# Patient Record
Sex: MALE | Race: WHITE | NOT HISPANIC OR LATINO | Employment: OTHER | URBAN - METROPOLITAN AREA
[De-identification: names, ages, dates, MRNs, and addresses within clinical notes are randomized per-mention and may not be internally consistent; named-entity substitution may affect disease eponyms.]

---

## 2017-03-15 DIAGNOSIS — E78.1 HIGH BLOOD TRIGLYCERIDES: ICD-10-CM

## 2017-03-15 DIAGNOSIS — I10 ESSENTIAL HYPERTENSION, BENIGN: ICD-10-CM

## 2017-03-15 DIAGNOSIS — E78.5 HYPERLIPIDEMIA LDL GOAL <130: ICD-10-CM

## 2017-03-15 RX ORDER — SIMVASTATIN 40 MG
40 TABLET ORAL AT BEDTIME
Qty: 30 TABLET | Refills: 5 | Status: SHIPPED | OUTPATIENT
Start: 2017-03-15 | End: 2017-11-07

## 2017-03-15 RX ORDER — OLMESARTAN MEDOXOMIL 40 MG/1
40 TABLET ORAL DAILY
Qty: 30 TABLET | Refills: 5 | Status: SHIPPED | OUTPATIENT
Start: 2017-03-15 | End: 2017-11-03

## 2017-03-15 NOTE — TELEPHONE ENCOUNTER
Prescription approved per [facility] Refill Protocol.  Clarisa Farrell RN

## 2017-03-15 NOTE — TELEPHONE ENCOUNTER
simvastatin (ZOCOR) 40 MG tablet     Last Written Prescription Date: 11/5/816  Last Fill Quantity: 30, # refills: 3  [place] with [facility] or [facility] prescribing provider: 10/24/2016         Lab Results   Component Value Date    CHOL 163 08/02/2016     Lab Results   Component Value Date    HDL 34 08/02/2016     Lab Results   Component Value Date    LDL  08/02/2016     Cannot estimate LDL when triglyceride exceeds 400 mg/dL    LDL 74 08/02/2016     Lab Results   Component Value Date    TRIG 462 08/02/2016     Lab Results   Component Value Date    CHOLHDLRATIO 5.2 05/15/2015   ________________________________________________________________________________    olmesartan (BENICAR) 40 MG tablet      Last Written Prescription Date: 11/5/16  Last Fill Quantity: 30, # refills: 3  Last [place] with [facility] or [facility] prescribing provider: 10/24/2016       Potassium   Date Value Ref Range Status   08/02/2016 4.3 3.4 - 5.3 mmol/L Final     Creatinine   Date Value Ref Range Status   08/02/2016 0.93 0.66 - 1.25 mg/dL Final     BP Readings from Last 3 Encounters:   10/24/16 142/80   08/23/16 136/80   08/02/16 132/80       OLYA Wilkinson

## 2017-05-05 ENCOUNTER — HOSPITAL ENCOUNTER (INPATIENT)
Facility: CLINIC | Age: 62
LOS: 2 days | Discharge: HOME | DRG: 305 | End: 2017-05-07
Attending: EMERGENCY MEDICINE | Admitting: HOSPITALIST
Payer: COMMERCIAL

## 2017-05-05 ENCOUNTER — APPOINTMENT (OUTPATIENT)
Dept: GENERAL RADIOLOGY | Facility: CLINIC | Age: 62
DRG: 305 | End: 2017-05-05
Attending: EMERGENCY MEDICINE
Payer: COMMERCIAL

## 2017-05-05 DIAGNOSIS — E78.5 HYPERLIPIDEMIA LDL GOAL <130: ICD-10-CM

## 2017-05-05 DIAGNOSIS — I16.1 HYPERTENSIVE EMERGENCY: ICD-10-CM

## 2017-05-05 DIAGNOSIS — I10 ESSENTIAL HYPERTENSION, BENIGN: Primary | ICD-10-CM

## 2017-05-05 DIAGNOSIS — I20.0 UNSTABLE ANGINA (H): ICD-10-CM

## 2017-05-05 LAB
ALBUMIN SERPL-MCNC: 4.2 G/DL (ref 3.4–5)
ALP SERPL-CCNC: 116 U/L (ref 40–150)
ALT SERPL W P-5'-P-CCNC: 48 U/L (ref 0–70)
ANION GAP SERPL CALCULATED.3IONS-SCNC: 9 MMOL/L (ref 3–14)
AST SERPL W P-5'-P-CCNC: 37 U/L (ref 0–45)
BASOPHILS # BLD AUTO: 0 10E9/L (ref 0–0.2)
BASOPHILS NFR BLD AUTO: 0.4 %
BILIRUB SERPL-MCNC: 0.6 MG/DL (ref 0.2–1.3)
BUN SERPL-MCNC: 14 MG/DL (ref 7–30)
CALCIUM SERPL-MCNC: 9.5 MG/DL (ref 8.5–10.1)
CHLORIDE SERPL-SCNC: 105 MMOL/L (ref 94–109)
CO2 SERPL-SCNC: 24 MMOL/L (ref 20–32)
CREAT SERPL-MCNC: 0.95 MG/DL (ref 0.66–1.25)
D DIMER PPP FEU-MCNC: 0.3 UG/ML FEU (ref 0–0.5)
DIFFERENTIAL METHOD BLD: ABNORMAL
EOSINOPHIL # BLD AUTO: 0 10E9/L (ref 0–0.7)
EOSINOPHIL NFR BLD AUTO: 0.3 %
ERYTHROCYTE [DISTWIDTH] IN BLOOD BY AUTOMATED COUNT: 12.3 % (ref 10–15)
GFR SERPL CREATININE-BSD FRML MDRD: 80 ML/MIN/1.7M2
GLUCOSE SERPL-MCNC: 129 MG/DL (ref 70–99)
HBA1C MFR BLD: 5.5 % (ref 4.3–6)
HCT VFR BLD AUTO: 47.4 % (ref 40–53)
HGB BLD-MCNC: 17.1 G/DL (ref 13.3–17.7)
IMM GRANULOCYTES # BLD: 0 10E9/L (ref 0–0.4)
IMM GRANULOCYTES NFR BLD: 0.3 %
INR PPP: 0.99 (ref 0.86–1.14)
INTERPRETATION ECG - MUSE: NORMAL
LIPASE SERPL-CCNC: 139 U/L (ref 73–393)
LMWH PPP CHRO-ACNC: 0.1 IU/ML
LYMPHOCYTES # BLD AUTO: 1.4 10E9/L (ref 0.8–5.3)
LYMPHOCYTES NFR BLD AUTO: 19.3 %
MCH RBC QN AUTO: 33.2 PG (ref 26.5–33)
MCHC RBC AUTO-ENTMCNC: 36.1 G/DL (ref 31.5–36.5)
MCV RBC AUTO: 92 FL (ref 78–100)
MONOCYTES # BLD AUTO: 0.6 10E9/L (ref 0–1.3)
MONOCYTES NFR BLD AUTO: 8.3 %
MRSA DNA SPEC QL NAA+PROBE: NORMAL
NEUTROPHILS # BLD AUTO: 5 10E9/L (ref 1.6–8.3)
NEUTROPHILS NFR BLD AUTO: 71.4 %
NRBC # BLD AUTO: 0 10*3/UL
NRBC BLD AUTO-RTO: 0 /100
PLATELET # BLD AUTO: 218 10E9/L (ref 150–450)
POTASSIUM SERPL-SCNC: 3.9 MMOL/L (ref 3.4–5.3)
PROT SERPL-MCNC: 8.3 G/DL (ref 6.8–8.8)
RBC # BLD AUTO: 5.15 10E12/L (ref 4.4–5.9)
SODIUM SERPL-SCNC: 138 MMOL/L (ref 133–144)
SPECIMEN SOURCE: NORMAL
TROPONIN I BLD-MCNC: 0 UG/L (ref 0–0.1)
TROPONIN I SERPL-MCNC: NORMAL UG/L (ref 0–0.04)
WBC # BLD AUTO: 7 10E9/L (ref 4–11)

## 2017-05-05 PROCEDURE — 20000003 ZZH R&B ICU

## 2017-05-05 PROCEDURE — 96376 TX/PRO/DX INJ SAME DRUG ADON: CPT

## 2017-05-05 PROCEDURE — 36415 COLL VENOUS BLD VENIPUNCTURE: CPT | Performed by: HOSPITALIST

## 2017-05-05 PROCEDURE — 96365 THER/PROPH/DIAG IV INF INIT: CPT

## 2017-05-05 PROCEDURE — 84484 ASSAY OF TROPONIN QUANT: CPT | Performed by: HOSPITALIST

## 2017-05-05 PROCEDURE — 25000132 ZZH RX MED GY IP 250 OP 250 PS 637: Performed by: HOSPITALIST

## 2017-05-05 PROCEDURE — 96366 THER/PROPH/DIAG IV INF ADDON: CPT

## 2017-05-05 PROCEDURE — 87640 STAPH A DNA AMP PROBE: CPT | Performed by: INTERNAL MEDICINE

## 2017-05-05 PROCEDURE — 71020 XR CHEST 2 VW: CPT

## 2017-05-05 PROCEDURE — 84484 ASSAY OF TROPONIN QUANT: CPT

## 2017-05-05 PROCEDURE — 99223 1ST HOSP IP/OBS HIGH 75: CPT | Mod: AI | Performed by: HOSPITALIST

## 2017-05-05 PROCEDURE — 83690 ASSAY OF LIPASE: CPT | Performed by: EMERGENCY MEDICINE

## 2017-05-05 PROCEDURE — 25000132 ZZH RX MED GY IP 250 OP 250 PS 637: Performed by: EMERGENCY MEDICINE

## 2017-05-05 PROCEDURE — 93005 ELECTROCARDIOGRAM TRACING: CPT | Mod: 76

## 2017-05-05 PROCEDURE — 87641 MR-STAPH DNA AMP PROBE: CPT | Performed by: INTERNAL MEDICINE

## 2017-05-05 PROCEDURE — 25000128 H RX IP 250 OP 636: Performed by: EMERGENCY MEDICINE

## 2017-05-05 PROCEDURE — 83036 HEMOGLOBIN GLYCOSYLATED A1C: CPT | Performed by: EMERGENCY MEDICINE

## 2017-05-05 PROCEDURE — 80053 COMPREHEN METABOLIC PANEL: CPT | Performed by: EMERGENCY MEDICINE

## 2017-05-05 PROCEDURE — 93005 ELECTROCARDIOGRAM TRACING: CPT

## 2017-05-05 PROCEDURE — 85610 PROTHROMBIN TIME: CPT | Performed by: EMERGENCY MEDICINE

## 2017-05-05 PROCEDURE — 85520 HEPARIN ASSAY: CPT | Performed by: EMERGENCY MEDICINE

## 2017-05-05 PROCEDURE — 85025 COMPLETE CBC W/AUTO DIFF WBC: CPT | Performed by: EMERGENCY MEDICINE

## 2017-05-05 PROCEDURE — 99285 EMERGENCY DEPT VISIT HI MDM: CPT | Mod: 25

## 2017-05-05 PROCEDURE — 85379 FIBRIN DEGRADATION QUANT: CPT | Performed by: EMERGENCY MEDICINE

## 2017-05-05 RX ORDER — LABETALOL HYDROCHLORIDE 5 MG/ML
15 INJECTION, SOLUTION INTRAVENOUS ONCE
Status: DISCONTINUED | OUTPATIENT
Start: 2017-05-05 | End: 2017-05-05

## 2017-05-05 RX ORDER — CARVEDILOL 12.5 MG/1
12.5 TABLET ORAL 2 TIMES DAILY WITH MEALS
Status: DISCONTINUED | OUTPATIENT
Start: 2017-05-05 | End: 2017-05-07 | Stop reason: HOSPADM

## 2017-05-05 RX ORDER — HYDRALAZINE HYDROCHLORIDE 20 MG/ML
10 INJECTION INTRAMUSCULAR; INTRAVENOUS EVERY 4 HOURS PRN
Status: DISCONTINUED | OUTPATIENT
Start: 2017-05-05 | End: 2017-05-06

## 2017-05-05 RX ORDER — ONDANSETRON 2 MG/ML
4 INJECTION INTRAMUSCULAR; INTRAVENOUS EVERY 6 HOURS PRN
Status: DISCONTINUED | OUTPATIENT
Start: 2017-05-05 | End: 2017-05-07 | Stop reason: HOSPADM

## 2017-05-05 RX ORDER — ONDANSETRON 4 MG/1
4 TABLET, ORALLY DISINTEGRATING ORAL EVERY 6 HOURS PRN
Status: DISCONTINUED | OUTPATIENT
Start: 2017-05-05 | End: 2017-05-07 | Stop reason: HOSPADM

## 2017-05-05 RX ORDER — BIOTIN 10 MG
1 TABLET ORAL DAILY
COMMUNITY

## 2017-05-05 RX ORDER — LIDOCAINE 40 MG/G
CREAM TOPICAL
Status: DISCONTINUED | OUTPATIENT
Start: 2017-05-05 | End: 2017-05-07 | Stop reason: HOSPADM

## 2017-05-05 RX ORDER — NITROGLYCERIN 0.4 MG/1
0.4 TABLET SUBLINGUAL EVERY 5 MIN PRN
Status: DISCONTINUED | OUTPATIENT
Start: 2017-05-05 | End: 2017-05-07 | Stop reason: HOSPADM

## 2017-05-05 RX ORDER — ASPIRIN 81 MG/1
81 TABLET ORAL DAILY
Status: DISCONTINUED | OUTPATIENT
Start: 2017-05-06 | End: 2017-05-07 | Stop reason: HOSPADM

## 2017-05-05 RX ORDER — NITROGLYCERIN 20 MG/100ML
.07-2 INJECTION INTRAVENOUS CONTINUOUS
Status: DISCONTINUED | OUTPATIENT
Start: 2017-05-05 | End: 2017-05-06

## 2017-05-05 RX ORDER — NALOXONE HYDROCHLORIDE 0.4 MG/ML
.1-.4 INJECTION, SOLUTION INTRAMUSCULAR; INTRAVENOUS; SUBCUTANEOUS
Status: DISCONTINUED | OUTPATIENT
Start: 2017-05-05 | End: 2017-05-06

## 2017-05-05 RX ORDER — AMOXICILLIN 250 MG
1-2 CAPSULE ORAL 2 TIMES DAILY PRN
Status: DISCONTINUED | OUTPATIENT
Start: 2017-05-05 | End: 2017-05-07 | Stop reason: HOSPADM

## 2017-05-05 RX ORDER — LOSARTAN POTASSIUM 100 MG/1
100 TABLET ORAL DAILY
Status: DISCONTINUED | OUTPATIENT
Start: 2017-05-05 | End: 2017-05-07 | Stop reason: HOSPADM

## 2017-05-05 RX ORDER — SIMVASTATIN 40 MG
40 TABLET ORAL AT BEDTIME
Status: DISCONTINUED | OUTPATIENT
Start: 2017-05-05 | End: 2017-05-07 | Stop reason: HOSPADM

## 2017-05-05 RX ADMIN — NITROGLYCERIN 0.4 MG: 0.4 TABLET SUBLINGUAL at 12:19

## 2017-05-05 RX ADMIN — NITROGLYCERIN 0.4 MG: 0.4 TABLET SUBLINGUAL at 12:41

## 2017-05-05 RX ADMIN — Medication 2200 UNITS: at 22:03

## 2017-05-05 RX ADMIN — Medication 4400 UNITS: at 14:23

## 2017-05-05 RX ADMIN — SIMVASTATIN 40 MG: 40 TABLET, FILM COATED ORAL at 21:58

## 2017-05-05 RX ADMIN — NITROGLYCERIN 0.4 MG: 0.4 TABLET SUBLINGUAL at 12:56

## 2017-05-05 RX ADMIN — NITROGLYCERIN 0.07 MCG/KG/MIN: 20 INJECTION INTRAVENOUS at 14:14

## 2017-05-05 RX ADMIN — LOSARTAN POTASSIUM 100 MG: 100 TABLET, FILM COATED ORAL at 22:04

## 2017-05-05 RX ADMIN — CARVEDILOL 12.5 MG: 12.5 TABLET, FILM COATED ORAL at 21:58

## 2017-05-05 RX ADMIN — HEPARIN SODIUM 900 UNITS/HR: 10000 INJECTION, SOLUTION INTRAVENOUS at 14:12

## 2017-05-05 RX ADMIN — HEPARIN SODIUM 900 UNITS/HR: 10000 INJECTION, SOLUTION INTRAVENOUS at 14:11

## 2017-05-05 ASSESSMENT — ENCOUNTER SYMPTOMS
NAUSEA: 1
BACK PAIN: 1
SHORTNESS OF BREATH: 1

## 2017-05-05 NOTE — PHARMACY-ADMISSION MEDICATION HISTORY
Admission medication history interview status for this patient is complete. See [facility] admission navigator for allergy information, prior to admission medications and immunization status.     Medication history interview source(s):Patient  Medication history resources (including written lists, pill bottles, clinic record):None    Changes made to PTA medication list:  Added: none  Deleted: robitussin  Changed: none    Actions taken by pharmacist (provider contacted, etc):None     Additional medication history information:    **Patient stopped taking simvastatin & olmesartan a few weeks ago (was not per PCP instruction). Left on PTA med list for MD to address.      Medication reconciliation/reorder completed by provider prior to medication history? No        For patients on insulin therapy: No  Lantus/levemir/NPH/Mix 70/30 dose:  _____   in AM/PM  or twice daily   Sliding scale Novolog Y/N  If Yes, do you have a baseline novolog pre-meal dose:  ______units with meals   Patients eat three meals a day:   Y/N     Any Barriers to therapy:  cost of medications/comfortable with giving injections (if applicable)/ comfortable and confident with current diabetes regimen       Prior to Admission medications    Medication Sig Last Dose Taking? Auth Provider   Multiple Vitamins-Minerals (MULTIVITAMIN ADULT) CHEW Take 1 chew tab by mouth daily 5/5/2017 at am Yes Unknown, Entered By History   simvastatin (ZOCOR) 40 MG tablet Take 1 tablet (40 mg) by mouth At Bedtime Past Month at stopped few weeks ago Yes Trish Edwards MD   olmesartan (BENICAR) 40 MG tablet Take 1 tablet (40 mg) by mouth daily Past Month at Stopped few weeks ago Yes Trish Edwards MD   aspirin 81 MG tablet Take 1 tablet by mouth daily. 5/5/2017 at 0800 Yes Reported, Patient   colchicine (COLCRYS) 0.6 MG tablet Take 2 tablets at the first sign of flare, take 1 additional tablet one hour later. More than a month at Unknown time  Trish Edwards  MD Radha

## 2017-05-05 NOTE — IP AVS SNAPSHOT
[facility]    [address]    [facility] [address]    Phone:  [phone]    Fax:  [phone]                                       After Visit Summary   5/5/2017    Emery Zayas    MRN: [id number]           After Visit Summary Signature Page     I have received my discharge instructions, and my questions have been answered. I have discussed any challenges I see with this plan with the nurse or doctor.    ..........................................................................................................................................  Patient/Patient Representative Signature      ..........................................................................................................................................  Patient Representative Print Name and Relationship to Patient    ..................................................               ................................................  Date                                            Time    ..........................................................................................................................................  Reviewed by Signature/Title    ...................................................              ..............................................  Date                                                            Time

## 2017-05-05 NOTE — IP AVS SNAPSHOT
MRN:[id number]                      After Visit Summary   5/5/2017    Emery Zayas    MRN: [id number]           Thank you!     Thank you for choosing [facility] for your care. Our goal is always to provide you with excellent care. Hearing back from our patients is one way we can continue to improve our services. Please take a few minutes to complete the written survey that you may receive in the mail after you visit. If you would like to speak to someone directly about your visit please contact Patient Relations at [phone]. Thank you!          Patient Information     Date Of Birth          1955        Designated Caregiver       Most Recent Value    Caregiver    Will someone help with your care after discharge? yes    Name of designated caregiver John self    Phone number of caregiver [phone]    Caregiver address xxxx [xxxx]      About your hospital stay     You were admitted on:  May 5, 2017 You last received care in the:  [facility]ensive [facility] Unit    You were discharged on:  May 7, 2017        Reason for your hospital stay       Hypertensive emergency and chest pain.                  Who to Call     For medical emergencies, please call 911.  For non-urgent questions about your medical care, please call your primary care provider or clinic, [phone]          Attending Provider     Provider Specialty    Chuck Jesus MD Emergency Medicine    ChaparroJustino dinero MD Family Practice       Primary Care Provider Office Phone # Fax #    Trish Radha Edwards [phone]       [facility] [address]  [address]   [facility] [address]        After Care Instructions     Activity       Your activity upon discharge: activity as tolerated            Diet       Follow this diet upon discharge: Orders Placed This Encounter      Low Saturated Fat Na <2400 mg                  Follow-up Appointments     Follow-up and recommended  "labs and tests        Follow up with primary care provider, Trish Edwards, within 7 days for hospital follow- up.  No follow up labs or test are needed.                  Additional Services     Follow-Up with Cardiologist                 Pending Results     No orders found from 5/3/2017 to 2017.            Statement of Approval     Ordered          17 1105  I have reviewed and agree with all the recommendations and orders detailed in this document.  EFFECTIVE NOW     Approved and electronically signed by:  Justino Chaparro MD             Admission Information     Date & Time Provider Department Dept. Phone    2017 Justino Chaparro MD [facility] Intensive Care Unit [phone]      Your Vitals Were     Blood Pressure Pulse Temperature Respirations Height Weight    121/84 100 97.6  F (36.4  C) (Oral) 12 1.676 m (5' 6\") 86.2 kg (190 lb 0.6 oz)    Pulse Oximetry BMI (Body Mass Index)                96% 30.67 kg/m2          MyChart Information     [employer] lets you send messages to your doctor, view your test results, renew your prescriptions, schedule appointments and more. To sign up, go to [email or web address] . Click on \"Log in\" on the left side of the screen, which will take you to the Welcome page. Then click on \"Sign up Now\" on the right side of the page.     You will be asked to enter the access code listed below, as well as some personal information. Please follow the directions to create your username and password.     Your access code is: [email or web address]  Expires: 2017 11:08 AM     Your access code will  in 90 days. If you need help or a new code, please call your [facility] clinic or [phone].        Care EveryWhere ID     This is your Care EveryWhere ID. This could be used by other organizations to access your [facility] medical records  [id number]           Review of your medicines      START taking        Dose / Directions    carvedilol 12.5 MG tablet   Commonly " known as:  COREG   Used for:  Essential hypertension, benign        Dose:  12.5 mg   Take 1 tablet (12.5 mg) by mouth 2 times daily (with meals)   Quantity:  60 tablet   Refills:  0       fenofibrate 54 MG tablet   Used for:  Hyperlipidemia LDL goal <130        Dose:  54 mg   Take 1 tablet (54 mg) by mouth daily   Quantity:  30 tablet   Refills:  0         CONTINUE these medicines which have NOT CHANGED        Dose / Directions    aspirin 81 MG tablet        Dose:  1 tablet   Take 1 tablet by mouth daily.   Refills:  0       colchicine 0.6 MG tablet   Commonly known as:  COLCRYS   Used for:  Idiopathic gout, unspecified chronicity, unspecified site        Take 2 tablets at the first sign of flare, take 1 additional tablet one hour later.   Quantity:  30 tablet   Refills:  3       MULTIVITAMIN ADULT Chew        Dose:  1 chew tab   Take 1 chew tab by mouth daily   Refills:  0       olmesartan 40 MG tablet   Commonly known as:  BENICAR   Used for:  Essential hypertension, benign        Dose:  40 mg   Take 1 tablet (40 mg) by mouth daily   Quantity:  30 tablet   Refills:  5       simvastatin 40 MG tablet   Commonly known as:  ZOCOR   Used for:  High blood triglycerides, Hyperlipidemia LDL goal <130        Dose:  40 mg   Take 1 tablet (40 mg) by mouth At Bedtime   Quantity:  30 tablet   Refills:  5            Where to get your medicines      These medications were sent to [facility] [address] [place] [id number]    Hours:  24-hours Phone:  [phone]     carvedilol 12.5 MG tablet    fenofibrate 54 MG tablet                Protect others around you: Learn how to safely use, store and throw away your medicines at www.disposemymeds.org.             Medication List: This is a list of all your medications and when to take them. Check marks below indicate your daily home schedule. Keep this list as a reference.       Medications           Morning Afternoon Evening Bedtime As Needed    aspirin 81 MG tablet   Take 1 tablet by mouth daily.                                carvedilol 12.5 MG tablet   Commonly known as:  COREG   Take 1 tablet (12.5 mg) by mouth 2 times daily (with meals)   Last time this was given:  12.5 mg on 5/7/2017  8:31 AM                                colchicine 0.6 MG tablet   Commonly known as:  COLCRYS   Take 2 tablets at the first sign of flare, take 1 additional tablet one hour later.                                fenofibrate 54 MG tablet   Take 1 tablet (54 mg) by mouth daily   Last time this was given:  54 mg on 5/7/2017  8:31 AM                                MULTIVITAMIN ADULT Chew   Take 1 chew tab by mouth daily                                olmesartan 40 MG tablet   Commonly known as:  BENICAR   Take 1 tablet (40 mg) by mouth daily                                simvastatin 40 MG tablet   Commonly known as:  ZOCOR   Take 1 tablet (40 mg) by mouth At Bedtime   Last time this was given:  40 mg on 5/6/2017  9:11 PM                                          More Information        High Cholesterol: Assessing Your Risk    Have you been told that your cholesterol is too high? If so, you could be heading for a heart attack, also known as acute myocardial infarction, or AMI, or stroke without knowing it. This is especially true if you have other risk factors for heart disease. Get smart about cholesterol and your heart disease risk. This sheet can help you understand your heart disease risk and how your cholesterol level affects it. Talk to your health care provider about how to get started controlling your cholesterol.  Why is high cholesterol a problem?  Blood cholesterol is a fatty substance. It travels through the bloodstream. When blood cholesterol is high, it forms plaque. The plaque builds up in the walls of arteries (blood vessels that carry blood from the heart to the body). This narrows the  opening for blood flow. Over time, this can lead to coronary artery disease, heart attack, or stroke.  3 steps to assessing your risk  Step 1. Find your risk factors for heart disease  How your cholesterol numbers affect your heart health depends on other risk factors for heart attack and stroke. Check off each risk factor below that applies to you:    Are you a man 45 years old or older or a woman 55 years old or older?    Does your family have a history of heart problems? This includes heart attack, coronary heart disease, or atherosclerosis.    Do you have high blood pressure? Are you on blood pressure medication?    Do you smoke?    Do you have diabetes?  Step 2. Test your cholesterol  Cholesterol testing most often needs no preparation. Sometimes you may be asked to fast (not eat) before your test. A blood sample is taken and sent to a lab. There, the amount of cholesterol and triglyceride in your blood is measured. There are 2 types of cholesterol in the sample. The first is HDL ( good cholesterol ). The second is LDL ( bad cholesterol ). Cholesterol test results are most often shown as the total of HDL and LDL cholesterol numbers. You may also be told the separate HDL and LDL cholesterol results.  Fill in your numbers below.  HDL cholesterol:                           LDL cholesterol:                         Total cholesterol:                         Triglyceride:                           Step 3. Discuss the results with your health care provider   If your cholesterol levels are higher than normal, your health care provider will help you with steps to take to lower your levels. Steps may include lifestyle changes like diet, physical activity, and quitting smoking, and medication.      8946-7308 [employer]. [address] [place] [address], [place] [address]. All rights reserved. This information is not intended as a substitute for professional medical care. Always follow your healthcare professional's  instructions.                Medication for Cholesterol Control  Cholesterol is a waxy substance in your bloodstream. If there is too much of it in your blood, it can build up in the walls of your arteries. Medication can give you the extra help you need to control your cholesterol.  How medication helps  There are different kinds of medications to help with cholesterol levels. Some help lower your LDL (bad cholesterol). Some help raise your HDL (good cholesterol). And some do both. It may take some time to find the right medication for you. Taking medication will be only one part of your cholesterol control plan. You will still need to eat right and get regular exercise.  Taking your medication  It is important to:    Tell your doctor about any other medications you take. This includes over-the-counter medications. It also includes vitamins and herbs.    Take your medication exactly as directed. This helps ensure that it works as it should.    Do not skip a dose.    Do not stop taking it if you feel better.    Do not stop taking it when your cholesterol numbers improve.    Order your refill before your medication runs out.  Side effects  Medications can cause side effects. These often occur at the start of taking a new medication. Side effects can include headache and upset stomach. These should go away in a few weeks. Tell your health care provider about any side effects you have.  When to call your health care provider  When taking your medication, let your health care provider know if you have:    Yellowing of the whites of eyes    Blurred vision    Muscle aches    Trouble breathing     7624-3282 [employer]. [address] [place] [address], [place] [address]. All rights reserved. This information is not intended as a substitute for professional medical care. Always follow your healthcare professional's instructions.                Understanding Your Cholesterol Numbers  The higher your blood cholesterol,  the greater your risk for heart attack, also known as acute myocardial infarction, or AMI, or stroke. That s why you need to know your cholesterol level. If it s high, you can take steps to bring it down. Eating the right foods and getting enough exercise can help. Some people also need medication to control their cholesterol. Your health care provider can help you get started on a plan to control your cholesterol.        Blood flows easily when arteries are clear.      Less blood flows when cholesterol builds up in artery walls.   Checking your cholesterol  Your cholesterol is checked with a simple blood test. The results tell you how much cholesterol you have in your blood. Get checked as often as your health care provider suggests. As you work to lower your cholesterol, your numbers will change slowly. But they will change. Be patient and stay on track. Discuss your numbers with your health care provider.   Your total cholesterol number  A blood test will give you a number for the total amount of cholesterol in your blood. The higher this number, the more likely it is that cholesterol will build up in your blood vessels. Even if your cholesterol is just slightly high, you are at increased risk for health problems.  My total cholesterol is: ________________  Your lipid numbers  Total cholesterol is just 1 part of the story. Cholesterol is made up of different kinds of fats, or  lipids.  If your total cholesterol is high, knowing your lipid profile is important. The 2 most important lipids are HDL and LDL. Lipids are checked during a  fasting  blood test (you don t eat for a certain amount of time before the test is done). And along with cholesterol, triglyceride (another type of fat) can also lead to blocked arteries. So, knowing your HDL, LDL, and triglyceride numbers as well as your total cholesterol gives you a more complete picture of your cholesterol level:    HDL is called the  good  cholesterol. It moves  "out of the bloodstream and does not block your blood vessels. HDL levels are affected by how much you exercise and what you eat.My HDL cholesterol is: ________________    LDL is called the  bad  cholesterol. This is because it can stick to your artery walls and block blood flow. LDL levels are most affected by what you eat.My LDL cholesterol is: ________________    Triglyceride is a type of fat the body uses to store energy. Too much triglyceride can increase your risk for heart disease. Triglyceride levels should be under 150.My triglyceride is:  ________________    2251-9235 [employer]. [address] [place] [address], [place] [address]. All rights reserved. This information is not intended as a substitute for professional medical care. Always follow your healthcare professional's instructions.                Controlling Your Cholesterol  Cholesterol is a waxy substance. It travels in your blood through the blood vessels. When you have high cholesterol, it builds up in the walls of the blood vessels. This makes the vessels narrower. Blood flow decreases. You are then at greater risk for having a heart attack or a stroke.  Good and bad cholesterol  Lipids are fats. Blood is mostly water. Fat and water don't mix. So our bodies need lipoproteins (lipids inside a protein shell) to carry the lipids. The protein shell carries its lipids through the bloodstream. There are two main kinds of lipoproteins:    LDL (low-density lipoprotein) is known as \"bad cholesterol.\" It mainly carries cholesterol. It delivers this cholesterol to body cells. Excess LDL cholesterol will build up in artery walls. This increases your risk for heart disease and stroke.    HDL (high-density lipoprotein) is known as \"good cholesterol.\" This protein shell collects excess cholesterol that LDLs have left behind on blood vessel walls. That's why high levels of HDL cholesterol can decrease your risk of heart disease and stroke.  Controlling " cholesterol levels  Total cholesterol includes LDL and HDL cholesterol, as well as other fats in the bloodstream. If your total cholesterol is high, follow the steps below to help lower your total cholesterol level:    Eat less unhealthy fat:    Cut back on saturated fats and trans (also called hydrogenated) fats by selecting lean cuts of meat, low-fat dairy, and using oils instead of solid fats. Limit baked goods, processed meats, and fried foods. A diet that s high in these fats increases your bad cholesterol. It's not enough to just cut back on foods containing cholesterol.    Eat about 2 servings of fish per week. Most fish contain omega-3 fatty acids. These help lower blood cholesterol.    Eat more whole grains and soluble fiber (such as oat bran). These lower overall cholesterol.    Be active:    Choose an activity you enjoy. Walking, swimming, and riding a bike are some good ways to be active.    Start at a level where you feel comfortable. Increase your time and pace a little each week.    Work up to 40 minutes of moderate to high intensity physical activity at least 3 to 4 days per week.    Remember, some activity is better than none.    If you haven't been exercising regularly, start slowly. Check with your doctor to make sure the exercise plan is right for you.    Quit smoking. Quitting smoking can improve your lipid levels. It also lowers your risk for heart disease and stroke.    Weight management. If you are overweight or obese, your health care provider will work with you to lose weight and lower your BMI (body mass index) to a normal or near-normal level. Making diet changes and increasing physical activity can help.    Take medication as directed. Many people need medication to get their LDL levels to a safe level. Medication to lower cholesterol levels is effective and safe. (But taking medication is not a substitute for exercise or watching your diet!) Your doctor can tell you whether you might  benefit from a cholesterol-lowering medication.    1637-1514 [employer]. [address] [place] [address], [place] [address]. All rights reserved. This information is not intended as a substitute for professional medical care. Always follow your healthcare professional's instructions.                Lifestyle Changes to Control Cholesterol  Diet, exercise, weight management, quitting smoking, stress management, and taking your medications right can help you control your cholesterol.    Diet  Your health care provider will give you information on changes to your diet you may need to make, based on your situation. Your provider may recommend that you see a registered dietitian for help with diet changes. Changes may include:    Reducing the amount of fat and cholesterol in your meals    Reducing the amount of sodium (salt) in your food, especially if you have high blood pressure    Eating more fresh vegetables and fruits    Eating lean proteins, such as fish, poultry, and legumes (beans and peas), and eating less red meat and processed meats    Using low-fat dairy products    Using vegetable and nut oils in limited amounts    Limiting how many sweets and processed foods like chips, cookies, and baked goods that you eat   Exercise  Regular exercise is a good way to help your body control cholesterol. Regular exercise has many benefits. It can:    Raise your good cholesterol.    Help lower your bad cholesterol.    Let blood flow better through your body.    Give more oxygen to your muscles and tissues.    Help you manage your weight.  Your health care provider may recommend that you get more physical activity if you haven't been active. Depending on your situation, your provider may recommend that you get moderate to vigorous physical activity for at least 40 minutes each day and for at least 3 to 4 days each week. A few examples of moderate to vigorous activity include:    Walking at a brisk pace, about 3 to 4  miles per hour    Jogging or running    Swimming or water aerobics    Hiking    Dancing    Martial arts    Tennis    Riding a bicycle or stationary bike    Dancing  Weight management  If you are overweight or obese, your health care provider will work with you to help you lose weight and lower your BMI (body mass index) to a normal or near-normal level. Making diet changes and getting more physical activity can help.    Quitting smoking  Smoking and other tobacco use can raise cholesterol and make it harder to control. Quitting is tough. But millions of people have given up tobacco for good. You can quit, too! Think about some of the reasons below to quit smoking. Do any of them make you think twice about your smoking habit?  Stop smoking because it:    Keeps your cholesterol high, even if you make all the other changes you re supposed to.    Damages your body, especially your heart, lungs, and blood vessels.    Makes you more likely to have a heart attack (also known as acute myocardial infarction, or AMI), stroke, or cancer.    Stains your teeth and makes your skin, clothes, and breath smell bad.    Costs a lot of money.  Stress   Learn stress-management techniques to help you deal with stress in your home and work life.   Making the most of medications  Healthy eating and exercise are a good start to keeping your cholesterol down. But you may need some extra help from medication. If your doctor prescribes medication, be sure to take it exactly as directed. Remember:    Tell your doctor about all other medications you take, including vitamins and herbs.    Tell your doctor if you have any side effects after starting to take a medication. Examples of side effects to watch for include: muscle aches, weakness, blurred vision, rust-colored urine, yellowing of eyes or skin (jaundice), or headache.    Don t skip a dose or stop taking your medication because you feel better or because your cholesterol numbers go down.  Never stop taking your medication unless your doctor has told you it s OK.    3651-4066 [employer]. [address] [place] [address], [place] [address]. All rights reserved. This information is not intended as a substitute for professional medical care. Always follow your healthcare professional's instructions.                Discharge Instructions: Taking Your Blood Pressure  Blood pressure is the force of blood as it moves from the heart through the blood vessels. You can take your own blood pressure reading using a digital monitor. Take readings as often as your health care provider instructs. Take your readings each time in the same way, using the same arm. Here are guidelines for taking your blood pressure.  1. Relax      Wait at least a half hour after smoking, eating, or exercising. Do not drink coffee, tea, soda, or other caffeinated beverages before checking your blood pressure.     Sit comfortably at a table. Place the monitor near you.    Rest for a few minutes before you begin.  2. Wrap the cuff      Place your arm on the table, palm up. Put your arm in a position that is level with your heart. Wrap the cuff around your upper arm, just above your elbow. It s best to wrap the cuff on bare skin, not over clothing.    Make sure your cuff fits. If it doesn t wrap around your upper arm, order a larger cuff. A cuff that is too large or too small can result in an inaccurate blood pressure reading.  3. Inflate the cuff      Pump the cuff until the scale reads 200. If you have a self-inflating cuff, push the button that starts the pump.    The cuff will tighten, then loosen.    The numbers will change. When they stop changing, your blood pressure reading will appear.    If you get a reading that is too high or too low for you, relax for a few minutes. Then do the test again.    4. Write down the results    Write down your blood pressure numbers. Jasbir the date and time. Keep your results in one place, such as  a notebook.    Remove the cuff from your arm. Turn off the machine.       4302-1444 [employer]. [address] [place] [address]. All rights reserved. This information is not intended as a substitute for professional medical care. Always follow your healthcare professional's instructions.                Taking Your Blood Pressure  Blood pressure is the force of blood as it moves from the heart through the blood vessels. You can take your own blood pressure reading using a digital monitor. Take readings as often as your doctor instructs. Take each reading at the same time of day.  Step 1. Relax      Wait at least a half-hour after smoking, eating, or exercising.    Sit comfortably at a table. Place the monitor near you.    Rest for a few minutes before you begin.  Step 2. Wrap the Cuff      Place your arm on the table, palm up. Your arm should be at the level of your heart. Wrap the cuff around your upper arm, just above your elbow. It s best done on bare skin, not over clothing.    Make sure your cuff fits. If it doesn t wrap around your upper arm, order a larger cuff.  Step 3. Inflate the Cuff      Pump the cuff until the scale reads 160. If you have a self-inflating cuff, push the button that starts the pump.    The cuff will tighten, then loosen.    The numbers will change. When they stop changing, your blood pressure reading will appear.    If you get a reading that is too high or too low for you, relax for a few minutes. Then do the test again.  Step 4. Write Down the Results      Write down your blood pressure numbers. Note the date and time. Keep your results in one place, such as a notebook.    Remove the cuff from your arm. Turn off the machine.    7403-9636 [employer]. [address] [place] [address], [place] [address]. All rights reserved. This information is not intended as a substitute for professional medical care. Always follow your healthcare professional's  instructions.                Established High Blood Pressure    High blood pressure (hypertension) is a chronic disease. Often health care providers don t know what causes it. But it can be caused by certain health conditions and medicines.  If you have high blood pressure, you may not have any symptoms. If you do have symptoms, they may include headache, dizziness, changes in your vision, chest pain, and shortness of breath. But even without symptoms, high blood pressure that s not treated raises your risk for heart attack and stroke. High blood pressure is a serious health risk and shouldn t be ignored.  A blood pressure reading is made up of two numbers: a higher number over a lower number. The top number is the systolic pressure. The bottom number is the diastolic pressure. A normal blood pressure is less than 120 over less than 80.  High blood pressure is when either the top number is 140 or higher, or the bottom number is 90 or higher. This must be the result when taking your blood pressure a number of times. The blood pressures between normal and high are called prehypertension.  Home care  If you have high blood pressure, you should do what is listed below to lower your blood pressure. If you are taking medicines for high blood pressure, these methods may reduce or end your need for medicines in the future.    Begin a weight-loss program if you are overweight.    Cut back on how much salt you get in your diet. Here s how to do this:    Don t eat foods that have a lot of salt. These include olives, pickles, smoked meats, and salted potato chips.    Don t add salt to your food at the table.    Use only small amounts of salt when cooking.    Begin an exercise program. Talk with your health care provider about the type of exercise program that would be best for you. It doesn't have to be hard. Even brisk walking for 20 minutes 3 times a week is a good form of exercise.    Don t take medicines that have heart  stimulants. This includes many cold and sinus decongestant pills and sprays, as well as diet pills. Check the warnings about hypertension on the label. Stimulants such as amphetamine or cocaine could be lethal for someone with high blood pressure. Never take these.    Limit how much caffeine you get in your diet. Switch to caffeine-free products.    Stop smoking. If you are a long-time smoker, this can be hard. Enroll in a stop-smoking program to make it more likely that you will quit for good.    Learn how to handle stress. This is an important part of any program to lower blood pressure. Learn about relaxation methods like meditation, yoga, or biofeedback.    If your provider prescribed medicines, take them exactly as directed. Missing doses may cause your blood pressure get out of control.    Consider buying an automatic blood pressure machine. You can get one of these at most pharmacies. Use this to watch your blood pressure at home. Give the results to your provider.  Follow-up care  You will need to make regular visits to your health care provider. This is to check your blood pressure and to make changes to your medicines. Make a follow-up appointment as directed.  When to seek medical advice  Call your health care provider right away if any of these occur:    Chest pain or shortness of breath    Severe headache    Throbbing or rushing sound in the ears    Nosebleed    Sudden severe pain in your belly (abdomen)    Extreme drowsiness, confusion, or fainting    Dizziness or dizziness with a spinning sensation (vertigo)    Weakness of an arm or leg or one side of the face    You have problems speaking or seeing     [id number] [employer]. [address] [place] [address], [place] [address]. All rights reserved. This information is not intended as a substitute for professional medical care. Always follow your healthcare professional's instructions.

## 2017-05-05 NOTE — H&P
[facility]    History and Physical  Hospitalist     Date of Admission:  5/5/2017  Date of Service (when I saw the patient): 05/05/17  Provider: Justino Chaparro MD    Assessment & Plan   Emery Zayas is a 61 year old male who presents with uncontrolled hypertension and new onset of chest pain radiating to the left arm.     1. Hypertensive emergency secondary to medication noncompliance.  2. Chest pain suspicious for angina in the setting of #1.  3. History of essential hypertension.  4. History of hyperlipidemia.  5. History of chronic low back pain.  6. History of gout.  7.Obesity    Plan.  - Admit to ICU. Telemetry.  - Continue nitroglycerin drip.    - Continue  heparin drip.  - start carvedilol 12.5 mg q.12 hours.  - resume Benicar.  - baby aspirin.  - complete serial troponin  - check lipid panel in the morning  - obtain echocardiogram.    Code Status   Full Code    Primary Care Physician   Trish Edwards    Chief Complaint   Chest pain     History is obtained from the patient, electronic health record and emergency department physician    History of Present Illness   Emery Zayas is a 61 year old male past medical history of hypertension, hyperlipidemia, gout,chronic low back pain who presents with chest pain of new onset. Early in the morning today he had pain in his left arm, and he blamed his position in bed during the nighttime.  Around  10:30 AM, he and his wife went to visit the library and suddenly he felt pain in the center of his chest, tightness type, 5-6/10 that radiated to the left arm. It is associated to mild shortness of breath.  No headache,nausea,sweatiness,near fainting sensations or palpitations.  He has a history of hypertension and he did not take his treatment for the last few weeks. Historically his blood pressure has not been at goal. According to the patient, his systolic blood pressure runs around 145 or more. And his diastolic blood pressure  fluctuates between 90 and 100.  He is not restricting the salt in his diet. After the preliminary treatment here in the emergency department, he feels pain-free. At the moment of my visit he is in fact asymptomatic.  Lab workup has been ordered and it is not very revealing.  Except for blood sugar of 126 the rest of the values are within normal limits. First troponin is negative.  EKG did not show any ST abnormality. He has RBBB pattern in his baseline.      Past Medical History    I have reviewed this patient's medical history and updated it with pertinent information if needed.   Past Medical History:   Diagnosis Date     Alcoholism (H)     Treated 1980's, denies any recent problems with withdrawal when skips days drinking.     Arthritis      Chronic low back pain      Gout     possible     Herniated intervertebral disk     thoracic     Hypertension      RBBB (right bundle branch block)     Present for several years         Past Surgical History   I have reviewed this patient's surgical history and updated it with pertinent information if needed.  Past Surgical History:   Procedure Laterality Date     BACK SURGERY       Back surgery x 4       ESOPHAGOSCOPY, GASTROSCOPY, DUODENOSCOPY (EGD), COMBINED  2000    normal     TONSILLECTOMY         Prior to Admission Medications   Prior to Admission Medications   Prescriptions Last Dose Informant Patient Reported? Taking?   Multiple Vitamins-Minerals (MULTIVITAMIN ADULT) CHEW 5/5/2017 at am  Yes Yes   Sig: Take 1 chew tab by mouth daily   aspirin 81 MG tablet 5/5/2017 at 0800 Self Yes Yes   Sig: Take 1 tablet by mouth daily.   colchicine (COLCRYS) 0.6 MG tablet More than a month at Unknown time  No No   Sig: Take 2 tablets at the first sign of flare, take 1 additional tablet one hour later.   olmesartan (BENICAR) 40 MG tablet Past Month at Stopped few weeks ago  No Yes   Sig: Take 1 tablet (40 mg) by mouth daily   simvastatin (ZOCOR) 40 MG tablet Past Month at stopped few  weeks ago  No Yes   Sig: Take 1 tablet (40 mg) by mouth At Bedtime      Facility-Administered Medications: None     Allergies   Allergies   Allergen Reactions     No Known Drug Allergies        Social History   I have personally reviewed the social history with the patient showing he quit smoking long time ago, he likes to drink in social occasions . He is  and his wife is present in the room.    Family History   I have reviewed this patient's family history and updated it with pertinent information if needed.   Family History   Problem Relation Age of Onset     Hypertension Father      HEART DISEASE Father      Angioplasty in late 60's or early 70's.     Unknown/Adopted Father      Hypertension Sister      KIDNEY DISEASE Sister      Unknown/Adopted Sister      CANCER Mother      lung     Unknown/Adopted Mother      Thyroid Disease Sister      Unknown/Adopted Sister      Unknown/Adopted Maternal Grandmother      Substance Abuse Maternal Grandfather      Depression Paternal Grandmother      Unknown/Adopted Paternal Grandfather      DIABETES Mother      DIABETES Sister        Review of Systems   Ten points ROS done and pertinent as per HPI, otherwise negative.    Physical Exam   Vital Signs with Ranges  Temp:  [98  F (36.7  C)] 98  F (36.7  C)  Pulse:  [100] 100  Heart Rate:  [] 82  Resp:  [18] 18  BP: (122-200)/() 156/93  SpO2:  [94 %-100 %] 94 %  195 lbs 0 oz    GEN: Obese. Alert, oriented x 3, appears comfortable, NAD.  HEENT:  Normocephalic/atraumatic, no scleral icterus, no nasal discharge, mouth moist.  CV:  Regular rate and rhythm, no murmur or JVD.  S1 + S2 noted, no S3 or S4.  LUNGS:  Clear to auscultation bilaterally without rales/rhonchi/wheezing/retractions.  Symmetric chest rise on inhalation noted.  ABD:  Active bowel sounds, soft, non-tender/non-distended.  No rebound/guarding/rigidity.  EXT:  No edema or cyanosis.  No joint synovitis noted.  SKIN:  Dry to touch, no exanthems noted in  the visualized areas.       Data   I personally reviewed the EKG tracing showing sinus rhythm with right bundle branch block pattern..  Results for orders placed or performed during the hospital encounter of 05/05/17 (from the past 24 hour(s))   CBC with platelets differential   Result Value Ref Range    WBC 7.0 4.0 - 11.0 10e9/L    RBC Count 5.15 4.4 - 5.9 10e12/L    Hemoglobin 17.1 13.3 - 17.7 g/dL    Hematocrit 47.4 40.0 - 53.0 %    MCV 92 78 - 100 fl    MCH 33.2 (H) 26.5 - 33.0 pg    MCHC 36.1 31.5 - 36.5 g/dL    RDW 12.3 10.0 - 15.0 %    Platelet Count 218 150 - 450 10e9/L    Diff Method Automated Method     % Neutrophils 71.4 %    % Lymphocytes 19.3 %    % Monocytes 8.3 %    % Eosinophils 0.3 %    % Basophils 0.4 %    % Immature Granulocytes 0.3 %    Nucleated RBCs 0 0 /100    Absolute Neutrophil 5.0 1.6 - 8.3 10e9/L    Absolute Lymphocytes 1.4 0.8 - 5.3 10e9/L    Absolute Monocytes 0.6 0.0 - 1.3 10e9/L    Absolute Eosinophils 0.0 0.0 - 0.7 10e9/L    Absolute Basophils 0.0 0.0 - 0.2 10e9/L    Abs Immature Granulocytes 0.0 0 - 0.4 10e9/L    Absolute Nucleated RBC 0.0    INR   Result Value Ref Range    INR 0.99 0.86 - 1.14   Comprehensive metabolic panel   Result Value Ref Range    Sodium 138 133 - 144 mmol/L    Potassium 3.9 3.4 - 5.3 mmol/L    Chloride 105 94 - 109 mmol/L    Carbon Dioxide 24 20 - 32 mmol/L    Anion Gap 9 3 - 14 mmol/L    Glucose 129 (H) 70 - 99 mg/dL    Urea Nitrogen 14 7 - 30 mg/dL    Creatinine 0.95 0.66 - 1.25 mg/dL    GFR Estimate 80 >60 mL/min/1.7m2    GFR Estimate If Black >90   GFR Calc   >60 mL/min/1.7m2    Calcium 9.5 8.5 - 10.1 mg/dL    Bilirubin Total 0.6 0.2 - 1.3 mg/dL    Albumin 4.2 3.4 - 5.0 g/dL    Protein Total 8.3 6.8 - 8.8 g/dL    Alkaline Phosphatase 116 40 - 150 U/L    ALT 48 0 - 70 U/L    AST 37 0 - 45 U/L   Lipase   Result Value Ref Range    Lipase 139 73 - 393 U/L   D dimer quantitative   Result Value Ref Range    D Dimer 0.3 0.0 - 0.50 ug/ml FEU   EKG  12 lead   Result Value Ref Range    Interpretation ECG Click View Image link to view waveform and result    Troponin POCT   Result Value Ref Range    Troponin I 0.00 0.00 - 0.10 ug/L   EKG 12-lead, tracing only   Result Value Ref Range    Interpretation ECG Click View Image link to view waveform and result    EKG 12-lead, tracing only   Result Value Ref Range    Interpretation ECG Click View Image link to view waveform and result        Disclaimer: This note consists of symbols derived from keyboarding, dictation and/or voice recognition software. As a result, there may be errors in the script that have gone undetected. Please consider this when interpreting information found in this chart.

## 2017-05-05 NOTE — ED NOTES
Pt presents to ED for evaluation of left shoulder and chest pain.  Took one 81mg ASA at 0800 today.  Pain improved slightly but then returned causing pt concern.  Pt also reports some minor SOB.  Hx of HTN and stopped taking his hypertensive meds about one week ago.

## 2017-05-05 NOTE — ED PROVIDER NOTES
History     Chief Complaint:  Chest Pain      HPI   Emery Zayas is a 61 year old male with a history of HTN and HLD who presents for evaluation of left arm and chest pain. The patient woke up this morning with some back and left arm pain. He took a baby aspirin and the pain almost entirely subsided 2-4 hours after onset. Later today, about 20 minutes ago, the patient was sitting at the library when he had a return of left arm pain and new chest pain with shortness of breath and mild nausea, prompting his visit to the ED today. Here, he describes his arm pain as a soreness, not worse with movement, and characterizes his chest pain as a 3-4/10 tightness, not exertion or movement dependent. Patient denies any recent trauma, travel, sickness. The patient stopped taking all of his medication including this meds for HTN and HLD 2-3 weeks ago, however, he continued to take a baby aspirin daily. Of note, the patient's last stress test was in 2012.      Cardiac Risk Factors:  CAD:    Neg  Hypertension:   Pos  Hyperlipidemia:  Pos  Diabetes:   Neg  Tobacco use:   Former smoker, quit 7/15/93  Gender:   M  Age:    61  Familial Hx of CAD:  Pos     Allergies:  The patient has no known drug allergies.      Medications:    Simvastatin (zocor) 40 mg tablet  Olmesartan (benicar) 40 mg tablet  Aspirin 81 mg tablet  Guaifenesin-codeine (robitussin ac) 100-10 mg/5ml soln  Colchicine (colcrys) 0.6 mg tablet  Multiple vitamins-minerals (centrum silver po)     Past Medical History:    Alcoholism (H)   Arthritis   Anxiety  Chronic low back pain   Dyspepsia and other specified disorders of function of stomach  Elevated blood sugar  Gout   Herniated intervertebral disk   Hypertension   RBBB (right bundle branch block)  Metabolic syndrome   Obesity    Past Surgical History:    Back surgery  EGD, combined  Tonsillectomy     Family History:    HTN  Heart disease  Lung cancer  Thyroid disease  Substance abuse  "  Depression  Diabetes    Social History:  Relationship status:   Tobacco use: Former smoker, quit 7/15/93  Alcohol use: Pos  The patient presents with his wife.     Review of Systems   Respiratory: Positive for shortness of breath.    Cardiovascular: Positive for chest pain.   Gastrointestinal: Positive for nausea.   Musculoskeletal: Positive for back pain.        Positive for left arm pain.    All other systems reviewed and are negative.    Physical Exam   First Vitals:  BP: (!) 200/101  Pulse: 100  Temp: 98  F (36.7  C)  Resp: 18  Height: 167.6 cm (5' 6\")  Weight: 88.5 kg (195 lb)  SpO2: 100 %    Physical Exam  General: The patient is alert, in no respiratory distress.    HENT: Mucous membranes moist.    Cardiovascular: Regular rate and rhythm. Good pulses in all four extremities. Normal capillary refill and skin turgor.     Respiratory: Lungs are clear. No nasal flaring. No retractions. No wheezing, no crackles.    Gastrointestinal: Abdomen soft. No guarding, no rebound. No palpable hernias. No abdominal tenderness.    Musculoskeletal: No gross deformity. No left arm tenderness to palpation     Skin: No rashes or petechiae.     Neurologic: The patient is alert and oriented x3. GCS 15. No testable cranial nerve deficit. Follows commands with clear and appropriate speech. Gives appropriate answers. Good strength in all extremities. No gross neurologic deficit. Gross sensation intact. Pupils are round and reactive. No meningismus.     Lymphology: No cervical adenopathy. No lower extremity swelling.    Psychiatric: The patient is non-tearful.     Emergency Department Course   ECG (11:52:05):  Indication: chest pain.   Rate 80 bpm. WV interval 140. QRS duration 132. QT/QTc 414/477. P-R-T axes -24.   Interpretation: Normal sinus rhythm, right bundle branch block, abnormal ECG.  Agree with computer interpretation.   No significant change compared to EKG dated 7/16/12.   Interpreted at 1156 by Dr. Jesus.  "     ECG (13:05:05):  Indication: chest pain.   Rate 97 bpm. AK interval 138. QRS duration 128. QT/QTc 398/505. P-R-T axes -20.   Interpretation: Normal sinus rhythm, right bundle branch block, abnormal ECG.  Agree with computer interpretation.   No significant change compared to EKG dated 7/16/12  Interpreted at 1311 by Dr. Jesus.      ECG (14:11:10):  Indication: chest pain.  Rate 80 bpm. AK interval 140. QRS duration 130. QT/QTc 416/479. P-R-T axes -28.   Interpretation: Normal sinus rhythm, Possible left atrial enlargement, Right bundle branch block, Abnormal ECG.  Agree with computer interpretation.   No significant change compared to EKG dated 7/16/12  Interpreted at 1412 by Dr. Jesus.      Laboratory:  CBC: WBC 7.0, HGB 17.1,   CMP: Glucose 129 (H), o/w WNL (Creatinine 0.95)   1152: INR: 0.09   1209: Troponin POCT: 0.00    Lipase: 139  D dimer: 0.3      Interventions:   1256: Nitrostat, 0.4 mg, Sublingual  1412: Heparin, 900 units/hr, IV  1414: Nitroglycerin, 0.07 mcg/kg/min*88.5 kg, IV  1423: Heparin Loading Dose Bolus, 4400 units, IV    Emergency Department Course:  Nursing notes and vitals reviewed.  I performed an exam of the patient as documented above.  The above workup was undertaken.  1233: After the first Nitro, the patient's pain remains at a 3-4/10.   1334: After 3 Nitro, the patient is now chest pain free, but still experiencing 2/10 arm pain.   1536: I discussed the patient with Dr. Chaparro of the hospitalist service who agrees to admit the patient to the hospital.   1559: I rechecked the patient and discussed results.    Findings and plan explained to the Patient who consents to admission. Discussed the patient with Dr. Chaparro, who will admit the patient to a ICU bed for further monitoring, evaluation, and treatment.      Impression & Plan    Medical Decision Making:  Emery Zayas is a 61 year old male who has not been compliant with his medications as he presents with very high  blood pressure. He is currently in hypertensive emergency. I believe this is what led to his arm pain this morning which resolved after 2-3 hours,. ECG was not diagnostic showing a right bundle branch block, but no signs of pathologic ST elevation. The patient was treated here aggressively with Nitro and a Nitro drip and his symptoms did resolve. Of note, 25 minutes prior to arrival, he presented with chest pain which was new this morning as well, but the first troponin was negative. The patient was anticoagulated and admitted to the ICU with hypertensive emergency, with improved blood pressure and is currently asymptomatic.     Critical Care time 40 minutes in exclusion of any procedures.    Diagnosis:    ICD-10-CM   1. Unstable angina (H) I20.0   2. Hypertensive emergency I16.1       Disposition:  Admitted to a ICU bed under the care of Dr. Chaparro of the hospitalist service.     I, Negrita Guerra, am serving as a scribe on 5/5/2017 at 12:06 PM to personally document services performed by Chuck Jesus MD, based on my observations and the provider's statements to me.    [facility] EMERGENCY DEPARTMENT           hCuck Jesus MD  05/06/17 0711       Chuck Jesus MD  05/14/17 [id number]

## 2017-05-06 ENCOUNTER — APPOINTMENT (OUTPATIENT)
Dept: CARDIOLOGY | Facility: CLINIC | Age: 62
DRG: 305 | End: 2017-05-06
Attending: HOSPITALIST
Payer: COMMERCIAL

## 2017-05-06 LAB
ALBUMIN SERPL-MCNC: 3.4 G/DL (ref 3.4–5)
ALP SERPL-CCNC: 94 U/L (ref 40–150)
ALT SERPL W P-5'-P-CCNC: 43 U/L (ref 0–70)
ANION GAP SERPL CALCULATED.3IONS-SCNC: 6 MMOL/L (ref 3–14)
AST SERPL W P-5'-P-CCNC: 27 U/L (ref 0–45)
BASOPHILS # BLD AUTO: 0 10E9/L (ref 0–0.2)
BASOPHILS NFR BLD AUTO: 0.2 %
BILIRUB SERPL-MCNC: 0.5 MG/DL (ref 0.2–1.3)
BUN SERPL-MCNC: 14 MG/DL (ref 7–30)
CALCIUM SERPL-MCNC: 8.8 MG/DL (ref 8.5–10.1)
CHLORIDE SERPL-SCNC: 106 MMOL/L (ref 94–109)
CHOLEST SERPL-MCNC: 225 MG/DL
CO2 SERPL-SCNC: 26 MMOL/L (ref 20–32)
CREAT SERPL-MCNC: 0.92 MG/DL (ref 0.66–1.25)
DIFFERENTIAL METHOD BLD: ABNORMAL
EOSINOPHIL # BLD AUTO: 0.1 10E9/L (ref 0–0.7)
EOSINOPHIL NFR BLD AUTO: 2 %
ERYTHROCYTE [DISTWIDTH] IN BLOOD BY AUTOMATED COUNT: 12.8 % (ref 10–15)
GFR SERPL CREATININE-BSD FRML MDRD: 83 ML/MIN/1.7M2
GLUCOSE BLDC GLUCOMTR-MCNC: 116 MG/DL (ref 70–99)
GLUCOSE BLDC GLUCOMTR-MCNC: 126 MG/DL (ref 70–99)
GLUCOSE BLDC GLUCOMTR-MCNC: 128 MG/DL (ref 70–99)
GLUCOSE BLDC GLUCOMTR-MCNC: 131 MG/DL (ref 70–99)
GLUCOSE BLDC GLUCOMTR-MCNC: 141 MG/DL (ref 70–99)
GLUCOSE SERPL-MCNC: 127 MG/DL (ref 70–99)
HCT VFR BLD AUTO: 41.7 % (ref 40–53)
HDLC SERPL-MCNC: 42 MG/DL
HGB BLD-MCNC: 14.8 G/DL (ref 13.3–17.7)
IMM GRANULOCYTES # BLD: 0 10E9/L (ref 0–0.4)
IMM GRANULOCYTES NFR BLD: 0.2 %
LDLC SERPL CALC-MCNC: ABNORMAL MG/DL
LMWH PPP CHRO-ACNC: 0.23 IU/ML
LYMPHOCYTES # BLD AUTO: 1.2 10E9/L (ref 0.8–5.3)
LYMPHOCYTES NFR BLD AUTO: 26.7 %
MCH RBC QN AUTO: 33.3 PG (ref 26.5–33)
MCHC RBC AUTO-ENTMCNC: 35.5 G/DL (ref 31.5–36.5)
MCV RBC AUTO: 94 FL (ref 78–100)
MONOCYTES # BLD AUTO: 0.5 10E9/L (ref 0–1.3)
MONOCYTES NFR BLD AUTO: 10.8 %
NEUTROPHILS # BLD AUTO: 2.8 10E9/L (ref 1.6–8.3)
NEUTROPHILS NFR BLD AUTO: 60.1 %
NONHDLC SERPL-MCNC: 183 MG/DL
NRBC # BLD AUTO: 0 10*3/UL
NRBC BLD AUTO-RTO: 0 /100
PLATELET # BLD AUTO: 168 10E9/L (ref 150–450)
POTASSIUM SERPL-SCNC: 3.4 MMOL/L (ref 3.4–5.3)
PROT SERPL-MCNC: 6.8 G/DL (ref 6.8–8.8)
RBC # BLD AUTO: 4.45 10E12/L (ref 4.4–5.9)
SODIUM SERPL-SCNC: 138 MMOL/L (ref 133–144)
TRIGL SERPL-MCNC: 574 MG/DL
TROPONIN I SERPL-MCNC: NORMAL UG/L (ref 0–0.04)
TROPONIN I SERPL-MCNC: NORMAL UG/L (ref 0–0.04)
WBC # BLD AUTO: 4.6 10E9/L (ref 4–11)

## 2017-05-06 PROCEDURE — 85025 COMPLETE CBC W/AUTO DIFF WBC: CPT | Performed by: HOSPITALIST

## 2017-05-06 PROCEDURE — 99221 1ST HOSP IP/OBS SF/LOW 40: CPT | Mod: 25 | Performed by: INTERNAL MEDICINE

## 2017-05-06 PROCEDURE — 80061 LIPID PANEL: CPT | Performed by: HOSPITALIST

## 2017-05-06 PROCEDURE — 40000264 ECHO COMPLETE WITH OPTISON

## 2017-05-06 PROCEDURE — 20000003 ZZH R&B ICU

## 2017-05-06 PROCEDURE — 00000146 ZZHCL STATISTIC GLUCOSE BY METER IP

## 2017-05-06 PROCEDURE — 25500064 ZZH RX 255 OP 636: Performed by: HOSPITALIST

## 2017-05-06 PROCEDURE — 93306 TTE W/DOPPLER COMPLETE: CPT | Mod: 26 | Performed by: INTERNAL MEDICINE

## 2017-05-06 PROCEDURE — 25000132 ZZH RX MED GY IP 250 OP 250 PS 637: Performed by: HOSPITALIST

## 2017-05-06 PROCEDURE — 84484 ASSAY OF TROPONIN QUANT: CPT | Performed by: HOSPITALIST

## 2017-05-06 PROCEDURE — 85520 HEPARIN ASSAY: CPT | Performed by: HOSPITALIST

## 2017-05-06 PROCEDURE — 36415 COLL VENOUS BLD VENIPUNCTURE: CPT | Performed by: HOSPITALIST

## 2017-05-06 PROCEDURE — 99233 SBSQ HOSP IP/OBS HIGH 50: CPT | Performed by: HOSPITALIST

## 2017-05-06 PROCEDURE — 80053 COMPREHEN METABOLIC PANEL: CPT | Performed by: HOSPITALIST

## 2017-05-06 RX ORDER — HYDRALAZINE HYDROCHLORIDE 10 MG/1
10 TABLET, FILM COATED ORAL 4 TIMES DAILY PRN
Status: DISCONTINUED | OUTPATIENT
Start: 2017-05-06 | End: 2017-05-07 | Stop reason: HOSPADM

## 2017-05-06 RX ORDER — HYDRALAZINE HYDROCHLORIDE 10 MG/1
10 TABLET, FILM COATED ORAL 4 TIMES DAILY
Status: DISCONTINUED | OUTPATIENT
Start: 2017-05-06 | End: 2017-05-06

## 2017-05-06 RX ORDER — FENOFIBRATE 54 MG/1
54 TABLET ORAL DAILY
Status: DISCONTINUED | OUTPATIENT
Start: 2017-05-06 | End: 2017-05-07 | Stop reason: HOSPADM

## 2017-05-06 RX ADMIN — SIMVASTATIN 40 MG: 40 TABLET, FILM COATED ORAL at 21:11

## 2017-05-06 RX ADMIN — ASPIRIN 81 MG: 81 TABLET, COATED ORAL at 09:06

## 2017-05-06 RX ADMIN — HUMAN ALBUMIN MICROSPHERES AND PERFLUTREN 3 ML: 10; .22 INJECTION, SOLUTION INTRAVENOUS at 08:26

## 2017-05-06 RX ADMIN — FENOFIBRATE 54 MG: 54 TABLET ORAL at 11:53

## 2017-05-06 RX ADMIN — LOSARTAN POTASSIUM 100 MG: 100 TABLET, FILM COATED ORAL at 09:07

## 2017-05-06 RX ADMIN — CARVEDILOL 12.5 MG: 12.5 TABLET, FILM COATED ORAL at 09:07

## 2017-05-06 NOTE — PLAN OF CARE
Problem: Goal Outcome Summary  Goal: Goal Outcome Summary  Outcome: Improving  ICU End of Shift Summary.  For vital signs and complete assessments, please see documentation flowsheets.   No chest pain. BP stable. HR dips to 50's at times.  Dr Shankar post. BBlocker now on hold due to stress echo in am.  S/B cardiology.      Pertinent assessments: As above  Major Shift Events: none  Plan (Upcoming Events): stress echo  Discharge/Transfer Needs: probable discharge after stress echo     Bedside Shift Report Completed :   Bedside Safety Check Completed:

## 2017-05-06 NOTE — CONSULTS
Patient seen; discussed with Mr. Zayas and his wife.  Recommended stress echo in AM.  We agreed that overall, he needs better BP control as well.    Full consult to follow.

## 2017-05-06 NOTE — PLAN OF CARE
Problem: Goal Outcome Summary  Goal: Goal Outcome Summary  Outcome: Improving  ICU End of Shift Summary.  For vital signs and complete assessments, please see documentation flowsheets.      Pertinent assessments: VSS, afebrile. Alert and oriented. Denies chest pain or pain of any kind entire shift. Lungs clear, RA. Denies shortness of breath. Voiding. Good appetite, tolerated a sandwich last night before bed. Heparin gtt infusing, adjusting appropriately per pharmacy and lab checks.   Major Shift Events: Nitro gtt stopped at 0330. Bradycardic at times.  Plan (Upcoming Events): Monitor patient for pain, monitor labs and heparin gtt. Echo today.   Discharge/Transfer Needs: Pending.      Bedside Shift Report Completed :   Bedside Safety Check Completed:

## 2017-05-06 NOTE — PROGRESS NOTES
Pt transferred up to floor from ED. A&O. VSS, see flow sheet. Heparin and nitro gtt infusing upon arrival. Currently denies any pain. Will continue to monitor.

## 2017-05-06 NOTE — PROGRESS NOTES
[facility]    Hospitalist Progress Note    Date of Service (when I saw the patient): 05/06/2017  Provider:  Justino Chaparro MD     Initial presenting complaint/issue to hospital (Diagnosis): Chest pain.  Assessment & Plan   Emery Zayas is a 61 year old male who presents with uncontrolled hypertension and new onset of chest pain radiating to the left arm.      1. Hypertensive emergency secondary to medication noncompliance.  2. Chest pain suspicious for angina in the setting of #1.  3. History of essential hypertension.   - Mild LVH per echo.  - Borderline dilation of the ascending aorta.  - Normal LVEF.  4. Hyperlipidemia with triglyceridemia > 500.  5. History of chronic low back pain.  6. History of gout.  7. Obesity     Plan.  - ICU. Telemetry.  - Discontinue nitroglycerin drip and heparin drip due to negative serial troponin.  - Carvedilol 12.5 mg q.12 hours.  - Benicar.  - baby aspirin.  - Resume simvastatin, start fenofibrate  - echocardiogram results available and commented above.    DVT Prophylaxis: Enoxaparin (Lovenox) SQ  Code Status: Full Code    Disposition: Expected discharge in 1- 2 days. .     Interval History   No chest pain, no shortness of breath.  Overall asymptomatic this morning.    -Data reviewed today: I reviewed all new labs and imaging results over the last 24 hours. I personally reviewed the EKG tracing showing NSR with RBBB.    Physical Exam   Temp: 98.8  F (37.1  C) Temp src: Oral BP: 135/83 Pulse: 100 Heart Rate: 56 Resp: 16 SpO2: 99 % O2 Device: None (Room air)    Vitals:    05/05/17 1154 05/06/17 0615   Weight: 88.5 kg (195 lb) 86.9 kg (191 lb 9.3 oz)     Vital Signs with Ranges  Temp:  [97.9  F (36.6  C)-98.8  F (37.1  C)] 98.8  F (37.1  C)  Pulse:  [100] 100  Heart Rate:  [] 56  Resp:  [10-28] 16  BP: ()/() 135/83  SpO2:  [91 %-100 %] 99 %  I/O last 3 completed shifts:  In: 182.09 [I.V.:182.09]  Out: 350 [Urine:350]    GEN: Obese. Alert,  oriented x 3, appears comfortable, NAD.  HEENT: Normocephalic/atraumatic, no scleral icterus, no nasal discharge, mouth moist.  CV: Regular rate and rhythm, no murmur or JVD. S1 + S2 noted, no S3 or S4.  LUNGS: Clear to auscultation bilaterally without rales/rhonchi/wheezing/retractions. Symmetric chest rise on inhalation noted.  ABD: Active bowel sounds, soft, non-tender/non-distended. No rebound/guarding/rigidity.  EXT: No edema or cyanosis. No joint synovitis noted.  SKIN: Dry to touch, no exanthems noted in the visualized areas.       Medications     HEParin       nitroglycerin Stopped (05/06/17 0330)     - MEDICATION INSTRUCTIONS -         sodium chloride (PF)  3 mL Intracatheter Q8H     simvastatin  40 mg Oral At Bedtime     losartan  100 mg Oral Daily     aspirin EC  81 mg Oral Daily     carvedilol  12.5 mg Oral BID w/meals       Data     Recent Labs  Lab 05/06/17  0505 05/06/17  0215 05/05/17  2043 05/05/17  1209 05/05/17  1152   WBC 4.6  --   --   --  7.0   HGB 14.8  --   --   --  17.1   MCV 94  --   --   --  92     --   --   --  218   INR  --   --   --   --  0.99     --   --   --  138   POTASSIUM 3.4  --   --   --  3.9   CHLORIDE 106  --   --   --  105   CO2 26  --   --   --  24   BUN 14  --   --   --  14   CR 0.92  --   --   --  0.95   ANIONGAP 6  --   --   --  9   RONN 8.8  --   --   --  9.5   *  --   --   --  129*   ALBUMIN 3.4  --   --   --  4.2   PROTTOTAL 6.8  --   --   --  8.3   BILITOTAL 0.5  --   --   --  0.6   ALKPHOS 94  --   --   --  116   ALT 43  --   --   --  48   AST 27  --   --   --  37   LIPASE  --   --   --   --  139   TROPI <0.015The 99th percentile for upper reference range is 0.045 ug/L.  Troponin values in the range of 0.045 - 0.120 ug/L may be associated with risks of adverse clinical events. <0.015The 99th percentile for upper reference range is 0.045 ug/L.  Troponin values in the range of 0.045 - 0.120 ug/L may be associated with risks of adverse clinical events.  <0.015The 99th percentile for upper reference range is 0.045 ug/L.  Troponin values in the range of 0.045 - 0.120 ug/L may be associated with risks of adverse clinical events.  --   --    TROPONIN  --   --   --  0.00  --        Recent Results (from the past 24 hour(s))   XR Chest 2 Views    Narrative    CHEST TWO VIEWS 5/5/2017 4:35 PM     COMPARISON: Two view chest x-ray 7/15/2012.    HISTORY: Chest pain.    FINDINGS: The cardiac silhouette, pulmonary vasculature, lungs and  pleural spaces are within normal limits.      Impression    IMPRESSION: Clear lungs.    BAM VELASQUEZ MD             Disclaimer: This note consists of symbols derived from keyboarding, dictation and/or voice recognition software. As a result, there may be errors in the script that have gone undetected. Please consider this when interpreting information found in this chart.

## 2017-05-07 ENCOUNTER — APPOINTMENT (OUTPATIENT)
Dept: CARDIOLOGY | Facility: CLINIC | Age: 62
DRG: 305 | End: 2017-05-07
Attending: INTERNAL MEDICINE
Payer: COMMERCIAL

## 2017-05-07 VITALS
SYSTOLIC BLOOD PRESSURE: 121 MMHG | HEART RATE: 100 BPM | OXYGEN SATURATION: 96 % | TEMPERATURE: 97.6 F | BODY MASS INDEX: 30.54 KG/M2 | DIASTOLIC BLOOD PRESSURE: 79 MMHG | WEIGHT: 190.04 LBS | RESPIRATION RATE: 12 BRPM | HEIGHT: 66 IN

## 2017-05-07 LAB
ALBUMIN SERPL-MCNC: 3.5 G/DL (ref 3.4–5)
ALP SERPL-CCNC: 92 U/L (ref 40–150)
ALT SERPL W P-5'-P-CCNC: 39 U/L (ref 0–70)
ANION GAP SERPL CALCULATED.3IONS-SCNC: 4 MMOL/L (ref 3–14)
AST SERPL W P-5'-P-CCNC: 19 U/L (ref 0–45)
BASOPHILS # BLD AUTO: 0 10E9/L (ref 0–0.2)
BASOPHILS NFR BLD AUTO: 0.6 %
BILIRUB SERPL-MCNC: 0.9 MG/DL (ref 0.2–1.3)
BUN SERPL-MCNC: 17 MG/DL (ref 7–30)
CALCIUM SERPL-MCNC: 8.8 MG/DL (ref 8.5–10.1)
CHLORIDE SERPL-SCNC: 107 MMOL/L (ref 94–109)
CO2 SERPL-SCNC: 30 MMOL/L (ref 20–32)
CREAT SERPL-MCNC: 1.2 MG/DL (ref 0.66–1.25)
DIFFERENTIAL METHOD BLD: NORMAL
EOSINOPHIL # BLD AUTO: 0.1 10E9/L (ref 0–0.7)
EOSINOPHIL NFR BLD AUTO: 1.8 %
ERYTHROCYTE [DISTWIDTH] IN BLOOD BY AUTOMATED COUNT: 13 % (ref 10–15)
GFR SERPL CREATININE-BSD FRML MDRD: 61 ML/MIN/1.7M2
GLUCOSE SERPL-MCNC: 125 MG/DL (ref 70–99)
HCT VFR BLD AUTO: 44 % (ref 40–53)
HGB BLD-MCNC: 15.1 G/DL (ref 13.3–17.7)
IMM GRANULOCYTES # BLD: 0 10E9/L (ref 0–0.4)
IMM GRANULOCYTES NFR BLD: 0.4 %
INTERPRETATION ECG - MUSE: NORMAL
INTERPRETATION ECG - MUSE: NORMAL
LYMPHOCYTES # BLD AUTO: 1.1 10E9/L (ref 0.8–5.3)
LYMPHOCYTES NFR BLD AUTO: 19.3 %
MCH RBC QN AUTO: 32.6 PG (ref 26.5–33)
MCHC RBC AUTO-ENTMCNC: 34.3 G/DL (ref 31.5–36.5)
MCV RBC AUTO: 95 FL (ref 78–100)
MONOCYTES # BLD AUTO: 0.6 10E9/L (ref 0–1.3)
MONOCYTES NFR BLD AUTO: 11.6 %
NEUTROPHILS # BLD AUTO: 3.6 10E9/L (ref 1.6–8.3)
NEUTROPHILS NFR BLD AUTO: 66.3 %
NRBC # BLD AUTO: 0 10*3/UL
NRBC BLD AUTO-RTO: 0 /100
PLATELET # BLD AUTO: 169 10E9/L (ref 150–450)
POTASSIUM SERPL-SCNC: 4.3 MMOL/L (ref 3.4–5.3)
PROT SERPL-MCNC: 7 G/DL (ref 6.8–8.8)
RBC # BLD AUTO: 4.63 10E12/L (ref 4.4–5.9)
SODIUM SERPL-SCNC: 141 MMOL/L (ref 133–144)
WBC # BLD AUTO: 5.5 10E9/L (ref 4–11)

## 2017-05-07 PROCEDURE — 36415 COLL VENOUS BLD VENIPUNCTURE: CPT | Performed by: HOSPITALIST

## 2017-05-07 PROCEDURE — 93350 STRESS TTE ONLY: CPT | Mod: 26 | Performed by: INTERNAL MEDICINE

## 2017-05-07 PROCEDURE — 25000132 ZZH RX MED GY IP 250 OP 250 PS 637: Performed by: HOSPITALIST

## 2017-05-07 PROCEDURE — 93325 DOPPLER ECHO COLOR FLOW MAPG: CPT | Mod: 26 | Performed by: INTERNAL MEDICINE

## 2017-05-07 PROCEDURE — 99231 SBSQ HOSP IP/OBS SF/LOW 25: CPT | Mod: 25 | Performed by: INTERNAL MEDICINE

## 2017-05-07 PROCEDURE — 93018 CV STRESS TEST I&R ONLY: CPT | Performed by: INTERNAL MEDICINE

## 2017-05-07 PROCEDURE — 25500064 ZZH RX 255 OP 636: Performed by: HOSPITALIST

## 2017-05-07 PROCEDURE — 80053 COMPREHEN METABOLIC PANEL: CPT | Performed by: HOSPITALIST

## 2017-05-07 PROCEDURE — 93321 DOPPLER ECHO F-UP/LMTD STD: CPT | Mod: 26 | Performed by: INTERNAL MEDICINE

## 2017-05-07 PROCEDURE — 85025 COMPLETE CBC W/AUTO DIFF WBC: CPT | Performed by: HOSPITALIST

## 2017-05-07 PROCEDURE — 40000264 ECHO STRESS TEST WITH LUMASON

## 2017-05-07 PROCEDURE — 93016 CV STRESS TEST SUPVJ ONLY: CPT | Performed by: INTERNAL MEDICINE

## 2017-05-07 PROCEDURE — 99239 HOSP IP/OBS DSCHRG MGMT >30: CPT | Performed by: HOSPITALIST

## 2017-05-07 RX ORDER — FENOFIBRATE 54 MG/1
54 TABLET ORAL DAILY
Qty: 30 TABLET | Refills: 0 | Status: SHIPPED | OUTPATIENT
Start: 2017-05-07 | End: 2017-05-11

## 2017-05-07 RX ORDER — CARVEDILOL 12.5 MG/1
12.5 TABLET ORAL 2 TIMES DAILY WITH MEALS
Qty: 60 TABLET | Refills: 0 | Status: SHIPPED | OUTPATIENT
Start: 2017-05-07 | End: 2017-05-11

## 2017-05-07 RX ADMIN — CARVEDILOL 12.5 MG: 12.5 TABLET, FILM COATED ORAL at 08:31

## 2017-05-07 RX ADMIN — SULFUR HEXAFLUORIDE 5 ML: KIT at 08:00

## 2017-05-07 RX ADMIN — ASPIRIN 81 MG: 81 TABLET, COATED ORAL at 08:31

## 2017-05-07 RX ADMIN — LOSARTAN POTASSIUM 100 MG: 100 TABLET, FILM COATED ORAL at 08:31

## 2017-05-07 RX ADMIN — FENOFIBRATE 54 MG: 54 TABLET ORAL at 08:31

## 2017-05-07 NOTE — PLAN OF CARE
Problem: Goal Outcome Summary  Goal: Goal Outcome Summary  ICU End of Shift Summary.  For vital signs and complete assessments, please see documentation flowsheets.      Pertinent assessments: Afebrile, VSS. Alert, oriented. Sinus sana. Lungs clear, denies shortness of breath. Ambulates to restroom, voiding. Denies pain of any kind.  Major Shift Events: NA  Plan (Upcoming Events): Echo Stress test today.  Discharge/Transfer Needs: D/C after stress test.      Bedside Shift Report Completed :   Bedside Safety Check Completed:

## 2017-05-07 NOTE — PROGRESS NOTES
"Cardiology Progress Note:    Assess/Recommendations:  1. Chest pain - he has \"ruled out\" for MI.   Symptoms likely related to chronic back issues and/or very high blood pressure.  Beta blockade is excellent for his situation.  He will benefit from lipid management as well.  Stress echo this morning was normal with good workload achieved and no symptoms.    2. Hypertension - as above.  Blood pressures are now normal.    Discussed results and recommendations with patient and his wife.  Routine F/U ordered with Cardiology in 9 months for risk factors.  Thank you.  Will sign off.    Subjective: no recurrent chest or arm discomfort.    Exam:  Blood pressure 121/84, pulse 100, temperature 97.6  F (36.4  C), temperature source Oral, resp. rate 12, height 1.676 m (5' 6\"), weight 86.2 kg (190 lb 0.6 oz), SpO2 96 %.      Labs:  Lab Results   Component Value Date    TROPI  05/06/2017     <0.015  The 99th percentile for upper reference range is 0.045 ug/L.  Troponin values in   the range of 0.045 - 0.120 ug/L may be associated with risks of adverse   clinical events.      TROPI  05/06/2017     <0.015  The 99th percentile for upper reference range is 0.045 ug/L.  Troponin values in   the range of 0.045 - 0.120 ug/L may be associated with risks of adverse   clinical events.      TROPI  05/05/2017     <0.015  The 99th percentile for upper reference range is 0.045 ug/L.  Troponin values in   the range of 0.045 - 0.120 ug/L may be associated with risks of adverse   clinical events.      TROPI <0.012 07/15/2012    TROPI <0.012 07/15/2012    TROPONIN 0.00 05/05/2017       "

## 2017-05-07 NOTE — CONSULTS
CARDIOLOGY CONSULTATION       REQUESTING PHYSICIAN:  Dr. Justino Chaparro.       PATIENT HISTORY:  Mr. Emery Zayas was seen in consultation at [facility] at the request of Dr. Chaparro of the Hospitalist Service.  Mr. Zayas was admitted with chest and arm pain.      He notes that he was feeling well, other than his chronic back pain, which does limit him to some degree.  He awakened from sleep yesterday morning and noted some mid-back and left arm discomfort.  He took an aspirin and he and his wife went to the [employer].  The discomfort persisted or increased and he noted he was mildly short of breath.  He did not feel well and so he went to the Emergency Department at his wife urging.  She was with him.      He has previously been evaluated for chest pain with normal stress echocardiograms.  He cannot remember exactly which symptoms he had, but he did not remember having left arm discomfort.  He does have a history of hypertension and dyslipidemia.  He stopped smoking about 20-25 years ago and he does have a family history of heart disease.  He does not have diabetes mellitus.      He notes that he has a longstanding history of hypertension.  His blood pressures are typically 150 mmHg, systolic when he is seen in the doctor's office.  It has previously been measured at 180 mmHg and he notes that despite some therapy in the past, his blood pressure has not come down.  When he was seen in the Emergency Department, his blood pressure was 200/101 mmHg.      His discomfort resolved later that day.  He has not had any return of the discomfort and his troponins have been normal.  I read his echocardiogram this morning which demonstrated normal left ventricular systolic performance.      His EKG similarly does not demonstrate any ischemic ST segment changes and he has been in normal sinus rhythm.      MEDICATIONS:   1.  Simvastatin 40 mg p.o. nightly.   2.  Benicar 40 mg p.o. daily, not  taken in the last weeks.   3.  Colchicine 0.6 mg p.r.n. gout.   4.  Aspirin 81 mg p.o. daily.      PAST MEDICAL HISTORY:   1.  Hypertension.   2.  Right bundle branch block.   3.  Herniated thoracic spinal disk.   4.  History of hyperuricemia.   5.  Chronic low back pain.   6.  Degenerative joint disease.   7.  Alcohol use.   8.  Prior back surgeries including a fusion x4.   9.  Tonsillectomy in the past.      ALLERGIES:  None known.      SOCIAL HISTORY:  The patient is .  He is retired.  He mentioned his grandchildren.      FAMILY HISTORY:  Significant for coronary artery disease in his father and diabetes mellitus in his mother.      REVIEW OF SYSTEMS:  Negative except as described above.      PHYSICAL EXAMINATION:   GENERAL:  This is a man in no apparent distress, lying in bed.   VITAL SIGNS:  Blood pressure was 142/80 mmHg, heart rate 60 beats per minute and regular, and respiratory rate 14-18 per minute.   SKIN:  Warm and dry without cyanosis or jaundice.   HEAD AND NECK:  The head was normocephalic.  The neck was free of thyromegaly.   CHEST:  Clear to auscultation.  Breathing was unlabored.   CARDIAC:  On cardiac auscultation, there was an S4, S1 and an S2 without additional extra sounds or murmur.  The rhythm was regular.   EXTREMITIES:  There is no peripheral pedal edema.   NEUROLOGIC:  Facies were symmetric and he moved all extremities without focal limitation.      LABORATORY:  The EKG demonstrated normal sinus rhythm with a right bundle branch block, but an absence of ischemic ST segment changes.  His triglycerides were 574 mg/dL, LDL was 183 mg/dL.  The troponins were normal.  The sodium was 138, potassium 3.4, BUN 14 and creatinine 0.92.  The white blood cell count was 4.6, hemoglobin 14.8 and platelet count 168,000.       ASSESSMENT:  Mr. Zayas has presented with somewhat atypical back and left arm discomfort in that it was not necessarily exertional and it was not provoked by any obvious  etiology.  It has resolved.  His blood pressure has also been treated and he has been initiated on beta blockade.  His troponins have been normal despite the high blood pressures.      As such, I have recommended a symptom-limited stress echocardiogram.  I would utilize a modified Maninder protocol given his chronic back pain.  If the stress echocardiogram does not demonstrate ischemia or provoke recurrent symptoms, I would recommend discharge with careful outpatient followup.  If he has a submaximal test, I would recommend a Lexiscan-exercise stress nuclear study at a later date.      Additionally, his blood pressure requires better control and it is very possible that whether he has significant epicardial coronary disease or not, his symptoms were provoked by the significant hypertension.         JUD DEL TORO MD, [facility]             D: 2017 18:06   T: 2017 19:37   MT: HERMINIA#[id number]      Name:     SHERRY PRESLEY   MRN:      [id number]        Account:       [id number]   :      1955           Consult Date:  2017      Document: [id number]       cc: Justino Chaparro MD

## 2017-05-07 NOTE — PLAN OF CARE
Problem: Goal Outcome Summary  Goal: Goal Outcome Summary  Outcome: Adequate for Discharge Date Met:  05/07/17  ICU End of Shift Summary.  For vital signs and complete assessments, please see documentation flowsheets.   Pt had stress echo this am and has been cleared by Dr Hunt for discharge.   BP stable. No complaints. No pain.  All discharge instructions and education on cholesterol and hypertension given to pt     Pertinent assessments: none  Major Shift Events: none  Plan (Upcoming Events):   Discharge/Transfer Needs: Pt discharged to home with wife     Bedside Shift Report Completed :   Bedside Safety Check Completed:

## 2017-05-07 NOTE — DISCHARGE SUMMARY
[facility]    Discharge Summary  Hospitalist    Date of Admission:  5/5/2017  Date of Discharge:  5/7/2017  Provider:  Justino Chaparro MD  Date of Service (when I last saw the patient): 05/07/17    Discharge Diagnoses    1. Hypertensive emergency secondary to medication noncompliance.  2. Chest pain secondary to #1, normal serial troponin and negative stress test.    3. History of essential hypertension.   - Mild LVH per echo.  - Borderline dilation of the ascending aorta.  - Normal LVEF.  4. Hyperlipidemia with triglyceridemia > 500.  5. History of chronic low back pain.  6. History of gout.  7. Obesity    Other medical issues:  Past Medical History:   Diagnosis Date     Alcoholism (H)     Treated 1980's, denies any recent problems with withdrawal when skips days drinking.     Arthritis      Chronic low back pain      Gout     possible     Herniated intervertebral disk     thoracic     Hypertension      RBBB (right bundle branch block)     Present for several years       History of Present Illness   Emery Zayas is an 61 year old male who presented with chest pain and elevated BP.  Please see the admission history and physical for full details.    Hospital Course   Emery Zayas was admitted on 5/5/2017.  The following problems were addressed during his hospitalization:    Hospital course.  He admitted to ICU on a nitroglycerin drip and heparin drip.  Serial troponins completed and negative.  Blood pressure treated with carvedilol and losartan successfully corrected.  Echocardiogram performed revealed normal LV ejection fraction, no abnormal wall motion.  Cardiology consulted.  Echo stress test done and reported within normal limits.  Overall he had a very stable and favorable hospital course with symptom resolution immediately after admission.  He has been asymptomatic for the rest of the stay.  He has been instructed about medication compliance, diet and physical activity as well as  regular follow-up with his primary care physician.        Significant Results and Procedures   See below     Pending Results   Unresulted Labs Ordered in the Past 30 Days of this Admission     No orders found from 3/6/2017 to 5/6/2017.          Code Status   Full Code       Primary Care Physician   Trish Edwards    GEN:  Alert, oriented x 3, appears comfortable, NAD.  HEENT:  Normocephalic/atraumatic, no scleral icterus, no nasal discharge, mouth moist.  CV:  Regular rate and rhythm, no murmur or JVD.  S1 + S2 noted, no S3 or S4.  LUNGS:  Clear to auscultation bilaterally without rales/rhonchi/wheezing/retractions.  Symmetric chest rise on inhalation noted.  ABD:  Active bowel sounds, soft, non-tender/non-distended.  No rebound/guarding/rigidity.  EXT:  No edema or cyanosis.  No joint synovitis noted.  SKIN:  Dry to touch, no exanthems noted in the visualized areas.     Discharge Disposition   Discharged to home    Consultations This Hospital Stay   PHARMACY TO DOSE HEPARIN  CARDIOLOGY IP CONSULT    Time Spent on this Encounter   I, Justino Chaparro, personally saw the patient today and spent greater than 30 minutes discharging this patient.     Discharge Orders     Follow-Up with Cardiologist     Reason for your hospital stay   Hypertensive emergency and chest pain.     Follow-up and recommended labs and tests    Follow up with primary care provider, Trish Edwarsd, within 7 days for hospital follow- up.  No follow up labs or test are needed.     Activity   Your activity upon discharge: activity as tolerated     Full Code     Diet   Follow this diet upon discharge: Orders Placed This Encounter     Low Saturated Fat Na <2400 mg       Discharge Medications   Current Discharge Medication List      START taking these medications    Details   fenofibrate 54 MG tablet Take 1 tablet (54 mg) by mouth daily  Qty: 30 tablet, Refills: 0    Associated Diagnoses: Hyperlipidemia LDL goal <130      carvedilol  (COREG) 12.5 MG tablet Take 1 tablet (12.5 mg) by mouth 2 times daily (with meals)  Qty: 60 tablet, Refills: 0    Associated Diagnoses: Essential hypertension, benign         CONTINUE these medications which have NOT CHANGED    Details   Multiple Vitamins-Minerals (MULTIVITAMIN ADULT) CHEW Take 1 chew tab by mouth daily      simvastatin (ZOCOR) 40 MG tablet Take 1 tablet (40 mg) by mouth At Bedtime  Qty: 30 tablet, Refills: 5    Associated Diagnoses: High blood triglycerides; Hyperlipidemia LDL goal <130      olmesartan (BENICAR) 40 MG tablet Take 1 tablet (40 mg) by mouth daily  Qty: 30 tablet, Refills: 5    Associated Diagnoses: Essential hypertension, benign      aspirin 81 MG tablet Take 1 tablet by mouth daily.      colchicine (COLCRYS) 0.6 MG tablet Take 2 tablets at the first sign of flare, take 1 additional tablet one hour later.  Qty: 30 tablet, Refills: 3    Associated Diagnoses: Idiopathic gout, unspecified chronicity, unspecified site           Allergies   Allergies   Allergen Reactions     No Known Drug Allergies      Data   Most Recent 3 CBC's:  Recent Labs   Lab Test  05/07/17   0505  05/06/17   0505  05/05/17   1152   WBC  5.5  4.6  7.0   HGB  15.1  14.8  17.1   MCV  95  94  92   PLT  169  168  218      Most Recent 3 BMP's:  Recent Labs   Lab Test  05/07/17   0505  05/06/17   0505  05/05/17   1152   NA  141  138  138   POTASSIUM  4.3  3.4  3.9   CHLORIDE  107  106  105   CO2  30  26  24   BUN  17  14  14   CR  1.20  0.92  0.95   ANIONGAP  4  6  9   RONN  8.8  8.8  9.5   GLC  125*  127*  129*     Most Recent 2 LFT's:  Recent Labs   Lab Test  05/07/17   0505  05/06/17   0505   AST  19  27   ALT  39  43   ALKPHOS  92  94   BILITOTAL  0.9  0.5     Most Recent INR's and Anticoagulation Dosing History:  Anticoagulation Dose History     Recent Dosing and Labs Latest Ref Rng & Units 2/5/2008 5/5/2017    INR 0.86 - 1.14 1.02 0.99        Most Recent 3 Troponin's:  Recent Labs   Lab Test  05/06/17   0505   05/06/17   0215  05/05/17   2043  05/05/17   1209   TROPI  <0.015  The 99th percentile for upper reference range is 0.045 ug/L.  Troponin values in   the range of 0.045 - 0.120 ug/L may be associated with risks of adverse   clinical events.    <0.015  The 99th percentile for upper reference range is 0.045 ug/L.  Troponin values in   the range of 0.045 - 0.120 ug/L may be associated with risks of adverse   clinical events.    <0.015  The 99th percentile for upper reference range is 0.045 ug/L.  Troponin values in   the range of 0.045 - 0.120 ug/L may be associated with risks of adverse   clinical events.     --    TROPONIN   --    --    --   0.00     Most Recent Cholesterol Panel:  Recent Labs   Lab Test  05/06/17   0505   CHOL  225*   LDL  Cannot estimate LDL when triglyceride exceeds 400 mg/dL   HDL  42   TRIG  574*     Most Recent 6 Bacteria Isolates From Any Culture (See EPIC Reports for Culture Details):No lab results found.  Most Recent TSH, T4 and A1c Labs:  Recent Labs   Lab Test  05/05/17   1152  08/02/16   0948   TSH   --   1.47   A1C  5.5  5.7     Results for orders placed or performed during the hospital encounter of 05/05/17   XR Chest 2 Views    Narrative    CHEST TWO VIEWS 5/5/2017 4:35 PM     COMPARISON: Two view chest x-ray 7/15/2012.    HISTORY: Chest pain.    FINDINGS: The cardiac silhouette, pulmonary vasculature, lungs and  pleural spaces are within normal limits.      Impression    IMPRESSION: Clear lungs.    BAM VELASQUEZ MD         Disclaimer: This note consists of symbols derived from keyboarding, dictation and/or voice recognition software. As a result, there may be errors in the script that have gone undetected. Please consider this when interpreting information found in this chart.

## 2017-05-07 NOTE — PROGRESS NOTES
Pt. Denies chest pain. BP stable. Shower this afternoon. 100% of dinner consumed. Wife at bedside.

## 2017-05-08 ENCOUNTER — TELEPHONE (OUTPATIENT)
Dept: FAMILY MEDICINE | Facility: CLINIC | Age: 62
End: 2017-05-08

## 2017-05-09 NOTE — PROGRESS NOTES
HPI   SUBJECTIVE:                                                    Emery Zayas is a 61 year old male who presents to clinic today for the following health issues:      Hospital Follow-up Visit:    Hospital/Nursing Home/IP Rehab Facility: [facility]  Date of Admission: 05/05/17  Date of Discharge: 05/07/17  Reason(s) for Admission: Hypertensive emergency secondary to medication noncompliance. He was drinking at that time as well, however not nearly as much as he used to drink.             Problems taking medications regularly: None       Medication changes since discharge: New meds: Fenofibrate 54 MG and Coreg 12.5 MG       Problems adhering to non-medication therapy: None    Summary of hospitalization:  [facility] discharge summary reviewed  Diagnostic Tests/Treatments reviewed.  Follow up needed: Cardiology in one year.  Other Healthcare Providers Involved in Patient s Care:         Specialist appointment - Next year with Cardiology for repeat Echocardiogram.  Update since discharge: Improved. He is back on medications and he has cut down on his drinking.     Post Discharge Medication Reconciliation: discharge medications reconciled, continue medications without change.  Plan of care communicated with patient     Coding guidelines for this visit:  Type of Medical   Decision Making Face-to-Face Visit       within 7 Days of discharge Face-to-Face Visit        within 14 days of discharge   Moderate Complexity [id number]   High Complexity [id number]        Cardiac Tests:  Reviewed the tests that were performed at [facility]. His Echocardiogram resulted with mild concentric left ventricular hypertrophy with mild mitral regurgitation. Also, there is borderline ascending aortic dilation, which will require routine monitoring with yearly Echocardiogram screening. His EKG resulted with a right bundle branch block.     Colon Cancer Screening:  According to health maintenance, the patient is due  for additional colon cancer screening. In the past, he has used the FIT test as his primary method of screening and elects to continue using the FIT test.     Medications:  The patient is due for refills of several of his medications.    Decreased Energy:  Since starting his new medications, the patient reports he is feeling less energetic, especially in the evenings. He states he could sleep until 9 AM every morning if he could. When asked, the patient states his wife complains of his snoring at night. He has not had a sleep study performed. Several times throughout the night he will wake up and cannot fall back to sleep, which he contributes to lower back pain that radiates into his hip.     Gout:  Upon returning home from the Hospital, the patient reports he woke the following morning with severe gout in the back of his left foot and on top of his foot. It is not as bad currently, but it is still painful if he bends his foot the wrong way. He was afraid to take colchicine the first day with his other medications, but eventually was unable to tolerate the pain and was prompted to take colchicine for relief. It seems to be improving with the use of colchicine.       Problem list and histories reviewed & adjusted, as indicated.  Additional history: as documented    BP Readings from Last 3 Encounters:   05/11/17 138/80   05/07/17 121/79   10/24/16 142/80    Wt Readings from Last 3 Encounters:   05/11/17 191 lb 11.2 oz (87 kg)   05/07/17 190 lb 0.6 oz (86.2 kg)   10/24/16 197 lb 9.6 oz (89.6 kg)             Labs reviewed in EPIC    Reviewed and updated as needed this visit by clinical staff  Tobacco  Allergies  Meds  Problems  Med Hx  Surg Hx  Fam Hx  Soc Hx        Reviewed and updated as needed this visit by Provider         ROS:  Constitutional, HEENT, cardiovascular, pulmonary, gi and gu systems are negative, except as otherwise noted.    This document serves as a record of the services and decisions  "personally performed and made by Trish Edwards MD. It was created on her behalf by Iva Valentin, a trained medical scribe. The creation of this document is based on the provider's statements to the medical scribe.  Iva Valentin, May 11, 2017, 3:08 PM     OBJECTIVE:                                                    /80 (BP Location: Right arm, Patient Position: Chair, Cuff Size: Adult Regular)  Pulse 64  Temp 98.5  F (36.9  C) (Oral)  Resp 16  Ht 5' 5.75\" (1.67 m)  Wt 191 lb 11.2 oz (87 kg)  SpO2 97%  BMI 31.18 kg/m2  Body mass index is 31.18 kg/(m^2).    EXAM:  GENERAL APPEARANCE: Patient appears healthy, alert and in no apparent distress  EYES: Eyes grossly normal to inspection, PERRL and conjunctivae and sclerae normal  HENT: Ear canals and TM's normal, mouth is ulcers or lesions, oropharynx clear and oral mucous membranes moist  NECK: No adenopathy, no asymmetry, masses, or scars and thyroid normal to palpation  RESP: Lungs clear to auscultation - no rales, rhonchi or wheezes  CV: Regular rate and rhythm, normal S1 S2, no S3 or S4, no murmur, click or rub, no peripheral edema and peripheral pulses strong  MS: No musculoskeletal defects are noted and gait is age appropriate without ataxia  PSYCH: Mentation appears normal and affect normal/bright    Diagnostic Test Results:  No results found for this or any previous visit (from the past 24 hour(s)).     ASSESSMENT/PLAN:                                                    (E78.5) Hyperlipidemia LDL goal <130  (primary encounter diagnosis)  Comment: Labs reviewed. Unable to determine his LDL Cholesterol based on elevated triglycerides. Started on Fenofibrate at the hospital. Adding Lovaza today to provide additional benefit to his cholesterol and triglycerides.   LDL Cholesterol Calculated   Date Value Ref Range Status   05/06/2017  <100 mg/dL Final    Cannot estimate LDL when triglyceride exceeds 400 mg/dL   Plan: fenofibrate 54 MG " tablet  Follow-up in one month.           (Z12.11) Screen for colon cancer  Comment: Due for additional screening. Elects to continue using the FIT test as his primary method of screening.   Plan: Fecal colorectal cancer screen (FIT)  Follow-up in one year for routine screening.          (E78.1) High blood triglycerides  Comment: Labs reviewed. History of elevated triglycerides, but improving as he cuts down on alcohol use. Now on Fenofibrate per [facility]. Starting on Lovaza to provide additional benefit to his cholesterol and triglycerides.   Recent Labs   Lab Test  05/06/17   0505  08/02/16   0948  05/15/15   0833  06/03/14   0948   CHOL  225*  163  183  127   HDL  42  34*  35*  26*   LDL  Cannot estimate LDL when triglyceride exceeds 400 mg/dL  Cannot estimate LDL when triglyceride exceeds 400 mg/dL  74  Cannot estimate LDL when triglyceride exceeds 400 mg/dL  76  41   TRIG  574*  462*  523*  300*   CHOLHDLRATIO   --    --   5.2*  5.0   Plan: omega-3 acid ethyl esters (LOVAZA) 1 G capsule  Follow-up in one month.           (I11.9) LVH (left ventricular hypertrophy) due to hypertensive disease, without heart failure  Comment: Noted on Echocardiogram completed at the hospital. Recommended to follow-up with cardiology annually for repeat Echocardiograms.  Plan: Follow-up with cardiology in one year.     (I77.810) Ascending aorta dilatation (H)  Comment: Noted on Echocardiogram completed at the hospital. Recommended to follow-up with cardiology annually for repeat Echocardiograms.  Plan: Follow-up with cardiology in one year.     (I10) Essential hypertension, benign  Comment: Blood pressure still borderline. However, will continue with current medication regimen he started at the Hospital. Refilled his prescription of Coreg. Will continue to monitor his blood pressure with every clinical visit.  Plan: carvedilol (COREG) 12.5 MG tablet  Follow-up in one month.           (R53.83) Fatigue, unspecified  type  Comment: Trouble sleeping at night. Wakes frequently. Wife complains of his snoring. Referred to Sleep Clinic for evaluation, he will consider evaluation.   Plan: SLEEP EVALUATION & MANAGEMENT REFERRAL - ADULT          (M10.00) Acute idiopathic gout, unspecified site  Comment: Flare-up upon returning home from hospital. Improving with use of Colchicine. Labs today to check his uric acid.   Plan: Uric acid              The information in this document, created by a medical scribe for me, accurately reflects the services I personally performed and the decisions made by me. I have reviewed and approved this document for accuracy.  Dr. Trish Edwards, May 11, 2017, 3:30 PM     Trish Edwards MD  [facility]      Physical Exam

## 2017-05-09 NOTE — TELEPHONE ENCOUNTER
"Hospital/TCU/ED for chronic condition Discharge Protocol    \"Hi, my name is Clarisa Farrell, a registered nurse, and I am calling from [facility].  I am calling to follow up and see how things are going for you after your recent emergency visit/hospital/TCU stay.\"    Tell me how you are doing now that you are home?\" Doing good.  No symptoms currently.  Feeling just fine.  Taking medications as prescribed.      Discharge Instructions    \"Let's review your discharge instructions.  What is/are the follow-up recommendations?  Pt. Response:  f/u with PCP if no improvement or worsening.      \"Has an appointment with your primary care provider been scheduled?\"   Yes. (confirm)    \"When you see the provider, I would recommend that you bring your medications with you.\"    Medications    \"Tell me what changed about your medicines when you discharged?\"    Changes to chronic meds?    0-1    \"What questions do you have about your medications?\"    None     New diagnoses of heart failure, COPD, diabetes, or MI?    No              Medication reconciliation completed? Yes  Was MTM referral placed (*Make sure to put transitions as reason for referral)?   No    Call Summary    \"What questions or concerns do you have about your recent visit and your follow-up care?\"     none    \"If you have questions or things don't continue to improve, we encourage you contact us through the main clinic number (give number).  Even if the clinic is not open, triage nurses are available 24/7 to help you.     We would like you to know that our clinic has extended hours (provide information).  We also have urgent care (provide details on closest location and hours/contact info)\"      \"Thank you for your time and take care!\"    Clarisa Farrell RN           "

## 2017-05-11 ENCOUNTER — OFFICE VISIT (OUTPATIENT)
Dept: FAMILY MEDICINE | Facility: CLINIC | Age: 62
End: 2017-05-11
Payer: COMMERCIAL

## 2017-05-11 VITALS
RESPIRATION RATE: 16 BRPM | HEIGHT: 66 IN | TEMPERATURE: 98.5 F | WEIGHT: 191.7 LBS | HEART RATE: 64 BPM | OXYGEN SATURATION: 97 % | SYSTOLIC BLOOD PRESSURE: 138 MMHG | BODY MASS INDEX: 30.81 KG/M2 | DIASTOLIC BLOOD PRESSURE: 80 MMHG

## 2017-05-11 DIAGNOSIS — E78.5 HYPERLIPIDEMIA LDL GOAL <130: Primary | ICD-10-CM

## 2017-05-11 DIAGNOSIS — Z12.11 SCREEN FOR COLON CANCER: ICD-10-CM

## 2017-05-11 DIAGNOSIS — I11.9 LVH (LEFT VENTRICULAR HYPERTROPHY) DUE TO HYPERTENSIVE DISEASE, WITHOUT HEART FAILURE: ICD-10-CM

## 2017-05-11 DIAGNOSIS — I10 ESSENTIAL HYPERTENSION, BENIGN: ICD-10-CM

## 2017-05-11 DIAGNOSIS — R53.83 FATIGUE, UNSPECIFIED TYPE: ICD-10-CM

## 2017-05-11 DIAGNOSIS — M10.00 ACUTE IDIOPATHIC GOUT, UNSPECIFIED SITE: ICD-10-CM

## 2017-05-11 DIAGNOSIS — E78.1 HIGH BLOOD TRIGLYCERIDES: ICD-10-CM

## 2017-05-11 DIAGNOSIS — I77.810 ASCENDING AORTA DILATATION (H): ICD-10-CM

## 2017-05-11 PROCEDURE — 36415 COLL VENOUS BLD VENIPUNCTURE: CPT | Performed by: FAMILY MEDICINE

## 2017-05-11 PROCEDURE — 99496 TRANSJ CARE MGMT HIGH F2F 7D: CPT | Performed by: FAMILY MEDICINE

## 2017-05-11 PROCEDURE — 84550 ASSAY OF BLOOD/URIC ACID: CPT | Performed by: FAMILY MEDICINE

## 2017-05-11 RX ORDER — FENOFIBRATE 54 MG/1
54 TABLET ORAL DAILY
Qty: 30 TABLET | Refills: 0 | Status: SHIPPED | OUTPATIENT
Start: 2017-05-11 | End: 2017-07-05

## 2017-05-11 RX ORDER — CARVEDILOL 12.5 MG/1
12.5 TABLET ORAL 2 TIMES DAILY WITH MEALS
Qty: 60 TABLET | Refills: 0 | Status: SHIPPED | OUTPATIENT
Start: 2017-05-11 | End: 2017-07-05

## 2017-05-11 RX ORDER — OMEGA-3-ACID ETHYL ESTERS 1 G/1
1 CAPSULE, LIQUID FILLED ORAL DAILY
Qty: 90 CAPSULE | Refills: 3 | Status: SHIPPED | OUTPATIENT
Start: 2017-05-11 | End: 2018-03-30

## 2017-05-11 NOTE — PATIENT INSTRUCTIONS
Consider the sleep study. Keep active, watch your diet closely. Continue to cut down on alcohol consumption.      The Mediterranean Diet can reduce your risk of Heart Disease and Stroke    Recommended:     Olive oil         >4 Tbs/day  Tree nuts       >3 handfuls/wk, prefer once daily 1/4 cup, Almonds, Walnuts, Hazelnuts preferred   Fresh Fruits   >3/day  Vegetables     >2/day  Fish, seafood >3/week  Legumes        >3/week  White meat     Instead of red meat  Wine with meals, optional, only for those who wish to drink, up to 7 drinks per week    Discouraged; Limit the following    Soda drinks                 <1/day  Commercial baked goods, sweets, pastries  <3/week  Spread fats                 <1/day  Red meat                    <1/day  Or processed meats  <1/day     Taken from [place] Journal of Medicine Feb 2013

## 2017-05-11 NOTE — MR AVS SNAPSHOT
After Visit Summary   5/11/2017    Emery Zayas    MRN: [id number]           Patient Information     Date Of Birth          1955        Visit Information        Provider Department      5/11/2017 2:40 PM Trish Edwards MD [facility]        Today's Diagnoses     Hyperlipidemia LDL goal <130    -  1    Screen for colon cancer        High blood triglycerides        LVH (left ventricular hypertrophy) due to hypertensive disease, without heart failure        Ascending aorta dilatation (H)        Essential hypertension, benign        Fatigue, unspecified type        Acute idiopathic gout, unspecified site          Care Instructions    Consider the sleep study. Keep active, watch your diet closely. Continue to cut down on alcohol consumption.      The Mediterranean Diet can reduce your risk of Heart Disease and Stroke    Recommended:     Olive oil         >4 Tbs/day  Tree nuts       >3 handfuls/wk, prefer once daily 1/4 cup, Almonds, Walnuts, Hazelnuts preferred   Fresh Fruits   >3/day  Vegetables     >2/day  Fish, seafood >3/week  Legumes        >3/week  White meat     Instead of red meat  Wine with meals, optional, only for those who wish to drink, up to 7 drinks per week    Discouraged; Limit the following    Soda drinks                 <1/day  Commercial baked goods, sweets, pastries  <3/week  Spread fats                 <1/day  Red meat                    <1/day  Or processed meats  <1/day     Taken from [place] Journal of Medicine Feb 2013         Follow-ups after your visit        Additional Services     SLEEP EVALUATION & MANAGEMENT REFERRAL - ADULT       Please be aware that coverage of these services is subject to the terms and limitations of your health insurance plan.  Call member services at your health plan with any benefit or coverage questions.      Please bring the following to your appointment:    >>   List of current medications   >>   This referral  "request   >>   Any documents/labs given to you for this referral    [facility] [phone] (Age 18 and up)                  Follow-up notes from your care team     Return in about 4 weeks (around 2017).      Future tests that were ordered for you today     Open Future Orders        Priority Expected Expires Ordered    Fecal colorectal cancer screen (FIT) Routine 2017    SLEEP EVALUATION & MANAGEMENT REFERRAL - ADULT Routine  2018            Who to contact     If you have questions or need follow up information about today's clinic visit or your schedule please contact [facility] directly at [phone].  Normal or non-critical lab and imaging results will be communicated to you by MyChart, letter or phone within 4 business days after the clinic has received the results. If you do not hear from us within 7 days, please contact the clinic through [email or web address] or phone. If you have a critical or abnormal lab result, we will notify you by phone as soon as possible.  Submit refill requests through [facility] or call your pharmacy and they will forward the refill request to us. Please allow 3 business days for your refill to be completed.          Additional Information About Your Visit        [email or web address] Information     [facility] lets you send messages to your doctor, view your test results, renew your prescriptions, schedule appointments and more. To sign up, go to [email or web address] . Click on \"Log in\" on the left side of the screen, which will take you to the Welcome page. Then click on \"Sign up Now\" on the right side of the page.     You will be asked to enter the access code listed below, as well as some personal information. Please follow the directions to create your username and password.     Your access code is: [email or web address]  Expires: 2017 11:08 AM     Your access code will  in 90 days. If you need help or a new code, " "please call your [facility] clinic or [phone].        Care EveryWhere ID     This is your Care EveryWhere ID. This could be used by other organizations to access your [facility] medical records  [id number]        Your Vitals Were     Pulse Temperature Respirations Height Pulse Oximetry BMI (Body Mass Index)    64 98.5  F (36.9  C) (Oral) 16 5' 5.75\" (1.67 m) 97% 31.18 kg/m2       Blood Pressure from Last 3 Encounters:   05/11/17 138/80   05/07/17 121/79   10/24/16 142/80    Weight from Last 3 Encounters:   05/11/17 191 lb 11.2 oz (87 kg)   05/07/17 190 lb 0.6 oz (86.2 kg)   10/24/16 197 lb 9.6 oz (89.6 kg)              We Performed the Following     Uric acid          Today's Medication Changes          These changes are accurate as of: 5/11/17  3:31 PM.  If you have any questions, ask your nurse or doctor.               Start taking these medicines.        Dose/Directions    omega-3 acid ethyl esters 1 G capsule   Commonly known as:  Lovaza   Used for:  High blood triglycerides   Started by:  Trish Edwards MD        Dose:  1 g   Take 1 capsule (1 g) by mouth daily   Quantity:  90 capsule   Refills:  3            Where to get your medicines      [facility] [address] [place] [address] AT [address] [address]    Hours:  24-hours Phone:  [phone]     carvedilol 12.5 MG tablet    fenofibrate 54 MG tablet    omega-3 acid ethyl esters 1 G capsule                Primary Care Provider Office Phone # Fax #    Trish Edwards [phone]       [facility] [address]  [address]   [facility] [address]        Thank you!     Thank you for choosing [facility]  for your care. Our goal is always to provide you with excellent care. Hearing back from our patients is one way we can continue to improve our services. Please take a few minutes to complete the " written survey that you may receive in the mail after your visit with us. Thank you!             Your Updated Medication List - Protect others around you: Learn how to safely use, store and throw away your medicines at www.disposemymeds.org.          This list is accurate as of: 5/11/17  3:31 PM.  Always use your most recent med list.                   Brand Name Dispense Instructions for use    aspirin 81 MG tablet      Take 1 tablet by mouth daily.       carvedilol 12.5 MG tablet    COREG    60 tablet    Take 1 tablet (12.5 mg) by mouth 2 times daily (with meals)       colchicine 0.6 MG tablet    COLCRYS    30 tablet    Take 2 tablets at the first sign of flare, take 1 additional tablet one hour later.       fenofibrate 54 MG tablet     30 tablet    Take 1 tablet (54 mg) by mouth daily       MULTIVITAMIN ADULT Chew      Take 1 chew tab by mouth daily       olmesartan 40 MG tablet    BENICAR    30 tablet    Take 1 tablet (40 mg) by mouth daily       omega-3 acid ethyl esters 1 G capsule    Lovaza    90 capsule    Take 1 capsule (1 g) by mouth daily       simvastatin 40 MG tablet    ZOCOR    30 tablet    Take 1 tablet (40 mg) by mouth At Bedtime

## 2017-05-11 NOTE — NURSING NOTE
"Chief Complaint   Patient presents with     Hospital F/U     Initial /80 (BP Location: Right arm, Patient Position: Chair, Cuff Size: Adult Regular)  Pulse 64  Temp 98.5  F (36.9  C) (Oral)  Resp 16  Ht 5' 5.75\" (1.67 m)  Wt 191 lb 11.2 oz (87 kg)  SpO2 97%  BMI 31.18 kg/m2 Estimated body mass index is 31.18 kg/(m^2) as calculated from the following:    Height as of this encounter: 5' 5.75\" (1.67 m).    Weight as of this encounter: 191 lb 11.2 oz (87 kg).  BP completed using cuff size regular right arm.    Lisa Magill, CMA    "

## 2017-05-12 ENCOUNTER — TELEPHONE (OUTPATIENT)
Dept: FAMILY MEDICINE | Facility: CLINIC | Age: 62
End: 2017-05-12

## 2017-05-12 DIAGNOSIS — M10.00 IDIOPATHIC GOUT, UNSPECIFIED CHRONICITY, UNSPECIFIED SITE: ICD-10-CM

## 2017-05-12 RX ORDER — COLCHICINE 0.6 MG/1
TABLET ORAL
Qty: 30 TABLET | Refills: 3 | Status: SHIPPED | OUTPATIENT
Start: 2017-05-12 | End: 2020-06-26

## 2017-05-12 NOTE — TELEPHONE ENCOUNTER
Disp Refills Start End REBEKA   colchicine (COLCRYS) 0.6 MG tablet 30 tablet 3 8/2/2016  No   Sig: Take 2 tablets at the first sign of flare, take 1 additional tablet one hour later.            Last Written Prescription Date: 8.2.16  Last Fill Quantity: 30, # refills: 3  Last Office Visit with [facility], [facility] or [facility] prescribing provider:  5.11.17   Next 5 appointments (look out 90 days)     Jun 13, 2017  7:40 AM CDT   SHORT with Trish Edwards MD   [facility]    [address] [place] [address], [address]  [facility] [address] [phone]                   Uric Acid   Date Value Ref Range Status   08/02/2016 9.0 (H) 3.5 - 7.2 mg/dL Final   ]  Creatinine   Date Value Ref Range Status   05/07/2017 1.20 0.66 - 1.25 mg/dL Final   ]  Lab Results   Component Value Date    WBC 5.5 05/07/2017     Lab Results   Component Value Date    RBC 4.63 05/07/2017     Lab Results   Component Value Date    HGB 15.1 05/07/2017     Lab Results   Component Value Date    HCT 44.0 05/07/2017     No components found for: MCT  Lab Results   Component Value Date    MCV 95 05/07/2017     Lab Results   Component Value Date    MCH 32.6 05/07/2017     Lab Results   Component Value Date    MCHC 34.3 05/07/2017     Lab Results   Component Value Date    RDW 13.0 05/07/2017     Lab Results   Component Value Date     05/07/2017     Lab Results   Component Value Date    AST 19 05/07/2017     Lab Results   Component Value Date    ALT 39 05/07/2017     Prescription approved per [facility] Refill Protocol  Yvette Rossi RN BS

## 2017-05-13 LAB — URATE SERPL-MCNC: 9.9 MG/DL (ref 3.5–7.2)

## 2017-05-15 ENCOUNTER — TELEPHONE (OUTPATIENT)
Dept: FAMILY MEDICINE | Facility: CLINIC | Age: 62
End: 2017-05-15

## 2017-05-15 DIAGNOSIS — E79.0 HIGH LEVEL OF URIC ACID IN BLOOD: Primary | ICD-10-CM

## 2017-05-16 RX ORDER — ALLOPURINOL 300 MG/1
300 TABLET ORAL DAILY
Qty: 90 TABLET | Refills: 1 | Status: SHIPPED | OUTPATIENT
Start: 2017-05-16 | End: 2017-06-13

## 2017-05-16 NOTE — TELEPHONE ENCOUNTER
Pt calls, accepts allopurinol, saw SD 8/23/16 for elevated uric acid but allopurinol never prescribed, pt will f/u later with [facility], routed to SD, please send new rx, close when final  Emi Valenzuela RN, BSN  Message handled by Nurse Triage.

## 2017-05-23 PROCEDURE — 82274 ASSAY TEST FOR BLOOD FECAL: CPT | Performed by: FAMILY MEDICINE

## 2017-05-26 DIAGNOSIS — Z12.11 SCREEN FOR COLON CANCER: ICD-10-CM

## 2017-05-26 LAB — HEMOCCULT STL QL IA: NEGATIVE

## 2017-06-12 NOTE — PROGRESS NOTES
HPI   SUBJECTIVE:                                                    Emery Zayas is a 61 year old male who presents to clinic today for the following health issues:    Hypertension Follow-up      Outpatient blood pressures are being checked at home.  Results are 138-144/80    Low Salt Diet: not monitoring salt      BP Readings from Last 6 Encounters:   06/13/17 120/74   05/11/17 138/80   05/07/17 121/79   10/24/16 142/80   08/23/16 136/80   08/02/16 132/80     Oziel is here today for hypertension follow up. Is taking all of his medications as directed. Benicar 40 mg daily and Coreg 12.5 mg BID.        Amount of exercise or physical activity: 6-7 days/week for an average of 15-30 minutes    Problems taking medications regularly: No    Medication side effects: diarrhea     Diet: regular (no restrictions)      Acute Illness   Acute illness concerns: chest congestion  Onset: Since last Thursday     Fever: no    Chills/Sweats: no    Headache (location?): no    Sinus Pressure:no    Conjunctivitis:  no    Ear Pain: no    Rhinorrhea: no    Backaches:  YES    Congestion: YES- chest     Sore Throat: YES     Cough: YES-non-productive    Wheeze: no    Decreased Appetite: YES    Nausea: no    Vomiting: no    Diarrhea:  YES    Dizziness:  YES    Dysuria/Freq.: no    Fatigue/Achiness: YES- fatigue    Sick/Strep Exposure: YES- wife      Therapies Tried and outcome: [id number] a little bit    Will would also like to address ongoing illness that started last Thursday. Notes that his wife has also been sick for a week and a half. Complains of congestion, backaches, sore throat, cough, and fatigue. Has felt some SOB due to chest congestion, no known wheezing.      Additional problems:   Alcohol dependence: still drinking 5-7 drinks a week. Down from what he is drinking in the past.   Hyperlipidemia: taking lovaza and Zocor 40 mg.   Gout: did have a bad flare up recently. Unsure if allopurinol is helping. Did have to take  colchicine- four times with latest flare up. Finally able to get left foot into a shoe. Does note that he has had bad diarrhea following colchicine use. Notes that he is has had this diarrhea for a couple weeks now.   Back pain: taking rin back and body if his back is flaring up.     Problem list and histories reviewed & adjusted, as indicated.  Additional history: as documented    BP Readings from Last 3 Encounters:   06/13/17 120/74   05/11/17 138/80   05/07/17 121/79    Wt Readings from Last 3 Encounters:   06/13/17 87.4 kg (192 lb 9.6 oz)   05/11/17 87 kg (191 lb 11.2 oz)   05/07/17 86.2 kg (190 lb 0.6 oz)                  Labs reviewed in EPIC    Reviewed and updated as needed this visit by clinical staff  Tobacco  Allergies  Meds  Problems  Med Hx  Surg Hx  Fam Hx  Soc Hx        Reviewed and updated as needed this visit by Provider  Allergies  Meds  Problems         ROS:  CONSTITUTIONAL:NEGATIVE for fever, chills, change in weight, POSITIVE  for fatigue and malaise  INTEGUMENTARY/SKIN: NEGATIVE for worrisome rashes, moles or lesions  EYES: NEGATIVE for vision changes or irritation  ENT/MOUTH: POSITIVE for nasal congestion, postnasal drainage and sore throat  RESP:POSITIVE for  cough-non productive and dyspnea on exertion  CV: NEGATIVE for chest pain, palpitations or peripheral edema, POSITIVE for Hx HTN  MUSCULOSKELETAL: NEGATIVE for significant arthralgias or myalgia  PSYCHIATRIC: NEGATIVE for changes in mood or affect    This document serves as a record of the services and decisions personally performed and made by Trish Edwards MD. It was created on her behalf by Sherice Sarmiento, a trained medical scribe. The creation of this document is based the provider's statements to the medical scribe.  Sherice Sarmiento June 13, 2017 8:12 AM    OBJECTIVE:                                                    /74 (BP Location: Right arm, Patient Position: Chair, Cuff Size: Adult Regular)  Pulse 55  Temp  98  F (36.7  C) (Oral)  Wt 87.4 kg (192 lb 9.6 oz)  SpO2 98%  BMI 31.32 kg/m2  Body mass index is 31.32 kg/(m^2).  GENERAL: healthy, alert and no distress  EYES: Eyes grossly normal to inspection, PERRL and conjunctivae and sclerae normal  HENT: normal cephalic/atraumatic, ear canals and TM's normal, nasal mucosa edematous , post nasal drainage.  oropharynx clear and oral mucous membranes moist  NECK: no adenopathy, no asymmetry, masses, or scars and thyroid normal to palpation  RESP: lungs clear to auscultation - no rales, rhonchi or wheezes  CV: regular rate and rhythm, normal S1 S2, no S3 or S4, no murmur, click or rub, no peripheral edema and peripheral pulses strong  ABDOMEN: soft, nontender, no hepatosplenomegaly, no masses and bowel sounds normal  MS: no gross musculoskeletal defects noted, no edema  SKIN: no suspicious lesions or rashes  NEURO: Normal strength and tone, mentation intact and speech normal  PSYCH: mentation appears normal, affect normal/bright    Diagnostic Test Results:  No results found for this or any previous visit (from the past 24 hour(s)).     ASSESSMENT/PLAN:                                                    (E78.5) Hyperlipidemia LDL goal <130  (primary encounter diagnosis)  Comment: Fasting for labs. Continue to work on a healthy diet. No refills needed today.    Plan: Lipid panel reflex to direct LDL, Comprehensive        metabolic panel (BMP + Alb, Alk Phos, ALT, AST,        Total. Bili, TP)        Follow up in 3 months.     (I10) Essential hypertension, benign  Comment: Well controlled. Continue with Coreg and Benicar.   BP Readings from Last 3 Encounters:   06/13/17 120/74   05/11/17 138/80   05/07/17 121/79     Plan: Comprehensive metabolic panel (BMP + Alb, Alk         Phos, ALT, AST, Total. Bili, TP)        Follow up in three months.     (F10.20) Uncomplicated alcohol dependence (H)  Comment: Continues to drink, 5-7 per week per patient. Discussed limiting to under 14 a  week.   Plan: allopurinol (ZYLOPRIM) 100 MG tablet        Follow up in three months.     (J06.9,  B97.89) Viral upper respiratory tract infection  Comment: Likely viral at this point. Advised supportive cares and rest. Flonase or saline nasal spray as needed. Continue to monitor symptoms and watch for possible sinus infection.   Plan: Follow up as needed.     (M10.00) Acute idiopathic gout, unspecified site  Comment: Increase allopurinol to 400 mg daily from 300 mg. Continue to monitor gout flare ups, use colchicine as needed. Discussed this could be causing diarrhea. Will check uric acid today.   Plan: Uric acid        Follow up in three months.         The information in this document, created by the medical scribe for me, accurately reflects the services I personally performed and the decisions made by me. I have reviewed and approved this document for accuracy prior to leaving the patient care area.  Trish Edwards MD 8:12 AM 6/13/2017          Trish Edwards MD  [facility]      Physical Exam

## 2017-06-13 ENCOUNTER — OFFICE VISIT (OUTPATIENT)
Dept: FAMILY MEDICINE | Facility: CLINIC | Age: 62
End: 2017-06-13
Payer: COMMERCIAL

## 2017-06-13 VITALS
OXYGEN SATURATION: 98 % | DIASTOLIC BLOOD PRESSURE: 74 MMHG | BODY MASS INDEX: 31.32 KG/M2 | TEMPERATURE: 98 F | WEIGHT: 192.6 LBS | HEART RATE: 55 BPM | SYSTOLIC BLOOD PRESSURE: 120 MMHG

## 2017-06-13 DIAGNOSIS — E78.5 HYPERLIPIDEMIA LDL GOAL <130: Primary | ICD-10-CM

## 2017-06-13 DIAGNOSIS — M10.00 ACUTE IDIOPATHIC GOUT, UNSPECIFIED SITE: ICD-10-CM

## 2017-06-13 DIAGNOSIS — F10.20 UNCOMPLICATED ALCOHOL DEPENDENCE (H): ICD-10-CM

## 2017-06-13 DIAGNOSIS — I10 ESSENTIAL HYPERTENSION, BENIGN: ICD-10-CM

## 2017-06-13 DIAGNOSIS — J06.9 VIRAL UPPER RESPIRATORY TRACT INFECTION: ICD-10-CM

## 2017-06-13 PROCEDURE — 36415 COLL VENOUS BLD VENIPUNCTURE: CPT | Performed by: FAMILY MEDICINE

## 2017-06-13 PROCEDURE — 80061 LIPID PANEL: CPT | Performed by: FAMILY MEDICINE

## 2017-06-13 PROCEDURE — 80053 COMPREHEN METABOLIC PANEL: CPT | Performed by: FAMILY MEDICINE

## 2017-06-13 PROCEDURE — 84550 ASSAY OF BLOOD/URIC ACID: CPT | Performed by: FAMILY MEDICINE

## 2017-06-13 PROCEDURE — 99214 OFFICE O/P EST MOD 30 MIN: CPT | Performed by: FAMILY MEDICINE

## 2017-06-13 RX ORDER — ALLOPURINOL 100 MG/1
200 TABLET ORAL 2 TIMES DAILY
Qty: 120 TABLET | Refills: 3 | Status: SHIPPED | OUTPATIENT
Start: 2017-06-13 | End: 2017-09-22

## 2017-06-13 NOTE — NURSING NOTE
"Chief Complaint   Patient presents with     Hypertension     URI     Initial /74 (BP Location: Right arm, Patient Position: Chair, Cuff Size: Adult Regular)  Pulse 55  Temp 98  F (36.7  C) (Oral)  Wt 192 lb 9.6 oz (87.4 kg)  SpO2 98%  BMI 31.32 kg/m2 Estimated body mass index is 31.32 kg/(m^2) as calculated from the following:    Height as of 5/11/17: 5' 5.75\" (1.67 m).    Weight as of this encounter: 192 lb 9.6 oz (87.4 kg).  BP completed using cuff size regular right arm.    Lisa Magill, CMA    "

## 2017-06-13 NOTE — LETTER
[address]  [address], 2017       [place] [address]           Phone :  [phone]          Fax:  [phone]  Emery Zayas  [address] [facility] [address]          Dear Emery,    The uric acid is very good, at 4, much better than last time , 9.9  Normal electrolyte panel. The sodium, potassium, sugar and kidneys are all normal  Your cholesterol tests are normal, but the triglycerides are still high, but way better then last time! The medications are really working. Keep on a low carb diet.    Sincerely,      Trish Edwards MD/jonatan  Results for orders placed or performed in visit on 06/13/17   Lipid panel reflex to direct LDL   Result Value Ref Range    Cholesterol 137 <200 mg/dL    Triglycerides 280 (H) <150 mg/dL    HDL Cholesterol 36 (L) >39 mg/dL    LDL Cholesterol Calculated 45 <100 mg/dL    Non HDL Cholesterol 101 <130 mg/dL   Comprehensive metabolic panel (BMP + Alb, Alk Phos, ALT, AST, Total. Bili, TP)   Result Value Ref Range    Sodium 142 133 - 144 mmol/L    Potassium 4.0 3.4 - 5.3 mmol/L    Chloride 108 94 - 109 mmol/L    Carbon Dioxide 24 20 - 32 mmol/L    Anion Gap 10 3 - 14 mmol/L    Glucose 124 (H) 70 - 99 mg/dL    Urea Nitrogen 14 7 - 30 mg/dL    Creatinine 1.03 0.66 - 1.25 mg/dL    GFR Estimate 73 >60 mL/min/1.7m2    GFR Estimate If Black 88 >60 mL/min/1.7m2    Calcium 8.8 8.5 - 10.1 mg/dL    Bilirubin Total 0.5 0.2 - 1.3 mg/dL    Albumin 3.8 3.4 - 5.0 g/dL    Protein Total 7.0 6.8 - 8.8 g/dL    Alkaline Phosphatase 85 40 - 150 U/L    ALT 37 0 - 70 U/L    AST 25 0 - 45 U/L   Uric acid   Result Value Ref Range    Uric Acid 4.5 3.5 - 7.2 mg/dL

## 2017-06-13 NOTE — MR AVS SNAPSHOT
"              After Visit Summary   6/13/2017    Emery Zayas    MRN: [id number]           Patient Information     Date Of Birth          1955        Visit Information        Provider Department      6/13/2017 7:40 AM Trish Edwards MD [facility]        Today's Diagnoses     Hyperlipidemia LDL goal <130    -  1    Essential hypertension, benign        Uncomplicated alcohol dependence (H)        Viral upper respiratory tract infection        Acute idiopathic gout, unspecified site           Follow-ups after your visit        Follow-up notes from your care team     Return in about 3 months (around 9/13/2017).      Who to contact     If you have questions or need follow up information about today's clinic visit or your schedule please contact [facility] directly at [phone].  Normal or non-critical lab and imaging results will be communicated to you by Edmodohart, letter or phone within 4 business days after the clinic has received the results. If you do not hear from us within 7 days, please contact the clinic through MyChart or phone. If you have a critical or abnormal lab result, we will notify you by phone as soon as possible.  Submit refill requests through Streamfile or call your pharmacy and they will forward the refill request to us. Please allow 3 business days for your refill to be completed.          Additional Information About Your Visit        MyChart Information     Streamfile lets you send messages to your doctor, view your test results, renew your prescriptions, schedule appointments and more. To sign up, go to [email or web address] . Click on \"Log in\" on the left side of the screen, which will take you to the Welcome page. Then click on \"Sign up Now\" on the right side of the page.     You will be asked to enter the access code listed below, as well as some personal information. Please follow the directions to create your username and password.   "   Your access code is: [email or web address]  Expires: 2017 11:08 AM     Your access code will  in 90 days. If you need help or a new code, please call your [place] clinic or [phone].        Care EveryWhere ID     This is your Care EveryWhere ID. This could be used by other organizations to access your [place] medical records  [id number]        Your Vitals Were     Pulse Temperature Pulse Oximetry BMI (Body Mass Index)          55 98  F (36.7  C) (Oral) 98% 31.32 kg/m2         Blood Pressure from Last 3 Encounters:   17 120/74   17 138/80   17 121/79    Weight from Last 3 Encounters:   17 192 lb 9.6 oz (87.4 kg)   17 191 lb 11.2 oz (87 kg)   17 190 lb 0.6 oz (86.2 kg)              We Performed the Following     Comprehensive metabolic panel (BMP + Alb, Alk Phos, ALT, AST, Total. Bili, TP)     Lipid panel reflex to direct LDL     Uric acid          Today's Medication Changes          These changes are accurate as of: 17  8:11 AM.  If you have any questions, ask your nurse or doctor.               These medicines have changed or have updated prescriptions.        Dose/Directions    allopurinol 100 MG tablet   Commonly known as:  ZYLOPRIM   This may have changed:    - medication strength  - how much to take  - when to take this   Used for:  Uncomplicated alcohol dependence (H)   Changed by:  Trish Edwards MD        Dose:  200 mg   Take 2 tablets (200 mg) by mouth 2 times daily   Quantity:  120 tablet   Refills:  3            Where to get your medicines      These medications were sent to [facility] [address] [place] [address] [id number] AT [place] [address]  [id number] [place] [address]    Hours:  24-hours Phone:  [phone]     allopurinol 100 MG tablet                Primary Care Provider Office Phone # Fax #    Trish Edwards [phone]       [facility] [address]  [facility] [address]        Thank you!     Thank you for choosing [facility]  for your care. Our goal is always to provide you with excellent care. Hearing back from our patients is one way we can continue to improve our services. Please take a few minutes to complete the written survey that you may receive in the mail after your visit with us. Thank you!             Your Updated Medication List - Protect others around you: Learn how to safely use, store and throw away your medicines at www.disposemymeds.org.          This list is accurate as of: 6/13/17  8:11 AM.  Always use your most recent med list.                   Brand Name Dispense Instructions for use    allopurinol 100 MG tablet    ZYLOPRIM    120 tablet    Take 2 tablets (200 mg) by mouth 2 times daily       aspirin 81 MG tablet      Take 1 tablet by mouth daily.       carvedilol 12.5 MG tablet    COREG    60 tablet    Take 1 tablet (12.5 mg) by mouth 2 times daily (with meals)       colchicine 0.6 MG tablet    COLCRYS    30 tablet    Take 2 tablets at the first sign of flare, take 1 additional tablet one hour later.       fenofibrate 54 MG tablet     30 tablet    Take 1 tablet (54 mg) by mouth daily       MULTIVITAMIN ADULT Chew      Take 1 chew tab by mouth daily       olmesartan 40 MG tablet    BENICAR    30 tablet    Take 1 tablet (40 mg) by mouth daily       omega-3 acid ethyl esters 1 G capsule    Lovaza    90 capsule    Take 1 capsule (1 g) by mouth daily       order for DME     1 Device    BP cuff, severe HTN, needs to monitor daily       simvastatin 40 MG tablet    ZOCOR    30 tablet    Take 1 tablet (40 mg) by mouth At Bedtime

## 2017-06-14 LAB
ALBUMIN SERPL-MCNC: 3.8 G/DL (ref 3.4–5)
ALP SERPL-CCNC: 85 U/L (ref 40–150)
ALT SERPL W P-5'-P-CCNC: 37 U/L (ref 0–70)
ANION GAP SERPL CALCULATED.3IONS-SCNC: 10 MMOL/L (ref 3–14)
AST SERPL W P-5'-P-CCNC: 25 U/L (ref 0–45)
BILIRUB SERPL-MCNC: 0.5 MG/DL (ref 0.2–1.3)
BUN SERPL-MCNC: 14 MG/DL (ref 7–30)
CALCIUM SERPL-MCNC: 8.8 MG/DL (ref 8.5–10.1)
CHLORIDE SERPL-SCNC: 108 MMOL/L (ref 94–109)
CHOLEST SERPL-MCNC: 137 MG/DL
CO2 SERPL-SCNC: 24 MMOL/L (ref 20–32)
CREAT SERPL-MCNC: 1.03 MG/DL (ref 0.66–1.25)
GFR SERPL CREATININE-BSD FRML MDRD: 73 ML/MIN/1.7M2
GLUCOSE SERPL-MCNC: 124 MG/DL (ref 70–99)
HDLC SERPL-MCNC: 36 MG/DL
LDLC SERPL CALC-MCNC: 45 MG/DL
NONHDLC SERPL-MCNC: 101 MG/DL
POTASSIUM SERPL-SCNC: 4 MMOL/L (ref 3.4–5.3)
PROT SERPL-MCNC: 7 G/DL (ref 6.8–8.8)
SODIUM SERPL-SCNC: 142 MMOL/L (ref 133–144)
TRIGL SERPL-MCNC: 280 MG/DL
URATE SERPL-MCNC: 4.5 MG/DL (ref 3.5–7.2)

## 2017-07-05 DIAGNOSIS — I10 ESSENTIAL HYPERTENSION, BENIGN: ICD-10-CM

## 2017-07-05 DIAGNOSIS — E78.5 HYPERLIPIDEMIA LDL GOAL <130: ICD-10-CM

## 2017-07-05 NOTE — TELEPHONE ENCOUNTER
carvedilol (COREG) 12.5 MG tablet      Sig: Take 1 tablet (12.5 mg) by mouth 2 times daily (with meals)    Last Written Prescription Date: 5/11/17  Last Fill Quantity: 60, # refills: 0  Last Office Visit with [facility], [facility] or [facility] prescribing provider: 6/13/2017         Potassium   Date Value Ref Range Status   06/13/2017 4.0 3.4 - 5.3 mmol/L Final     Creatinine   Date Value Ref Range Status   06/13/2017 1.03 0.66 - 1.25 mg/dL Final     BP Readings from Last 3 Encounters:   06/13/17 120/74   05/11/17 138/80   05/07/17 121/79     ________________________________________________________________________________    fenofibrate 54 MG tablet       Last Written Prescription Date: 5/11/17  Last Fill Quantity: 30, # refills: 0    Last Office Visit with [facility], [facility] or [facility] prescribing provider:  6/13/2017     Future Office Visit:      Cholesterol   Date Value Ref Range Status   06/13/2017 137 <200 mg/dL Final     HDL Cholesterol   Date Value Ref Range Status   06/13/2017 36 (L) >39 mg/dL Final     LDL Cholesterol Calculated   Date Value Ref Range Status   06/13/2017 45 <100 mg/dL Final     Comment:     Desirable:       <100 mg/dl     Triglycerides   Date Value Ref Range Status   06/13/2017 280 (H) <150 mg/dL Final     Comment:     Borderline high:  150-199 mg/dl   High:             200-499 mg/dl   Very high:       >499 mg/dl   Fasting specimen       Cholesterol/HDL Ratio   Date Value Ref Range Status   05/15/2015 5.2 (H) 0.0 - 5.0 Final     ALT   Date Value Ref Range Status   06/13/2017 37 0 - 70 U/L Final          OLYA Wilkinson  July 5, 2017  3:28 PM

## 2017-07-06 RX ORDER — FENOFIBRATE 54 MG/1
54 TABLET ORAL DAILY
Qty: 30 TABLET | Refills: 5 | Status: SHIPPED | OUTPATIENT
Start: 2017-07-06 | End: 2018-01-09

## 2017-07-06 RX ORDER — CARVEDILOL 12.5 MG/1
12.5 TABLET ORAL 2 TIMES DAILY WITH MEALS
Qty: 60 TABLET | Refills: 5 | Status: SHIPPED | OUTPATIENT
Start: 2017-07-06 | End: 2018-01-02

## 2017-07-06 NOTE — TELEPHONE ENCOUNTER
Prescription approved per [facility] Refill Protocol.  Clarisa Farrell RN

## 2017-07-28 ENCOUNTER — RADIANT APPOINTMENT (OUTPATIENT)
Dept: GENERAL RADIOLOGY | Facility: CLINIC | Age: 62
End: 2017-07-28
Attending: FAMILY MEDICINE
Payer: COMMERCIAL

## 2017-07-28 ENCOUNTER — OFFICE VISIT (OUTPATIENT)
Dept: FAMILY MEDICINE | Facility: CLINIC | Age: 62
End: 2017-07-28
Payer: COMMERCIAL

## 2017-07-28 VITALS
TEMPERATURE: 98.2 F | WEIGHT: 196 LBS | SYSTOLIC BLOOD PRESSURE: 139 MMHG | RESPIRATION RATE: 20 BRPM | HEART RATE: 52 BPM | BODY MASS INDEX: 31.88 KG/M2 | DIASTOLIC BLOOD PRESSURE: 78 MMHG

## 2017-07-28 DIAGNOSIS — M10.062 ACUTE IDIOPATHIC GOUT OF LEFT KNEE: ICD-10-CM

## 2017-07-28 DIAGNOSIS — M25.562 ACUTE PAIN OF LEFT KNEE: ICD-10-CM

## 2017-07-28 DIAGNOSIS — M25.562 ACUTE PAIN OF LEFT KNEE: Primary | ICD-10-CM

## 2017-07-28 PROCEDURE — 73562 X-RAY EXAM OF KNEE 3: CPT | Mod: LT

## 2017-07-28 PROCEDURE — 84550 ASSAY OF BLOOD/URIC ACID: CPT | Performed by: FAMILY MEDICINE

## 2017-07-28 PROCEDURE — 36415 COLL VENOUS BLD VENIPUNCTURE: CPT | Performed by: FAMILY MEDICINE

## 2017-07-28 PROCEDURE — 99214 OFFICE O/P EST MOD 30 MIN: CPT | Performed by: FAMILY MEDICINE

## 2017-07-28 RX ORDER — INDOMETHACIN 50 MG/1
50 CAPSULE ORAL
Qty: 42 CAPSULE | Refills: 1 | Status: SHIPPED | OUTPATIENT
Start: 2017-07-28 | End: 2020-06-26

## 2017-07-28 ASSESSMENT — ENCOUNTER SYMPTOMS
FOCAL WEAKNESS: 0
CONSTITUTIONAL NEGATIVE: 1
FEVER: 0
FALLS: 0
NEUROLOGICAL NEGATIVE: 1

## 2017-07-28 NOTE — PROGRESS NOTES
"HPI  Emery Zayas is a 61 year old male who presents to clinic today for the following health issues:        Joint Pain    Onset: injured yesterday 07/27/2017    Description:   Location: left knee  Character: Sharp    Intensity: moderate    Progression of Symptoms: worse    Accompanying Signs & Symptoms:  Other symptoms: radiation of pain to left leg and swelling    History:   Previous similar pain: no       Precipitating factors:   Trauma or overuse: YES    Alleviating factors:  Improved by: nothing     Therapies Tried and outcome: heat        Bowls often with grandson, bowling yesterday and felt a twinge.  Seemed tender but basically OK the rest of the day.  Woke during the night with knee pain.  Any bend in knee is very painful, need to be completely straight.  Pain seem to be particularly notable across to of kneecap.  When walking will often feel a \"snap.\"  No previous injuries of surgeries to knees.  Has had issues with gout in big toe, ankle.  Has tried both ASA and ibuprofen, without much improvement.  Notes he did have a beer the day before the pain started.     Allopurinol dose increased about one month ago.      Review of Systems   Constitutional: Negative.  Negative for fever.   Musculoskeletal: Positive for joint pain. Negative for falls.   Neurological: Negative.  Negative for focal weakness.         Physical Exam   Musculoskeletal:        Left knee: He exhibits decreased range of motion and effusion. Tenderness found. No medial joint line, no lateral joint line, no MCL, no LCL and no patellar tendon tenderness noted.        Legs:  Diffusely tender, but extremely tender at superior pole of patella.  Subtle effusion, redness at this point.     (M25.562) Acute pain of left knee  (primary encounter diagnosis)  Comment: normal, latoh lateral may show smaall effusion superior to patella  Plan: XR Knee Left 3 Views            (M10.062) Acute idiopathic gout of left knee  Comment: no rel mechanism of " injury, extremely tender, suspect gout.  May also sart colchicine  Plan: Uric acid, indomethacin (INDOCIN) 50 MG capsule              RTC in 2w    Sky Harrington MD

## 2017-07-28 NOTE — MR AVS SNAPSHOT
"              After Visit Summary   2017    Emery Zayas    MRN: [id number]           Patient Information     Date Of Birth          1955        Visit Information        Provider Department      2017 10:40 AM Sky Harrington MD [facility]        Today's Diagnoses     Acute pain of left knee    -  1    Acute idiopathic gout of left knee           Follow-ups after your visit        Who to contact     If you have questions or need follow up information about today's clinic visit or your schedule please contact [facility] directly at [phone].  Normal or non-critical lab and imaging results will be communicated to you by [employer], letter or phone within 4 business days after the clinic has received the results. If you do not hear from us within 7 days, please contact the clinic through [employer] or phone. If you have a critical or abnormal lab result, we will notify you by phone as soon as possible.  Submit refill requests through [employer] or call your pharmacy and they will forward the refill request to us. Please allow 3 business days for your refill to be completed.          Additional Information About Your Visit        MyChart Information     [employer] lets you send messages to your doctor, view your test results, renew your prescriptions, schedule appointments and more. To sign up, go to [email or web address] . Click on \"Log in\" on the left side of the screen, which will take you to the Welcome page. Then click on \"Sign up Now\" on the right side of the page.     You will be asked to enter the access code listed below, as well as some personal information. Please follow the directions to create your username and password.     Your access code is: [email or web address]  Expires: 2017 11:08 AM     Your access code will  in 90 days. If you need help or a new code, please call your [facility] or [phone].        Care EveryWhere ID     This is your " Care EveryWhere ID. This could be used by other organizations to access your [place] medical records  [id number]        Your Vitals Were     Pulse Temperature Respirations BMI (Body Mass Index)          52 98.2  F (36.8  C) (Oral) 20 31.88 kg/m2         Blood Pressure from Last 3 Encounters:   07/28/17 139/78   06/13/17 120/74   05/11/17 138/80    Weight from Last 3 Encounters:   07/28/17 196 lb (88.9 kg)   06/13/17 192 lb 9.6 oz (87.4 kg)   05/11/17 191 lb 11.2 oz (87 kg)              We Performed the Following     Uric acid          Today's Medication Changes          These changes are accurate as of: 7/28/17 11:57 AM.  If you have any questions, ask your nurse or doctor.               Start taking these medicines.        Dose/Directions    indomethacin 50 MG capsule   Commonly known as:  INDOCIN   Used for:  Acute idiopathic gout of left knee   Started by:  Sky Harrington MD        Dose:  50 mg   Take 1 capsule (50 mg) by mouth 3 times daily (with meals)   Quantity:  42 capsule   Refills:  1            Where to get your medicines      These medications were sent to [facility] [address] [place] [id number]    Hours:  24-hours Phone:  [phone]     indomethacin 50 MG capsule                Primary Care Provider Office Phone # Fax #    Trish Radha Edwards [phone]       [facility] [place] [address]  [place]   [facility] [address]        Equal Access to Services     [facility] : Hadii aad ku hadasho Soomaali, waaxda luqadaha, qaybta kaalmada adeegyada, mary idiin haypravin velázquez . So [facility] [phone].    ATENCIÓN: Si habla español, tiene a smith disposición servicios gratuitos de asistencia lingüística. Llame al [phone].    We comply with applicable federal civil rights laws and [place] laws. We do not discriminate on the basis of race, color, national  origin, age, disability sex, sexual orientation or gender identity.            Thank you!     Thank you for choosing [facility]  for your care. Our goal is always to provide you with excellent care. Hearing back from our patients is one way we can continue to improve our services. Please take a few minutes to complete the written survey that you may receive in the mail after your visit with us. Thank you!             Your Updated Medication List - Protect others around you: Learn how to safely use, store and throw away your medicines at www.disposemymeds.org.          This list is accurate as of: 7/28/17 11:57 AM.  Always use your most recent med list.                   Brand Name Dispense Instructions for use Diagnosis    allopurinol 100 MG tablet    ZYLOPRIM    120 tablet    Take 2 tablets (200 mg) by mouth 2 times daily    Uncomplicated alcohol dependence (H)       aspirin 81 MG tablet      Take 1 tablet by mouth daily.        carvedilol 12.5 MG tablet    COREG    60 tablet    Take 1 tablet (12.5 mg) by mouth 2 times daily (with meals)    Essential hypertension, benign       colchicine 0.6 MG tablet    COLCRYS    30 tablet    Take 2 tablets at the first sign of flare, take 1 additional tablet one hour later.    Idiopathic gout, unspecified chronicity, unspecified site       fenofibrate 54 MG tablet     30 tablet    Take 1 tablet (54 mg) by mouth daily    Hyperlipidemia LDL goal <130       indomethacin 50 MG capsule    INDOCIN    42 capsule    Take 1 capsule (50 mg) by mouth 3 times daily (with meals)    Acute idiopathic gout of left knee       MULTIVITAMIN ADULT Chew      Take 1 chew tab by mouth daily        olmesartan 40 MG tablet    BENICAR    30 tablet    Take 1 tablet (40 mg) by mouth daily    Essential hypertension, benign       omega-3 acid ethyl esters 1 G capsule    Lovaza    90 capsule    Take 1 capsule (1 g) by mouth daily    High blood triglycerides       order for DME     1 Device     BP cuff, severe HTN, needs to monitor daily    Essential hypertension, benign       simvastatin 40 MG tablet    ZOCOR    30 tablet    Take 1 tablet (40 mg) by mouth At Bedtime    High blood triglycerides, Hyperlipidemia LDL goal <130

## 2017-07-28 NOTE — NURSING NOTE
"Chief Complaint   Patient presents with     Knee Pain     left knee injury, injured when bowling yesterday, c/o pain, swelling, and decreased ROM       Initial /79 (BP Location: Right arm, Patient Position: Chair, Cuff Size: Adult Large)  Pulse 54  Temp 98.2  F (36.8  C) (Oral)  Resp 20  Wt 196 lb (88.9 kg)  BMI 31.88 kg/m2 Estimated body mass index is 31.88 kg/(m^2) as calculated from the following:    Height as of 5/11/17: 5' 5.75\" (1.67 m).    Weight as of this encounter: 196 lb (88.9 kg).  Medication Reconciliation: complete     Vilma Madrigal/[facility]---[facility]      "

## 2017-07-28 NOTE — PROGRESS NOTES
"  SUBJECTIVE:                                                    Emery Zayas is a 61 year old male who presents to clinic today for the following health issues:      Joint Pain    Onset: injured yesterday 07/27/2017    Description:   Location: left knee  Character: Sharp    Intensity: moderate    Progression of Symptoms: worse    Accompanying Signs & Symptoms:  Other symptoms: radiation of pain to left leg and swelling    History:   Previous similar pain: no       Precipitating factors:   Trauma or overuse: YES    Alleviating factors:  Improved by: nothing    Therapies Tried and outcome: heat      Bowls often with grandson, bowling yesterday and felt a twinge.  Seemed tender but basically OK the rest of the day.  Woke during the night with knee pain.  Any bend in knee is very painful, need to be completely straight.  Pain seem to be particularly notable across to of kneecap.  When walking will often feel a \"snap.\"  No previous injuries of surgeries to knees.  Has had issues with gout in big toe, ankle.  Has tried both ASA and ibuprofen, without much improvement.    "

## 2017-07-29 LAB — URATE SERPL-MCNC: 3 MG/DL (ref 3.5–7.2)

## 2017-07-31 ENCOUNTER — TELEPHONE (OUTPATIENT)
Dept: FAMILY MEDICINE | Facility: CLINIC | Age: 62
End: 2017-07-31

## 2017-07-31 NOTE — TELEPHONE ENCOUNTER
Follow up - reviewed Uric Acid results.  Knee muchbetter, was able to attaend father's birthday party over the weekend.    Sky Harrington MD

## 2017-09-22 DIAGNOSIS — F10.20 UNCOMPLICATED ALCOHOL DEPENDENCE (H): ICD-10-CM

## 2017-09-22 RX ORDER — ALLOPURINOL 100 MG/1
200 TABLET ORAL 2 TIMES DAILY
Qty: 120 TABLET | Refills: 3 | Status: SHIPPED | OUTPATIENT
Start: 2017-09-22 | End: 2018-01-17

## 2017-10-06 ENCOUNTER — OFFICE VISIT (OUTPATIENT)
Dept: FAMILY MEDICINE | Facility: CLINIC | Age: 62
End: 2017-10-06
Payer: COMMERCIAL

## 2017-10-06 ENCOUNTER — RADIANT APPOINTMENT (OUTPATIENT)
Dept: GENERAL RADIOLOGY | Facility: CLINIC | Age: 62
End: 2017-10-06
Attending: FAMILY MEDICINE
Payer: COMMERCIAL

## 2017-10-06 VITALS
BODY MASS INDEX: 32.46 KG/M2 | RESPIRATION RATE: 20 BRPM | WEIGHT: 199.6 LBS | HEART RATE: 54 BPM | OXYGEN SATURATION: 99 % | TEMPERATURE: 98.4 F | SYSTOLIC BLOOD PRESSURE: 126 MMHG | DIASTOLIC BLOOD PRESSURE: 64 MMHG

## 2017-10-06 DIAGNOSIS — M54.50 CHRONIC LOW BACK PAIN WITHOUT SCIATICA, UNSPECIFIED BACK PAIN LATERALITY: ICD-10-CM

## 2017-10-06 DIAGNOSIS — M25.551 HIP PAIN, RIGHT: Primary | ICD-10-CM

## 2017-10-06 DIAGNOSIS — G89.29 CHRONIC LOW BACK PAIN WITHOUT SCIATICA, UNSPECIFIED BACK PAIN LATERALITY: ICD-10-CM

## 2017-10-06 DIAGNOSIS — M25.551 HIP PAIN, RIGHT: ICD-10-CM

## 2017-10-06 PROCEDURE — 99214 OFFICE O/P EST MOD 30 MIN: CPT | Performed by: FAMILY MEDICINE

## 2017-10-06 PROCEDURE — 73522 X-RAY EXAM HIPS BI 3-4 VIEWS: CPT

## 2017-10-06 RX ORDER — TRAMADOL HYDROCHLORIDE 50 MG/1
50-100 TABLET ORAL EVERY 6 HOURS PRN
Qty: 30 TABLET | Refills: 0 | Status: SHIPPED | OUTPATIENT
Start: 2017-10-06 | End: 2018-07-30

## 2017-10-06 NOTE — PROGRESS NOTES
SUBJECTIVE:   Emery Zayas is a 62 year old male who presents to clinic today for the following health issues:      Back Pain       Duration: Hx of back problems; currently 1.5 weeks ago        Specific cause: right hip pain caused him to walk differently; he thinks that this may have contributed to the onset of his back pain    Description:   Location of pain: low back right  Character of pain: stabbing  Pain radiation:none and radiates into mid-back  New numbness or weakness in legs, not attributed to pain:    Intensity: Currently 3/10, At its worst 10/10    History:   Pain interferes with job: Not applicable, retired  History of back problems: previous degenerative joint disease of the lumbar spine  Any previous MRI or X-rays: Yes- at [facility].  Date 2-3 years ago  Sees a specialist for back pain:  No  Therapies tried without relief: heat, NSAIDs and rest    Alleviating factors:   Improved by: None    Precipitating factors:  Worsened by: Bending and Standing    Functional and Psychosocial Screen (Shubham STarT Back):      Not performed today          Accompanying Signs & Symptoms:  Risk of Fracture:  none  Risk of Cauda Equina:  None  Risk of Infection:  None  Risk of Cancer:  None  Risk of Ankylosing Spondylitis:  Onset at age <35, male, AND morning back stiffness. no                 Joint Pain    Onset: 1.5 weeks    Description:   Location: right hip  Character: Stabbing    Intensity: 2-10/10    Progression of Symptoms: intermittent    Accompanying Signs & Symptoms:  Other symptoms: radiation of pain to right leg, leg feels tired with too much activity, numbness and cramping in calf.    History:   Previous similar pain: no       Precipitating factors:   Trauma or overuse: no     Alleviating factors:  Improved by: nothing    Therapies Tried and outcome: Took an old oxycontin to be able to fall asleep.it dulled the pain    Gout  Patient was told he had gout when he previously saw a physician for his pain.  "Medication cleared it up. He is still on Allopurinol, two tablets 2X a day.     Hip pain  The patient states that when walking, he has recently begun to feel a sudden sharp, stabbing, knife-like pain in his inner (groin-side) right hip. It gets to the point where his back begins to hurt because he starts walking abnormally. He feels as if he will \"buckle over\" during these episodes. These symptoms began about 1.5 weeks ago. He first noticed in when playing kickiball with his grandkids, kicking the ball while sitting in a chair. The pain affects his sleep, and it takes him up to 5 hours to fall asleep with the pain. He has tried heat pads, bare back and body. Old oxycontin that he had on hand worked to improve the pain somewhat.     Exercise  The patient gets activity (eg kickball, basketball) when his kids get home from school, and from walking.     Alcohol  The patient staets that he drinks about 1-2 alcoholic beverages a day, definitely less than 14 drinks total in a week. He typically drinks rum and coke, more than a shot at a time if he mixes them himself at home.     Influenza vaccination  The patient refuses an influenza vaccination today, as he \"is not a believer in them.\"    Problem list and histories reviewed & adjusted, as indicated.  Additional history: as documented    BP Readings from Last 3 Encounters:   10/06/17 126/64   07/28/17 139/78   06/13/17 120/74    Wt Readings from Last 3 Encounters:   10/06/17 90.5 kg (199 lb 9.6 oz)   07/28/17 88.9 kg (196 lb)   06/13/17 87.4 kg (192 lb 9.6 oz)            Labs reviewed in EPIC      Reviewed and updated as needed this visit by clinical staff  Tobacco  Allergies  Meds  Problems       Reviewed and updated as needed this visit by Provider  Problems         ROS:  Constitutional, HEENT, cardiovascular, pulmonary, gi and gu systems are negative, except as otherwise noted.    This document serves as a record of the services and decisions personally performed " and made by Trish Edwards MD. It was created on his/her behalf by Carlos Daley, a trained medical scribe. The creation of this document is based the provider's statements to the medical scribe.  Marshaibmaria del carmen Daley 10:50 AM, October 6, 2017    OBJECTIVE:     /64  Pulse 54  Temp 98.4  F (36.9  C) (Oral)  Resp 20  Wt 90.5 kg (199 lb 9.6 oz)  SpO2 99%  BMI 32.46 kg/m2  Body mass index is 32.46 kg/(m^2).  GENERAL: healthy, alert and no distress  MS: no gross musculoskeletal defects noted, no edema. POS limited ROM and especially adduction of right hip.   SKIN: no suspicious lesions or rashes  NEURO: Normal strength and tone, mentation intact and speech normal  PSYCH: mentation appears normal, affect normal/bright    Diagnostic Test Results:  none     ASSESSMENT/PLAN:     (M25.551) Hip pain, right  (primary encounter diagnosis)  Comment: Discussed patient's right hip pain, suspect OA type pain, pt is requeting something for sleep, will try Ultram  patient agreed. Will decide plan based on x-ray findings  Plan: XR Hip Bilateral 2 Views Each, traMADol         (ULTRAM) 50 MG tablet    (M54.5,  G89.29) Chronic low back pain without sciatica, unspecified back pain laterality  Comment: Lower back pain appears to be chronic and stable and associated with the right hip pain; advised patient on his right hip pain (see above), and will see if back pain is alleviated as well.   Plan: Follow plan for right hip pain (as above), follow-up at a later date if back pain does not improve as well.         The information in this document, created by the medical scribe for me, accurately reflects the services I personally performed and the decisions made by me. I have reviewed and approved this document for accuracy prior to leaving the patient care area.  Trish Edwards MD  10:50 AM, 10/06/17    Trish Edwards MD  [facility]

## 2017-10-06 NOTE — NURSING NOTE
"Chief Complaint   Patient presents with     Back Pain     Musculoskeletal Problem     right hip pain       Initial /64  Pulse 54  Temp 98.4  F (36.9  C) (Oral)  Resp 20  Wt 199 lb 9.6 oz (90.5 kg)  SpO2 99%  BMI 32.46 kg/m2 Estimated body mass index is 32.46 kg/(m^2) as calculated from the following:    Height as of 5/11/17: 5' 5.75\" (1.67 m).    Weight as of this encounter: 199 lb 9.6 oz (90.5 kg).  Medication Reconciliation: complete     Patient declined flu shot.    Tanya Berger CMA (AAMA)    "

## 2017-10-06 NOTE — MR AVS SNAPSHOT
After Visit Summary   10/6/2017    Emery Zayas    MRN: [id number]           Patient Information     Date Of Birth          1955        Visit Information        Provider Department      10/6/2017 10:20 AM Trish Edwards MD [facility]        Today's Diagnoses     Hip pain, right    -  1    Chronic low back pain without sciatica, unspecified back pain laterality           Follow-ups after your visit        Your next 10 appointments already scheduled     Oct 06, 2017 11:05 AM CDT   XR HIP BILATERAL 2 VIEWS EACH with FMXR1   [facility]    [id number] [place] [address], [address]  [facility] [id number] [phone]           Please bring a list of your current medicines to your exam. (Include vitamins, minerals and over-thecounter medicines.) Leave your valuables at home.  Tell your doctor if there is a chance you may be pregnant.  You do not need to do anything special for this exam.              Future tests that were ordered for you today     Open Future Orders        Priority Expected Expires Ordered    XR Hip Bilateral 2 Views Each Routine 10/6/2017 10/6/2018 10/6/2017            Who to contact     If you have questions or need follow up information about today's clinic visit or your schedule please contact [facility] directly at [phone].  Normal or non-critical lab and imaging results will be communicated to you by MyChart, letter or phone within 4 business days after the clinic has received the results. If you do not hear from us within 7 days, please contact the clinic through MyChart or phone. If you have a critical or abnormal lab result, we will notify you by phone as soon as possible.  Submit refill requests through [employer] or call your pharmacy and they will forward the refill request to us. Please allow 3 business days for your refill to be completed.          Additional Information About  "Your Visit        [employer] Information     [employer] lets you send messages to your doctor, view your test results, renew your prescriptions, schedule appointments and more. To sign up, go to [email or web address] . Click on \"Log in\" on the left side of the screen, which will take you to the Welcome page. Then click on \"Sign up Now\" on the right side of the page.     You will be asked to enter the access code listed below, as well as some personal information. Please follow the directions to create your username and password.     Your access code is: [email or web address]  Expires: 2018 11:02 AM     Your access code will  in 90 days. If you need help or a new code, please call your [place] clinic or [phone].        Care EveryWhere ID     This is your Care EveryWhere ID. This could be used by other organizations to access your [place] medical records  [id number]        Your Vitals Were     Pulse Temperature Respirations Pulse Oximetry BMI (Body Mass Index)       54 98.4  F (36.9  C) (Oral) 20 99% 32.46 kg/m2        Blood Pressure from Last 3 Encounters:   10/06/17 126/64   17 139/78   17 120/74    Weight from Last 3 Encounters:   10/06/17 199 lb 9.6 oz (90.5 kg)   17 196 lb (88.9 kg)   17 192 lb 9.6 oz (87.4 kg)                 Today's Medication Changes          These changes are accurate as of: 10/6/17 11:02 AM.  If you have any questions, ask your nurse or doctor.               Start taking these medicines.        Dose/Directions    traMADol 50 MG tablet   Commonly known as:  ULTRAM   Used for:  Hip pain, right   Started by:  Trish Edwards MD        Dose:   mg   Take 1-2 tablets ( mg) by mouth every 6 hours as needed for pain maximum 4 tablet(s) per day   Quantity:  30 tablet   Refills:  0            Where to get your medicines      Some of these will need a paper prescription and others can be bought over the counter.  Ask your nurse if you have questions.  "    Bring a paper prescription for each of these medications     traMADol 50 MG tablet                Primary Care Provider Office Phone # Fax #    Trish Radha Edwards [phone]       [address]  [place]   [facility] [address]        Equal Access to Services     [place] : Hadii russ ku hadasho Soomaali, waaxda luqadaha, qaybta kaalmada adeegyada, waxmauricio barry haydavidn justine diggsshaguftafelecia guan. So [facility] [phone].    ATENCIÓN: Si habla español, tiene a smith disposición servicios gratuitos de asistencia lingüística. Llame al [phone].    We comply with applicable federal civil rights laws and [place] laws. We do not discriminate on the basis of race, color, national origin, age, disability, sex, sexual orientation, or gender identity.            Thank you!     Thank you for choosing [facility]  for your care. Our goal is always to provide you with excellent care. Hearing back from our patients is one way we can continue to improve our services. Please take a few minutes to complete the written survey that you may receive in the mail after your visit with us. Thank you!             Your Updated Medication List - Protect others around you: Learn how to safely use, store and throw away your medicines at www.disposemymeds.org.          This list is accurate as of: 10/6/17 11:02 AM.  Always use your most recent med list.                   Brand Name Dispense Instructions for use Diagnosis    allopurinol 100 MG tablet    ZYLOPRIM    120 tablet    Take 2 tablets (200 mg) by mouth 2 times daily    Uncomplicated alcohol dependence (H)       aspirin 81 MG tablet      Take 1 tablet by mouth daily.        carvedilol 12.5 MG tablet    COREG    60 tablet    Take 1 tablet (12.5 mg) by mouth 2 times daily (with meals)    Essential hypertension, benign       colchicine 0.6 MG tablet    COLCRYS    30 tablet    Take 2 tablets at the first sign of flare, take 1 additional tablet one hour  later.    Idiopathic gout, unspecified chronicity, unspecified site       fenofibrate 54 MG tablet     30 tablet    Take 1 tablet (54 mg) by mouth daily    Hyperlipidemia LDL goal <130       indomethacin 50 MG capsule    INDOCIN    42 capsule    Take 1 capsule (50 mg) by mouth 3 times daily (with meals)    Acute idiopathic gout of left knee       MULTIVITAMIN ADULT Chew      Take 1 chew tab by mouth daily        olmesartan 40 MG tablet    BENICAR    30 tablet    Take 1 tablet (40 mg) by mouth daily    Essential hypertension, benign       omega-3 acid ethyl esters 1 G capsule    Lovaza    90 capsule    Take 1 capsule (1 g) by mouth daily    High blood triglycerides       order for DME     1 Device    BP cuff, severe HTN, needs to monitor daily    Essential hypertension, benign       simvastatin 40 MG tablet    ZOCOR    30 tablet    Take 1 tablet (40 mg) by mouth At Bedtime    High blood triglycerides, Hyperlipidemia LDL goal <130       traMADol 50 MG tablet    ULTRAM    30 tablet    Take 1-2 tablets ( mg) by mouth every 6 hours as needed for pain maximum 4 tablet(s) per day    Hip pain, right

## 2017-10-09 ENCOUNTER — TELEPHONE (OUTPATIENT)
Dept: FAMILY MEDICINE | Facility: CLINIC | Age: 62
End: 2017-10-09

## 2017-10-09 NOTE — TELEPHONE ENCOUNTER
Pt calling why he was called by  [facility] when he hasn't heard anything about his x-rays.  Explained that x-ray was normal and he was referred to a specialist to find out why he is having so much pain and what recommendations they have.  Writer apologized to pt and offered to give him the number to call but he had it.    Live Peña RN, BSN

## 2017-10-11 NOTE — TELEPHONE ENCOUNTER
Notes Recorded by Trish Edwards MD on 10/6/2017 at 4:57 PM  The x-ray was normal. I would like him to see sports medicine for his hips.

## 2017-11-03 DIAGNOSIS — I10 ESSENTIAL HYPERTENSION, BENIGN: ICD-10-CM

## 2017-11-03 DIAGNOSIS — E78.5 HYPERLIPIDEMIA LDL GOAL <130: ICD-10-CM

## 2017-11-03 DIAGNOSIS — E78.1 HIGH BLOOD TRIGLYCERIDES: ICD-10-CM

## 2017-11-07 RX ORDER — OLMESARTAN MEDOXOMIL 40 MG/1
40 TABLET ORAL DAILY
Qty: 30 TABLET | Refills: 5 | Status: SHIPPED | OUTPATIENT
Start: 2017-11-07 | End: 2018-04-26

## 2017-11-07 RX ORDER — SIMVASTATIN 40 MG
40 TABLET ORAL AT BEDTIME
Qty: 30 TABLET | Refills: 5 | Status: SHIPPED | OUTPATIENT
Start: 2017-11-07 | End: 2018-04-26

## 2017-11-07 NOTE — TELEPHONE ENCOUNTER
Prescription approved per [facility] Refill Protocol.  Clarisa Farrell RN    LDL Cholesterol Calculated   Date Value Ref Range Status   06/13/2017 45 <100 mg/dL Final     Comment:     Desirable:       <100 mg/dl

## 2018-01-02 DIAGNOSIS — I10 ESSENTIAL HYPERTENSION, BENIGN: ICD-10-CM

## 2018-01-02 NOTE — TELEPHONE ENCOUNTER
Carvedilol 12.5 mg      Last Written Prescription Date: 07/06/17  Last Fill Quantity: 60, # refills: 5    Last Office Visit with G, P or [facility] prescribing provider:  10/06/17 Jerry   Future Office Visit:        BP Readings from Last 3 Encounters:   10/06/17 126/64   07/28/17 139/78   06/13/17 120/74

## 2018-01-04 RX ORDER — CARVEDILOL 12.5 MG/1
12.5 TABLET ORAL 2 TIMES DAILY WITH MEALS
Qty: 60 TABLET | Refills: 5 | Status: SHIPPED | OUTPATIENT
Start: 2018-01-04 | End: 2018-06-27

## 2018-01-04 NOTE — TELEPHONE ENCOUNTER
Requested Prescriptions   Pending Prescriptions Disp Refills     carvedilol (COREG) 12.5 MG tablet 60 tablet 5     Sig: Take 1 tablet (12.5 mg) by mouth 2 times daily (with meals)    Beta-Blockers Protocol Passed    1/2/2018  4:01 PM       Passed - Blood pressure under 140/90    BP Readings from Last 3 Encounters:   10/06/17 126/64   07/28/17 139/78   06/13/17 120/74          Passed - Patient is age 6 or older       Passed - Recent or future visit with authorizing provider's specialty    Patient had office visit in the last year or has a visit in the next 30 days with authorizing provider.  See chart review.

## 2018-01-09 DIAGNOSIS — E78.5 HYPERLIPIDEMIA LDL GOAL <130: ICD-10-CM

## 2018-01-09 RX ORDER — FENOFIBRATE 54 MG/1
54 TABLET ORAL DAILY
Qty: 30 TABLET | Refills: 5 | Status: SHIPPED | OUTPATIENT
Start: 2018-01-09 | End: 2018-06-26

## 2018-01-09 NOTE — TELEPHONE ENCOUNTER
Per patient Brandon requested 3 times    Requested Prescriptions   Pending Prescriptions Disp Refills     fenofibrate 54 MG tablet 30 tablet 5     Sig: Take 1 tablet (54 mg) by mouth daily    There is no refill protocol information for this order   .Last Written Prescription Date:  07/06/2017  Last Fill Quantity: 30,  # refills: 5   Last Office Visit with G, P or [facility] prescribing provider:  10/06/2017   Future Office Visit:          Rochelle Dias

## 2018-01-09 NOTE — TELEPHONE ENCOUNTER
"Requested Prescriptions   Pending Prescriptions Disp Refills     fenofibrate 54 MG tablet 30 tablet 5     Sig: Take 1 tablet (54 mg) by mouth daily    Fibrates Failed    1/9/2018 12:00 PM       Failed - Lipid panel on file in past 12 months    Recent Labs   Lab Test  06/13/17   0809   05/15/15   0833   CHOL  137   < >  183   TRIG  280*   < >  523*   HDL  36*   < >  35*   LDL  45   < >  Cannot estimate LDL when triglyceride exceeds 400 mg/dL  76   VLDL   --    --   Cannot estimate VLDL when triglyceride exceeds 400 mg/dL.   CHOLHDLRATIO   --    --   5.2*    < > = values in this interval not displayed.              Passed - No abnormal creatine kinase in past 12 months    No lab results found.         Passed - Recent or future visit with authorizing provider    Patient had office visit in the last year or has a visit in the next 30 days with authorizing provider.  See \"Patient Info\" tab in inbasket, or \"Choose Columns\" in Meds & Orders section of the refill encounter.              Passed - Patient is age 18 or older        Last fill: 7.6.17 #30, 5 ref  Last OV: 10.6.17    Prescription approved per [facility] Refill Protocol  Yvette Rossi RN BS    "

## 2018-01-17 DIAGNOSIS — F10.20 UNCOMPLICATED ALCOHOL DEPENDENCE (H): ICD-10-CM

## 2018-01-17 NOTE — TELEPHONE ENCOUNTER
.  Requested Prescriptions   Pending Prescriptions Disp Refills     allopurinol (ZYLOPRIM) 100 MG tablet 120 tablet 3     Sig: Take 2 tablets (200 mg) by mouth 2 times daily    There is no refill protocol information for this order        Last Written Prescription Date:  9/22/2017  Last Fill Quantity: 120,  # refills: 3   Last Office Visit with G, [facility] or [facility] prescribing provider:  10/06/2017   Future Office Visit:

## 2018-01-17 NOTE — TELEPHONE ENCOUNTER
"Requested Prescriptions   Pending Prescriptions Disp Refills     allopurinol (ZYLOPRIM) 100 MG tablet 120 tablet 3     Sig: Take 2 tablets (200 mg) by mouth 2 times daily    Gout Agents Protocol Failed    1/17/2018  2:13 PM       Failed - Uric acid greater than or equal to 6 on file in past 12 months    Recent Labs   Lab Test  07/28/17   1155   URIC  3.0*     If level is 6mg/dL or greater, ok to refill one time and refer to provider.          Passed - CBC on file in past 12 months    Recent Labs   Lab Test  05/07/17   0505   WBC  5.5   RBC  4.63   HGB  15.1   HCT  44.0   PLT  169            Passed - ALT on file in past 12 months    Recent Labs   Lab Test  06/13/17   0809   ALT  37            Passed - Recent or future visit with authorizing provider's specialty    Patient had office visit in the last year or has a visit in the next 30 days with authorizing provider.  See \"Patient Info\" tab in inbasket, or \"Choose Columns\" in Meds & Orders section of the refill encounter.            Passed - Patient is age 18 or older       Passed - Normal serum creatinine on file in the past 12 months    Recent Labs   Lab Test  06/13/17   0809   CR  1.03               "

## 2018-01-19 RX ORDER — ALLOPURINOL 100 MG/1
200 TABLET ORAL 2 TIMES DAILY
Qty: 120 TABLET | Refills: 3 | Status: SHIPPED | OUTPATIENT
Start: 2018-01-19 | End: 2018-05-29

## 2018-03-16 ENCOUNTER — TELEPHONE (OUTPATIENT)
Dept: FAMILY MEDICINE | Facility: CLINIC | Age: 63
End: 2018-03-16

## 2018-03-16 DIAGNOSIS — Z20.828 EXPOSURE TO INFLUENZA: Primary | ICD-10-CM

## 2018-03-16 RX ORDER — OSELTAMIVIR PHOSPHATE 75 MG/1
75 CAPSULE ORAL DAILY
Qty: 10 CAPSULE | Refills: 0 | Status: SHIPPED | OUTPATIENT
Start: 2018-03-16 | End: 2018-07-30

## 2018-03-30 DIAGNOSIS — E78.1 HIGH BLOOD TRIGLYCERIDES: ICD-10-CM

## 2018-04-02 RX ORDER — OMEGA-3-ACID ETHYL ESTERS 1 G/1
1 CAPSULE, LIQUID FILLED ORAL DAILY
Qty: 90 CAPSULE | Refills: 0 | Status: SHIPPED | OUTPATIENT
Start: 2018-04-02 | End: 2018-07-02

## 2018-04-26 DIAGNOSIS — E78.1 HIGH BLOOD TRIGLYCERIDES: ICD-10-CM

## 2018-04-26 DIAGNOSIS — I10 ESSENTIAL HYPERTENSION, BENIGN: ICD-10-CM

## 2018-04-26 DIAGNOSIS — E78.5 HYPERLIPIDEMIA LDL GOAL <130: ICD-10-CM

## 2018-04-27 RX ORDER — SIMVASTATIN 40 MG
40 TABLET ORAL AT BEDTIME
Qty: 90 TABLET | Refills: 0 | Status: SHIPPED | OUTPATIENT
Start: 2018-04-27 | End: 2018-07-30

## 2018-04-27 RX ORDER — OLMESARTAN MEDOXOMIL 40 MG/1
40 TABLET ORAL DAILY
Qty: 30 TABLET | Refills: 1 | Status: SHIPPED | OUTPATIENT
Start: 2018-04-27 | End: 2018-06-27

## 2018-04-27 NOTE — TELEPHONE ENCOUNTER
"Routing refill request to provider for review/approval because:  Drug interaction warning  Live Peña RN, BSN              Last Written Prescription Date:  11/7/17  Last Fill Quantity: 30,  # refills: 5   Last office visit: 10/6/2017 with prescribing provider:  Jerry   Future Office Visit:    Requested Prescriptions   Pending Prescriptions Disp Refills     simvastatin (ZOCOR) 40 MG tablet 30 tablet 5     Sig: Take 1 tablet (40 mg) by mouth At Bedtime    Statins Protocol Passed    4/26/2018  8:28 AM       Passed - LDL on file in past 12 months    Recent Labs   Lab Test  06/13/17   0809   LDL  45            Passed - No abnormal creatine kinase in past 12 months    No lab results found.            Passed - Recent (12 mo) or future (30 days) visit within the authorizing provider's specialty    Patient had office visit in the last 12 months or has a visit in the next 30 days with authorizing provider or within the authorizing provider's specialty.  See \"Patient Info\" tab in inbasket, or \"Choose Columns\" in Meds & Orders section of the refill encounter.           Passed - Patient is age 18 or older          "

## 2018-04-27 NOTE — TELEPHONE ENCOUNTER
"Prescription approved per [facility] [place].  Live Peña RN, BSN      Last Written Prescription Date:  11/7/17  Last Fill Quantity: 30,  # refills: 5   Last office visit: 10/6/2017 with prescribing provider:  Jerry   Future Office Visit:    Requested Prescriptions   Pending Prescriptions Disp Refills     olmesartan (BENICAR) 40 MG tablet 30 tablet 5     Sig: Take 1 tablet (40 mg) by mouth daily    Angiotensin-II Receptors Passed    4/26/2018  8:30 AM       Passed - Blood pressure under 140/90 in past 12 months    BP Readings from Last 3 Encounters:   10/06/17 126/64   07/28/17 139/78   06/13/17 120/74                Passed - Recent (12 mo) or future (30 days) visit within the authorizing provider's specialty    Patient had office visit in the last 12 months or has a visit in the next 30 days with authorizing provider or within the authorizing provider's specialty.  See \"Patient Info\" tab in inbasket, or \"Choose Columns\" in Meds & Orders section of the refill encounter.           Passed - Patient is age 18 or older       Passed - Normal serum creatinine on file in past 12 months    Recent Labs   Lab Test  06/13/17   0809   CR  1.03            Passed - Normal serum potassium on file in past 12 months    Recent Labs   Lab Test  06/13/17   0809   POTASSIUM  4.0                      "

## 2018-05-29 DIAGNOSIS — F10.20 UNCOMPLICATED ALCOHOL DEPENDENCE (H): ICD-10-CM

## 2018-05-29 RX ORDER — ALLOPURINOL 100 MG/1
200 TABLET ORAL 2 TIMES DAILY
Qty: 120 TABLET | Refills: 3 | Status: SHIPPED | OUTPATIENT
Start: 2018-05-29 | End: 2018-07-30

## 2018-05-29 NOTE — TELEPHONE ENCOUNTER
Routing refill request to provider for review/approval because:  Due for several labs soon, when do you want to see him again?    Yvette Rossi RN, BS  Clinical Nurse Triage.

## 2018-05-29 NOTE — TELEPHONE ENCOUNTER
Requested Prescriptions   Pending Prescriptions Disp Refills     allopurinol (ZYLOPRIM) 100 MG tablet  Last Written Prescription Date:  1/19/2018  Last Fill Quantity: 120,  # refills: 3   Last office visit: 10/6/2017 with prescribing provider:  10/6/17   Future Office Visit:   120 tablet 3     Sig: Take 2 tablets (200 mg) by mouth 2 times daily    There is no refill protocol information for this order

## 2018-05-29 NOTE — TELEPHONE ENCOUNTER
"Requested Prescriptions   Pending Prescriptions Disp Refills     allopurinol (ZYLOPRIM) 100 MG tablet 120 tablet 3     Sig: Take 2 tablets (200 mg) by mouth 2 times daily    Gout Agents Protocol Failed    5/29/2018  4:01 PM       Failed - CBC on file in past 12 months    Recent Labs   Lab Test  05/07/17   0505   WBC  5.5   RBC  4.63   HGB  15.1   HCT  44.0   PLT  169       For [facility] ONLY: [id number] = WBC, JHVD152 = RBC         Failed - Uric acid greater than or equal to 6 on file in past 12 months    Recent Labs   Lab Test  07/28/17   1155   URIC  3.0*     If level is 6mg/dL or greater, ok to refill one time and refer to provider.          Passed - ALT on file in past 12 months    Recent Labs   Lab Test  06/13/17   0809   ALT  37            Passed - Recent (12 mo) or future (30 days) visit within the authorizing provider's specialty    Patient had office visit in the last 12 months or has a visit in the next 30 days with authorizing provider or within the authorizing provider's specialty.  See \"Patient Info\" tab in inbasket, or \"Choose Columns\" in Meds & Orders section of the refill encounter.           Passed - Patient is age 18 or older       Passed - Normal serum creatinine on file in the past 12 months    Recent Labs   Lab Test  06/13/17   0809   CR  1.03               "

## 2018-06-26 DIAGNOSIS — E78.5 HYPERLIPIDEMIA LDL GOAL <130: ICD-10-CM

## 2018-06-26 RX ORDER — FENOFIBRATE 54 MG/1
54 TABLET ORAL DAILY
Qty: 30 TABLET | Refills: 0 | Status: SHIPPED | OUTPATIENT
Start: 2018-06-26 | End: 2018-07-23

## 2018-06-26 NOTE — TELEPHONE ENCOUNTER
Medication is being filled for 1 time refill only due to:  due for labs, reminder sent     Yvette Rossi RN, BS  Clinical Nurse Triage.

## 2018-06-26 NOTE — TELEPHONE ENCOUNTER
"fenofibrate 54 MG tablet  Last Written Prescription Date:  01/09/18  Last Fill Quantity: 30,  # refills: 5   Last office visit: 10/6/2017 with prescribing provider:  Jerry     Future Office Visit:      Requested Prescriptions   Pending Prescriptions Disp Refills     fenofibrate 54 MG tablet 30 tablet 5     Sig: Take 1 tablet (54 mg) by mouth daily    Fibrates Failed    6/26/2018  4:23 PM       Failed - Lipid panel on file in past 12 months    Recent Labs   Lab Test  06/13/17   0809   05/15/15   0833   CHOL  137   < >  183   TRIG  280*   < >  523*   HDL  36*   < >  35*   LDL  45   < >  Cannot estimate LDL when triglyceride exceeds 400 mg/dL  76   NHDL  101   < >   --    VLDL   --    --   Cannot estimate VLDL when triglyceride exceeds 400 mg/dL.   CHOLHDLRATIO   --    --   5.2*    < > = values in this interval not displayed.              Passed - No abnormal creatine kinase in past 12 months    No lab results found.            Passed - Recent (12 mo) or future (30 days) visit within the authorizing provider's specialty    Patient had office visit in the last 12 months or has a visit in the next 30 days with authorizing provider or within the authorizing provider's specialty.  See \"Patient Info\" tab in inbasket, or \"Choose Columns\" in Meds & Orders section of the refill encounter.           Passed - Patient is age 18 or older          "

## 2018-06-27 DIAGNOSIS — I10 ESSENTIAL HYPERTENSION, BENIGN: ICD-10-CM

## 2018-06-27 RX ORDER — OLMESARTAN MEDOXOMIL 40 MG/1
40 TABLET ORAL DAILY
Qty: 30 TABLET | Refills: 0 | Status: SHIPPED | OUTPATIENT
Start: 2018-06-27 | End: 2018-07-24

## 2018-06-27 RX ORDER — CARVEDILOL 12.5 MG/1
12.5 TABLET ORAL 2 TIMES DAILY WITH MEALS
Qty: 60 TABLET | Refills: 3 | Status: SHIPPED | OUTPATIENT
Start: 2018-06-27 | End: 2018-07-30

## 2018-06-27 NOTE — TELEPHONE ENCOUNTER
"Last Written Prescription Date:  4.27.18  Last Fill Quantity: 30,  # refills: 1   Last office visit: 10/6/2017 with prescribing provider:  Jerry   Future Office Visit:      Requested Prescriptions   Pending Prescriptions Disp Refills     olmesartan (BENICAR) 40 MG tablet 30 tablet 1     Sig: Take 1 tablet (40 mg) by mouth daily    Angiotensin-II Receptors Failed    6/27/2018 10:47 AM       Failed - Normal serum creatinine on file in past 12 months    Recent Labs   Lab Test  06/13/17   0809   CR  1.03            Failed - Normal serum potassium on file in past 12 months    Recent Labs   Lab Test  06/13/17   0809   POTASSIUM  4.0                   Passed - Blood pressure under 140/90 in past 12 months    BP Readings from Last 3 Encounters:   10/06/17 126/64   07/28/17 139/78   06/13/17 120/74                Passed - Recent (12 mo) or future (30 days) visit within the authorizing provider's specialty    Patient had office visit in the last 12 months or has a visit in the next 30 days with authorizing provider or within the authorizing provider's specialty.  See \"Patient Info\" tab in inbasket, or \"Choose Columns\" in Meds & Orders section of the refill encounter.           Passed - Patient is age 18 or older        Medication is being filled for 1 time refill only due to:  due for physical and labs, reminder sent     Yvette Rossi RN, BS  Clinical Nurse Triage.          "

## 2018-06-27 NOTE — TELEPHONE ENCOUNTER
"Requested Prescriptions   Pending Prescriptions Disp Refills     carvedilol (COREG) 12.5 MG tablet 60 tablet 5        Last Written Prescription Date: 1/4/18  Last Fill Quantity: 60 tablet,  # refills: 5   Last office visit: 10/6/2017 with prescribing provider:  Jerry   Future Office Visit:     Sig: Take 1 tablet (12.5 mg) by mouth 2 times daily (with meals)    Beta-Blockers Protocol Passed    6/27/2018 10:40 AM       Passed - Blood pressure under 140/90 in past 12 months    BP Readings from Last 3 Encounters:   10/06/17 126/64   07/28/17 139/78   06/13/17 120/74                Passed - Patient is age 6 or older       Passed - Recent (12 mo) or future (30 days) visit within the authorizing provider's specialty    Patient had office visit in the last 12 months or has a visit in the next 30 days with authorizing provider or within the authorizing provider's specialty.  See \"Patient Info\" tab in inbasket, or \"Choose Columns\" in Meds & Orders section of the refill encounter.              "

## 2018-07-23 DIAGNOSIS — E78.5 HYPERLIPIDEMIA LDL GOAL <130: ICD-10-CM

## 2018-07-23 NOTE — TELEPHONE ENCOUNTER
"fenofibrate 54 MG tablet  Last Written Prescription Date:  06/26/18  Last Fill Quantity: 30,  # refills: 0   Last office visit: 10/6/2017 with prescribing provider:  Jerry     Future Office Visit:   Next 5 appointments (look out 90 days)     Jul 30, 2018  9:40 AM CDT   PHYSICAL with Trish Edwards MD   [facility]    [address] [place] [address], [address] [address] [phone]                 Requested Prescriptions   Pending Prescriptions Disp Refills     fenofibrate 54 MG tablet 30 tablet 0     Sig: Take 1 tablet (54 mg) by mouth daily See MD for refills, due for office visit and labs    Fibrates Failed    7/23/2018 11:16 AM       Failed - Lipid panel on file in past 12 months    Recent Labs   Lab Test  06/13/17   0809   05/15/15   0833   CHOL  137   < >  183   TRIG  280*   < >  523*   HDL  36*   < >  35*   LDL  45   < >  Cannot estimate LDL when triglyceride exceeds 400 mg/dL  76   NHDL  101   < >   --    VLDL   --    --   Cannot estimate VLDL when triglyceride exceeds 400 mg/dL.   CHOLHDLRATIO   --    --   5.2*    < > = values in this interval not displayed.              Passed - No abnormal creatine kinase in past 12 months    No lab results found.            Passed - Recent (12 mo) or future (30 days) visit within the authorizing provider's specialty    Patient had office visit in the last 12 months or has a visit in the next 30 days with authorizing provider or within the authorizing provider's specialty.  See \"Patient Info\" tab in inbasket, or \"Choose Columns\" in Meds & Orders section of the refill encounter.           Passed - Patient is age 18 or older          "

## 2018-07-24 DIAGNOSIS — I10 ESSENTIAL HYPERTENSION, BENIGN: ICD-10-CM

## 2018-07-24 RX ORDER — FENOFIBRATE 54 MG/1
54 TABLET ORAL DAILY
Qty: 30 TABLET | Refills: 0 | Status: SHIPPED | OUTPATIENT
Start: 2018-07-24 | End: 2018-07-30

## 2018-07-24 RX ORDER — OLMESARTAN MEDOXOMIL 40 MG/1
40 TABLET ORAL DAILY
Qty: 30 TABLET | Refills: 0 | Status: SHIPPED | OUTPATIENT
Start: 2018-07-24 | End: 2018-07-30

## 2018-07-24 NOTE — TELEPHONE ENCOUNTER
"Last Written Prescription Date:  6/27/18  Last Fill Quantity: 30 tablet,  # refills: 0   Last office visit: 10/6/2017 with prescribing provider:  Jerry   Future Office Visit:   Next 5 appointments (look out 90 days)     Jul 30, 2018  9:40 AM CDT   PHYSICAL with Trish Edwards MD   [facility]    [address] [place] [address], [address] [address] [phone]                 Notes to Pharmacy: Pt due for physical and labs, please schedule asap  Requested Prescriptions   Pending Prescriptions Disp Refills     olmesartan (BENICAR) 40 MG tablet 30 tablet 0     Sig: Take 1 tablet (40 mg) by mouth daily    Angiotensin-II Receptors Failed    7/24/2018  9:06 AM       Failed - Normal serum creatinine on file in past 12 months    Recent Labs   Lab Test  06/13/17   0809   CR  1.03            Failed - Normal serum potassium on file in past 12 months    Recent Labs   Lab Test  06/13/17   0809   POTASSIUM  4.0                   Passed - Blood pressure under 140/90 in past 12 months    BP Readings from Last 3 Encounters:   10/06/17 126/64   07/28/17 139/78   06/13/17 120/74                Passed - Recent (12 mo) or future (30 days) visit within the authorizing provider's specialty    Patient had office visit in the last 12 months or has a visit in the next 30 days with authorizing provider or within the authorizing provider's specialty.  See \"Patient Info\" tab in inbasket, or \"Choose Columns\" in Meds & Orders section of the refill encounter.           Passed - Patient is age 18 or older          "

## 2018-07-30 ENCOUNTER — OFFICE VISIT (OUTPATIENT)
Dept: FAMILY MEDICINE | Facility: CLINIC | Age: 63
End: 2018-07-30
Payer: COMMERCIAL

## 2018-07-30 VITALS
DIASTOLIC BLOOD PRESSURE: 80 MMHG | HEART RATE: 56 BPM | WEIGHT: 197 LBS | BODY MASS INDEX: 30.92 KG/M2 | TEMPERATURE: 98.8 F | SYSTOLIC BLOOD PRESSURE: 134 MMHG | RESPIRATION RATE: 16 BRPM | HEIGHT: 67 IN | OXYGEN SATURATION: 97 %

## 2018-07-30 DIAGNOSIS — Z00.00 ROUTINE GENERAL MEDICAL EXAMINATION AT A HEALTH CARE FACILITY: Primary | ICD-10-CM

## 2018-07-30 DIAGNOSIS — M1A.9XX0 CHRONIC GOUT WITHOUT TOPHUS, UNSPECIFIED CAUSE, UNSPECIFIED SITE: ICD-10-CM

## 2018-07-30 DIAGNOSIS — E78.5 HYPERLIPIDEMIA LDL GOAL <130: ICD-10-CM

## 2018-07-30 DIAGNOSIS — I77.810 ASCENDING AORTA DILATATION (H): ICD-10-CM

## 2018-07-30 DIAGNOSIS — I10 ESSENTIAL HYPERTENSION, BENIGN: ICD-10-CM

## 2018-07-30 DIAGNOSIS — E78.1 HIGH BLOOD TRIGLYCERIDES: ICD-10-CM

## 2018-07-30 DIAGNOSIS — Z12.11 SCREEN FOR COLON CANCER: ICD-10-CM

## 2018-07-30 DIAGNOSIS — M51.369 DDD (DEGENERATIVE DISC DISEASE), LUMBAR: ICD-10-CM

## 2018-07-30 LAB
ERYTHROCYTE [DISTWIDTH] IN BLOOD BY AUTOMATED COUNT: 12.6 % (ref 10–15)
HCT VFR BLD AUTO: 41.8 % (ref 40–53)
HGB BLD-MCNC: 14.5 G/DL (ref 13.3–17.7)
MCH RBC QN AUTO: 33.9 PG (ref 26.5–33)
MCHC RBC AUTO-ENTMCNC: 34.7 G/DL (ref 31.5–36.5)
MCV RBC AUTO: 98 FL (ref 78–100)
PLATELET # BLD AUTO: 182 10E9/L (ref 150–450)
RBC # BLD AUTO: 4.28 10E12/L (ref 4.4–5.9)
WBC # BLD AUTO: 5.6 10E9/L (ref 4–11)

## 2018-07-30 PROCEDURE — 99213 OFFICE O/P EST LOW 20 MIN: CPT | Mod: 25 | Performed by: FAMILY MEDICINE

## 2018-07-30 PROCEDURE — 84443 ASSAY THYROID STIM HORMONE: CPT | Performed by: FAMILY MEDICINE

## 2018-07-30 PROCEDURE — 99396 PREV VISIT EST AGE 40-64: CPT | Performed by: FAMILY MEDICINE

## 2018-07-30 PROCEDURE — 85027 COMPLETE CBC AUTOMATED: CPT | Performed by: FAMILY MEDICINE

## 2018-07-30 PROCEDURE — 36415 COLL VENOUS BLD VENIPUNCTURE: CPT | Performed by: FAMILY MEDICINE

## 2018-07-30 PROCEDURE — 80053 COMPREHEN METABOLIC PANEL: CPT | Performed by: FAMILY MEDICINE

## 2018-07-30 PROCEDURE — 80061 LIPID PANEL: CPT | Performed by: FAMILY MEDICINE

## 2018-07-30 PROCEDURE — 84550 ASSAY OF BLOOD/URIC ACID: CPT | Performed by: FAMILY MEDICINE

## 2018-07-30 RX ORDER — SIMVASTATIN 40 MG
40 TABLET ORAL AT BEDTIME
Qty: 90 TABLET | Refills: 3 | Status: SHIPPED | OUTPATIENT
Start: 2018-07-30 | End: 2018-11-20

## 2018-07-30 RX ORDER — GABAPENTIN 300 MG/1
CAPSULE ORAL
Qty: 90 CAPSULE | Refills: 0 | Status: SHIPPED | OUTPATIENT
Start: 2018-07-30 | End: 2018-09-04

## 2018-07-30 RX ORDER — OLMESARTAN MEDOXOMIL 40 MG/1
40 TABLET ORAL DAILY
Qty: 90 TABLET | Refills: 1 | Status: SHIPPED | OUTPATIENT
Start: 2018-07-30 | End: 2018-11-20

## 2018-07-30 RX ORDER — CARVEDILOL 12.5 MG/1
12.5 TABLET ORAL 2 TIMES DAILY WITH MEALS
Qty: 60 TABLET | Refills: 3 | Status: SHIPPED | OUTPATIENT
Start: 2018-07-30 | End: 2018-12-11

## 2018-07-30 RX ORDER — FENOFIBRATE 54 MG/1
54 TABLET ORAL DAILY
Qty: 90 TABLET | Refills: 3 | Status: SHIPPED | OUTPATIENT
Start: 2018-07-30 | End: 2019-01-03

## 2018-07-30 RX ORDER — ALLOPURINOL 100 MG/1
200 TABLET ORAL 2 TIMES DAILY
Qty: 120 TABLET | Refills: 3 | Status: SHIPPED | OUTPATIENT
Start: 2018-07-30 | End: 2018-12-11

## 2018-07-30 NOTE — MR AVS SNAPSHOT
After Visit Summary   7/30/2018    Emery Zayas    MRN: [id number]           Patient Information     Date Of Birth          1955        Visit Information        Provider Department      7/30/2018 9:40 AM Trish Edwards MD [facility]        Today's Diagnoses     Routine general medical examination at a health care facility    -  1    Hyperlipidemia LDL goal <130        Essential hypertension, benign        High blood triglycerides        Screen for colon cancer        Chronic gout without tophus, unspecified cause, unspecified site        DDD (degenerative disc disease), lumbar          Care Instructions      Preventive Health Recommendations  Male Ages 50 - 64    Yearly exam:             See your health care provider every year in order to  o   Review health changes.   o   Discuss preventive care.    o   Review your medicines if your doctor has prescribed any.     Have a cholesterol test every 5 years, or more frequently if you are at risk for high cholesterol/heart disease.     Have a diabetes test (fasting glucose) every three years. If you are at risk for diabetes, you should have this test more often.     Have a colonoscopy at age 50, or have a yearly FIT test (stool test). These exams will check for colon cancer.      Talk with your health care provider about whether or not a prostate cancer screening test (PSA) is right for you.    You should be tested each year for STDs (sexually transmitted diseases), if you re at risk.     Shots: Get a flu shot each year. Get a tetanus shot every 10 years.     Nutrition:    Eat at least 5 servings of fruits and vegetables daily.     Eat whole-grain bread, whole-wheat pasta and brown rice instead of white grains and rice.     Get adequate Calcium and Vitamin D.     Lifestyle    Exercise for at least 150 minutes a week (30 minutes a day, 5 days a week). This will help you control your weight and prevent disease.     Limit  "alcohol to one drink per day.     No smoking.     Wear sunscreen to prevent skin cancer.     See your dentist every six months for an exam and cleaning.     See your eye doctor every 1 to 2 years.            Follow-ups after your visit        Follow-up notes from your care team     Return in about 1 month (around 8/30/2018) for BACK PAIN RECHECK.      Future tests that were ordered for you today     Open Future Orders        Priority Expected Expires Ordered    Fecal colorectal cancer screen FIT - Future (S+30) Routine 8/20/2018 8/29/2018 7/30/2018            Who to contact     If you have questions or need follow up information about today's clinic visit or your schedule please contact [facility] directly at [phone].  Normal or non-critical lab and imaging results will be communicated to you by K[employer], letter or phone within 4 business days after the clinic has received the results. If you do not hear from us within 7 days, please contact the clinic through Kikhart or phone. If you have a critical or abnormal lab result, we will notify you by phone as soon as possible.  Submit refill requests through [facility] or call your pharmacy and they will forward the refill request to us. Please allow 3 business days for your refill to be completed.          Additional Information About Your Visit        [facility] [employer] Information     [facility] lets you send messages to your doctor, view your test results, renew your prescriptions, schedule appointments and more. To sign up, go to [email or web address] . Click on \"Log in\" on the left side of the screen, which will take you to the Welcome page. Then click on \"Sign up Now\" on the right side of the page.     You will be asked to enter the access code listed below, as well as some personal information. Please follow the directions to create your username and password.     Your access code is: [email or web address]  Expires: 10/28/2018 10:24 AM     Your access code will " " in 90 days. If you need help or a new code, please call your [place] clinic or [phone].        Care EveryWhere ID     This is your Care EveryWhere ID. This could be used by other organizations to access your [place] medical records  [id number]        Your Vitals Were     Pulse Temperature Respirations Height Pulse Oximetry BMI (Body Mass Index)    56 98.8  F (37.1  C) (Oral) 16 5' 6.75\" (1.695 m) 97% 31.09 kg/m2       Blood Pressure from Last 3 Encounters:   18 134/80   10/06/17 126/64   17 139/78    Weight from Last 3 Encounters:   18 197 lb (89.4 kg)   10/06/17 199 lb 9.6 oz (90.5 kg)   17 196 lb (88.9 kg)              We Performed the Following     CBC with platelets     Comprehensive metabolic panel     Lipid panel reflex to direct LDL Fasting     OFFICE/OUTPT VISIT,EST,LEVL III     TSH with free T4 reflex     Uric acid          Today's Medication Changes          These changes are accurate as of 18 10:24 AM.  If you have any questions, ask your nurse or doctor.               Start taking these medicines.        Dose/Directions    gabapentin 300 MG capsule   Commonly known as:  NEURONTIN   Used for:  DDD (degenerative disc disease), lumbar   Started by:  Trish Edwards MD        Take 1 tablet (300 mg) every night for 1-3 days, then 1 tablet twice daily for 1-3 days, then 1 tablet three times daily   Quantity:  90 capsule   Refills:  0         These medicines have changed or have updated prescriptions.        Dose/Directions    fenofibrate 54 MG tablet   This may have changed:  additional instructions   Used for:  Hyperlipidemia LDL goal <130   Changed by:  Trish Edwards MD        Dose:  54 mg   Take 1 tablet (54 mg) by mouth daily   Quantity:  90 tablet   Refills:  3            Where to get your medicines      These medications were sent to [facility] [address] [place] [address] - [address] AT [place] [address]  [address] " [facility] [address]     Phone:  [phone]     allopurinol 100 MG tablet    carvedilol 12.5 MG tablet    fenofibrate 54 MG tablet    gabapentin 300 MG capsule    olmesartan 40 MG tablet    simvastatin 40 MG tablet                Primary Care Provider Office Phone # Fax #    Trish Radha Edwards [phone]       [id number]   [facility] [address]        Equal Access to Services     CHARIS [place] : Hadii aad ku hadasho Soomaali, waaxda luqadaha, qaybta kaalmada adeegyada, waxay idiin hayaan adeeg khshaguftash lalesia . So [facility] [phone].    ATENCIÓN: Si melvina valdovinos, tiene a smith disposición servicios gratuitos de asistencia lingüística. [facility] [phone].    We comply with applicable federal civil rights laws and [place] laws. We do not discriminate on the basis of race, color, national origin, age, disability, sex, sexual orientation, or gender identity.            Thank you!     Thank you for choosing [facility]  for your care. Our goal is always to provide you with excellent care. Hearing back from our patients is one way we can continue to improve our services. Please take a few minutes to complete the written survey that you may receive in the mail after your visit with us. Thank you!             Your Updated Medication List - Protect others around you: Learn how to safely use, store and throw away your medicines at www.disposemymeds.org.          This list is accurate as of 7/30/18 10:24 AM.  Always use your most recent med list.                   Brand Name Dispense Instructions for use Diagnosis    allopurinol 100 MG tablet    ZYLOPRIM    120 tablet    Take 2 tablets (200 mg) by mouth 2 times daily    Chronic gout without tophus, unspecified cause, unspecified site       aspirin 81 MG tablet      Take 1 tablet by mouth daily.        carvedilol 12.5 MG tablet    COREG    60 tablet    Take 1 tablet (12.5 mg) by mouth 2 times daily (with meals)     Essential hypertension, benign       colchicine 0.6 MG tablet    COLCRYS    30 tablet    Take 2 tablets at the first sign of flare, take 1 additional tablet one hour later.    Idiopathic gout, unspecified chronicity, unspecified site       fenofibrate 54 MG tablet     90 tablet    Take 1 tablet (54 mg) by mouth daily    Hyperlipidemia LDL goal <130       gabapentin 300 MG capsule    NEURONTIN    90 capsule    Take 1 tablet (300 mg) every night for 1-3 days, then 1 tablet twice daily for 1-3 days, then 1 tablet three times daily    DDD (degenerative disc disease), lumbar       indomethacin 50 MG capsule    INDOCIN    42 capsule    Take 1 capsule (50 mg) by mouth 3 times daily (with meals)    Acute idiopathic gout of left knee       MULTIVITAMIN ADULT Chew      Take 1 chew tab by mouth daily        olmesartan 40 MG tablet    BENICAR    90 tablet    Take 1 tablet (40 mg) by mouth daily    Essential hypertension, benign       omega-3 acid ethyl esters 1 g capsule    Lovaza    90 capsule    Take 1 capsule (1 g) by mouth daily    High blood triglycerides       order for DME     1 Device    BP cuff, severe HTN, needs to monitor daily    Essential hypertension, benign       simvastatin 40 MG tablet    ZOCOR    90 tablet    Take 1 tablet (40 mg) by mouth At Bedtime    High blood triglycerides, Hyperlipidemia LDL goal <130

## 2018-07-30 NOTE — PROGRESS NOTES
"  SUBJECTIVE:   CC: Emery Zayas is an 62 year old male who presents for preventative health visit.     Healthy Habits:    Do you get at least three servings of calcium containing foods daily (dairy, green leafy vegetables, etc.)? {YES/NO, DAIRY INTAKE:941874::\"yes\"}    Amount of exercise or daily activities, outside of work: {AMOUNT EXERCISE:362479}    Problems taking medications regularly {Yes /No default:387777::\"No\"}    Medication side effects: {Yes /No default.:273951::\"No\"}    Have you had an eye exam in the past two years? {YESNOBLANK:022934}    Do you see a dentist twice per year? {YESNOBLANK:210136}    Do you have sleep apnea, excessive snoring or daytime drowsiness?{YESNOBLANK:338999}  {Outside tests to abstract? :472084}     {additional problems to add (Optional):584298}    Today's PHQ-2 Score:   PHQ-2 ( 1999 [employer]) 7/30/2018 10/6/2017   Q1: Little interest or pleasure in doing things 0 0   Q2: Feeling down, depressed or hopeless 0 0   PHQ-2 Score 0 0   Q1: Little interest or pleasure in doing things Not at all -   Q2: Feeling down, depressed or hopeless Not at all -   PHQ-2 Score 0 -     {PHQ-2 LOOK IN ASSESSMENTS :998894}  Abuse: Current or Past(Physical, Sexual or Emotional)- {YES/NO/NA:516050}  Do you feel safe in your environment - {YES/NO/NA:308047}    Social History   Substance Use Topics     Smoking status: Former Smoker     Quit date: 7/15/1993     Smokeless tobacco: Never Used      Comment: Quit 20 years ago.     Alcohol use 0.0 oz/week     0 Standard drinks or equivalent per week      Comment: maybe 5-7 rum and cokes or beer a week      If you drink alcohol do you typically have >3 drinks per day or >7 drinks per week? {ETOH :495630}                      Last PSA:   PSA   Date Value Ref Range Status   01/04/2011 3.19 0 - 4 ug/L Final       Reviewed orders with patient. Reviewed health maintenance and updated orders accordingly - {Yes/No:192609::\"Yes\"}  {Chronicprobdata " "(Optional):682909}    Reviewed and updated as needed this visit by clinical staff         Reviewed and updated as needed this visit by Provider        {HISTORY OPTIONS (Optional):601303}    ROS:  { :528864::\"CONSTITUTIONAL: NEGATIVE for fever, chills, change in weight\",\"INTEGUMENTARY/SKIN: NEGATIVE for worrisome rashes, moles or lesions\",\"EYES: NEGATIVE for vision changes or irritation\",\"ENT: NEGATIVE for ear, mouth and throat problems\",\"RESP: NEGATIVE for significant cough or SOB\",\"CV: NEGATIVE for chest pain, palpitations or peripheral edema\",\"GI: NEGATIVE for nausea, abdominal pain, heartburn, or change in bowel habits\",\" male: negative for dysuria, hematuria, decreased urinary stream, erectile dysfunction, urethral discharge\",\"MUSCULOSKELETAL: NEGATIVE for significant arthralgias or myalgia\",\"NEURO: NEGATIVE for weakness, dizziness or paresthesias\",\"PSYCHIATRIC: NEGATIVE for changes in mood or affect\"}    OBJECTIVE:   There were no vitals taken for this visit.  EXAM:  {Exam Choices:115056}    {Diagnostic Test Results (Optional):904001::\"Diagnostic Test Results:\",\"none \"}    ASSESSMENT/PLAN:   {Diag Picklist:689838}    COUNSELING:  {MALE COUNSELING MESSAGES:080545::\"Reviewed preventive health counseling, as reflected in patient instructions\"}    BP Readings from Last 1 Encounters:   10/06/17 126/64     Estimated body mass index is 32.46 kg/(m^2) as calculated from the following:    Height as of 5/11/17: 5' 5.75\" (1.67 m).    Weight as of 10/6/17: 199 lb 9.6 oz (90.5 kg).    {BP Counseling- Complete if BP >= 120/80  (Optional):238702}  {Weight Management Plan (ACO) Complete if BMI is abnormal-  Ages 18-64  BMI >24.9.  Age 65+ with BMI <23 or >30 (Optional):041609}     reports that he quit smoking about 25 years ago. He has never used smokeless tobacco.  {Tobacco Cessation -- Complete if patient is a smoker (Optional):012238}    Counseling Resources:  ATP IV Guidelines  Pooled Cohorts Equation Calculator  FRAX " Risk Assessment  ICSI Preventive Guidelines  Dietary Guidelines for Americans, 2010  USDA's MyPlate  ASA Prophylaxis  Lung CA Screening    Trish Edwards MD  [facility]

## 2018-07-30 NOTE — PROGRESS NOTES
SUBJECTIVE:   CC: Emery Zayas is an 62 year old male who presents for preventative health visit.     Physical   Annual:     Getting at least 3 servings of Calcium per day:  NO    Bi-annual eye exam:  Yes    Dental care twice a year:  Yes    Sleep apnea or symptoms of sleep apnea:  Excessive snoring    Diet:  Regular (no restrictions)    Frequency of exercise:  6-7 days/week    Duration of exercise:  15-30 minutes    Taking medications regularly:  Yes    Medication side effects:  None    Additional concerns today:  No    Back Pain       Duration: ongoing         Specific cause: work-related    Description:   Location of pain: low back both  Character of pain: dull ache and constant  Pain radiation:radiates into the left buttocks, radiates into the left leg and radiates into the left foot  New numbness or weakness in legs, not attributed to pain:  no     Intensity: Currently 3/10, At its worst 8-9/10    History:   Pain interferes with job: Not applicable  History of back problems: previous herniated disc and previous spinal surgery --fusion   Any previous MRI or X-rays: Yes--CT scans, MRI's and X-rays   Sees a specialist for back pain:  [facility]   Therapies tried without relief: Physical Therapy and stretch    Alleviating factors:   Improved by: LAYING DOWN and heat      Precipitating factors:  Worsened by: Bending and Walking    Functional and Psychosocial Screen (Shubham STarT Back):      Not performed today    Accompanying Signs & Symptoms:  Risk of Fracture:  None  Risk of Cauda Equina:  None  Risk of Infection:  None  Risk of Cancer:  None  Risk of Ankylosing Spondylitis:  Onset at age <35, male, AND morning back stiffness. no     Patient reports that over the past couple of months he has been taking some of his old oxycodone once or twice a week. He inquires if there is a way he can get a refill on this prescription. Notes that the back pain has been flaring up, he now  goes to bed with a heating pad because the pain makes it difficult to fall asleep and it keeps him up at night. He has been on lyrica years ago, he does not recall it being effective. He has been seen by Pain Clinic before, he does not wish to return as he did not think it was very helpful. He takes a lot of OTC aspirin, which helped for a while but is no longer as affective. Aleve has provided no improvement in pain.     Alcohol use  Will have a couple drinks every now and then. Sometimes he goes days without drinking it, sometimes he has a couple of drinks in one day. He has saige trying to drink in moderation and trying not to drink daily.      Gout  Has not had any recent gout flare ups. Currently on allopurinol 200 mg twice daily.     Hyperlipidemia  Taking simvastatin 40 mg and fenofibrate 54 mg daily.     Hypertension  Taking carvedilol 25 mg daily and olmesartan 40 mg daily.     Derm  Notes a skin lesion on the right side of his temple that it is painful to the touch. He notices it more when he shaves because it gets very irritated afterwards. It does not bleed after shaving. The size and color of it has not changed.     He went through a patch of poison ivy while at the cabin last weekend. Notes that the skin on his lower legs is still clearing up.      Ventral hernia  He has gone to see a specialist, was told it is not a ventral hernia. He was recommended some weight loss.     Nocturia  No changes to nocturia. It does not occur often and is not too bothersome for him.     Borderline dilated ascending aorta  Has not been seen by cardiology recently. Last echo cardiogram was 5/5/2017.       Today's PHQ-2 Score:   PHQ-2 ( 1999 [employer]) 7/30/2018   Q1: Little interest or pleasure in doing things 0   Q2: Feeling down, depressed or hopeless 0   PHQ-2 Score 0   Q1: Little interest or pleasure in doing things Not at all   Q2: Feeling down, depressed or hopeless Not at all   PHQ-2 Score 0     Abuse: Current or  Past(Physical, Sexual or Emotional)- NO  Do you feel safe in your environment - YES    Social History   Substance Use Topics     Smoking status: Former Smoker     Quit date: 7/15/1993     Smokeless tobacco: Never Used      Comment: Quit 20 years ago.     Alcohol use 0.0 oz/week     0 Standard drinks or equivalent per week      Comment: maybe 5-7 rum and cokes or beer a week     Alcohol Use 7/30/2018   If you drink alcohol do you typically have greater than 3 drinks per day OR greater than 7 drinks per week? No   No flowsheet data found.    Last PSA:   PSA   Date Value Ref Range Status   01/04/2011 3.19 0 - 4 ug/L Final     Reviewed orders with patient. Reviewed health maintenance and updated orders accordingly - Yes  Labs reviewed in EPIC  BP Readings from Last 3 Encounters:   07/30/18 134/80   10/06/17 126/64   07/28/17 139/78    Wt Readings from Last 3 Encounters:   07/30/18 89.4 kg (197 lb)   10/06/17 90.5 kg (199 lb 9.6 oz)   07/28/17 88.9 kg (196 lb)            Recent Labs   Lab Test  06/13/17   0809  05/07/17   0505  05/06/17   0505  05/05/17   1152  08/02/16   0948   12/30/13   0752  12/30/13   0751   A1C   --    --    --   5.5  5.7   --   5.5   --    LDL  45   --   Cannot estimate LDL when triglyceride exceeds 400 mg/dL   --   Cannot estimate LDL when triglyceride exceeds 400 mg/dL  74   < >   --   Cannot estimate LDL when triglyceride exceeds 400 mg/dL  65   HDL  36*   --   42   --   34*   < >   --   33*   TRIG  280*   --   574*   --   462*   < >   --   1102*   ALT  37  39  43  48  45   < >   --   90*   CR  1.03  1.20  0.92  0.95  0.93   < >   --   1.08   GFRESTIMATED  73  61  83  80  82   < >   --   70   GFRESTBLACK  88  74  >90   GFR Calc    >90   GFR Calc    >90   GFR Calc     < >   --   84   POTASSIUM  4.0  4.3  3.4  3.9  4.3   < >   --   4.2   TSH   --    --    --    --   1.47   --    --   1.84    < > = values in this interval not displayed.     "  Reviewed and updated as needed this visit by clinical staff  Tobacco  Allergies  Meds  Med Hx  Surg Hx  Fam Hx  Soc Hx        Reviewed and updated as needed this visit by Provider          Review of Systems  CONSTITUTIONAL: NEGATIVE for fever, chills, change in weight  INTEGUMENTARY/SKIN: POSITIVE for skin lesion on right temple NEGATIVE for worrisome rashes, moles   EYES: NEGATIVE for vision changes or irritation  ENT: NEGATIVE for ear, mouth and throat problems  RESP: NEGATIVE for significant cough or SOB  CV: NEGATIVE for chest pain, palpitations or peripheral edema  GI: NEGATIVE for nausea, abdominal pain, heartburn, or change in bowel habits   male: negative for dysuria, hematuria, decreased urinary stream, erectile dysfunction, urethral discharge  MUSCULOSKELETAL: POSITIVE for back pain   NEURO: NEGATIVE for weakness, dizziness or paresthesias  PSYCHIATRIC: NEGATIVE for changes in mood or affect    This document serves as a record of the services and decisions personally performed and made by Trish Edwards MD. It was created on her behalf by Violeta Pina, a trained medical scribe. The creation of this document is based on the provider's statements to the medical scribe.  Violeta Pina July 30, 2018 10:07 AM     OBJECTIVE:   /80 (BP Location: Right arm, Patient Position: Chair, Cuff Size: Adult Regular)  Pulse 56  Temp 98.8  F (37.1  C) (Oral)  Resp 16  Ht 5' 6.75\" (1.695 m)  Wt 197 lb (89.4 kg)  SpO2 97%  BMI 31.09 kg/m2    Physical Exam  GENERAL: healthy, alert and no distress  EYES: Eyes grossly normal to inspection, PERRL and conjunctivae and sclerae normal  HENT: ear canals and TM's normal, nose and mouth without ulcers or lesions  NECK: no adenopathy, no asymmetry, masses, or scars and thyroid normal to palpation  RESP: lungs clear to auscultation - no rales, rhonchi or wheezes  CV: regular rate and rhythm, normal S1 S2, no S3 or S4, no murmur, click or rub, no peripheral edema " and peripheral pulses strong  ABDOMEN: soft, nontender, no hepatosplenomegaly, no masses and bowel sounds normal  MS: no gross musculoskeletal defects noted, no edema  SKIN: SK appearing lesion on right temple  no suspicious rashes  NEURO: Normal strength and tone, mentation intact and speech normal  PSYCH: mentation appears normal, affect normal/bright    Diagnostic Test Results:  No results found for this or any previous visit (from the past 24 hour(s)).    ASSESSMENT/PLAN:   (Z00.00) Routine general medical examination at a health care facility  (primary encounter diagnosis)  Comment: Normal physical exam. Fasting lab work up today, recommend stop drinking and lose weight  Plan: TSH with free T4 reflex, CBC with platelets,         Comprehensive metabolic panel          (E78.5) Hyperlipidemia LDL goal <130  Comment: Fasting labs today. If LDL is within target range, continue fenofibrate and simvastatin  Plan: fenofibrate 54 MG tablet, simvastatin (ZOCOR)         40 MG tablet, Lipid panel reflex to direct LDL         Fasting, OFFICE/OUTPT VISIT,EST,LEVL III          (I10) Essential hypertension, benign  Comment: BP at target. Refilled rx, continue benicar and carvedilol.  Plan: olmesartan (BENICAR) 40 MG tablet, carvedilol         (COREG) 12.5 MG tablet, OFFICE/OUTPT         VISIT,EST,LEVL III          (E78.1) High blood triglycerides  Plan: simvastatin (ZOCOR) 40 MG tablet, OFFICE/OUTPT         VISIT,EST,LEVL III          (Z12.11) Screen for colon cancer  Comment: Ordered today  Plan: Fecal colorectal cancer screen FIT - Future         (S+30)          (M1A.9XX0) Chronic gout without tophus, unspecified cause, unspecified site  Comment: Checking uric acid levels. If within normal limits, continue allopurinol   Plan: allopurinol (ZYLOPRIM) 100 MG tablet, Uric         acid, OFFICE/OUTPT VISIT,EST,LEVL III          (M51.36) DDD (degenerative disc disease), lumbar  Comment: Discussed that oxycodone is not recommended  "for chronic pain management. Recommended gabapentin, starting out at 300 mg daily and increasing dosage as needed. Discussed and reviewed potential side effects of medication, will let me know if experience any.   Plan: gabapentin (NEURONTIN) 300 MG capsule,         OFFICE/OUTPT VISIT,ABI BARTLETT III          (I77.810) Ascending aorta dilatation (H)  Comment: Patient has not been seen by cardiology recently. Recommend a follow up  Plan: CARDIOLOGY EVAL ADULT REFERRAL          Skin: assured patient that SK appears benign. Will let me know if he notices any changes    Follow up in 1 month     COUNSELING:   Reviewed preventive health counseling, as reflected in patient instructions       Regular exercise       Healthy diet/nutrition       HIV screeninx in teen years, 1x in adult years, and at intervals if high risk       Colon cancer screening    BP Readings from Last 1 Encounters:   18 134/80     Estimated body mass index is 31.09 kg/(m^2) as calculated from the following:    Height as of this encounter: 5' 6.75\" (1.695 m).    Weight as of this encounter: 197 lb (89.4 kg).  Weight management plan: Discussed healthy diet and exercise guidelines and patient will follow up in 12 months in clinic to re-evaluate.   reports that he quit smoking about 25 years ago. He has never used smokeless tobacco.    Counseling Resources:  ATP IV Guidelines  Pooled Cohorts Equation Calculator  FRAX Risk Assessment  ICSI Preventive Guidelines  Dietary Guidelines for Americans,   USDA's MyPlate  ASA Prophylaxis  Lung CA Screening    The information in this document, created by the medical scribe for me, accurately reflects the services I personally performed and the decisions made by me. I have reviewed and approved this document for accuracy prior to leaving the patient care area.  2018 10:07 AM    Trish Edwards MD  [facility]  "

## 2018-07-31 LAB
ALBUMIN SERPL-MCNC: 3.8 G/DL (ref 3.4–5)
ALP SERPL-CCNC: 80 U/L (ref 40–150)
ALT SERPL W P-5'-P-CCNC: 28 U/L (ref 0–70)
ANION GAP SERPL CALCULATED.3IONS-SCNC: 9 MMOL/L (ref 3–14)
AST SERPL W P-5'-P-CCNC: 20 U/L (ref 0–45)
BILIRUB SERPL-MCNC: 0.6 MG/DL (ref 0.2–1.3)
BUN SERPL-MCNC: 14 MG/DL (ref 7–30)
CALCIUM SERPL-MCNC: 9 MG/DL (ref 8.5–10.1)
CHLORIDE SERPL-SCNC: 106 MMOL/L (ref 94–109)
CHOLEST SERPL-MCNC: 135 MG/DL
CO2 SERPL-SCNC: 24 MMOL/L (ref 20–32)
CREAT SERPL-MCNC: 0.97 MG/DL (ref 0.66–1.25)
GFR SERPL CREATININE-BSD FRML MDRD: 78 ML/MIN/1.7M2
GLUCOSE SERPL-MCNC: 107 MG/DL (ref 70–99)
HDLC SERPL-MCNC: 36 MG/DL
LDLC SERPL CALC-MCNC: 60 MG/DL
NONHDLC SERPL-MCNC: 99 MG/DL
POTASSIUM SERPL-SCNC: 4.4 MMOL/L (ref 3.4–5.3)
PROT SERPL-MCNC: 7.1 G/DL (ref 6.8–8.8)
SODIUM SERPL-SCNC: 139 MMOL/L (ref 133–144)
TRIGL SERPL-MCNC: 193 MG/DL
TSH SERPL DL<=0.005 MIU/L-ACNC: 1.88 MU/L (ref 0.4–4)
URATE SERPL-MCNC: 3.1 MG/DL (ref 3.5–7.2)

## 2018-09-04 DIAGNOSIS — M51.369 DDD (DEGENERATIVE DISC DISEASE), LUMBAR: ICD-10-CM

## 2018-09-04 NOTE — TELEPHONE ENCOUNTER
Routing refill request to provider for review/approval because:  Drug not on the [facility] refill protocol   Requesting 90 day supply, rx adjusted    Yvette Rossi RN, BS  Clinical Nurse Triage.

## 2018-09-04 NOTE — TELEPHONE ENCOUNTER
gabapentin (NEURONTIN) 300 MG capsule - 90 Day Supply  Last Written Prescription Date:  07/30/18  Last Fill Quantity: 90,  # refills: 0   Last office visit: 7/30/2018 with prescribing provider:  Jerry     Future Office Visit:        Requested Prescriptions   Pending Prescriptions Disp Refills     gabapentin (NEURONTIN) 300 MG capsule 90 capsule 0     Sig: Take 1 tablet (300 mg) every night for 1-3 days, then 1 tablet twice daily for 1-3 days, then 1 tablet three times daily    There is no refill protocol information for this order

## 2018-09-05 RX ORDER — GABAPENTIN 300 MG/1
300 CAPSULE ORAL 3 TIMES DAILY
Qty: 270 CAPSULE | Refills: 1 | Status: SHIPPED | OUTPATIENT
Start: 2018-09-05 | End: 2019-01-03

## 2018-11-20 DIAGNOSIS — E78.1 HIGH BLOOD TRIGLYCERIDES: ICD-10-CM

## 2018-11-20 DIAGNOSIS — Z12.11 SPECIAL SCREENING FOR MALIGNANT NEOPLASMS, COLON: Primary | ICD-10-CM

## 2018-11-20 DIAGNOSIS — E88.810 METABOLIC SYNDROME: ICD-10-CM

## 2018-11-20 DIAGNOSIS — I10 ESSENTIAL HYPERTENSION, BENIGN: ICD-10-CM

## 2018-11-20 DIAGNOSIS — E78.5 HYPERLIPIDEMIA LDL GOAL <130: ICD-10-CM

## 2018-11-20 NOTE — TELEPHONE ENCOUNTER
"Requesting 90 Day Supply  olmesartan (BENICAR) 40 MG tablet  Last Written Prescription Date:  07/30/18  Last Fill Quantity: 90,  # refills: 1   Last office visit: 7/30/2018 with prescribing provider:  Jerry     Requested Prescriptions   Pending Prescriptions Disp Refills     olmesartan (BENICAR) 40 MG tablet 90 tablet 1     Sig: Take 1 tablet (40 mg) by mouth daily    Angiotensin-II Receptors Passed    11/20/2018  2:02 PM       Passed - Blood pressure under 140/90 in past 12 months    BP Readings from Last 3 Encounters:   07/30/18 134/80   10/06/17 126/64   07/28/17 139/78                Passed - Recent (12 mo) or future (30 days) visit within the authorizing provider's specialty    Patient had office visit in the last 12 months or has a visit in the next 30 days with authorizing provider or within the authorizing provider's specialty.  See \"Patient Info\" tab in inbasket, or \"Choose Columns\" in Meds & Orders section of the refill encounter.             Passed - Patient is age 18 or older       Passed - Normal serum creatinine on file in past 12 months    Recent Labs   Lab Test  07/30/18   1026   CR  0.97            Passed - Normal serum potassium on file in past 12 months    Recent Labs   Lab Test  07/30/18   1026   POTASSIUM  4.4                    simvastatin (ZOCOR) 40 MG tablet 90 tablet 3     Sig: Take 1 tablet (40 mg) by mouth At Bedtime    Statins Protocol Passed    11/20/2018  2:02 PM       Passed - LDL on file in past 12 months    Recent Labs   Lab Test  07/30/18   1026   LDL  60            Passed - No abnormal creatine kinase in past 12 months    No lab results found.            Passed - Recent (12 mo) or future (30 days) visit within the authorizing provider's specialty    Patient had office visit in the last 12 months or has a visit in the next 30 days with authorizing provider or within the authorizing provider's specialty.  See \"Patient Info\" tab in inbasket, or \"Choose Columns\" in Meds & Orders " "section of the refill encounter.             Passed - Patient is age 18 or older        Requesting 90 Day Supply  simvastatin (ZOCOR) 40 MG tablet  Last Written Prescription Date:  07/30/18  Last Fill Quantity: 90,  # refills: 3   Last office visit: 7/30/2018 with prescribing provider:  Jerry     Requested Prescriptions   Pending Prescriptions Disp Refills     olmesartan (BENICAR) 40 MG tablet 90 tablet 1     Sig: Take 1 tablet (40 mg) by mouth daily    Angiotensin-II Receptors Passed    11/20/2018  2:02 PM       Passed - Blood pressure under 140/90 in past 12 months    BP Readings from Last 3 Encounters:   07/30/18 134/80   10/06/17 126/64   07/28/17 139/78                Passed - Recent (12 mo) or future (30 days) visit within the authorizing provider's specialty    Patient had office visit in the last 12 months or has a visit in the next 30 days with authorizing provider or within the authorizing provider's specialty.  See \"Patient Info\" tab in inbasket, or \"Choose Columns\" in Meds & Orders section of the refill encounter.             Passed - Patient is age 18 or older       Passed - Normal serum creatinine on file in past 12 months    Recent Labs   Lab Test  07/30/18   1026   CR  0.97            Passed - Normal serum potassium on file in past 12 months    Recent Labs   Lab Test  07/30/18   1026   POTASSIUM  4.4                    simvastatin (ZOCOR) 40 MG tablet 90 tablet 3     Sig: Take 1 tablet (40 mg) by mouth At Bedtime    Statins Protocol Passed    11/20/2018  2:02 PM       Passed - LDL on file in past 12 months    Recent Labs   Lab Test  07/30/18   1026   LDL  60            Passed - No abnormal creatine kinase in past 12 months    No lab results found.            Passed - Recent (12 mo) or future (30 days) visit within the authorizing provider's specialty    Patient had office visit in the last 12 months or has a visit in the next 30 days with authorizing provider or within the authorizing provider's " "specialty.  See \"Patient Info\" tab in inbasket, or \"Choose Columns\" in Meds & Orders section of the refill encounter.             Passed - Patient is age 18 or older          "

## 2018-11-26 RX ORDER — OLMESARTAN MEDOXOMIL 40 MG/1
40 TABLET ORAL DAILY
Qty: 90 TABLET | Refills: 2 | Status: SHIPPED | OUTPATIENT
Start: 2018-11-26 | End: 2019-01-03

## 2018-11-26 NOTE — TELEPHONE ENCOUNTER
Routing refill request to provider for review/approval because:  Drug interaction warning  Live Peña RN, BSN

## 2018-11-27 RX ORDER — SIMVASTATIN 40 MG
40 TABLET ORAL AT BEDTIME
Qty: 90 TABLET | Refills: 2 | Status: SHIPPED | OUTPATIENT
Start: 2018-11-27 | End: 2019-01-03

## 2018-11-27 NOTE — TELEPHONE ENCOUNTER
Pt needs to be seen every 6 months, so is due for appt.  Please call pt and offer appt AND I recommend he get the FIT done again, can he pick this up? I can also order the labs for his up coming visit. Please offer a lab only appt as well.  Thank you

## 2018-12-03 NOTE — TELEPHONE ENCOUNTER
Patient called back.  Assisted patient in scheduling a welcome to medicare physical for 01/03/18.  Patient will come in fasting.  Lisa Magill, CMA

## 2018-12-11 DIAGNOSIS — M1A.9XX0 CHRONIC GOUT WITHOUT TOPHUS, UNSPECIFIED CAUSE, UNSPECIFIED SITE: ICD-10-CM

## 2018-12-11 DIAGNOSIS — I10 ESSENTIAL HYPERTENSION, BENIGN: ICD-10-CM

## 2018-12-11 RX ORDER — ALLOPURINOL 100 MG/1
TABLET ORAL
Qty: 120 TABLET | Refills: 0 | Status: SHIPPED | OUTPATIENT
Start: 2018-12-11 | End: 2019-01-03

## 2018-12-11 RX ORDER — CARVEDILOL 12.5 MG/1
TABLET ORAL
Qty: 60 TABLET | Refills: 0 | Status: SHIPPED | OUTPATIENT
Start: 2018-12-11 | End: 2019-01-03

## 2018-12-11 NOTE — TELEPHONE ENCOUNTER
Medication is being filled for 1 time refill only due to:  Patient needs to be seen because short term fill until appt.     Yvette Rossi RN, BS  Clinical Nurse Triage.

## 2018-12-11 NOTE — TELEPHONE ENCOUNTER
"Requested Prescriptions   Pending Prescriptions Disp Refills     carvedilol (COREG) 12.5 MG tablet [Pharmacy Med Name: CARVEDILOL 12.5 MG TABLET] 60 tablet 3    Last Written Prescription Date:  7/30/18  Last Fill Quantity: 60,  # refills: 3   Last Office Visit: 7/30/2018 Jerry      Return in about 1 month (around 8/30/2018) for BACK PAIN RECHECK.     Future Office Visit:    Next 5 appointments (look out 90 days)    Jan 03, 2019 10:00 AM CST  PHYSICAL with Trish Edwards MD  [facility] [address] [place] [address], [facility] [address]  [facility] [address] [phone]          Sig: TAKE 1 TABLET BY MOUTH TWICE A DAY WITH MEAL    Beta-Blockers Protocol Passed - 12/11/2018  1:49 AM       Passed - Blood pressure under 140/90 in past 12 months    BP Readings from Last 3 Encounters:   07/30/18 134/80   10/06/17 126/64   07/28/17 139/78                Passed - Patient is age 6 or older       Passed - Recent (12 mo) or future (30 days) visit within the authorizing provider's specialty    Patient had office visit in the last 12 months or has a visit in the next 30 days with authorizing provider or within the authorizing provider's specialty.  See \"Patient Info\" tab in inbasket, or \"Choose Columns\" in Meds & Orders section of the refill encounter.         _________________________________________________________________________________________________________________________       allopurinol (ZYLOPRIM) 100 MG tablet [Pharmacy Med Name: ALLOPURINOL 100 MG TABLET] 120 tablet 3    Last Written Prescription Date:  7/30/18  Last Fill Quantity: 120,  # refills: 3   Last Office Visit: 7/30/2018   Future Office Visit:    Next 5 appointments (look out 90 days)    Jan 03, 2019 10:00 AM CST  PHYSICAL with Trish Edwards MD  [facility] ([facility]) [id number] [place] [address], [address] [address] [phone]          Sig: TAKE 2 TAB BY " "MOUTH TWICE DAILY    Gout Agents Protocol Passed - 12/11/2018  1:49 AM       Passed - CBC on file in past 12 months    Recent Labs   Lab Test 07/30/18  1026   WBC 5.6   RBC 4.28*   HGB 14.5   HCT 41.8                   Passed - ALT on file in past 12 months    Recent Labs   Lab Test 07/30/18  1026   ALT 28            Passed - Has Uric Acid on file in past 12 months and value is less than 6    Recent Labs   Lab Test 07/30/18  1026   URIC 3.1*     If level is 6mg/dL or greater, ok to refill one time and refer to provider.          Passed - Recent (12 mo) or future (30 days) visit within the authorizing provider's specialty    Patient had office visit in the last 12 months or has a visit in the next 30 days with authorizing provider or within the authorizing provider's specialty.  See \"Patient Info\" tab in inbasket, or \"Choose Columns\" in Meds & Orders section of the refill encounter.             Passed - Patient is age 18 or older       Passed - Normal serum creatinine on file in the past 12 months    Recent Labs   Lab Test 07/30/18  1026   CR 0.97             "

## 2019-01-03 ENCOUNTER — OFFICE VISIT (OUTPATIENT)
Dept: FAMILY MEDICINE | Facility: CLINIC | Age: 64
End: 2019-01-03
Payer: MEDICARE

## 2019-01-03 VITALS
HEIGHT: 66 IN | SYSTOLIC BLOOD PRESSURE: 134 MMHG | BODY MASS INDEX: 31.5 KG/M2 | WEIGHT: 196 LBS | HEART RATE: 52 BPM | DIASTOLIC BLOOD PRESSURE: 70 MMHG | TEMPERATURE: 98 F | RESPIRATION RATE: 16 BRPM

## 2019-01-03 DIAGNOSIS — E78.5 HYPERLIPIDEMIA LDL GOAL <130: ICD-10-CM

## 2019-01-03 DIAGNOSIS — E78.1 HIGH BLOOD TRIGLYCERIDES: ICD-10-CM

## 2019-01-03 DIAGNOSIS — M1A.9XX0 CHRONIC GOUT WITHOUT TOPHUS, UNSPECIFIED CAUSE, UNSPECIFIED SITE: ICD-10-CM

## 2019-01-03 DIAGNOSIS — F10.20 UNCOMPLICATED ALCOHOL DEPENDENCE (H): ICD-10-CM

## 2019-01-03 DIAGNOSIS — I77.810 ASCENDING AORTA DILATATION (H): ICD-10-CM

## 2019-01-03 DIAGNOSIS — R73.09 ELEVATED GLUCOSE: ICD-10-CM

## 2019-01-03 DIAGNOSIS — Z12.11 SCREEN FOR COLON CANCER: ICD-10-CM

## 2019-01-03 DIAGNOSIS — Z23 NEED FOR PROPHYLACTIC VACCINATION AND INOCULATION AGAINST INFLUENZA: ICD-10-CM

## 2019-01-03 DIAGNOSIS — I10 ESSENTIAL HYPERTENSION, BENIGN: ICD-10-CM

## 2019-01-03 DIAGNOSIS — Z00.00 MEDICARE WELCOME VISIT: Primary | ICD-10-CM

## 2019-01-03 DIAGNOSIS — L82.1 SEBORRHEIC KERATOSES: ICD-10-CM

## 2019-01-03 LAB
ERYTHROCYTE [DISTWIDTH] IN BLOOD BY AUTOMATED COUNT: 12.8 % (ref 10–15)
HCT VFR BLD AUTO: 40.4 % (ref 40–53)
HGB BLD-MCNC: 13.7 G/DL (ref 13.3–17.7)
MCH RBC QN AUTO: 32.2 PG (ref 26.5–33)
MCHC RBC AUTO-ENTMCNC: 33.9 G/DL (ref 31.5–36.5)
MCV RBC AUTO: 95 FL (ref 78–100)
PLATELET # BLD AUTO: 199 10E9/L (ref 150–450)
RBC # BLD AUTO: 4.25 10E12/L (ref 4.4–5.9)
WBC # BLD AUTO: 5 10E9/L (ref 4–11)

## 2019-01-03 PROCEDURE — 36415 COLL VENOUS BLD VENIPUNCTURE: CPT | Performed by: FAMILY MEDICINE

## 2019-01-03 PROCEDURE — 84443 ASSAY THYROID STIM HORMONE: CPT | Performed by: FAMILY MEDICINE

## 2019-01-03 PROCEDURE — 80061 LIPID PANEL: CPT | Performed by: FAMILY MEDICINE

## 2019-01-03 PROCEDURE — 99397 PER PM REEVAL EST PAT 65+ YR: CPT | Performed by: FAMILY MEDICINE

## 2019-01-03 PROCEDURE — 85027 COMPLETE CBC AUTOMATED: CPT | Performed by: FAMILY MEDICINE

## 2019-01-03 PROCEDURE — 84550 ASSAY OF BLOOD/URIC ACID: CPT | Performed by: FAMILY MEDICINE

## 2019-01-03 PROCEDURE — 80053 COMPREHEN METABOLIC PANEL: CPT | Performed by: FAMILY MEDICINE

## 2019-01-03 RX ORDER — OLMESARTAN MEDOXOMIL 40 MG/1
40 TABLET ORAL DAILY
Qty: 90 TABLET | Refills: 1 | Status: SHIPPED | OUTPATIENT
Start: 2019-01-03 | End: 2019-06-11

## 2019-01-03 RX ORDER — FENOFIBRATE 54 MG/1
54 TABLET ORAL DAILY
Qty: 90 TABLET | Refills: 3 | Status: SHIPPED | OUTPATIENT
Start: 2019-01-03 | End: 2019-06-11

## 2019-01-03 RX ORDER — SIMVASTATIN 40 MG
40 TABLET ORAL AT BEDTIME
Qty: 90 TABLET | Refills: 3 | Status: SHIPPED | OUTPATIENT
Start: 2019-01-03 | End: 2019-06-11

## 2019-01-03 RX ORDER — CARVEDILOL 12.5 MG/1
12.5 TABLET ORAL 2 TIMES DAILY WITH MEALS
Qty: 180 TABLET | Refills: 1 | Status: SHIPPED | OUTPATIENT
Start: 2019-01-03 | End: 2019-06-11

## 2019-01-03 RX ORDER — ALLOPURINOL 100 MG/1
200 TABLET ORAL 2 TIMES DAILY
Qty: 240 TABLET | Refills: 1 | Status: SHIPPED | OUTPATIENT
Start: 2019-01-03 | End: 2019-05-20

## 2019-01-03 ASSESSMENT — ENCOUNTER SYMPTOMS
ARTHRALGIAS: 1
CONSTIPATION: 0
MYALGIAS: 1
DIARRHEA: 0
COUGH: 0
DYSURIA: 0
WEAKNESS: 0
CHILLS: 0
FEVER: 0
HEARTBURN: 0
SORE THROAT: 0
EYE PAIN: 0
ABDOMINAL PAIN: 0
NERVOUS/ANXIOUS: 0
PARESTHESIAS: 0
FREQUENCY: 0
JOINT SWELLING: 0
SHORTNESS OF BREATH: 0
DIZZINESS: 0
PALPITATIONS: 0
HEADACHES: 0
HEMATOCHEZIA: 0
NAUSEA: 0
HEMATURIA: 0

## 2019-01-03 ASSESSMENT — ACTIVITIES OF DAILY LIVING (ADL): CURRENT_FUNCTION: NO ASSISTANCE NEEDED

## 2019-01-03 ASSESSMENT — MIFFLIN-ST. JEOR: SCORE: 1620.77

## 2019-01-03 NOTE — PROGRESS NOTES
"SUBJECTIVE:   Emery Zayas is a 63 year old male who presents for Preventive Visit.    Are you in the first 12 months of your Medicare coverage?  Had a eye exam completed within the last 12 months     Annual Wellness Visit     In general, how would you rate your overall health?  Good    Frequency of exercise:  6-7 days/week    Duration of exercise:  15-30 minutes    Do you usually eat at least 4 servings of fruit and vegetables a day, include whole grains    & fiber and avoid regularly eating high fat or \"junk\" foods?  No    Taking medications regularly:  Yes    Medication side effects:  None    Ability to successfully perform activities of daily living:  No assistance needed    Home Safety:  No safety concerns identified    Hearing Impairment:  No hearing concerns    In the past 6 months, have you been bothered by leaking of urine?  No    In general, how would you rate your overall mental or emotional health?  Excellent    PHQ-2 Total Score: 0    Additional concerns today:  No    Do you feel safe in your environment? NO    Do you have a Health Care Directive? Yes: Advance Directive has been received and scanned.    Fall risk  Fallen 2 or more times in the past year?: No  Any fall with injury in the past year?: No  click delete button to remove this line now  Cognitive Screening   1) Repeat 3 items (Leader, Season, Table)    2) Clock draw: NORMAL  3) 3 item recall: Recalls 3 objects  Results: 3 items recalled: COGNITIVE IMPAIRMENT LESS LIKELY    Mini-CogTM Copyright S Molly. Licensed by the author for use in [facility]; reprinted with permission (lina@.[facility]). All rights reserved.      Do you have sleep apnea, excessive snoring or daytime drowsiness?: yes-excessive snoring     DDD  No longer taking gabapentin. He notes ongoing back pain but does not want to take any medications. He would rather work on improving lifestyle changes and being more active.     Alcohol use  He has cut back on alcohol " use, has cut back 3/4. He hopes to continue working on this.     Weight    Wt Readings from Last 3 Encounters:   01/03/19 88.9 kg (196 lb)   07/30/18 89.4 kg (197 lb)   10/06/17 90.5 kg (199 lb 9.6 oz)     Notes that he used to weigh 155 lbs, then gained weight after he quit smoking.     Borderline dilated ascending aorta  Patient also has a hx of left ventricular hypertrophy. He has not been seen by cardiology and was given referral at [facility] but has not scheduled an appointment yet.     Gout  No recent flare ups. Taking allopurinol 200 mg twice daily.    Hyperlipidemia    Recent Labs   Lab Test 07/30/18  1026 06/13/17  0809  05/15/15  0833 06/03/14  0948   CHOL 135 137   < > 183 127   HDL 36* 36*   < > 35* 26*   LDL 60 45   < > Cannot estimate LDL when triglyceride exceeds 400 mg/dL  76 41   TRIG 193* 280*   < > 523* 300*   CHOLHDLRATIO  --   --   --  5.2* 5.0    < > = values in this interval not displayed.     Currently on fenofibrate 54 mg daily and simvastatin 40 mg daily. He does not eat that much red meat. Notes that he does not eat many fruits or vegetables. For breakfast he eats just a banana, he normally does not eat much for breakfast.     Hypertension    BP Readings from Last 3 Encounters:   01/03/19 134/70   07/30/18 134/80   10/06/17 126/64     Taking carvedilol 12.5 mg twice daily and olmesartan 40 mg daily.     Prostate caner screening  Denies a family hx of prostate cancer. Notes his father does have an enlarged prostate. Patient denies any urinary symptoms.     Other problems to add on...  -Patient reports that he is now busy renovating his upper floor and is redoing the floor, kitchen, and repainting the walls. Notes that he has been more experiencing knee pains because of this. He does not wear knee pads.   -Notes a skin lesion near the right temple. It has not changed in size.     Reviewed and updated as needed this visit by clinical staff  Tobacco  Allergies  Meds  Problems  Med Hx  Surg Hx   Fam Hx  Soc Hx          Reviewed and updated as needed this visit by Provider  Med Hx  Surg Hx  Fam Hx        Social History     Tobacco Use     Smoking status: Former Smoker     Last attempt to quit: 7/15/1993     Years since quittin.4     Smokeless tobacco: Never Used     Tobacco comment: Quit 20 years ago.   Substance Use Topics     Alcohol use: Yes     Alcohol/week: 0.0 oz     Comment: maybe 5-7 rum and cokes or beer a week     Alcohol Use 1/3/2019   If you drink alcohol do you typically have greater than 3 drinks per day OR greater than 7 drinks per week? Yes   No flowsheet data found.    Current providers sharing in care for this patient include:   Patient Care Team:  Trish Edwards MD as PCP - General (Family Practice)  Trish Edwards MD as PCP - Assigned PCP    The following health maintenance items are reviewed in Epic and correct as of today:  Health Maintenance   Topic Date Due     ZOSTER IMMUNIZATION (1 of 2) 2003     ADVANCE DIRECTIVE PLANNING Q5 YRS  2017     FIT Q1 YR  2018     AORTIC ANEURYSM SCREENING (SYSTEM ASSIGNED)  2018     PNEUMOVAX IMMUNIZATION 65+ LOW/MEDIUM RISK (1 of 2 - PCV13) 2019 (Originally 2018)     CREATININE Q1 YEAR  2019     FALL RISK ASSESSMENT  2020     PHQ-2 Q1 YR  2020     DTAP/TDAP/TD IMMUNIZATION (2 - Td) 2021     LIPID SCREEN Q5 YR MALE (SYSTEM ASSIGNED)  2023     HEPATITIS C SCREENING  Completed     HIV SCREEN (SYSTEM ASSIGNED)  Addressed     INFLUENZA VACCINE  Addressed     IPV IMMUNIZATION  Aged Out     MENINGITIS IMMUNIZATION  Aged Out     Labs reviewed in EPIC  BP Readings from Last 3 Encounters:   19 134/70   18 134/80   10/06/17 126/64    Wt Readings from Last 3 Encounters:   19 88.9 kg (196 lb)   18 89.4 kg (197 lb)   10/06/17 90.5 kg (199 lb 9.6 oz)          Recent Labs   Lab Test 18  1026 17  0809 17  0505 17  0505  05/05/17  1152 08/02/16  0948  12/30/13  0752   A1C  --   --   --   --  5.5 5.7  --  5.5   LDL 60 45  --  Cannot estimate LDL when triglyceride exceeds 400 mg/dL  --  Cannot estimate LDL when triglyceride exceeds 400 mg/dL  74   < >  --    HDL 36* 36*  --  42  --  34*   < >  --    TRIG 193* 280*  --  574*  --  462*   < >  --    ALT 28 37 39 43 48 45   < >  --    CR 0.97 1.03 1.20 0.92 0.95 0.93   < >  --    GFRESTIMATED 78 73 61 83 80 82   < >  --    GFRESTBLACK >90 88 74 >90   GFR Calc   >90   GFR Calc   >90   GFR Calc     < >  --    POTASSIUM 4.4 4.0 4.3 3.4 3.9 4.3   < >  --    TSH 1.88  --   --   --   --  1.47  --   --     < > = values in this interval not displayed.      Review of Systems   Constitutional: Negative for chills and fever.   HENT: Negative for congestion, ear pain, hearing loss and sore throat.    Eyes: Negative for pain and visual disturbance.   Respiratory: Negative for cough and shortness of breath.    Cardiovascular: Negative for chest pain, palpitations and peripheral edema.   Gastrointestinal: Negative for abdominal pain, constipation, diarrhea, heartburn, hematochezia and nausea.   Genitourinary: Negative for dysuria, frequency, genital sores, hematuria and urgency.   Musculoskeletal: Positive for arthralgias (knee pain and back pain) and myalgias. Negative for joint swelling.   Skin: Negative for rash.   Neurological: Negative for dizziness, weakness, headaches and paresthesias.   Psychiatric/Behavioral: Negative for mood changes. The patient is not nervous/anxious.      This document serves as a record of the services and decisions personally performed and made by Trish Edwards MD. It was created on her behalf by Violeta Pina, a trained medical scribe. The creation of this document is based on the provider's statements to the medical scribe.  Violeta Pina January 3, 2019 10:23 AM     OBJECTIVE:   /70 (BP Location: Right arm,  "Patient Position: Sitting, Cuff Size: Adult Regular)   Pulse 52   Temp 98  F (36.7  C) (Oral)   Resp 16   Ht 1.683 m (5' 6.25\")   Wt 88.9 kg (196 lb)   BMI 31.40 kg/m   Estimated body mass index is 31.4 kg/m  as calculated from the following:    Height as of this encounter: 1.683 m (5' 6.25\").    Weight as of this encounter: 88.9 kg (196 lb).     Physical Exam  GENERAL: healthy, alert and no distress  EYES: Eyes grossly normal to inspection and conjunctivae and sclerae normal  HENT: ear canals and TM's normal, nose and mouth without ulcers or lesions  NECK: no adenopathy, no asymmetry, masses, or scars and thyroid normal to palpation  RESP: lungs clear to auscultation - no rales, rhonchi or wheezes  CV: regular rate and rhythm, normal S1 S2, no S3 or S4, no murmur, click or rub, no peripheral edema and peripheral pulses strong  ABDOMEN: soft, nontender, no hepatosplenomegaly, no masses and bowel sounds normal  MS: no gross musculoskeletal defects noted, no edema  SKIN: SK appearing lesion on right temple. rosacea noted as well no suspicious lesions or rashes  NEURO: Normal strength and tone, mentation intact and speech normal  PSYCH: mentation appears normal, affect normal/bright    Diagnostic Test Results:  No results found for this or any previous visit (from the past 24 hour(s)).    ASSESSMENT / PLAN:   (Z00.00) Medicare welcome visit  (primary encounter diagnosis)  Comment: Patient overall doing well. Normal physical exam. Lab work up today  Plan: Lipid panel reflex to direct LDL Fasting, TSH         with free T4 reflex, CBC with platelets,         Comprehensive metabolic panel          (M1A.9XX0) Chronic gout without tophus, unspecified cause, unspecified site  Comment: Stable. Refilled rx, continue allopurinol  Plan: allopurinol (ZYLOPRIM) 100 MG tablet, Uric acid          (I10) Essential hypertension, benign  Comment: BP at target. Continue current meds.  Plan: carvedilol (COREG) 12.5 MG tablet, " "olmesartan         (BENICAR) 40 MG tablet          (E78.1) High blood triglycerides  Comment: Encouraged patient aim for a 10 lb weight loss , dicussed that this can benefit BP, cholesterol, and back pain. Continue working on diet and exercise regime. Increase whole grain consumption. Recommended oatmeal with fruit in the mornings.   Plan: Lipid panel reflex to direct LDL Fasting,         simvastatin (ZOCOR) 40 MG tablet          (E78.5) Hyperlipidemia LDL goal <130  Comment: See above  Plan: simvastatin (ZOCOR) 40 MG tablet, fenofibrate         (LOFIBRA) 54 MG tablet          (Z12.11) Screen for colon cancer  Comment: FIT test ordered today  Plan: Fecal colorectal cancer screen FIT - Future         (S+30)          (I77.810) Ascending aorta dilatation (H)  Comment: Recommended patient follow up with cardiology, he has not been seen in office recently  Plan: CARDIOLOGY EVAL ADULT REFERRAL          (F10.20) Uncomplicated alcohol dependence (H)  Comment: Patient has decreased alcohol consumption by 3/4. Encouraged him to continue doing so.     (L82.1) Seborrheic keratoses  Comment: Reassured patient lesion appears to be a benign SK    Follow up in 6 months    End of Life Planning:  Patient currently has an advanced directive: Yes.  Practitioner is supportive of decision.    COUNSELING:  Reviewed preventive health counseling, as reflected in patient instructions       Regular exercise       Healthy diet/nutrition       Colon cancer screening       Prostate cancer screening    BP Readings from Last 1 Encounters:   01/03/19 134/70     Estimated body mass index is 31.4 kg/m  as calculated from the following:    Height as of this encounter: 1.683 m (5' 6.25\").    Weight as of this encounter: 88.9 kg (196 lb).  Weight management plan: Discussed healthy diet and exercise guidelines   reports that he quit smoking about 25 years ago. he has never used smokeless tobacco.    Appropriate preventive services were discussed with " this patient, including applicable screening as appropriate for cardiovascular disease, diabetes, osteopenia/osteoporosis, and glaucoma.  As appropriate for age/gender, discussed screening for colorectal cancer, prostate cancer, breast cancer, and cervical cancer. Checklist reviewing preventive services available has been given to the patient.    Reviewed patients plan of care and provided an AVS. The Basic Care Plan (routine screening as documented in Health Maintenance) for Emery meets the Care Plan requirement. This Care Plan has been established and reviewed with the Patient.    Counseling Resources:  ATP IV Guidelines  Pooled Cohorts Equation Calculator  Breast Cancer Risk Calculator  FRAX Risk Assessment  ICSI Preventive Guidelines  Dietary Guidelines for Americans, 2010  USDA's MyPlate  ASA Prophylaxis  Lung CA Screening    The information in this document, created by the medical scribe for me, accurately reflects the services I personally performed and the decisions made by me. I have reviewed and approved this document for accuracy prior to leaving the patient care area.  January 3, 2019 10:47 AM    Trish Edwards MD  [facility]

## 2019-01-03 NOTE — NURSING NOTE
"Chief Complaint   Patient presents with     Medicare Visit     Physical     Blood Draw     LABS.  Patient is fasting     Initial /70 (BP Location: Right arm, Patient Position: Sitting, Cuff Size: Adult Regular)   Pulse 52   Temp 98  F (36.7  C) (Oral)   Resp 16   Ht 1.683 m (5' 6.25\")   Wt 88.9 kg (196 lb)   BMI 31.40 kg/m   Estimated body mass index is 31.4 kg/m  as calculated from the following:    Height as of this encounter: 1.683 m (5' 6.25\").    Weight as of this encounter: 88.9 kg (196 lb).  BP completed using cuff size regular right arm    Lisa Magill, CMA    "

## 2019-01-03 NOTE — PATIENT INSTRUCTIONS
Preventive Health Recommendations:     See your health care provider every year to    Review health changes.     Discuss preventive care.      Review your medicines if your doctor has prescribed any.    Talk with your health care provider about whether you should have a test to screen for prostate cancer (PSA).    Every 3 years, have a diabetes test (fasting glucose). If you are at risk for diabetes, you should have this test more often.    Every 5 years, have a cholesterol test. Have this test more often if you are at risk for high cholesterol or heart disease.     Every 10 years, have a colonoscopy. Or, have a yearly FIT test (stool test). These exams will check for colon cancer.    Talk to with your health care provider about screening for Abdominal Aortic Aneurysm if you have a family history of AAA or have a history of smoking.  Shots:     Get a flu shot each year.     Get a tetanus shot every 10 years.     Talk to your doctor about your pneumonia vaccines. There are now two you should receive - Pneumovax (PPSV 23) and Prevnar (PCV 13).    Talk to your pharmacist about a shingles vaccine.     Talk to your doctor about the hepatitis B vaccine.  Nutrition:     Eat at least 5 servings of fruits and vegetables each day.     Eat whole-grain bread, whole-wheat pasta and brown rice instead of white grains and rice.     Get adequate Calcium and Vitamin D.   Lifestyle    Exercise for at least 150 minutes a week (30 minutes a day, 5 days a week). This will help you control your weight and prevent disease.     Limit alcohol to one drink per day.     No smoking.     Wear sunscreen to prevent skin cancer.     See your dentist every six months for an exam and cleaning.     See your eye doctor every 1 to 2 years to screen for conditions such as glaucoma, macular degeneration and cataracts.    Personalized Prevention Plan  You are due for the preventive services outlined below.  Your care team is available to assist you in  scheduling these services.  If you have already completed any of these items, please share that information with your care team to update in your medical record.    Health Maintenance Due   Topic Date Due     Zoster (Chicken Pox) Vaccine (1 of 2) 08/16/2003     Discuss Advance Directive Planning  09/25/2017     Colon Cancer Screening - FIT Test - yearly  05/23/2018     FALL RISK ASSESSMENT  08/16/2018     Pneumococcal Vaccine (1 of 2 - PCV13) 08/16/2018     AORTIC ANEURYSM SCREENING (SYSTEM ASSIGNED)  08/16/2018     Flu Vaccine (1) 09/01/2018

## 2019-01-04 LAB
ALBUMIN SERPL-MCNC: 3.8 G/DL (ref 3.4–5)
ALP SERPL-CCNC: 76 U/L (ref 40–150)
ALT SERPL W P-5'-P-CCNC: 27 U/L (ref 0–70)
ANION GAP SERPL CALCULATED.3IONS-SCNC: 6 MMOL/L (ref 3–14)
AST SERPL W P-5'-P-CCNC: 16 U/L (ref 0–45)
BILIRUB SERPL-MCNC: 0.4 MG/DL (ref 0.2–1.3)
BUN SERPL-MCNC: 17 MG/DL (ref 7–30)
CALCIUM SERPL-MCNC: 8.8 MG/DL (ref 8.5–10.1)
CHLORIDE SERPL-SCNC: 106 MMOL/L (ref 94–109)
CHOLEST SERPL-MCNC: 131 MG/DL
CO2 SERPL-SCNC: 26 MMOL/L (ref 20–32)
CREAT SERPL-MCNC: 0.92 MG/DL (ref 0.66–1.25)
GFR SERPL CREATININE-BSD FRML MDRD: 87 ML/MIN/{1.73_M2}
GLUCOSE SERPL-MCNC: 114 MG/DL (ref 70–99)
HDLC SERPL-MCNC: 39 MG/DL
LDLC SERPL CALC-MCNC: 63 MG/DL
NONHDLC SERPL-MCNC: 92 MG/DL
POTASSIUM SERPL-SCNC: 4.5 MMOL/L (ref 3.4–5.3)
PROT SERPL-MCNC: 7.2 G/DL (ref 6.8–8.8)
SODIUM SERPL-SCNC: 138 MMOL/L (ref 133–144)
TRIGL SERPL-MCNC: 144 MG/DL
TSH SERPL DL<=0.005 MIU/L-ACNC: 1.42 MU/L (ref 0.4–4)
URATE SERPL-MCNC: 2.8 MG/DL (ref 3.5–7.2)

## 2019-01-07 ENCOUNTER — TELEPHONE (OUTPATIENT)
Dept: FAMILY MEDICINE | Facility: CLINIC | Age: 64
End: 2019-01-07

## 2019-01-07 NOTE — TELEPHONE ENCOUNTER
Call out to pt, LM with wife requesting a call back  Needs A1c done    Yvette Rossi RN, BS  Clinical Nurse Triage.      Notes recorded by Trish Edwards MD on 1/7/2019 at 11:32 AM CST  Thank you. I have ordered an a1c test, please have him come back in for this la only test. No needs to fast.  ------    Notes recorded by Yvette Rossi RN on 1/7/2019 at 9:58 AM CST  Call out to pt, message given, labs reviewed,  Glucose was fasting, has been high at each testing past year  Wondering if further testing is warranted?  Glucose       Date                     Value               Ref Range           Status                01/03/2019               114 (H)             70 - 99 mg/dL       Final            Comment:  Fasting specimen       07/30/2018               107 (H)             70 - 99 mg/dL       Final            Comment:  Fasting specimen       06/13/2017               124 (H)             70 - 99 mg/dL       Final            Comment:  Fasting specimen       05/07/2017               125 (H)             70 - 99 mg/dL       Final                 05/06/2017               127 (H)             70 - 99 mg/dL       Final            ----------    Route to provider to review and advise    Yvette Rossi RN Nurse Triage    ------    Notes recorded by Trish Edwards MD on 1/7/2019 at 6:30 AM CST  That labs look amazing! The cholesterol is normal! The uric acid is low., which is great.   Keep up the good work.

## 2019-01-08 DIAGNOSIS — R73.09 ELEVATED GLUCOSE: ICD-10-CM

## 2019-01-08 LAB — HBA1C MFR BLD: 5.4 % (ref 0–5.6)

## 2019-01-08 PROCEDURE — 36415 COLL VENOUS BLD VENIPUNCTURE: CPT | Performed by: FAMILY MEDICINE

## 2019-01-08 PROCEDURE — 83036 HEMOGLOBIN GLYCOSYLATED A1C: CPT | Performed by: FAMILY MEDICINE

## 2019-01-29 NOTE — TELEPHONE ENCOUNTER
----- Message from Ping West MD sent at 1/28/2019  2:08 PM CST -----  Please write letter to pt/pcp. Biopsies from egd and colonoscopy are all benign. As we discussed all of the findings may be from medication (diclofenac). Please minimize use, stop if possible and discuss non-NSAID alternatives with your prescribing doctor. I recommend an EGD in 2-3 months to evaluate for healing. We can evaluate your symptoms at that point and discuss if we need to do follow up of the inflammation in your colon. We can consider repeating the colonoscopy in 1 year. Notes Recorded by Trish Edwards MD on 5/15/2017 at 11:52 AM  The uric acid is very high. He really should be taking allopurinol. Had he been on this in the past? I can order this again.           Ref Range & Units 4d ago  (5/11/17) 9mo ago  (8/2/16) 2yr ago  (6/3/14) 4yr ago  (9/26/12) 4yr ago  (6/30/12)      Uric Acid 3.5 - 7.2 mg/dL 9.9 (H) 9.0 (H) 6.6R 7.3R 6.6R     Resulting Agency  [facility] to   Yvette Rossi RN, BS  Clinical Nurse Triage.

## 2019-05-01 ENCOUNTER — OFFICE VISIT (OUTPATIENT)
Dept: FAMILY MEDICINE | Facility: CLINIC | Age: 64
End: 2019-05-01
Payer: MEDICARE

## 2019-05-01 ENCOUNTER — TELEPHONE (OUTPATIENT)
Dept: FAMILY MEDICINE | Facility: CLINIC | Age: 64
End: 2019-05-01

## 2019-05-01 VITALS
SYSTOLIC BLOOD PRESSURE: 118 MMHG | HEART RATE: 56 BPM | BODY MASS INDEX: 32.17 KG/M2 | TEMPERATURE: 98.1 F | WEIGHT: 200.8 LBS | DIASTOLIC BLOOD PRESSURE: 60 MMHG

## 2019-05-01 DIAGNOSIS — L71.8 ACNE ROSACEA, ERYTHEMATOUS TELANGIECTATIC TYPE: Primary | ICD-10-CM

## 2019-05-01 DIAGNOSIS — L71.1 RHINOPHYMA: ICD-10-CM

## 2019-05-01 DIAGNOSIS — L98.9 SKIN LESION: ICD-10-CM

## 2019-05-01 PROCEDURE — 99214 OFFICE O/P EST MOD 30 MIN: CPT | Performed by: FAMILY MEDICINE

## 2019-05-01 RX ORDER — MINOCYCLINE HYDROCHLORIDE 50 MG/1
50 CAPSULE ORAL 2 TIMES DAILY
Qty: 60 CAPSULE | Refills: 1 | Status: SHIPPED | OUTPATIENT
Start: 2019-05-01 | End: 2019-06-11

## 2019-05-01 ASSESSMENT — ENCOUNTER SYMPTOMS
ROS SKIN COMMENTS: LUMP ON FACE
CONSTITUTIONAL NEGATIVE: 1

## 2019-05-01 NOTE — TELEPHONE ENCOUNTER
Pt referred to Derm by Dr Harrington ([place]) today. No appts in [facility] that  can use.   He has a 14 day rx of antibiotics to finish; if no improvement, he will need Derm appt.  Please help pt schedule with any Derm mid May.  Please call pt at [phone] (can leave detailed message); or wife (c), John, at [phone].    Thank you.     sariah

## 2019-05-01 NOTE — PROGRESS NOTES
HPI    SUBJECTIVE:   Emery Zayas is a 63 year old male who presents to clinic today for the following   health issues:    Concern - White spot on the right side of his face  Onset: less than 1 year    Description:   White spot that appears to be getting larger; patient just returned from [place] and is a little sunburned.    Intensity: moderate    Progression of Symptoms:  worsening    Accompanying Signs & Symptoms:  Sensitive to touch, swelling    Previous history of similar problem:   None    Precipitating factors:   Worsened by: touch, sun    Alleviating factors:  Improved by: None    Therapies Tried and outcome: None; avoids the sun      Additional history: as documented    Reviewed  and updated as needed this visit by clinical staff       Spot over R cheek bone.  Small spot, white, slightly raised.  Notes skin around this is a bit rough and whole area looks subtly swollen.  No history of injury.  Notes that he always sunburns easily, has always done.      Notes thickened skin over nose tends to come and go, more prominent in the last couple of years.          Review of Systems   Constitutional: Negative.    Skin: Positive for rash. Negative for itching.        Lump on face         Physical Exam   Constitutional: He is oriented to person, place, and time. He appears well-developed and well-nourished.   HENT:   Head:       Neurological: He is alert and oriented to person, place, and time.   Skin:   Rhinophyma.  Ttelangectasia across both cheeks   Nursing note and vitals reviewed.    (L71.8) Acne rosacea, erythematous telangiectatic type  (primary encounter diagnosis)  Comment: not what he came in for, but worth treating.  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  Will refer for eval  Plan: DERMATOLOGY REFERRAL              RTC in 1m or prn    Sky Harrington MD

## 2019-05-03 NOTE — TELEPHONE ENCOUNTER
Pt has appt in [place] on 6/3. I left a voicemail lettting him know that we do not have any sooner available appts at this [facility].  Advised patient to contact his primary doctor if he felt it was emergent.     Mariaelena Le [facility] 5/3/2019 2:47 PM

## 2019-06-03 ENCOUNTER — OFFICE VISIT (OUTPATIENT)
Dept: DERMATOLOGY | Facility: CLINIC | Age: 64
End: 2019-06-03
Attending: FAMILY MEDICINE
Payer: MEDICARE

## 2019-06-03 VITALS — SYSTOLIC BLOOD PRESSURE: 145 MMHG | HEART RATE: 55 BPM | DIASTOLIC BLOOD PRESSURE: 79 MMHG

## 2019-06-03 DIAGNOSIS — L57.8 SOLAR ELASTOSIS: ICD-10-CM

## 2019-06-03 DIAGNOSIS — L82.0 INFLAMED SEBORRHEIC KERATOSIS: ICD-10-CM

## 2019-06-03 DIAGNOSIS — L71.9 ROSACEA: Primary | ICD-10-CM

## 2019-06-03 PROCEDURE — 99214 OFFICE O/P EST MOD 30 MIN: CPT | Mod: 25 | Performed by: PHYSICIAN ASSISTANT

## 2019-06-03 PROCEDURE — 17110 DESTRUCTION B9 LES UP TO 14: CPT | Performed by: PHYSICIAN ASSISTANT

## 2019-06-03 RX ORDER — METRONIDAZOLE 7.5 MG/G
GEL TOPICAL 2 TIMES DAILY
Qty: 45 G | Refills: 3 | Status: SHIPPED | OUTPATIENT
Start: 2019-06-03 | End: 2020-06-26

## 2019-06-03 NOTE — PATIENT INSTRUCTIONS
WOUND CARE INSTRUCTIONS  FOR CRYOSURGERY        This area treated with liquid nitrogen will form a blister. You do not need to bandage the area until after the blister forms and breaks (which may be a few days).  When the blister breaks, begin daily dressing changes as follows:    1) Clean and dry the area with tap water using clean Q-tip or sterile gauze pad.    2) Apply Polysporin ointment or Bacitracin ointment over entire wound.  Do NOT use Neosporin ointment.    3) Cover the wound with a band-aid or sterile non-stick gauze pad and micropore paper tape.      REPEAT THESE INSTRUCTIONS AT LEAST ONCE A DAY UNTIL THE WOUND HAS COMPLETELY HEALED.        It is an old wives tale that a wound heals better when it is exposed to air and allowed to dry out. The wound will heal faster with a better cosmetic result if it is kept moist with ointment and covered with a bandage.  Do not let the wound dry out.      Supplies Needed:     *Cotton tipped applicators (Q-tips)   *Polysporin ointment or Bacitracin ointment (NOT NEOSPORIN)   *Band-aids, or non stick gauze pads and micropore paper tape    PATIENT INFORMATION    During the healing process you will notice a number of changes. All wounds develop a small halo of redness surrounding the wound.  This means healing is occurring. Severe itching with extensive redness usually indicates sensitivity to the ointment or bandage tape used to dress the wound.  You should call our office if this develops.      Swelling and/or discoloration around your surgical site is common, particularly when performed around the eye.    All wounds normally drain.  The larger the wound the more drainage there will be.  After 7-10 days, you will notice the wound beginning to shrink and new skin will begin to grow.  The wound is healed when you can see skin has formed over the entire area.  A healed wound has a healthy, shiny look to the surface and is red to dark pink in color to normalize.  Wounds may  take approximately 4-6 weeks to heal.  Larger wounds may take 6-8 weeks.  After the wound is healed you may discontinue dressing changes.    You may experience a sensation of tightness as your wound heals. This is normal and will gradually subside.    Your healed wound may be sensitive to temperature changes. This sensitivity improves with time, but if you re having a lot of discomfort, try to avoid temperature extremes.    Patients frequently experience itching after their wound appears to have healed because of the continue healing under the skin.  Plain Vaseline will help relieve the itching.             Wear a sunscreen with at least SPF 30 on your face, ears, neck and V of the chest daily. Wear sunscreen on other areas of the body if those areas are exposed to the sun throughout the day. Sunscreens can contain physical and/or chemical blockers. Physical blockers are less likely to clog pores, these include zinc oxide and titanium dioxide. Reapply every two hour and after swimming. Sunscreen examples include Neutrogena, CeraVe, Blue Lizard, Elta MD and many others.    UV radiation  UVA radiation remains constant throughout the day and throughout the year. It is a longer wavelength than UVB and therefore penetrates deeper into the skin leading to immediate and delayed tanning, photoaging, and skin cancer. 70-80% of UVA and UVB radiation occurs between the hours of 10am-2pm.  UVB radiation  UVB radiation causes the most harmful effects and is more significant during the summer months. However, snow and ice can reflect UVB radiation leading to skin damage during the winter months as well. UVB radiation is responsible for tanning, burning, inflammation, delayed erythema (pinkness), pigmentation (brown spots), and skin cancer.     Begin metrogel 2x a day  Treatment options including topical Finacea, Sulfa, Metronidazole, Ivermectin, and oral Doxycyline. Redness of rosacea is more difficult to treat and treatment is  considered cosmetic.  Redness can be treated with lasers, Mirvaso or Rofade.    Avoid triggers such as sunlight, spicy foods, and alcohol. Wear sunscreen everyday of the year.     Wear a sunscreen with at least SPF 30 on your face, ears, neck and V of the chest daily. Wear sunscreen on other areas of the body if those areas are exposed to the sun throughout the day. Sunscreens can contain physical and/or chemical blockers. Physical blockers are less likely to clog pores, these include zinc oxide and titanium dioxide. Reapply every two hour and after swimming. Sunscreen examples include Neutrogena, CeraVe, Blue Lizard, Elta MD and many others.

## 2019-06-03 NOTE — PROGRESS NOTES
HPI:  I was asked to see pt by Dr. Harrington. Emery Zayas is a 63 year old male patient here today for growth on right temple .  Patient states this has been present for 1-2 years.  Patient reports the following symptoms: growing and burning .  Patient reports the following previous treatments: none.  Patient reports the following modifying factors: none.  Associated symptoms: none.  Patient has no other skin complaints today.  Remainder of the HPI, Meds, PMH, Allergies, FH, and SH was reviewed in chart.    Pertinent Hx:   No personal or family history of skin cancer    Past Medical History:   Diagnosis Date     Alcoholism (H)     Treated 1980's, denies any recent problems with withdrawal when skips days drinking.     Arthritis      Chronic low back pain      Gout     possible     Herniated intervertebral disk     thoracic     Hypertension      RBBB (right bundle branch block)     Present for several years       Past Surgical History:   Procedure Laterality Date     BACK SURGERY       Back surgery x 4       ESOPHAGOSCOPY, GASTROSCOPY, DUODENOSCOPY (EGD), COMBINED  2000    normal     TONSILLECTOMY          Family History   Problem Relation Age of Onset     Hypertension Father      Heart Disease Father         Angioplasty in late 60's or early 70's.     Unknown/Adopted Father      Hypertension Sister      Kidney Disease Sister      Unknown/Adopted Sister      Cancer Mother         lung     Unknown/Adopted Mother      Diabetes Mother      Thyroid Disease Sister      Unknown/Adopted Sister      Unknown/Adopted Maternal Grandmother      Substance Abuse Maternal Grandfather      Depression Paternal Grandmother      Unknown/Adopted Paternal Grandfather      Diabetes Sister        Social History     Socioeconomic History     Marital status:      Spouse name: Not on file     Number of children: Not on file     Years of education: Not on file     Highest education level: Not on file   Occupational History     Not  on file   Social Needs     Financial resource strain: Not on file     Food insecurity:     Worry: Not on file     Inability: Not on file     Transportation needs:     Medical: Not on file     Non-medical: Not on file   Tobacco Use     Smoking status: Former Smoker     Last attempt to quit: 7/15/1993     Years since quittin.9     Smokeless tobacco: Never Used     Tobacco comment: Quit 20 years ago.   Substance and Sexual Activity     Alcohol use: Yes     Alcohol/week: 0.0 oz     Comment: maybe 5-7 rum and cokes or beer a week     Drug use: No     Sexual activity: Yes     Partners: Female   Lifestyle     Physical activity:     Days per week: Not on file     Minutes per session: Not on file     Stress: Not on file   Relationships     Social connections:     Talks on phone: Not on file     Gets together: Not on file     Attends Druze service: Not on file     Active member of club or organization: Not on file     Attends meetings of clubs or organizations: Not on file     Relationship status: Not on file     Intimate partner violence:     Fear of current or ex partner: Not on file     Emotionally abused: Not on file     Physically abused: Not on file     Forced sexual activity: Not on file   Other Topics Concern     Parent/sibling w/ CABG, MI or angioplasty before 65F 55M? 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       Outpatient Encounter Medications as of 6/3/2019   Medication Sig Dispense Refill     allopurinol (ZYLOPRIM) 100 MG tablet TAKE 2 TABLETS (200 MG) BY MOUTH 2 TIMES DAILY 240 tablet 1     aspirin 81 MG tablet Take 1 tablet by mouth daily.       carvedilol (COREG) 12.5 MG tablet Take 1 tablet (12.5 mg) by mouth 2 times daily (with meals) 180 tablet 1     colchicine (COLCRYS) 0.6 MG tablet Take 2 tablets at the first sign of flare, take 1 additional tablet one hour  later. 30 tablet 3     fenofibrate (LOFIBRA) 54 MG tablet Take 1 tablet (54 mg) by mouth daily 90 tablet 3     indomethacin (INDOCIN) 50 MG capsule Take 1 capsule (50 mg) by mouth 3 times daily (with meals) 42 capsule 1     minocycline (MINOCIN/DYNACIN) 50 MG capsule Take 1 capsule (50 mg) by mouth 2 times daily 60 capsule 1     Multiple Vitamins-Minerals (MULTIVITAMIN ADULT) CHEW Take 1 chew tab by mouth daily       olmesartan (BENICAR) 40 MG tablet Take 1 tablet (40 mg) by mouth daily 90 tablet 1     omega-3 acid ethyl esters (LOVAZA) 1 g capsule TAKE 2 CAP BY MOUTH DAILY 180 capsule 1     order for DME BP cuff, severe HTN, needs to monitor daily 1 Device 0     simvastatin (ZOCOR) 40 MG tablet Take 1 tablet (40 mg) by mouth At Bedtime 90 tablet 3     No facility-administered encounter medications on file as of 6/3/2019.        Review Of Systems:  Skin: As above  Eyes: negative  Ears/Nose/Throat: negative  Respiratory: No shortness of breath, dyspnea on exertion, cough, or hemoptysis  Cardiovascular: negative  Gastrointestinal: negative  Genitourinary: negative  Musculoskeletal: negative  Neurologic: negative  Psychiatric: negative  Hematologic/Lymphatic/Immunologic: negative  Endocrine: negative      Objective:     /79   Pulse 55   Eyes: Conjunctivae/lids: Normal   ENT: Lips:  Normal  MSK: Normal  Cardiovascular: Peripheral edema none  Pulm: Breathing Normal  Neuro/Psych: Orientation: Normal; Mood/Affect: Normal, NAD, WDWN  Pt accompanied by: self  Following areas examined: face, neck, forearm, hands  Ro skin type:ii   Findings:  Rhytides, hypo/hyperpigmentation, and atrophy of face   Inflamed brown, stuck-on scaly appearing papules on right temple x 7  Erythematous cheeks and nose. Blue purple linear patches on cheeks and nose  Assessment and Plan:  1) inflamed seborrheic keratoses  Disc etiology and course  LN2: Treated with LN2 for 5s for 1-2 cycles. Warned risks of blistering, pain, pigment  change, scarring, and incomplete resolution.  Advised patient to return if lesions do not completely resolve within 2-3 months.  Wound care sheet given.      2) solar elastosis  Wear a sunscreen with at least SPF 30 on your face, ears, neck and V of the chest daily. Wear sunscreen on other areas of the body if those areas are exposed to the sun throughout the day. Sunscreens can contain physical and/or chemical blockers. Physical blockers are less likely to clog pores, these include zinc oxide and titanium dioxide. Reapply every two hour and after swimming. Sunscreen examples include Neutrogena, CeraVe, Blue Lizard, Elta MD and many others.    3) rosacea  Begin metrogel 2x a day  Treatment options including topical Finacea, Sulfa, Metronidazole, Ivermectin, and oral Doxycyline. Redness of rosacea is more difficult to treat and treatment is considered cosmetic.  Redness can be treated with lasers, Mirvaso or Rofade.    Avoid triggers such as sunlight, spicy foods, and alcohol. Wear sunscreen everyday of the year.     Wear a sunscreen with at least SPF 30 on your face, ears, neck and V of the chest daily. Wear sunscreen on other areas of the body if those areas are exposed to the sun throughout the day. Sunscreens can contain physical and/or chemical blockers. Physical blockers are less likely to clog pores, these include zinc oxide and titanium dioxide. Reapply every two hour and after swimming. Sunscreen examples include Neutrogena, CeraVe, Blue Lizard, Elta MD and many others.    Follow up in 2-3 months

## 2019-06-03 NOTE — LETTER
6/3/2019         RE: Emery Zayas  [address] [facility] [id number]        Dear Colleague,    Thank you for referring your patient, Emery Zayas, to the [facility]. Please see a copy of my visit note below.    HPI:  I was asked to see pt by Dr. Harrington. Emery Zayas is a 63 year old male patient here today for growth on right temple .  Patient states this has been present for 1-2 years.  Patient reports the following symptoms: growing and burning .  Patient reports the following previous treatments: none.  Patient reports the following modifying factors: none.  Associated symptoms: none.  Patient has no other skin complaints today.  Remainder of the HPI, Meds, PMH, Allergies, FH, and SH was reviewed in chart.    Pertinent Hx:   No personal or family history of skin cancer    Past Medical History:   Diagnosis Date     Alcoholism (H)     Treated 1980's, denies any recent problems with withdrawal when skips days drinking.     Arthritis      Chronic low back pain      Gout     possible     Herniated intervertebral disk     thoracic     Hypertension      RBBB (right bundle branch block)     Present for several years       Past Surgical History:   Procedure Laterality Date     BACK SURGERY       Back surgery x 4       ESOPHAGOSCOPY, GASTROSCOPY, DUODENOSCOPY (EGD), COMBINED  2000    normal     TONSILLECTOMY          Family History   Problem Relation Age of Onset     Hypertension Father      Heart Disease Father         Angioplasty in late 60's or early 70's.     Unknown/Adopted Father      Hypertension Sister      Kidney Disease Sister      Unknown/Adopted Sister      Cancer Mother         lung     Unknown/Adopted Mother      Diabetes Mother      Thyroid Disease Sister      Unknown/Adopted Sister      Unknown/Adopted Maternal Grandmother      Substance Abuse Maternal Grandfather      Depression Paternal Grandmother      Unknown/Adopted Paternal Grandfather       Diabetes Sister        Social History     Socioeconomic History     Marital status:      Spouse name: Not on file     Number of children: Not on file     Years of education: Not on file     Highest education level: Not on file   Occupational History     Not on file   Social Needs     Financial resource strain: Not on file     Food insecurity:     Worry: Not on file     Inability: Not on file     Transportation needs:     Medical: Not on file     Non-medical: Not on file   Tobacco Use     Smoking status: Former Smoker     Last attempt to quit: 7/15/1993     Years since quittin.9     Smokeless tobacco: Never Used     Tobacco comment: Quit 20 years ago.   Substance and Sexual Activity     Alcohol use: Yes     Alcohol/week: 0.0 oz     Comment: maybe 5-7 rum and cokes or beer a week     Drug use: No     Sexual activity: Yes     Partners: Female   Lifestyle     Physical activity:     Days per week: Not on file     Minutes per session: Not on file     Stress: Not on file   Relationships     Social connections:     Talks on phone: Not on file     Gets together: Not on file     Attends [employer] service: Not on file     Active member of club or organization: Not on file     Attends meetings of clubs or organizations: Not on file     Relationship status: Not on file     Intimate partner violence:     Fear of current or ex partner: Not on file     Emotionally abused: Not on file     Physically abused: Not on file     Forced sexual activity: Not on file   Other Topics Concern     Parent/sibling w/ CABG, MI or angioplasty before 65F 55M? 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       Outpatient Encounter Medications as of 6/3/2019   Medication Sig Dispense Refill     allopurinol (ZYLOPRIM) 100 MG tablet TAKE 2 TABLETS (200 MG) BY MOUTH 2 TIMES DAILY 240 tablet 1     aspirin 81 MG  tablet Take 1 tablet by mouth daily.       carvedilol (COREG) 12.5 MG tablet Take 1 tablet (12.5 mg) by mouth 2 times daily (with meals) 180 tablet 1     colchicine (COLCRYS) 0.6 MG tablet Take 2 tablets at the first sign of flare, take 1 additional tablet one hour later. 30 tablet 3     fenofibrate (LOFIBRA) 54 MG tablet Take 1 tablet (54 mg) by mouth daily 90 tablet 3     indomethacin (INDOCIN) 50 MG capsule Take 1 capsule (50 mg) by mouth 3 times daily (with meals) 42 capsule 1     minocycline (MINOCIN/DYNACIN) 50 MG capsule Take 1 capsule (50 mg) by mouth 2 times daily 60 capsule 1     Multiple Vitamins-Minerals (MULTIVITAMIN ADULT) CHEW Take 1 chew tab by mouth daily       olmesartan (BENICAR) 40 MG tablet Take 1 tablet (40 mg) by mouth daily 90 tablet 1     omega-3 acid ethyl esters (LOVAZA) 1 g capsule TAKE 2 CAP BY MOUTH DAILY 180 capsule 1     order for DME BP cuff, severe HTN, needs to monitor daily 1 Device 0     simvastatin (ZOCOR) 40 MG tablet Take 1 tablet (40 mg) by mouth At Bedtime 90 tablet 3     No facility-administered encounter medications on file as of 6/3/2019.        Review Of Systems:  Skin: As above  Eyes: negative  Ears/Nose/Throat: negative  Respiratory: No shortness of breath, dyspnea on exertion, cough, or hemoptysis  Cardiovascular: negative  Gastrointestinal: negative  Genitourinary: negative  Musculoskeletal: negative  Neurologic: negative  Psychiatric: negative  Hematologic/Lymphatic/Immunologic: negative  Endocrine: negative      Objective:     /79   Pulse 55   Eyes: Conjunctivae/lids: Normal   ENT: Lips:  Normal  MSK: Normal  Cardiovascular: Peripheral edema none  Pulm: Breathing Normal  Neuro/Psych: Orientation: Normal; Mood/Affect: Normal, NAD, WDWN  Pt accompanied by: self  Following areas examined: face, neck, forearm, hands  Ro skin type:ii   Findings:  Rhytides, hypo/hyperpigmentation, and atrophy of face   Inflamed brown, stuck-on scaly appearing papules on  right temple x 7  Erythematous cheeks and nose. Blue purple linear patches on cheeks and nose  Assessment and Plan:  1) inflamed seborrheic keratoses  Disc etiology and course  LN2: Treated with LN2 for 5s for 1-2 cycles. Warned risks of blistering, pain, pigment change, scarring, and incomplete resolution.  Advised patient to return if lesions do not completely resolve within 2-3 months.  Wound care sheet given.      2) solar elastosis  Wear a sunscreen with at least SPF 30 on your face, ears, neck and V of the chest daily. Wear sunscreen on other areas of the body if those areas are exposed to the sun throughout the day. Sunscreens can contain physical and/or chemical blockers. Physical blockers are less likely to clog pores, these include zinc oxide and titanium dioxide. Reapply every two hour and after swimming. Sunscreen examples include Neutrogena, CeraVe, Blue Lizard, Elta MD and many others.    3) rosacea  Begin metrogel 2x a day  Treatment options including topical Finacea, Sulfa, Metronidazole, Ivermectin, and oral Doxycyline. Redness of rosacea is more difficult to treat and treatment is considered cosmetic.  Redness can be treated with lasers, Mirvaso or Rofade.    Avoid triggers such as sunlight, spicy foods, and alcohol. Wear sunscreen everyday of the year.     Wear a sunscreen with at least SPF 30 on your face, ears, neck and V of the chest daily. Wear sunscreen on other areas of the body if those areas are exposed to the sun throughout the day. Sunscreens can contain physical and/or chemical blockers. Physical blockers are less likely to clog pores, these include zinc oxide and titanium dioxide. Reapply every two hour and after swimming. Sunscreen examples include Neutrogena, CeraVe, Blue Lizard, Elta MD and many others.    Follow up in 2-3 months      Again, thank you for allowing me to participate in the care of your patient.        Sincerely,        Tracy Pate PA-C

## 2019-06-11 ENCOUNTER — ANCILLARY PROCEDURE (OUTPATIENT)
Dept: GENERAL RADIOLOGY | Facility: CLINIC | Age: 64
End: 2019-06-11
Attending: FAMILY MEDICINE
Payer: MEDICARE

## 2019-06-11 ENCOUNTER — OFFICE VISIT (OUTPATIENT)
Dept: FAMILY MEDICINE | Facility: CLINIC | Age: 64
End: 2019-06-11
Payer: MEDICARE

## 2019-06-11 VITALS
HEART RATE: 56 BPM | DIASTOLIC BLOOD PRESSURE: 88 MMHG | WEIGHT: 197.9 LBS | RESPIRATION RATE: 16 BRPM | TEMPERATURE: 98.1 F | SYSTOLIC BLOOD PRESSURE: 168 MMHG | BODY MASS INDEX: 31.7 KG/M2

## 2019-06-11 DIAGNOSIS — R73.09 ELEVATED GLUCOSE: ICD-10-CM

## 2019-06-11 DIAGNOSIS — M54.6 CHRONIC MIDLINE THORACIC BACK PAIN: ICD-10-CM

## 2019-06-11 DIAGNOSIS — Z12.5 SCREENING FOR PROSTATE CANCER: ICD-10-CM

## 2019-06-11 DIAGNOSIS — Z12.11 SPECIAL SCREENING FOR MALIGNANT NEOPLASMS, COLON: ICD-10-CM

## 2019-06-11 DIAGNOSIS — F10.20 UNCOMPLICATED ALCOHOL DEPENDENCE (H): ICD-10-CM

## 2019-06-11 DIAGNOSIS — G89.29 CHRONIC MIDLINE THORACIC BACK PAIN: ICD-10-CM

## 2019-06-11 DIAGNOSIS — M1A.9XX0 CHRONIC GOUT WITHOUT TOPHUS, UNSPECIFIED CAUSE, UNSPECIFIED SITE: ICD-10-CM

## 2019-06-11 DIAGNOSIS — I10 ESSENTIAL HYPERTENSION, BENIGN: Primary | ICD-10-CM

## 2019-06-11 DIAGNOSIS — E78.5 HYPERLIPIDEMIA LDL GOAL <130: ICD-10-CM

## 2019-06-11 DIAGNOSIS — E78.1 HIGH BLOOD TRIGLYCERIDES: ICD-10-CM

## 2019-06-11 DIAGNOSIS — I77.810 ASCENDING AORTA DILATATION (H): ICD-10-CM

## 2019-06-11 DIAGNOSIS — R53.83 FATIGUE, UNSPECIFIED TYPE: ICD-10-CM

## 2019-06-11 LAB
ALBUMIN SERPL-MCNC: 4 G/DL (ref 3.4–5)
ALBUMIN UR-MCNC: NEGATIVE MG/DL
ALP SERPL-CCNC: 89 U/L (ref 40–150)
ALT SERPL W P-5'-P-CCNC: 27 U/L (ref 0–70)
ANION GAP SERPL CALCULATED.3IONS-SCNC: 7 MMOL/L (ref 3–14)
APPEARANCE UR: CLEAR
AST SERPL W P-5'-P-CCNC: 26 U/L (ref 0–45)
BILIRUB SERPL-MCNC: 0.5 MG/DL (ref 0.2–1.3)
BILIRUB UR QL STRIP: NEGATIVE
BUN SERPL-MCNC: 13 MG/DL (ref 7–30)
CALCIUM SERPL-MCNC: 9.2 MG/DL (ref 8.5–10.1)
CHLORIDE SERPL-SCNC: 109 MMOL/L (ref 94–109)
CHOLEST SERPL-MCNC: 141 MG/DL
CO2 SERPL-SCNC: 27 MMOL/L (ref 20–32)
COLOR UR AUTO: YELLOW
CREAT SERPL-MCNC: 1 MG/DL (ref 0.66–1.25)
ERYTHROCYTE [DISTWIDTH] IN BLOOD BY AUTOMATED COUNT: 13.1 % (ref 10–15)
GFR SERPL CREATININE-BSD FRML MDRD: 78 ML/MIN/{1.73_M2}
GLUCOSE SERPL-MCNC: 106 MG/DL (ref 70–99)
GLUCOSE UR STRIP-MCNC: NEGATIVE MG/DL
HCT VFR BLD AUTO: 41.6 % (ref 40–53)
HDLC SERPL-MCNC: 34 MG/DL
HGB BLD-MCNC: 14.5 G/DL (ref 13.3–17.7)
HGB UR QL STRIP: NEGATIVE
KETONES UR STRIP-MCNC: NEGATIVE MG/DL
LDLC SERPL CALC-MCNC: 33 MG/DL
LEUKOCYTE ESTERASE UR QL STRIP: NEGATIVE
MCH RBC QN AUTO: 33.3 PG (ref 26.5–33)
MCHC RBC AUTO-ENTMCNC: 34.9 G/DL (ref 31.5–36.5)
MCV RBC AUTO: 95 FL (ref 78–100)
NITRATE UR QL: NEGATIVE
NONHDLC SERPL-MCNC: 107 MG/DL
PH UR STRIP: 5.5 PH (ref 5–7)
PLATELET # BLD AUTO: 198 10E9/L (ref 150–450)
POTASSIUM SERPL-SCNC: 4.4 MMOL/L (ref 3.4–5.3)
PROT SERPL-MCNC: 7.5 G/DL (ref 6.8–8.8)
RBC # BLD AUTO: 4.36 10E12/L (ref 4.4–5.9)
SODIUM SERPL-SCNC: 143 MMOL/L (ref 133–144)
SOURCE: NORMAL
SP GR UR STRIP: 1.02 (ref 1–1.03)
TRIGL SERPL-MCNC: 371 MG/DL
TSH SERPL DL<=0.005 MIU/L-ACNC: 1.98 MU/L (ref 0.4–4)
URATE SERPL-MCNC: 3.4 MG/DL (ref 3.5–7.2)
UROBILINOGEN UR STRIP-ACNC: 1 EU/DL (ref 0.2–1)
WBC # BLD AUTO: 6.1 10E9/L (ref 4–11)

## 2019-06-11 PROCEDURE — 80061 LIPID PANEL: CPT | Performed by: FAMILY MEDICINE

## 2019-06-11 PROCEDURE — 84550 ASSAY OF BLOOD/URIC ACID: CPT | Performed by: FAMILY MEDICINE

## 2019-06-11 PROCEDURE — 84443 ASSAY THYROID STIM HORMONE: CPT | Performed by: FAMILY MEDICINE

## 2019-06-11 PROCEDURE — 85027 COMPLETE CBC AUTOMATED: CPT | Performed by: FAMILY MEDICINE

## 2019-06-11 PROCEDURE — 80053 COMPREHEN METABOLIC PANEL: CPT | Performed by: FAMILY MEDICINE

## 2019-06-11 PROCEDURE — 99214 OFFICE O/P EST MOD 30 MIN: CPT | Performed by: FAMILY MEDICINE

## 2019-06-11 PROCEDURE — 36415 COLL VENOUS BLD VENIPUNCTURE: CPT | Performed by: FAMILY MEDICINE

## 2019-06-11 PROCEDURE — 81003 URINALYSIS AUTO W/O SCOPE: CPT | Performed by: FAMILY MEDICINE

## 2019-06-11 PROCEDURE — 71046 X-RAY EXAM CHEST 2 VIEWS: CPT

## 2019-06-11 RX ORDER — CARVEDILOL 12.5 MG/1
12.5 TABLET ORAL 2 TIMES DAILY WITH MEALS
Qty: 180 TABLET | Refills: 1 | Status: SHIPPED | OUTPATIENT
Start: 2019-06-11 | End: 2020-01-23

## 2019-06-11 RX ORDER — SIMVASTATIN 40 MG
40 TABLET ORAL AT BEDTIME
Qty: 90 TABLET | Refills: 3 | Status: SHIPPED | OUTPATIENT
Start: 2019-06-11 | End: 2020-06-26

## 2019-06-11 RX ORDER — FENOFIBRATE 54 MG/1
54 TABLET ORAL DAILY
Qty: 90 TABLET | Refills: 3 | Status: SHIPPED | OUTPATIENT
Start: 2019-06-11 | End: 2020-06-26

## 2019-06-11 RX ORDER — OLMESARTAN MEDOXOMIL 40 MG/1
40 TABLET ORAL DAILY
Qty: 90 TABLET | Refills: 1 | Status: SHIPPED | OUTPATIENT
Start: 2019-06-11 | End: 2020-03-24

## 2019-06-11 RX ORDER — ALLOPURINOL 100 MG/1
200 TABLET ORAL 2 TIMES DAILY
Qty: 240 TABLET | Refills: 1 | Status: SHIPPED | OUTPATIENT
Start: 2019-06-11 | End: 2019-12-03

## 2019-06-11 RX ORDER — CYCLOBENZAPRINE HCL 5 MG
5 TABLET ORAL AT BEDTIME
Qty: 30 TABLET | Refills: 3 | Status: SHIPPED | OUTPATIENT
Start: 2019-06-11 | End: 2020-06-26

## 2019-06-11 SDOH — SOCIAL STABILITY: SOCIAL NETWORK: HOW OFTEN DO YOU ATTEND CHURCH OR RELIGIOUS SERVICES?: NEVER

## 2019-06-11 SDOH — HEALTH STABILITY: PHYSICAL HEALTH: ON AVERAGE, HOW MANY MINUTES DO YOU ENGAGE IN EXERCISE AT THIS LEVEL?: 30 MIN

## 2019-06-11 SDOH — SOCIAL STABILITY: SOCIAL NETWORK: HOW OFTEN DO YOU GET TOGETHER WITH FRIENDS OR RELATIVES?: ONCE A WEEK

## 2019-06-11 SDOH — SOCIAL STABILITY: SOCIAL NETWORK
DO YOU BELONG TO ANY CLUBS OR ORGANIZATIONS SUCH AS CHURCH GROUPS UNIONS, FRATERNAL OR ATHLETIC GROUPS, OR SCHOOL GROUPS?: NO

## 2019-06-11 SDOH — HEALTH STABILITY: MENTAL HEALTH: HOW MANY STANDARD DRINKS CONTAINING ALCOHOL DO YOU HAVE ON A TYPICAL DAY?: 3 OR 4

## 2019-06-11 SDOH — HEALTH STABILITY: MENTAL HEALTH: HOW OFTEN DO YOU HAVE A DRINK CONTAINING ALCOHOL?: 4 OR MORE TIMES A WEEK

## 2019-06-11 SDOH — HEALTH STABILITY: MENTAL HEALTH: HOW OFTEN DO YOU HAVE 6 OR MORE DRINKS ON ONE OCCASION?: MONTHLY

## 2019-06-11 SDOH — HEALTH STABILITY: PHYSICAL HEALTH: ON AVERAGE, HOW MANY DAYS PER WEEK DO YOU ENGAGE IN MODERATE TO STRENUOUS EXERCISE (LIKE A BRISK WALK)?: 7 DAYS

## 2019-06-11 SDOH — HEALTH STABILITY: MENTAL HEALTH
STRESS IS WHEN SOMEONE FEELS TENSE, NERVOUS, ANXIOUS, OR CAN'T SLEEP AT NIGHT BECAUSE THEIR MIND IS TROUBLED. HOW STRESSED ARE YOU?: VERY MUCH

## 2019-06-11 SDOH — SOCIAL STABILITY: SOCIAL NETWORK: HOW OFTEN DO YOU ATTENT MEETINGS OF THE CLUB OR ORGANIZATION YOU BELONG TO?: NOT ASKED

## 2019-06-11 SDOH — SOCIAL STABILITY: SOCIAL NETWORK
IN A TYPICAL WEEK, HOW MANY TIMES DO YOU TALK ON THE PHONE WITH FAMILY, FRIENDS, OR NEIGHBORS?: MORE THAN THREE TIMES A WEEK

## 2019-06-11 SDOH — SOCIAL STABILITY: SOCIAL NETWORK: ARE YOU MARRIED, WIDOWED, DIVORCED, SEPARATED, NEVER MARRIED, OR LIVING WITH A PARTNER?: MARRIED

## 2019-06-11 NOTE — NURSING NOTE
"Chief Complaint   Patient presents with     Hypertension     Back Pain     Blood Draw     LABS.  Patient is fasting      Initial /84 (BP Location: Left arm, Patient Position: Sitting, Cuff Size: Adult Regular)   Pulse 56   Temp 98.1  F (36.7  C) (Oral)   Resp 16   Wt 89.8 kg (197 lb 14.4 oz)   BMI 31.70 kg/m   Estimated body mass index is 31.7 kg/m  as calculated from the following:    Height as of 1/3/19: 1.683 m (5' 6.25\").    Weight as of this encounter: 89.8 kg (197 lb 14.4 oz).  BP completed using cuff size regular LEFT arm    Lisa Magill, CMA    "

## 2019-06-11 NOTE — LETTER
June 11, 2019      Emery Zayas  [address] [facility] [address]        Dear ,    We are writing to inform you of your test results.    The urine looks normal. Normal electrolyte panel. The sodium, potassium, sugar and kidneys are all normal. The uric acid looks nice and low. The cholesterol is showing high trig's. But not nearly as bad as in the past. Please cut out or way back on the alcohol and pop. The sugary foods will cause it to jump as well.  Let me know if you would like to see a specialist for your back or try PT again.    Resulted Orders   CBC with platelets   Result Value Ref Range    WBC 6.1 4.0 - 11.0 10e9/L    RBC Count 4.36 (L) 4.4 - 5.9 10e12/L      Comment:      Results confirmed by repeat test    Hemoglobin 14.5 13.3 - 17.7 g/dL    Hematocrit 41.6 40.0 - 53.0 %    MCV 95 78 - 100 fl    MCH 33.3 (H) 26.5 - 33.0 pg    MCHC 34.9 31.5 - 36.5 g/dL    RDW 13.1 10.0 - 15.0 %    Platelet Count 198 150 - 450 10e9/L   UA reflex to Microscopic and Culture   Result Value Ref Range    Color Urine Yellow     Appearance Urine Clear     Glucose Urine Negative NEG^Negative mg/dL    Bilirubin Urine Negative NEG^Negative    Ketones Urine Negative NEG^Negative mg/dL    Specific Gravity Urine 1.020 1.003 - 1.035    Blood Urine Negative NEG^Negative    pH Urine 5.5 5.0 - 7.0 pH    Protein Albumin Urine Negative NEG^Negative mg/dL    Urobilinogen Urine 1.0 0.2 - 1.0 EU/dL    Nitrite Urine Negative NEG^Negative    Leukocyte Esterase Urine Negative NEG^Negative    Source Midstream Urine        If you have any questions or concerns, please call the clinic at the number listed above.       Sincerely,    Trish Edwards MD/mega

## 2019-06-11 NOTE — PROGRESS NOTES
Subjective     Emery Zayas is a 63 year old male who presents to clinic today for the following health issues:    Hypertension     Back Pain:  He presents for follow up of back pain. Patient's back pain is a chronic problem.  Location of back pain:  Right middle of back  Description of back pain: stabbing  Back pain spreads: nowhere    Since patient first noticed back pain, pain is: gradually worsening  Does back pain interfere with his job:  Not applicable      Hyperlipidemia:  He presents for follow up of hyperlipidemia.  He reports left-sided neck or arm pain. He is taking medication to lower cholesterol. He is not having myalgia or other side effects to statin medications.    Hypertension: He presents for follow up of hypertension.  He does not check blood pressure  regularly outside of the clinic. Outside blood pressures have been over 140/90. He does not follow a low salt diet.     He eats 0-1 servings of fruits and vegetables daily.He consumes 4 sweetened beverage(s) daily.  He is taking medications regularly.  History of Present Illness     Back Pain:  He presents for follow up of back pain. Patient's back pain is a chronic problem.  Location of back pain:  Right middle of back  Description of back pain: stabbing  Back pain spreads: nowhere    Since patient first noticed back pain, pain is: gradually worsening  Does back pain interfere with his job:  Not applicable      Hyperlipidemia:  He presents for follow up of hyperlipidemia.  He reports left-sided neck or arm pain. He is taking medication to lower cholesterol. He is not having myalgia or other side effects to statin medications.    Hypertension: He presents for follow up of hypertension.  He does not check blood pressure  regularly outside of the clinic. Outside blood pressures have been over 140/90. He does not follow a low salt diet.     He eats 0-1 servings of fruits and vegetables daily.He consumes 4 sweetened beverage(s) daily.  He is  taking medications regularly.       Not sleeping well, has to sit up in chair and that helps, this location is new, not low back, but middle of back, on the right, will wake up in the morning and will be 8/10. Bare back and body for the pain, takes the edge off. No f/c, no urine problems with urine, rare nocturia that is not new. Walking every day.   Trying to eat better.    Father  in Feb. Lived in [place], sister helps out and calls him often. Wife fell recently and has a cast on wrist    PROBLEMS TO ADD ON...    Recent Labs   Lab Test 19  0852 19  1050 18  1026 17  0809  17  1152 16  0948   A1C 5.4  --   --   --   --  5.5 5.7   LDL  --  63 60 45   < >  --  Cannot estimate LDL when triglyceride exceeds 400 mg/dL  74   HDL  --  39* 36* 36*   < >  --  34*   TRIG  --  144 193* 280*   < >  --  462*   ALT  --  27 28 37   < > 48 45   CR  --  0.92 0.97 1.03   < > 0.95 0.93   GFRESTIMATED  --  87 78 73   < > 80 82   GFRESTBLACK  --  >90 >90 88   < > >90   GFR Calc   >90   GFR Calc     POTASSIUM  --  4.5 4.4 4.0   < > 3.9 4.3   TSH  --  1.42 1.88  --   --   --  1.47    < > = values in this interval not displayed.      BP Readings from Last 3 Encounters:   19 168/88   19 145/79   19 118/60    Wt Readings from Last 3 Encounters:   19 89.8 kg (197 lb 14.4 oz)   19 91.1 kg (200 lb 12.8 oz)   19 88.9 kg (196 lb)                    Reviewed and updated as needed this visit by Provider         Review of Systems   ROS COMP: Constitutional, HEENT, cardiovascular, pulmonary, gi and gu systems are negative, except as otherwise noted.      Objective    /84 (BP Location: Left arm, Patient Position: Sitting, Cuff Size: Adult Regular)   Pulse 56   Temp 98.1  F (36.7  C) (Oral)   Resp 16   Wt 89.8 kg (197 lb 14.4 oz)   BMI 31.70 kg/m    Body mass index is 31.7 kg/m .  Physical Exam   GENERAL: healthy, alert and no  distress  EYES: Eyes grossly normal to inspection,  and conjunctivae and sclerae normal  NECK: no adenopathy, no asymmetry, masses, or scars and thyroid normal to palpation  RESP: lungs clear to auscultation - no rales, rhonchi or wheezes  CV: regular rate and rhythm, normal S1 S2, no S3 or S4, no murmur, click or rub, no peripheral edema and peripheral pulses strong  ABDOMEN: soft, nontender, no hepatosplenomegaly, no masses and bowel sounds normal  MS: no gross musculoskeletal defects noted, no edema  BACK: no CVA tenderness, no paralumbar tenderness  PSYCH: mentation appears normal, affect normal/bright    Diagnostic Test Results:  Labs reviewed in Epic        Assessment & Plan     1. Essential hypertension, benign  Poor control, recheck in 2 weeks,   - carvedilol (COREG) 12.5 MG tablet; Take 1 tablet (12.5 mg) by mouth 2 times daily (with meals)  Dispense: 180 tablet; Refill: 1  - olmesartan (BENICAR) 40 MG tablet; Take 1 tablet (40 mg) by mouth daily  Dispense: 90 tablet; Refill: 1  - HEALTH  REFERRAL    2. Hyperlipidemia LDL goal <130  Fasting today, refilling meds  - fenofibrate (LOFIBRA) 54 MG tablet; Take 1 tablet (54 mg) by mouth daily  Dispense: 90 tablet; Refill: 3  - simvastatin (ZOCOR) 40 MG tablet; Take 1 tablet (40 mg) by mouth At Bedtime  Dispense: 90 tablet; Refill: 3  - Lipid panel reflex to direct LDL Fasting  - HEALTH  REFERRAL    3. Chronic gout without tophus, unspecified cause, unspecified site  Recheck uric acid, doing well  - allopurinol (ZYLOPRIM) 100 MG tablet; Take 2 tablets (200 mg) by mouth 2 times daily  Dispense: 240 tablet; Refill: 1    4. High blood triglycerides  Due for labs, pt is drinking again, has not cut back , suspect trigs will be higher  - simvastatin (ZOCOR) 40 MG tablet; Take 1 tablet (40 mg) by mouth At Bedtime  Dispense: 90 tablet; Refill: 3  - HEALTH  REFERRAL    5. Chronic midline thoracic back pain  New location for back pain for him, needs work up,  "suspect muscle, but will check CXR, ua and labs  - UA reflex to Microscopic and Culture  - Uric acid  - cyclobenzaprine (FLEXERIL) 5 MG tablet; Take 1 tablet (5 mg) by mouth At Bedtime  Dispense: 30 tablet; Refill: 3  - XR Chest 2 Views; Future    6. Fatigue, unspecified type  Suspect mild depression(pt denies this), will refer and check labs  - TSH with free T4 reflex  - CBC with platelets  - Comprehensive metabolic panel  - HEALTH  REFERRAL    7. Screening for prostate cancer  No sx    8. Special screening for malignant neoplasms, colon  Pt to turn this in this week  - Fecal colorectal cancer screen (FIT); Future    9. Uncomplicated alcohol dependence (H)  Pt wanting to cut back  - HEALTH  REFERRAL    10. Elevated glucose  fasting    11. Ascending aorta dilatation (H)  Will set this appt up, pt says he will forget if we do not get this done  - CARDIOLOGY EVAL ADULT REFERRAL     BMI:   Estimated body mass index is 31.7 kg/m  as calculated from the following:    Height as of 1/3/19: 1.683 m (5' 6.25\").    Weight as of this encounter: 89.8 kg (197 lb 14.4 oz).   Weight management plan: Discussed healthy diet and exercise guidelines        Work on weight loss  Regular exercise    Return in about 6 months (around 12/11/2019) for wellness exam.    Trish Edwards MD  [facility]"

## 2019-06-14 ENCOUNTER — PATIENT OUTREACH (OUTPATIENT)
Dept: NURSING | Facility: CLINIC | Age: 64
End: 2019-06-14

## 2019-06-14 NOTE — PROGRESS NOTES
Received Health Coaching Referral-Outreached patient for the first time, but wasn't able to connect. Left message for call back.    Beltran Bryant, Health    6/14/2019    2:16 PM

## 2019-06-25 ENCOUNTER — TELEPHONE (OUTPATIENT)
Dept: FAMILY MEDICINE | Facility: CLINIC | Age: 64
End: 2019-06-25

## 2019-06-25 ENCOUNTER — ALLIED HEALTH/NURSE VISIT (OUTPATIENT)
Dept: NURSING | Facility: CLINIC | Age: 64
End: 2019-06-25
Payer: MEDICARE

## 2019-06-25 VITALS — DIASTOLIC BLOOD PRESSURE: 70 MMHG | HEART RATE: 55 BPM | SYSTOLIC BLOOD PRESSURE: 146 MMHG

## 2019-06-25 DIAGNOSIS — I10 ESSENTIAL HYPERTENSION, BENIGN: Primary | ICD-10-CM

## 2019-06-25 PROCEDURE — 99207 ZZC NO CHARGE NURSE ONLY: CPT

## 2019-06-25 NOTE — PROGRESS NOTES
Emery Zayas is a 63 year old patient who comes in today for a Blood Pressure check.  Initial BP:  /70 (BP Location: Right arm, Patient Position: Sitting, Cuff Size: Adult Regular)   Pulse 55      55  Disposition: results routed to provider    Alice Cheng MA

## 2019-06-25 NOTE — TELEPHONE ENCOUNTER
Emery Zayas is a 63 year old patient who comes in today for a Blood Pressure check.  Initial BP:  /70 (BP Location: Right arm, Patient Position: Sitting, Cuff Size: Adult Regular)   Pulse 55      55  Disposition: results routed to provider    Alice Cheng MA      BP Readings from Last 6 Encounters:   06/25/19 146/70   06/11/19 168/88   06/03/19 145/79   05/01/19 118/60   01/03/19 134/70   07/30/18 134/80       Patient stated you may contact him via My Chart. I resent him a text to sign up for My chart today while in clinic.     Alice Cheng MA

## 2019-06-25 NOTE — TELEPHONE ENCOUNTER
He is getting close, but not yet in good control. Can he consider connecting with the health , to work on diet changes and a plan to help lower HTN and weight?  Health  has tried to reach him, but they have not connected.

## 2019-06-26 ENCOUNTER — PATIENT OUTREACH (OUTPATIENT)
Dept: NURSING | Facility: CLINIC | Age: 64
End: 2019-06-26

## 2019-06-26 NOTE — PROGRESS NOTES
Outreached patient for second time, and was able to connect with his wife.  She states that patient did receive my first voicemail and is planning to connect.  Unfortunately patient is out of town until Friday. She states I could try back at that time and she also took down my phone number in case he wants to give me a call back.  Will try again on Friday.    Beltran Bryant, Health    6/26/2019    10:36 AM

## 2019-06-28 NOTE — PROGRESS NOTES
Patient calling back, unable to reach Beltran. Patient states he will try the phone number that he was given.

## 2019-07-01 ENCOUNTER — PATIENT OUTREACH (OUTPATIENT)
Dept: NURSING | Facility: CLINIC | Age: 64
End: 2019-07-01

## 2019-07-01 NOTE — PROGRESS NOTES
Outreached patient for the third and final time, and was able to connect.  Patient agrees to participate in Health Coaching and scheduled first appointment for 7/10/2019.    [facility]tran [facility]    7/1/2019    2:54 PM

## 2019-07-08 ENCOUNTER — OFFICE VISIT (OUTPATIENT)
Dept: URGENT CARE | Facility: URGENT CARE | Age: 64
End: 2019-07-08
Payer: MEDICARE

## 2019-07-08 VITALS
TEMPERATURE: 98.5 F | SYSTOLIC BLOOD PRESSURE: 130 MMHG | DIASTOLIC BLOOD PRESSURE: 74 MMHG | HEART RATE: 74 BPM | OXYGEN SATURATION: 97 %

## 2019-07-08 DIAGNOSIS — L08.9 LOCAL INFECTION OF SKIN AND SUBCUTANEOUS TISSUE: Primary | ICD-10-CM

## 2019-07-08 PROCEDURE — 87070 CULTURE OTHR SPECIMN AEROBIC: CPT | Performed by: FAMILY MEDICINE

## 2019-07-08 PROCEDURE — 99214 OFFICE O/P EST MOD 30 MIN: CPT | Performed by: FAMILY MEDICINE

## 2019-07-08 PROCEDURE — 87205 SMEAR GRAM STAIN: CPT | Performed by: FAMILY MEDICINE

## 2019-07-08 RX ORDER — SULFAMETHOXAZOLE/TRIMETHOPRIM 800-160 MG
1 TABLET ORAL 2 TIMES DAILY
Qty: 20 TABLET | Refills: 10 | Status: SHIPPED | OUTPATIENT
Start: 2019-07-08 | End: 2020-06-26

## 2019-07-08 NOTE — PATIENT INSTRUCTIONS
Antibiotic Bactrim twice a day for 10 days    If symptoms get worse (Fever, worsening swelling, increasing pain) please return to be re-evaluated    Expect improvement over 5-7 days

## 2019-07-08 NOTE — PROGRESS NOTES
Subjective:   Emery Zayas is a 63 year old male who presents for   Chief Complaint   Patient presents with     Urgent Care     Infection     Possible Lt ankle infection started yesterday, pulled a leech from inner Lt ankle yesterday- started having clear discharge, painful, swollen, visible redness     [place] area of this affected left inner ankle area. There is some swelling. Irritated skin. Discharge coming from the skin and redness, feels slight pressure. No fevers recorded.     Accompanied by his wife.     Patient Active Problem List    Diagnosis Date Noted     LVH (left ventricular hypertrophy) due to hypertensive disease, without heart failure 05/11/2017     Priority: Medium     Ascending aorta dilatation (H) 05/11/2017     Priority: Medium     Echo needs to be done yearly, pt has appt in 1 yr 5/18       Hypertensive emergency 05/05/2017     Priority: Medium     Acute idiopathic gout, unspecified site 08/23/2016     Priority: Medium     Uncomplicated alcohol dependence (H) 08/23/2016     Priority: Medium     Essential hypertension, benign 08/02/2016     Priority: Medium     Anxiety 12/31/2014     Priority: Medium     Metabolic syndrome 12/30/2013     Priority: Medium     High blood triglycerides 12/30/2013     Priority: Medium     Elevated liver enzymes 12/30/2013     Priority: Medium     Hyperlipidemia LDL goal <130 04/15/2013     Priority: Medium     Obesity 04/15/2013     Priority: Medium     Elevated blood sugar 10/01/2012     Priority: Medium     Chest pain 07/15/2012     Priority: Medium     Dyspepsia and other specified disorders of function of stomach 07/31/2003     Priority: Medium     Other chest pain 07/31/2003     Priority: Medium     Other and unspecified disc disorder of lumbar region 07/15/2003     Priority: Medium       Current Outpatient Medications   Medication     allopurinol (ZYLOPRIM) 100 MG tablet     aspirin 81 MG tablet     carvedilol (COREG)  12.5 MG tablet     colchicine (COLCRYS) 0.6 MG tablet     cyclobenzaprine (FLEXERIL) 5 MG tablet     fenofibrate (LOFIBRA) 54 MG tablet     Multiple Vitamins-Minerals (MULTIVITAMIN ADULT) CHEW     olmesartan (BENICAR) 40 MG tablet     omega-3 acid ethyl esters (LOVAZA) 1 g capsule     simvastatin (ZOCOR) 40 MG tablet     sulfamethoxazole-trimethoprim (BACTRIM DS/SEPTRA DS) 800-160 MG tablet     indomethacin (INDOCIN) 50 MG capsule     metroNIDAZOLE (METROGEL) 0.75 % external gel     order for DME     No current facility-administered medications for this visit.        ROS:  As above per HPI    Objective:   /74 (BP Location: Right arm, Patient Position: Chair, Cuff Size: Adult Large)   Pulse 74   Temp 98.5  F (36.9  C) (Oral)   SpO2 97% , There is no height or weight on file to calculate BMI.  Gen:  NAD, well-nourished, sitting in chair comfortably  HEENT: EOMI, sclera anicteric, Head normocephalic, ; nares patent; moist mucous membranes  Neck: trachea midline, no thyromegaly  CV:  Hemodynamically stable, RRR  Pulm:  no increased work of breathing  Extrem: no cyanosis, edema or clubbing  Skin: inner left ankle about 1.5x1.75 inches is an area of weeping skin with slight erythema  Left ankle: mild medial ankle swelling  Psych: Euthymic, linear thoughts, normal rate of speech            Assessment & Plan:   Emery Zayas, 63 year old male who presents with:    Local infection of skin and subcutaneous tissue  Wound culture was obtained after the area was initially wiped with sterile gauze, the wound features ongoing weeping. Will do bactrim twice daily for 10 days. Close monitoring for worsening symptoms, otherwise expect improvement over several days. Normal vital signs and without episodes of fever.     - sulfamethoxazole-trimethoprim (BACTRIM DS/SEPTRA DS) 800-160 MG tablet  Dispense: 20 tablet; Refill: 10  - Wound Culture Aerobic Bacterial  - Gram stain      Emery Rivas MD   [facility]    The use of Dragon/Zipscene dictation services may have been used to construct the content in this note; any grammatical or spelling errors are non-intentional. Please contact the author of this note directly if you are in need of any clarification.

## 2019-07-09 LAB
GRAM STN SPEC: NORMAL
GRAM STN SPEC: NORMAL
Lab: NORMAL
SPECIMEN SOURCE: NORMAL

## 2019-07-10 ENCOUNTER — ALLIED HEALTH/NURSE VISIT (OUTPATIENT)
Dept: NURSING | Facility: CLINIC | Age: 64
End: 2019-07-10

## 2019-07-10 DIAGNOSIS — E66.9 OBESITY: Primary | ICD-10-CM

## 2019-07-10 PROCEDURE — 99207 ZZC HEALTH COACHING, NO CHARGE: CPT

## 2019-07-10 NOTE — PROGRESS NOTES
July 10, 2019    Health Coaching Progress Note    Patient Name: Emery Zayas Date: July 10, 2019      Session Length: 60      DATA    PRM Master Survey Scores Reviewed: Yes    Core Healthy Days Survey:    Would you say that in general your health is: : Good    MARY Score (Last Two) 11/1/2012 7/10/2019   MARY Raw Score 44 29   Activation Score 70.8 52.9   MARY Level 4 2       PHQ-2 Score 7/10/2019 6/11/2019   PHQ-2 Total Score (Adult) - Positive if 3 or more points; Administer PHQ-9 if positive 0 0   MyC PHQ-2 Score - 0       PHQ-9 SCORE 5/19/2014 12/30/2014   PHQ-9 Total Score 5 1       Treatment Objective(s) Addressed in This Session:  Target Behavior(s): diet/weight loss, alcohol use and disease management/lifestyle changes of working on finding ways to be more active-walks regularly but body has probably adapted to this activity-will try adding a second walk 2-3 days per week if able, interested in [employer] membership-will call to check on eligibility-would love to get access to a pool for low impact exercise (great option with his back injuries), cutting back on his alcohol consumption and soda (which he usually mixes with his drinks)-cutting back to only 1-3 cans per day or finding some other healthy alternatives (water, sparkling water, diet option), getting into a better daily routine-not skipping meals and only eating 1 large meal per day, watching his sodium intake/not adding salt, accessing resources, and attending appointments as scheduled.    Current Stressors / Issues:  Ongoing back issues/pain management, recently had an infection in his leg from a leech bite, blood pressure issues-highly genetic in his family, high cholesterol, ongoing drinking problem for many years-drinks a lot of soda with his alcohol, doesn't really pay attention to what he eats, cutting back on salt, would like to lose weight but never has much success, loves to swim but doesn't have access to a pool-checking on [employer]  What Patient Does Well:   1) Patient knows he should make changes to leave a healthier life but needs to accountability to stay on track and maintain those changes  Previous Successes:   Patient states that he does walk for a half an hour every day with his neighbor in the evenings  Areas in Need of Improvement:   1) Healthier eating-not only eating 1 large meal per day/cutting back on sodium intake  2) Cutting back on soda intake/drinking  3) Weight loss  Barriers to Change:   1) Can be stubborn/stuck in his ways/resistance to change  2) Back surgeries/ongoing back pain  3) Drinking problem over the years  Reasons for Change:   1) Be healthier  2) Reduce BP/Cholesterol  3) Lose weight  Plan/Goal for the Next 4 Weeks:   Goals Addressed as of 7/10/2019 at 4:07 PM                 Today      Being Active (pt-stated)   0%    Added 7/10/19 by Beltran Bryant     My Goal: I will call into [facility] program and see if I am eligible    What I need to meet my goal: Call into the number provided    I plan to meet my goal by this date: Next Coaching Session          Being Active (pt-stated)   0%    Added 7/10/19 by Beltran Bryant     My Goal: I will take a second walk in the mornings 2-3 days/week     What I need to meet my goal: Continue walking and add in an additional walk    I plan to meet my goal by this date: Next Coaching Session          Healthy Eating (pt-stated)   0%    Added 7/10/19 by Beltran Bryant     My Goal: I will cut back on soda intake to 1-3 cans per day or try other healthy alternatives (sparkling water, diet, water)    What I need to meet my goal: Healthier options/awareness    I plan to meet my goal by this date: Next Coaching Session        Healthy Eating (pt-stated)   0%    Added 7/10/19 by Beltran Bryant     My Goal: I will try to have a little something for breakfast instead of nothing at all    What I need to meet my goal: Have healthy options available    I plan to meet my goal by this  date: Next Coaching Session            Intervention:  Motivational Interviewing    MI Intervention: Expressed Empathy/Understanding, Supported Autonomy, Collaboration, Evocation, Permission to raise concern or advise, Open-ended questions, Reflections: simple and complex and Change talk (evoked)     Change Talk Expressed by the Patient: Ability to change Reasons to change Need to change Committment to change Activation    Provider Response to Change Talk: E - Evoked more info from patient about behavior change, A - Affirmed patient's thoughts, decisions, or attempts at behavior change, R - Reflected patient's change talk and S - Summarized patient's change talk statements    Assessment / Progress on Treatment Objective(s) / Homework:    New Objective established this session - PREPARATION (Decided to change - considering how); Intervened by negotiating a change plan and determining options / strategies for behavior change, identifying triggers, exploring social supports, and working towards setting a date to begin behavior change     Plan: (Homework, other):  Patient was encouraged to continue to seek condition-related information and education, as well as schedule a follow up appointment with the Health  in 4 weeks. Patient has set self-identified goals and will monitor progress until the next appointment.  Scheduled our next coaching appointment for Wednesday August 7th at 9am.      [facility]tr[facility]    7/10/2019    4:08 PM

## 2019-07-11 LAB
BACTERIA SPEC CULT: NO GROWTH
Lab: NORMAL
SPECIMEN SOURCE: NORMAL

## 2019-07-25 ENCOUNTER — OFFICE VISIT (OUTPATIENT)
Dept: CARDIOLOGY | Facility: CLINIC | Age: 64
End: 2019-07-25
Attending: FAMILY MEDICINE
Payer: MEDICARE

## 2019-07-25 VITALS
HEIGHT: 66 IN | DIASTOLIC BLOOD PRESSURE: 61 MMHG | SYSTOLIC BLOOD PRESSURE: 133 MMHG | HEART RATE: 52 BPM | WEIGHT: 195 LBS | BODY MASS INDEX: 31.34 KG/M2

## 2019-07-25 DIAGNOSIS — Z13.6 SCREENING FOR CARDIOVASCULAR CONDITION: Primary | ICD-10-CM

## 2019-07-25 DIAGNOSIS — I77.89 AORTIC ROOT ENLARGEMENT (H): ICD-10-CM

## 2019-07-25 PROCEDURE — 99214 OFFICE O/P EST MOD 30 MIN: CPT | Performed by: INTERNAL MEDICINE

## 2019-07-25 PROCEDURE — 93000 ELECTROCARDIOGRAM COMPLETE: CPT | Performed by: INTERNAL MEDICINE

## 2019-07-25 ASSESSMENT — MIFFLIN-ST. JEOR: SCORE: 1612.26

## 2019-07-25 NOTE — LETTER
7/25/2019      Trish Edwards MD  [address]  [facility] [address]      RE: Emery Zayas       Dear Colleague,    I had the pleasure of seeing Emery Zayas in the [facility].    Service Date: 07/25/2019      HISTORY OF PRESENT ILLNESS:  It is a pleasure for me to see Mr. Zayas today for cardiac assessment.  He is a very pleasant 63-year-old gentleman who has no history of cardiac disease, but has multiple cardiac risk factors.      He tells me that in 2000, he was diagnosed with hypertension and he has been taking medications for 6-7 years.  He does measure his blood pressure at home and more recently, his numbers have been good with values around 130/70.  I do see some numbers at 168/88 recently as well in clinic.  He is not diabetic.  He does have combined hyperlipidemia.  Most recent lipid profile shows a total cholesterol of 141, HDL 34, LDL 33 and triglycerides of 371.  He is on fenofibrate, simvastatin as well as fish oils.        He has a history of alcohol abuse.  He has been through treatment twice.  He tells me that he still drinks 1-2 drinks a day and [place] cabin, he would drink 3-4 alcoholic drinks in the evening.      He gave up smoking 20 years ago.  He has been retired for 11 years.  He used to work as an  and .      Body mass index is 31.5.  He does not watch his diet closely.  He has gout.      He has no chest pain, shortness of breath, dizzy spells or syncopal episodes.        Cardiac examination is notable for heart rate of 52; it is otherwise normal.      Electrolytes within normal limits as is CBC.        His EKG today shows sinus bradycardia with right bundle branch block patten.  He had an echocardiogram which showed mild concentric left ventricular hypertrophy, mild mitral regurgitation and ascending aorta measured at 3.7 cm.      IMPRESSION:   1.  Sinus bradycardia, likely due to the  carvedilol that he is taking.  He has no symptoms attributable to bradycardia.  I think we can continue with this medication.   2.  Hypertension.  Under reasonable control at this time.  I advised continued home monitoring.   3.  Combined hyperlipidemia.  He is already on 3 medications.  I think there is significant contribution from his drinking and I advised him to drink no more than 1 drink a day.   4.  Obesity.  We talked about eating 3 regular meals a day and I have strongly advised portion control.   5.  Previous history of alcohol use:  Occasional binge drinking.   6.  Borderline ascending aortic enlargement.  I think it may be a normal given his age as well as his body surface area.  I will recheck his aortic measurements in a year's time with echocardiography.      I think overall this gentleman needs much closer attention to his lifestyle in order to better control his cardiac risk factors.  We discussed this and he understands.  I look forward to seeing him again in a year's time for further followup.         TODD CROWLEY MD, [facility]             D: 2019   T: 2019   MT: LUIZA      Name:     SHERRY PRESLEY   MRN:      [id number]        Account:      [id number]   :      1955           Service Date: 2019      Document: [id number]           Outpatient Encounter Medications as of 2019   Medication Sig Dispense Refill     allopurinol (ZYLOPRIM) 100 MG tablet Take 2 tablets (200 mg) by mouth 2 times daily 240 tablet 1     aspirin 81 MG tablet Take 1 tablet by mouth daily.       carvedilol (COREG) 12.5 MG tablet Take 1 tablet (12.5 mg) by mouth 2 times daily (with meals) 180 tablet 1     fenofibrate (LOFIBRA) 54 MG tablet Take 1 tablet (54 mg) by mouth daily 90 tablet 3     Multiple Vitamins-Minerals (MULTIVITAMIN ADULT) CHEW Take 1 chew tab by mouth daily       olmesartan (BENICAR) 40 MG tablet Take 1 tablet (40 mg) by mouth daily 90 tablet 1     omega-3 acid ethyl esters (LOVAZA) 1  g capsule TAKE 2 CAP BY MOUTH DAILY 180 capsule 1     simvastatin (ZOCOR) 40 MG tablet Take 1 tablet (40 mg) by mouth At Bedtime 90 tablet 3     colchicine (COLCRYS) 0.6 MG tablet Take 2 tablets at the first sign of flare, take 1 additional tablet one hour later. (Patient not taking: Reported on 7/25/2019) 30 tablet 3     cyclobenzaprine (FLEXERIL) 5 MG tablet Take 1 tablet (5 mg) by mouth At Bedtime (Patient not taking: Reported on 7/25/2019) 30 tablet 3     indomethacin (INDOCIN) 50 MG capsule Take 1 capsule (50 mg) by mouth 3 times daily (with meals) (Patient not taking: Reported on 7/8/2019) 42 capsule 1     metroNIDAZOLE (METROGEL) 0.75 % external gel Apply topically 2 times daily To face (Patient not taking: Reported on 7/8/2019) 45 g 3     order for DME BP cuff, severe HTN, needs to monitor daily (Patient not taking: Reported on 7/25/2019) 1 Device 0     sulfamethoxazole-trimethoprim (BACTRIM DS/SEPTRA DS) 800-160 MG tablet Take 1 tablet by mouth 2 times daily (Patient not taking: Reported on 7/25/2019) 20 tablet 10     No facility-administered encounter medications on file as of 7/25/2019.        Again, thank you for allowing me to participate in the care of your patient.      Sincerely,    DR TODD CROWLEY MD     [facility]

## 2019-07-25 NOTE — LETTER
7/25/2019    Trish Edwards MD  [address]  [facility] [address]    RE: Emery Zayas       Dear Colleague,    I had the pleasure of seeing Emery Zayas in the [facility].    HPI and Plan:   See dictation    Orders Placed This Encounter   Procedures     Follow-Up with Cardiologist     EKG 12-lead complete w/read - Clinics (performed today)     Echocardiogram Complete       No orders of the defined types were placed in this encounter.      Encounter Diagnoses   Name Primary?     Screening for cardiovascular condition Yes     Aortic root enlargement (H)        CURRENT MEDICATIONS:  Current Outpatient Medications   Medication Sig Dispense Refill     allopurinol (ZYLOPRIM) 100 MG tablet Take 2 tablets (200 mg) by mouth 2 times daily 240 tablet 1     aspirin 81 MG tablet Take 1 tablet by mouth daily.       carvedilol (COREG) 12.5 MG tablet Take 1 tablet (12.5 mg) by mouth 2 times daily (with meals) 180 tablet 1     fenofibrate (LOFIBRA) 54 MG tablet Take 1 tablet (54 mg) by mouth daily 90 tablet 3     Multiple Vitamins-Minerals (MULTIVITAMIN ADULT) CHEW Take 1 chew tab by mouth daily       olmesartan (BENICAR) 40 MG tablet Take 1 tablet (40 mg) by mouth daily 90 tablet 1     omega-3 acid ethyl esters (LOVAZA) 1 g capsule TAKE 2 CAP BY MOUTH DAILY 180 capsule 1     simvastatin (ZOCOR) 40 MG tablet Take 1 tablet (40 mg) by mouth At Bedtime 90 tablet 3     colchicine (COLCRYS) 0.6 MG tablet Take 2 tablets at the first sign of flare, take 1 additional tablet one hour later. (Patient not taking: Reported on 7/25/2019) 30 tablet 3     cyclobenzaprine (FLEXERIL) 5 MG tablet Take 1 tablet (5 mg) by mouth At Bedtime (Patient not taking: Reported on 7/25/2019) 30 tablet 3     indomethacin (INDOCIN) 50 MG capsule Take 1 capsule (50 mg) by mouth 3 times daily (with meals) (Patient not taking: Reported on 7/8/2019) 42 capsule 1     metroNIDAZOLE (METROGEL) 0.75 % external  gel Apply topically 2 times daily To face (Patient not taking: Reported on 7/8/2019) 45 g 3     order for DME BP cuff, severe HTN, needs to monitor daily (Patient not taking: Reported on 7/25/2019) 1 Device 0     sulfamethoxazole-trimethoprim (BACTRIM DS/SEPTRA DS) 800-160 MG tablet Take 1 tablet by mouth 2 times daily (Patient not taking: Reported on 7/25/2019) 20 tablet 10       ALLERGIES     Allergies   Allergen Reactions     No Known Drug Allergies        PAST MEDICAL HISTORY:  Past Medical History:   Diagnosis Date     Alcoholism (H)     Treated 1980's, denies any recent problems with withdrawal when skips days drinking.     Arthritis      Chronic low back pain      Gout     possible     Herniated intervertebral disk     thoracic     Hypertension      RBBB (right bundle branch block)     Present for several years       PAST SURGICAL HISTORY:  Past Surgical History:   Procedure Laterality Date     BACK SURGERY       Back surgery x 4       ESOPHAGOSCOPY, GASTROSCOPY, DUODENOSCOPY (EGD), COMBINED  2000    normal     TONSILLECTOMY         FAMILY HISTORY:  Family History   Problem Relation Age of Onset     Hypertension Father      Heart Disease Father         Angioplasty in late 60's or early 70's.     Unknown/Adopted Father      Hypertension Sister      Kidney Disease Sister      Unknown/Adopted Sister      Cancer Mother         lung     Unknown/Adopted Mother      Diabetes Mother      Thyroid Disease Sister      Unknown/Adopted Sister      Unknown/Adopted Maternal Grandmother      Substance Abuse Maternal Grandfather      Depression Paternal Grandmother      Unknown/Adopted Paternal Grandfather      Diabetes Sister        SOCIAL HISTORY:  Social History     Socioeconomic History     Marital status:      Spouse name: None     Number of children: None     Years of education: None     Highest education level: None   Occupational History     None   Social Needs     Financial resource strain: None     Food  "insecurity:     Worry: None     Inability: None     Transportation needs:     Medical: None     Non-medical: None   Tobacco Use     Smoking status: Former Smoker     Last attempt to quit: 7/15/1993     Years since quittin.0     Smokeless tobacco: Never Used     Tobacco comment: Quit 20 years ago.   Substance and Sexual Activity     Alcohol use: Yes     Alcohol/week: 4.2 oz     Types: 7 Shots of liquor per week     Frequency: 4 or more times a week     Drinks per session: 3 or 4     Binge frequency: Monthly     Comment: maybe 5-7 rum and cokes or beer a week     Drug use: No     Sexual activity: Yes     Partners: Female   Lifestyle     Physical activity:     Days per week: 7 days     Minutes per session: 30 min     Stress: Very much   Relationships     Social connections:     Talks on phone: More than three times a week     Gets together: Once a week     Attends Hoahaoism service: Never     Active member of club or organization: No     Attends meetings of clubs or organizations: None     Relationship status:      Intimate partner violence:     Fear of current or ex partner: None     Emotionally abused: None     Physically abused: None     Forced sexual activity: None   Other Topics Concern     Parent/sibling w/ CABG, MI or angioplasty before 65F 55M? 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       Review of Systems:  Skin:  Positive for bruising   Eyes:  Negative for    ENT:  Negative for    Respiratory:  Negative for    Cardiovascular:    fatigue;Positive for  Gastroenterology: Negative for    Genitourinary:  not assessed    Musculoskeletal:  Positive for back pain  Neurologic:  Negative for    Psychiatric:  Positive for sleep disturbances  Heme/Lymph/Imm:  Negative for    Endocrine:  Negative for      Physical Exam:  Vitals: /61   Pulse 52   Ht 1.676 m (5' 6\")   " Wt 88.5 kg (195 lb)   BMI 31.47 kg/m       Constitutional:  cooperative, alert and oriented, well developed, well nourished, in no acute distress obese      Skin:  warm and dry to the touch, no apparent skin lesions or masses noted          Head:  normocephalic, no masses or lesions        Eyes:  pupils equal and round, conjunctivae and lids unremarkable, sclera white, no xanthalasma, EOMS intact, no nystagmus        Lymph:No Cervical lymphadenopathy present     ENT:  no pallor or cyanosis, dentition good        Neck:  carotid pulses are full and equal bilaterally, JVP normal, no carotid bruit        Respiratory:  normal breath sounds, clear to auscultation, normal A-P diameter, normal symmetry, normal respiratory excursion, no use of accessory muscles         Cardiac: regular rhythm, normal S1/S2, no S3 or S4, apical impulse not displaced, no murmurs, gallops or rubs                pulses full and equal, no bruits auscultated                                        GI:  abdomen soft, non-tender, BS normoactive, no mass, no HSM, no bruits        Extremities and Muscular Skeletal:  no deformities, clubbing, cyanosis, erythema observed              Neurological:  no gross motor deficits        Psych:  Alert and Oriented x 3        Recent Lab Results:  LIPID RESULTS:  Lab Results   Component Value Date    CHOL 141 06/11/2019    HDL 34 (L) 06/11/2019    LDL 33 06/11/2019    TRIG 371 (H) 06/11/2019    CHOLHDLRATIO 5.2 (H) 05/15/2015       LIVER ENZYME RESULTS:  Lab Results   Component Value Date    AST 26 06/11/2019    ALT 27 06/11/2019       CBC RESULTS:  Lab Results   Component Value Date    WBC 6.1 06/11/2019    RBC 4.36 (L) 06/11/2019    HGB 14.5 06/11/2019    HCT 41.6 06/11/2019    MCV 95 06/11/2019    MCH 33.3 (H) 06/11/2019    MCHC 34.9 06/11/2019    RDW 13.1 06/11/2019     06/11/2019       BMP RESULTS:  Lab Results   Component Value Date     06/11/2019    POTASSIUM 4.4 06/11/2019    CHLORIDE 109  06/11/2019    CO2 27 06/11/2019    ANIONGAP 7 06/11/2019     (H) 06/11/2019    BUN 13 06/11/2019    CR 1.00 06/11/2019    GFRESTIMATED 78 06/11/2019    GFRESTBLACK >90 06/11/2019    RONN 9.2 06/11/2019        A1C RESULTS:  Lab Results   Component Value Date    A1C 5.4 01/08/2019       INR RESULTS:  Lab Results   Component Value Date    INR 0.99 05/05/2017    INR 1.02 02/05/2008           CC  Trish Edwards MD  [id number]   [place] [id number]                  Thank you for allowing me to participate in the care of your patient.      Sincerely,     DR TODD CROWLEY MD     [facility]    cc:   Trish Edwards MD  [id number]  [place] [address]

## 2019-07-25 NOTE — PROGRESS NOTES
HPI and Plan:   See dictation    Orders Placed This Encounter   Procedures     Follow-Up with Cardiologist     EKG 12-lead complete w/read - Clinics (performed today)     Echocardiogram Complete       No orders of the defined types were placed in this encounter.      Encounter Diagnoses   Name Primary?     Screening for cardiovascular condition Yes     Aortic root enlargement (H)        CURRENT MEDICATIONS:  Current Outpatient Medications   Medication Sig Dispense Refill     allopurinol (ZYLOPRIM) 100 MG tablet Take 2 tablets (200 mg) by mouth 2 times daily 240 tablet 1     aspirin 81 MG tablet Take 1 tablet by mouth daily.       carvedilol (COREG) 12.5 MG tablet Take 1 tablet (12.5 mg) by mouth 2 times daily (with meals) 180 tablet 1     fenofibrate (LOFIBRA) 54 MG tablet Take 1 tablet (54 mg) by mouth daily 90 tablet 3     Multiple Vitamins-Minerals (MULTIVITAMIN ADULT) CHEW Take 1 chew tab by mouth daily       olmesartan (BENICAR) 40 MG tablet Take 1 tablet (40 mg) by mouth daily 90 tablet 1     omega-3 acid ethyl esters (LOVAZA) 1 g capsule TAKE 2 CAP BY MOUTH DAILY 180 capsule 1     simvastatin (ZOCOR) 40 MG tablet Take 1 tablet (40 mg) by mouth At Bedtime 90 tablet 3     colchicine (COLCRYS) 0.6 MG tablet Take 2 tablets at the first sign of flare, take 1 additional tablet one hour later. (Patient not taking: Reported on 7/25/2019) 30 tablet 3     cyclobenzaprine (FLEXERIL) 5 MG tablet Take 1 tablet (5 mg) by mouth At Bedtime (Patient not taking: Reported on 7/25/2019) 30 tablet 3     indomethacin (INDOCIN) 50 MG capsule Take 1 capsule (50 mg) by mouth 3 times daily (with meals) (Patient not taking: Reported on 7/8/2019) 42 capsule 1     metroNIDAZOLE (METROGEL) 0.75 % external gel Apply topically 2 times daily To face (Patient not taking: Reported on 7/8/2019) 45 g 3     order for DME BP cuff, severe HTN, needs to monitor daily (Patient not taking: Reported on 7/25/2019) 1 Device 0      sulfamethoxazole-trimethoprim (BACTRIM DS/SEPTRA DS) 800-160 MG tablet Take 1 tablet by mouth 2 times daily (Patient not taking: Reported on 7/25/2019) 20 tablet 10       ALLERGIES     Allergies   Allergen Reactions     No Known Drug Allergies        PAST MEDICAL HISTORY:  Past Medical History:   Diagnosis Date     Alcoholism (H)     Treated 1980's, denies any recent problems with withdrawal when skips days drinking.     Arthritis      Chronic low back pain      Gout     possible     Herniated intervertebral disk     thoracic     Hypertension      RBBB (right bundle branch block)     Present for several years       PAST SURGICAL HISTORY:  Past Surgical History:   Procedure Laterality Date     BACK SURGERY       Back surgery x 4       ESOPHAGOSCOPY, GASTROSCOPY, DUODENOSCOPY (EGD), COMBINED  2000    normal     TONSILLECTOMY         FAMILY HISTORY:  Family History   Problem Relation Age of Onset     Hypertension Father      Heart Disease Father         Angioplasty in late 60's or early 70's.     Unknown/Adopted Father      Hypertension Sister      Kidney Disease Sister      Unknown/Adopted Sister      Cancer Mother         lung     Unknown/Adopted Mother      Diabetes Mother      Thyroid Disease Sister      Unknown/Adopted Sister      Unknown/Adopted Maternal Grandmother      Substance Abuse Maternal Grandfather      Depression Paternal Grandmother      Unknown/Adopted Paternal Grandfather      Diabetes Sister        SOCIAL HISTORY:  Social History     Socioeconomic History     Marital status:      Spouse name: None     Number of children: None     Years of education: None     Highest education level: None   Occupational History     None   Social Needs     Financial resource strain: None     Food insecurity:     Worry: None     Inability: None     Transportation needs:     Medical: None     Non-medical: None   Tobacco Use     Smoking status: Former Smoker     Last attempt to quit: 7/15/1993     Years since  "quittin.0     Smokeless tobacco: Never Used     Tobacco comment: Quit 20 years ago.   Substance and Sexual Activity     Alcohol use: Yes     Alcohol/week: 4.2 oz     Types: 7 Shots of liquor per week     Frequency: 4 or more times a week     Drinks per session: 3 or 4     Binge frequency: Monthly     Comment: maybe 5-7 rum and cokes or beer a week     Drug use: No     Sexual activity: Yes     Partners: Female   Lifestyle     Physical activity:     Days per week: 7 days     Minutes per session: 30 min     Stress: Very much   Relationships     Social connections:     Talks on phone: More than three times a week     Gets together: Once a week     Attends Confucianism service: Never     Active member of club or organization: No     Attends meetings of clubs or organizations: None     Relationship status:      Intimate partner violence:     Fear of current or ex partner: None     Emotionally abused: None     Physically abused: None     Forced sexual activity: None   Other Topics Concern     Parent/sibling w/ CABG, MI or angioplasty before 65F 55M? 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       Review of Systems:  Skin:  Positive for bruising   Eyes:  Negative for    ENT:  Negative for    Respiratory:  Negative for    Cardiovascular:    fatigue;Positive for  Gastroenterology: Negative for    Genitourinary:  not assessed    Musculoskeletal:  Positive for back pain  Neurologic:  Negative for    Psychiatric:  Positive for sleep disturbances  Heme/Lymph/Imm:  Negative for    Endocrine:  Negative for      Physical Exam:  Vitals: /61   Pulse 52   Ht 1.676 m (5' 6\")   Wt 88.5 kg (195 lb)   BMI 31.47 kg/m      Constitutional:  cooperative, alert and oriented, well developed, well nourished, in no acute distress obese      Skin:  warm and dry to the touch, no apparent skin " lesions or masses noted          Head:  normocephalic, no masses or lesions        Eyes:  pupils equal and round, conjunctivae and lids unremarkable, sclera white, no xanthalasma, EOMS intact, no nystagmus        Lymph:No Cervical lymphadenopathy present     ENT:  no pallor or cyanosis, dentition good        Neck:  carotid pulses are full and equal bilaterally, JVP normal, no carotid bruit        Respiratory:  normal breath sounds, clear to auscultation, normal A-P diameter, normal symmetry, normal respiratory excursion, no use of accessory muscles         Cardiac: regular rhythm, normal S1/S2, no S3 or S4, apical impulse not displaced, no murmurs, gallops or rubs                pulses full and equal, no bruits auscultated                                        GI:  abdomen soft, non-tender, BS normoactive, no mass, no HSM, no bruits        Extremities and Muscular Skeletal:  no deformities, clubbing, cyanosis, erythema observed              Neurological:  no gross motor deficits        Psych:  Alert and Oriented x 3        Recent Lab Results:  LIPID RESULTS:  Lab Results   Component Value Date    CHOL 141 06/11/2019    HDL 34 (L) 06/11/2019    LDL 33 06/11/2019    TRIG 371 (H) 06/11/2019    CHOLHDLRATIO 5.2 (H) 05/15/2015       LIVER ENZYME RESULTS:  Lab Results   Component Value Date    AST 26 06/11/2019    ALT 27 06/11/2019       CBC RESULTS:  Lab Results   Component Value Date    WBC 6.1 06/11/2019    RBC 4.36 (L) 06/11/2019    HGB 14.5 06/11/2019    HCT 41.6 06/11/2019    MCV 95 06/11/2019    MCH 33.3 (H) 06/11/2019    MCHC 34.9 06/11/2019    RDW 13.1 06/11/2019     06/11/2019       BMP RESULTS:  Lab Results   Component Value Date     06/11/2019    POTASSIUM 4.4 06/11/2019    CHLORIDE 109 06/11/2019    CO2 27 06/11/2019    ANIONGAP 7 06/11/2019     (H) 06/11/2019    BUN 13 06/11/2019    CR 1.00 06/11/2019    GFRESTIMATED 78 06/11/2019    GFRESTBLACK >90 06/11/2019    RONN 9.2 06/11/2019         A1C RESULTS:  Lab Results   Component Value Date    A1C 5.4 01/08/2019       INR RESULTS:  Lab Results   Component Value Date    INR 0.99 05/05/2017    INR 1.02 02/05/2008           CC  Trish Edwards MD  [id number]  [place] [address]

## 2019-07-25 NOTE — PROGRESS NOTES
Service Date: 07/25/2019      HISTORY OF PRESENT ILLNESS:  It is a pleasure for me to see Mr. Zayas today for cardiac assessment.  He is a very pleasant 63-year-old gentleman who has no history of cardiac disease, but has multiple cardiac risk factors.      He tells me that in 2000, he was diagnosed with hypertension and he has been taking medications for 6-7 years.  He does measure his blood pressure at home and more recently, his numbers have been good with values around 130/70.  I do see some numbers at 168/88 recently as well in clinic.  He is not diabetic.  He does have combined hyperlipidemia.  Most recent lipid profile shows a total cholesterol of 141, HDL 34, LDL 33 and triglycerides of 371.  He is on fenofibrate, simvastatin as well as fish oils.        He has a history of alcohol abuse.  He has been through treatment twice.  He tells me that he still drinks 1-2 drinks a day and [place] the cabin, he would drink 3-4 alcoholic drinks in the evening.      He gave up smoking 20 years ago.  He has been retired for 11 years.  He used to work as an  and .      Body mass index is 31.5.  He does not watch his diet closely.  He has gout.      He has no chest pain, shortness of breath, dizzy spells or syncopal episodes.        Cardiac examination is notable for heart rate of 52; it is otherwise normal.      Electrolytes within normal limits as is CBC.        His EKG today shows sinus bradycardia with right bundle branch block patten.  He had an echocardiogram which showed mild concentric left ventricular hypertrophy, mild mitral regurgitation and ascending aorta measured at 3.7 cm.      IMPRESSION:   1.  Sinus bradycardia, likely due to the carvedilol that he is taking.  He has no symptoms attributable to bradycardia.  I think we can continue with this medication.   2.  Hypertension.  Under reasonable control at this time.  I advised continued home monitoring.   3.  Combined  hyperlipidemia.  He is already on 3 medications.  I think there is significant contribution from his drinking and I advised him to drink no more than 1 drink a day.   4.  Obesity.  We talked about eating 3 regular meals a day and I have strongly advised portion control.   5.  Previous history of alcohol use:  Occasional binge drinking.   6.  Borderline ascending aortic enlargement.  I think it may be a normal given his age as well as his body surface area.  I will recheck his aortic measurements in a year's time with echocardiography.      I think overall this gentleman needs much closer attention to his lifestyle in order to better control his cardiac risk factors.  We discussed this and he understands.  I look forward to seeing him again in a year's time for further followup.         TODD CROWLEY MD, [facility]             D: 2019   T: 2019   MT: LUIZA      Name:     SHERRY PRESLEY   MRN:      [id number]        Account:      [id number]   :      1955           Service Date: 2019      Document: [id number]

## 2019-09-09 ENCOUNTER — OFFICE VISIT (OUTPATIENT)
Dept: DERMATOLOGY | Facility: CLINIC | Age: 64
End: 2019-09-09
Payer: MEDICARE

## 2019-09-09 VITALS — DIASTOLIC BLOOD PRESSURE: 81 MMHG | OXYGEN SATURATION: 99 % | HEART RATE: 50 BPM | SYSTOLIC BLOOD PRESSURE: 136 MMHG

## 2019-09-09 DIAGNOSIS — L71.9 ROSACEA: ICD-10-CM

## 2019-09-09 DIAGNOSIS — L57.8 SOLAR ELASTOSIS: ICD-10-CM

## 2019-09-09 DIAGNOSIS — L57.0 ACTINIC KERATOSES: Primary | ICD-10-CM

## 2019-09-09 PROCEDURE — 99213 OFFICE O/P EST LOW 20 MIN: CPT | Mod: 25 | Performed by: PHYSICIAN ASSISTANT

## 2019-09-09 PROCEDURE — 17000 DESTRUCT PREMALG LESION: CPT | Performed by: PHYSICIAN ASSISTANT

## 2019-09-09 PROCEDURE — 17003 DESTRUCT PREMALG LES 2-14: CPT | Performed by: PHYSICIAN ASSISTANT

## 2019-09-09 NOTE — LETTER
9/9/2019         RE: Emery Zayas  [address] [place] [address]        Dear Colleague,    Thank you for referring your patient, Emery Zayas, to the [facility]. Please see a copy of my visit note below.    HPI:  Emery Zayas is a 64 year old male patient here today for recheck rosacea .  Patient states this has been present for years.  Patient reports the following symptoms: redness and breakout on nose .  Patient reports the following previous treatments: LOV start metrogel bid. Pt states he has used it maybe once a week.  Patient reports the following modifying factors: none.  Associated symptoms: none.  Patient has no other skin complaints today.  Remainder of the HPI, Meds, PMH, Allergies, FH, and SH was reviewed in chart.      Past Medical History:   Diagnosis Date     Alcoholism (H)     Treated 1980's, denies any recent problems with withdrawal when skips days drinking.     Arthritis      Chronic low back pain      Gout     possible     Herniated intervertebral disk     thoracic     Hypertension      RBBB (right bundle branch block)     Present for several years       Past Surgical History:   Procedure Laterality Date     BACK SURGERY       Back surgery x 4       ESOPHAGOSCOPY, GASTROSCOPY, DUODENOSCOPY (EGD), COMBINED  2000    normal     TONSILLECTOMY          Family History   Problem Relation Age of Onset     Hypertension Father      Heart Disease Father         Angioplasty in late 60's or early 70's.     Unknown/Adopted Father      Hypertension Sister      Kidney Disease Sister      Unknown/Adopted Sister      Cancer Mother         lung     Unknown/Adopted Mother      Diabetes Mother      Thyroid Disease Sister      Unknown/Adopted Sister      Unknown/Adopted Maternal Grandmother      Substance Abuse Maternal Grandfather      Depression Paternal Grandmother      Unknown/Adopted Paternal Grandfather      Diabetes Sister        Social History      Socioeconomic History     Marital status:      Spouse name: Not on file     Number of children: Not on file     Years of education: Not on file     Highest education level: Not on file   Occupational History     Not on file   Social Needs     Financial resource strain: Not on file     Food insecurity:     Worry: Not on file     Inability: Not on file     Transportation needs:     Medical: Not on file     Non-medical: Not on file   Tobacco Use     Smoking status: Former Smoker     Last attempt to quit: 7/15/1993     Years since quittin.1     Smokeless tobacco: Never Used     Tobacco comment: Quit 20 years ago.   Substance and Sexual Activity     Alcohol use: Yes     Alcohol/week: 4.2 oz     Types: 7 Shots of liquor per week     Frequency: 4 or more times a week     Drinks per session: 3 or 4     Binge frequency: Monthly     Comment: maybe 5-7 rum and cokes or beer a week     Drug use: No     Sexual activity: Yes     Partners: Female   Lifestyle     Physical activity:     Days per week: 7 days     Minutes per session: 30 min     Stress: Very much   Relationships     Social connections:     Talks on phone: More than three times a week     Gets together: Once a week     Attends Sabianist service: Never     Active member of club or organization: No     Attends meetings of clubs or organizations: Not on file     Relationship status:      Intimate partner violence:     Fear of current or ex partner: Not on file     Emotionally abused: Not on file     Physically abused: Not on file     Forced sexual activity: Not on file   Other Topics Concern     Parent/sibling w/ CABG, MI or angioplasty before 65F 55M? 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       Outpatient Encounter Medications as of 2019   Medication Sig Dispense Refill     allopurinol (ZYLOPRIM) 100 MG  tablet Take 2 tablets (200 mg) by mouth 2 times daily 240 tablet 1     aspirin 81 MG tablet Take 1 tablet by mouth daily.       carvedilol (COREG) 12.5 MG tablet Take 1 tablet (12.5 mg) by mouth 2 times daily (with meals) 180 tablet 1     fenofibrate (LOFIBRA) 54 MG tablet Take 1 tablet (54 mg) by mouth daily 90 tablet 3     indomethacin (INDOCIN) 50 MG capsule Take 1 capsule (50 mg) by mouth 3 times daily (with meals) 42 capsule 1     Multiple Vitamins-Minerals (MULTIVITAMIN ADULT) CHEW Take 1 chew tab by mouth daily       olmesartan (BENICAR) 40 MG tablet Take 1 tablet (40 mg) by mouth daily 90 tablet 1     omega-3 acid ethyl esters (LOVAZA) 1 g capsule TAKE 2 CAP BY MOUTH DAILY 180 capsule 1     simvastatin (ZOCOR) 40 MG tablet Take 1 tablet (40 mg) by mouth At Bedtime 90 tablet 3     colchicine (COLCRYS) 0.6 MG tablet Take 2 tablets at the first sign of flare, take 1 additional tablet one hour later. (Patient not taking: Reported on 7/25/2019) 30 tablet 3     cyclobenzaprine (FLEXERIL) 5 MG tablet Take 1 tablet (5 mg) by mouth At Bedtime (Patient not taking: Reported on 7/25/2019) 30 tablet 3     metroNIDAZOLE (METROGEL) 0.75 % external gel Apply topically 2 times daily To face (Patient not taking: Reported on 7/8/2019) 45 g 3     order for DME BP cuff, severe HTN, needs to monitor daily (Patient not taking: Reported on 7/25/2019) 1 Device 0     sulfamethoxazole-trimethoprim (BACTRIM DS/SEPTRA DS) 800-160 MG tablet Take 1 tablet by mouth 2 times daily (Patient not taking: Reported on 7/25/2019) 20 tablet 10     No facility-administered encounter medications on file as of 9/9/2019.        Review Of Systems:  Skin: As above  Eyes: negative  Ears/Nose/Throat: negative  Respiratory: No shortness of breath, dyspnea on exertion, cough, or hemoptysis  Cardiovascular: negative  Gastrointestinal: negative  Genitourinary: negative  Musculoskeletal: negative  Neurologic: negative  Psychiatric:  negative  Hematologic/Lymphatic/Immunologic: negative  Endocrine: negative      Objective:     /81   Pulse 50   SpO2 99%   Eyes: Conjunctivae/lids: Normal   ENT: Lips:  Normal  MSK: Normal  Cardiovascular: Peripheral edema none  Pulm: Breathing Normal  Neuro/Psych: Orientation: Normal; Mood/Affect: Normal, NAD, WDWN  Pt accompanied by: self  Following areas examined: face, neck  Ro skin type:i   Findings:  Pink scaly macule/s x 1 on right dorsal hand and right temple x 3 and right lateral forehead x 2  Rhytides, hypo/hyperpigmentation, and atrophy of face, neck, and dorsal hands  Pink and red david appearance on nose and cheeks.  Assessment and Plan:  1) Actinic keratoses x 7 and solar elastosis    LN2 for 5 seconds x 2. Discussed AE include hypopigmentation (white spot) and recurrence. Follow up in 2-3 months to recheck lesions. There is a risk of AKs developing into a SCC.   Treatment options include LN2 vs PDT vs Efudex. Pt elected LN2    2) rosacea  Disc tx. Pt would like to start the metrogel that was prescribed at last appt.   Start metrogel BID.   Disc treatment options including topical Finacea, Sulfa, Metronidazole, Ivermectin, and oral Doxycyline. Redness of rosacea is more difficult to treat and treatment is considered cosmetic.  Redness can be treated with lasers, Mirvaso or Rofade.    Avoid triggers such as sunlight, spicy foods, and alcohol. Wear sunscreen everyday of the year.     Wear a sunscreen with at least SPF 30 on your face, ears, neck and V of the chest daily. Wear sunscreen on other areas of the body if those areas are exposed to the sun throughout the day. Sunscreens can contain physical and/or chemical blockers. Physical blockers are less likely to clog pores, these include zinc oxide and titanium dioxide. Reapply every two hour and after swimming. Sunscreen examples include Neutrogena, CeraVe, Blue Lizard, Elta MD and many others.    Follow up in 2-3 months for FBE and to  recheck aks.      Again, thank you for allowing me to participate in the care of your patient.        Sincerely,        Tracy Pate PA-C

## 2019-09-09 NOTE — PROGRESS NOTES
HPI:  Emery Zayas is a 64 year old male patient here today for recheck rosacea .  Patient states this has been present for years.  Patient reports the following symptoms: redness and breakout on nose .  Patient reports the following previous treatments: LOV start metrogel bid. Pt states he has used it maybe once a week.  Patient reports the following modifying factors: none.  Associated symptoms: none.  Patient has no other skin complaints today.  Remainder of the HPI, Meds, PMH, Allergies, FH, and SH was reviewed in chart.      Past Medical History:   Diagnosis Date     Alcoholism (H)     Treated 1980's, denies any recent problems with withdrawal when skips days drinking.     Arthritis      Chronic low back pain      Gout     possible     Herniated intervertebral disk     thoracic     Hypertension      RBBB (right bundle branch block)     Present for several years       Past Surgical History:   Procedure Laterality Date     BACK SURGERY       Back surgery x 4       ESOPHAGOSCOPY, GASTROSCOPY, DUODENOSCOPY (EGD), COMBINED  2000    normal     TONSILLECTOMY          Family History   Problem Relation Age of Onset     Hypertension Father      Heart Disease Father         Angioplasty in late 60's or early 70's.     Unknown/Adopted Father      Hypertension Sister      Kidney Disease Sister      Unknown/Adopted Sister      Cancer Mother         lung     Unknown/Adopted Mother      Diabetes Mother      Thyroid Disease Sister      Unknown/Adopted Sister      Unknown/Adopted Maternal Grandmother      Substance Abuse Maternal Grandfather      Depression Paternal Grandmother      Unknown/Adopted Paternal Grandfather      Diabetes Sister        Social History     Socioeconomic History     Marital status:      Spouse name: Not on file     Number of children: Not on file     Years of education: Not on file     Highest education level: Not on file   Occupational History     Not on file   Social Needs     Financial  resource strain: Not on file     Food insecurity:     Worry: Not on file     Inability: Not on file     Transportation needs:     Medical: Not on file     Non-medical: Not on file   Tobacco Use     Smoking status: Former Smoker     Last attempt to quit: 7/15/1993     Years since quittin.1     Smokeless tobacco: Never Used     Tobacco comment: Quit 20 years ago.   Substance and Sexual Activity     Alcohol use: Yes     Alcohol/week: 4.2 oz     Types: 7 Shots of liquor per week     Frequency: 4 or more times a week     Drinks per session: 3 or 4     Binge frequency: Monthly     Comment: maybe 5-7 rum and cokes or beer a week     Drug use: No     Sexual activity: Yes     Partners: Female   Lifestyle     Physical activity:     Days per week: 7 days     Minutes per session: 30 min     Stress: Very much   Relationships     Social connections:     Talks on phone: More than three times a week     Gets together: Once a week     Attends Rastafari service: Never     Active member of club or organization: No     Attends meetings of clubs or organizations: Not on file     Relationship status:      Intimate partner violence:     Fear of current or ex partner: Not on file     Emotionally abused: Not on file     Physically abused: Not on file     Forced sexual activity: Not on file   Other Topics Concern     Parent/sibling w/ CABG, MI or angioplasty before 65F 55M? 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       Outpatient Encounter Medications as of 2019   Medication Sig Dispense Refill     allopurinol (ZYLOPRIM) 100 MG tablet Take 2 tablets (200 mg) by mouth 2 times daily 240 tablet 1     aspirin 81 MG tablet Take 1 tablet by mouth daily.       carvedilol (COREG) 12.5 MG tablet Take 1 tablet (12.5 mg) by mouth 2 times daily (with meals) 180 tablet 1     fenofibrate (LOFIBRA) 54 MG  tablet Take 1 tablet (54 mg) by mouth daily 90 tablet 3     indomethacin (INDOCIN) 50 MG capsule Take 1 capsule (50 mg) by mouth 3 times daily (with meals) 42 capsule 1     Multiple Vitamins-Minerals (MULTIVITAMIN ADULT) CHEW Take 1 chew tab by mouth daily       olmesartan (BENICAR) 40 MG tablet Take 1 tablet (40 mg) by mouth daily 90 tablet 1     omega-3 acid ethyl esters (LOVAZA) 1 g capsule TAKE 2 CAP BY MOUTH DAILY 180 capsule 1     simvastatin (ZOCOR) 40 MG tablet Take 1 tablet (40 mg) by mouth At Bedtime 90 tablet 3     colchicine (COLCRYS) 0.6 MG tablet Take 2 tablets at the first sign of flare, take 1 additional tablet one hour later. (Patient not taking: Reported on 7/25/2019) 30 tablet 3     cyclobenzaprine (FLEXERIL) 5 MG tablet Take 1 tablet (5 mg) by mouth At Bedtime (Patient not taking: Reported on 7/25/2019) 30 tablet 3     metroNIDAZOLE (METROGEL) 0.75 % external gel Apply topically 2 times daily To face (Patient not taking: Reported on 7/8/2019) 45 g 3     order for DME BP cuff, severe HTN, needs to monitor daily (Patient not taking: Reported on 7/25/2019) 1 Device 0     sulfamethoxazole-trimethoprim (BACTRIM DS/SEPTRA DS) 800-160 MG tablet Take 1 tablet by mouth 2 times daily (Patient not taking: Reported on 7/25/2019) 20 tablet 10     No facility-administered encounter medications on file as of 9/9/2019.        Review Of Systems:  Skin: As above  Eyes: negative  Ears/Nose/Throat: negative  Respiratory: No shortness of breath, dyspnea on exertion, cough, or hemoptysis  Cardiovascular: negative  Gastrointestinal: negative  Genitourinary: negative  Musculoskeletal: negative  Neurologic: negative  Psychiatric: negative  Hematologic/Lymphatic/Immunologic: negative  Endocrine: negative      Objective:     /81   Pulse 50   SpO2 99%   Eyes: Conjunctivae/lids: Normal   ENT: Lips:  Normal  MSK: Normal  Cardiovascular: Peripheral edema none  Pulm: Breathing Normal  Neuro/Psych: Orientation: Normal;  Mood/Affect: Normal, NAD, WDWN  Pt accompanied by: self  Following areas examined: face, neck  Ro skin type:i   Findings:  Pink scaly macule/s x 1 on right dorsal hand and right temple x 3 and right lateral forehead x 2  Rhytides, hypo/hyperpigmentation, and atrophy of face, neck, and dorsal hands  Pink and red david appearance on nose and cheeks.  Assessment and Plan:  1) Actinic keratoses x 7 and solar elastosis    LN2 for 5 seconds x 2. Discussed AE include hypopigmentation (white spot) and recurrence. Follow up in 2-3 months to recheck lesions. There is a risk of AKs developing into a SCC.   Treatment options include LN2 vs PDT vs Efudex. Pt elected LN2    2) rosacea  Disc tx. Pt would like to start the metrogel that was prescribed at last appt.   Start metrogel BID.   Disc treatment options including topical Finacea, Sulfa, Metronidazole, Ivermectin, and oral Doxycyline. Redness of rosacea is more difficult to treat and treatment is considered cosmetic.  Redness can be treated with lasers, Mirvaso or Rofade.    Avoid triggers such as sunlight, spicy foods, and alcohol. Wear sunscreen everyday of the year.     Wear a sunscreen with at least SPF 30 on your face, ears, neck and V of the chest daily. Wear sunscreen on other areas of the body if those areas are exposed to the sun throughout the day. Sunscreens can contain physical and/or chemical blockers. Physical blockers are less likely to clog pores, these include zinc oxide and titanium dioxide. Reapply every two hour and after swimming. Sunscreen examples include Neutrogena, CeraVe, Blue Lizard, Elta MD and many others.    Follow up in 2-3 months for FBE and to recheck aks.

## 2019-09-09 NOTE — PATIENT INSTRUCTIONS
WOUND CARE INSTRUCTIONS  FOR CRYOSURGERY        This area treated with liquid nitrogen will form a blister. You do not need to bandage the area until after the blister forms and breaks (which may be a few days).  When the blister breaks, begin daily dressing changes as follows:    1) Clean and dry the area with tap water using clean Q-tip or sterile gauze pad.    2) Apply Polysporin ointment or Bacitracin ointment over entire wound.  Do NOT use Neosporin ointment.    3) Cover the wound with a band-aid or sterile non-stick gauze pad and micropore paper tape.      REPEAT THESE INSTRUCTIONS AT LEAST ONCE A DAY UNTIL THE WOUND HAS COMPLETELY HEALED.        It is an old wives tale that a wound heals better when it is exposed to air and allowed to dry out. The wound will heal faster with a better cosmetic result if it is kept moist with ointment and covered with a bandage.  Do not let the wound dry out.      Supplies Needed:     *Cotton tipped applicators (Q-tips)   *Polysporin ointment or Bacitracin ointment (NOT NEOSPORIN)   *Band-aids, or non stick gauze pads and micropore paper tape    PATIENT INFORMATION    During the healing process you will notice a number of changes. All wounds develop a small halo of redness surrounding the wound.  This means healing is occurring. Severe itching with extensive redness usually indicates sensitivity to the ointment or bandage tape used to dress the wound.  You should call our office if this develops.      Swelling and/or discoloration around your surgical site is common, particularly when performed around the eye.    All wounds normally drain.  The larger the wound the more drainage there will be.  After 7-10 days, you will notice the wound beginning to shrink and new skin will begin to grow.  The wound is healed when you can see skin has formed over the entire area.  A healed wound has a healthy, shiny look to the surface and is red to dark pink in color to normalize.  Wounds may  take approximately 4-6 weeks to heal.  Larger wounds may take 6-8 weeks.  After the wound is healed you may discontinue dressing changes.    You may experience a sensation of tightness as your wound heals. This is normal and will gradually subside.    Your healed wound may be sensitive to temperature changes. This sensitivity improves with time, but if you re having a lot of discomfort, try to avoid temperature extremes.    Patients frequently experience itching after their wound appears to have healed because of the continue healing under the skin.  Plain Vaseline will help relieve the itching.         Proper skin care from [facility]:    -Eliminate harsh soaps as they strip the natural oils from the skin, often resulting in dry itchy skin ( i.e. Dial, Zest, [place] Spring)  -Use mild soaps such as Cetaphil or Dove Sensitive Skin in the shower. You do not need to use soap on arms, legs, and trunk every time you shower unless visibly soiled.   -Avoid hot or cold showers.  -After showering, lightly dry off and apply moisturizing within 2-3 minutes. This will help trap moisture in the skin.   -Aggressive use of a moisturizer at least 1-2 times a day to the entire body (including -Vanicream, Cetaphil, Aquaphor or Cerave) and moisturize hands after every washing.  -We recommend using moisturizers that come in a tub that needs to be scooped out, not a pump. This has more of an oil base. It will hold moisture in your skin much better than a water base moisturizer. The above recommended are non-pore clogging.      Wear a sunscreen with at least SPF 30 on your face, ears, neck and V of the chest daily. Wear sunscreen on other areas of the body if those areas are exposed to the sun throughout the day. Sunscreens can contain physical and/or chemical blockers. Physical blockers are less likely to clog pores, these include zinc oxide and titanium dioxide. Reapply every two hour and after swimming. Sunscreen examples  include Neutrogena, CeraVe, Blue Lizard, Elta MD and many others.    UV radiation  UVA radiation remains constant throughout the day and throughout the year. It is a longer wavelength than UVB and therefore penetrates deeper into the skin leading to immediate and delayed tanning, photoaging, and skin cancer. 70-80% of UVA and UVB radiation occurs between the hours of 10am-2pm.  UVB radiation  UVB radiation causes the most harmful effects and is more significant during the summer months. However, snow and ice can reflect UVB radiation leading to skin damage during the winter months as well. UVB radiation is responsible for tanning, burning, inflammation, delayed erythema (pinkness), pigmentation (brown spots), and skin cancer.       Rosacea  Start metrogel   Disc treatment options including topical Finacea, Sulfa, Metronidazole, Ivermectin, and oral Doxycyline. Redness of rosacea is more difficult to treat and treatment is considered cosmetic.  Redness can be treated with lasers, Mirvaso or Rofade.    Avoid triggers such as sunlight, spicy foods, and alcohol. Wear sunscreen everyday of the year.     Wear a sunscreen with at least SPF 30 on your face, ears, neck and V of the chest daily. Wear sunscreen on other areas of the body if those areas are exposed to the sun throughout the day. Sunscreens can contain physical and/or chemical blockers. Physical blockers are less likely to clog pores, these include zinc oxide and titanium dioxide. Reapply every two hour and after swimming. Sunscreen examples include Neutrogena, CeraVe, Blue Lizard, Elta MD and many others.

## 2019-11-11 ENCOUNTER — OFFICE VISIT (OUTPATIENT)
Dept: DERMATOLOGY | Facility: CLINIC | Age: 64
End: 2019-11-11
Payer: MEDICARE

## 2019-11-11 VITALS — DIASTOLIC BLOOD PRESSURE: 79 MMHG | HEART RATE: 52 BPM | SYSTOLIC BLOOD PRESSURE: 152 MMHG | OXYGEN SATURATION: 97 %

## 2019-11-11 DIAGNOSIS — L57.8 SOLAR ELASTOSIS: ICD-10-CM

## 2019-11-11 DIAGNOSIS — L82.1 SEBORRHEIC KERATOSES: ICD-10-CM

## 2019-11-11 DIAGNOSIS — D22.9 MULTIPLE NEVI: ICD-10-CM

## 2019-11-11 DIAGNOSIS — L57.0 ACTINIC KERATOSIS: ICD-10-CM

## 2019-11-11 DIAGNOSIS — L71.9 ROSACEA: ICD-10-CM

## 2019-11-11 DIAGNOSIS — L30.1 DYSHIDROTIC ECZEMA: Primary | ICD-10-CM

## 2019-11-11 DIAGNOSIS — D18.01 CHERRY ANGIOMA: ICD-10-CM

## 2019-11-11 DIAGNOSIS — L81.4 LENTIGINES: ICD-10-CM

## 2019-11-11 PROCEDURE — 99214 OFFICE O/P EST MOD 30 MIN: CPT | Performed by: PHYSICIAN ASSISTANT

## 2019-11-11 RX ORDER — FLUOROURACIL 50 MG/G
CREAM TOPICAL
Qty: 60 G | Refills: 1 | Status: SHIPPED | OUTPATIENT
Start: 2019-11-11 | End: 2020-06-26

## 2019-11-11 RX ORDER — FLUOCINONIDE 0.5 MG/G
CREAM TOPICAL 2 TIMES DAILY
Qty: 30 G | Refills: 1 | Status: SHIPPED | OUTPATIENT
Start: 2019-11-11 | End: 2020-06-26

## 2019-11-11 NOTE — PROGRESS NOTES
HPI:  Emery Zayas is a 64 year old male patient here today for FBE. Pt has a history of aks on right dorsal hand and face.  Patient states this has been present for a while.  Patient reports the following symptoms: improvement .  Patient reports the following previous treatments: ln2.  Patient reports the following modifying factors: none.  Associated symptoms: none.  Patient has no other skin complaints today.  Remainder of the HPI, Meds, PMH, Allergies, FH, and SH was reviewed in chart.    Pertinent Hx:   Aks. No personal or family history of skin cancer.     Past Medical History:   Diagnosis Date     Alcoholism (H)     Treated 1980's, denies any recent problems with withdrawal when skips days drinking.     Arthritis      Chronic low back pain      Gout     possible     Herniated intervertebral disk     thoracic     Hypertension      RBBB (right bundle branch block)     Present for several years       Past Surgical History:   Procedure Laterality Date     BACK SURGERY       Back surgery x 4       ESOPHAGOSCOPY, GASTROSCOPY, DUODENOSCOPY (EGD), COMBINED  2000    normal     TONSILLECTOMY          Family History   Problem Relation Age of Onset     Hypertension Father      Heart Disease Father         Angioplasty in late 60's or early 70's.     Unknown/Adopted Father      Hypertension Sister      Kidney Disease Sister      Unknown/Adopted Sister      Cancer Mother         lung     Unknown/Adopted Mother      Diabetes Mother      Thyroid Disease Sister      Unknown/Adopted Sister      Unknown/Adopted Maternal Grandmother      Substance Abuse Maternal Grandfather      Depression Paternal Grandmother      Unknown/Adopted Paternal Grandfather      Diabetes Sister        Social History     Socioeconomic History     Marital status:      Spouse name: Not on file     Number of children: Not on file     Years of education: Not on file     Highest education level: Not on file   Occupational History     Not on file    Social Needs     Financial resource strain: Not on file     Food insecurity:     Worry: Not on file     Inability: Not on file     Transportation needs:     Medical: Not on file     Non-medical: Not on file   Tobacco Use     Smoking status: Former Smoker     Packs/day: 0.00     Last attempt to quit: 7/15/1993     Years since quittin.3     Smokeless tobacco: Never Used     Tobacco comment: Quit 20 years ago.   Substance and Sexual Activity     Alcohol use: Yes     Alcohol/week: 7.0 standard drinks     Types: 7 Shots of liquor per week     Frequency: 4 or more times a week     Drinks per session: 3 or 4     Binge frequency: Monthly     Comment: maybe 5-7 rum and cokes or beer a week     Drug use: No     Sexual activity: Yes     Partners: Female   Lifestyle     Physical activity:     Days per week: 7 days     Minutes per session: 30 min     Stress: Very much   Relationships     Social connections:     Talks on phone: More than three times a week     Gets together: Once a week     Attends Gnosticist service: Never     Active member of club or organization: No     Attends meetings of clubs or organizations: Not on file     Relationship status:      Intimate partner violence:     Fear of current or ex partner: Not on file     Emotionally abused: Not on file     Physically abused: Not on file     Forced sexual activity: Not on file   Other Topics Concern     Parent/sibling w/ CABG, MI or angioplasty before 65F 55M? 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       Outpatient Encounter Medications as of 2019   Medication Sig Dispense Refill     allopurinol (ZYLOPRIM) 100 MG tablet Take 2 tablets (200 mg) by mouth 2 times daily 240 tablet 1     aspirin 81 MG tablet Take 1 tablet by mouth daily.       carvedilol (COREG) 12.5 MG tablet Take 1 tablet (12.5 mg) by mouth 2 times  daily (with meals) 180 tablet 1     cyclobenzaprine (FLEXERIL) 5 MG tablet Take 1 tablet (5 mg) by mouth At Bedtime 30 tablet 3     fenofibrate (LOFIBRA) 54 MG tablet Take 1 tablet (54 mg) by mouth daily 90 tablet 3     indomethacin (INDOCIN) 50 MG capsule Take 1 capsule (50 mg) by mouth 3 times daily (with meals) 42 capsule 1     Multiple Vitamins-Minerals (MULTIVITAMIN ADULT) CHEW Take 1 chew tab by mouth daily       olmesartan (BENICAR) 40 MG tablet Take 1 tablet (40 mg) by mouth daily 90 tablet 1     omega-3 acid ethyl esters (LOVAZA) 1 g capsule TAKE 2 CAP BY MOUTH DAILY 180 capsule 1     simvastatin (ZOCOR) 40 MG tablet Take 1 tablet (40 mg) by mouth At Bedtime 90 tablet 3     colchicine (COLCRYS) 0.6 MG tablet Take 2 tablets at the first sign of flare, take 1 additional tablet one hour later. (Patient not taking: Reported on 7/25/2019) 30 tablet 3     metroNIDAZOLE (METROGEL) 0.75 % external gel Apply topically 2 times daily To face (Patient not taking: Reported on 7/8/2019) 45 g 3     order for DME BP cuff, severe HTN, needs to monitor daily (Patient not taking: Reported on 7/25/2019) 1 Device 0     sulfamethoxazole-trimethoprim (BACTRIM DS/SEPTRA DS) 800-160 MG tablet Take 1 tablet by mouth 2 times daily (Patient not taking: Reported on 7/25/2019) 20 tablet 10     No facility-administered encounter medications on file as of 11/11/2019.        Review Of Systems:  Skin: As above  Eyes: negative  Ears/Nose/Throat: negative  Respiratory: No shortness of breath, dyspnea on exertion, cough, or hemoptysis  Cardiovascular: negative  Gastrointestinal: negative  Genitourinary: negative  Musculoskeletal: negative  Neurologic: negative  Psychiatric: negative  Hematologic/Lymphatic/Immunologic: negative  Endocrine: negative      Objective:     BP (!) 157/89   Pulse 52   SpO2 97%   Eyes: Conjunctivae/lids: Normal   ENT: Lips:  Normal  MSK: Normal  Cardiovascular: Peripheral edema none  Pulm: Breathing  Normal  Neuro/Psych: Orientation: Normal; Mood/Affect: Normal, NAD, WDWN  Pt accompanied by: self  Following areas examined: Scalp, face, eyelids, lips, neck, chest, abdomen, back, buttocks, and R&L upper and lower extremities. Pt defers exam of groin and genitals.   Ro skin type:i   Findings:  Red smooth well-defined macules on trunk and extremities.  Brown, stuck-on scaly appearing papules on trunk and extremities.  Well circumscribed macules with symmetric color distribution on trunk and extremities.  Tan WD smooth macules on face, neck, trunk, and extremities.  Red david nose, cheeks, and chin. Blue/red smooth linear macules on nose and cheeks  Pink scaly macule/s on temples, forehead  Rhytides, hypo/hyperpigmentation, and atrophy  Yellow/brown deep seated papules on right medial foot x 4 papules    Assessment and Plan:     1) Cherry angiomas, Seborrheic keratoses, Benign nevi, Lentigines     I discussed the specifics of tumor, prognosis, and genetics of benign lesions.  I explained that treatment of these lesions would be purely cosmetic and not medically neccessary.  I discussed with patient different removal options including excision, cryotherapy, cautery and /or laser.  Lesion may recur and/or may not completely resolve. May need additional treatment.     2) rosacea  Continue metrogel bid  Pt pt is helping  Ttreatment options including topical Finacea, Sulfa, Metronidazole, Ivermectin, and oral Doxycyline. Redness of rosacea is more difficult to treat and treatment is considered cosmetic.  Redness can be treated with lasers, Mirvaso or Rofade.    Avoid triggers such as sunlight, spicy foods, and alcohol. Wear sunscreen everyday of the year.     Wear a sunscreen with at least SPF 30 on your face, ears, neck and V of the chest daily. Wear sunscreen on other areas of the body if those areas are exposed to the sun throughout the day. Sunscreens can contain physical and/or chemical blockers. Physical  blockers are less likely to clog pores, these include zinc oxide and titanium dioxide. Reapply every two hour and after swimming. Sunscreen examples include Neutrogena, CeraVe, Blue Lizard, Elta MD and many others.  3) dyshidrotic eczema vs psoriasis  .  Apply a thin layer of lidex to affected area 2x a day for 2 weeks. Tapering with improvement. Do not use on face or body folds.  Discussed side effects of topical steroids including but not limited to atrophy (skin thinning), striae (stretch marks) telangiectasias, steroid acne, and others. Do not apply to normal skin. Do not apply to discolored skin that does not have rash present. Educated patient on post inflammatory hyperpigmentation.     4) Actinic keratoses x 12 and solar elastosis    There is a risk of AKs developing into a SCC.   Treatment options include LN2 vs PDT vs Efudex. Pt elects efudex  5 Fluorouracil (Efudex)     Apply a pea-sized amount two times a day for 2 weeks to temples and forehead.     Efudex is a topical chemotherapy medication. Do not use if you are pregnant or have a hypersensitive to to the drug, class, or any components of the medication. Very rare to have any systemic absorption.    Common reactions include and are not limited to:  Application site reaction, pruritis, pain, bleeding, edema, stinging, burning, scaling, crusting, erythema, photosensitivity, and allergic contact dermatitis. Do not let children get into medication and do not let pets eat medication.     Any questions or concerns please call or follow up in clinic.     Signs and Symptoms of non-melanoma skin cancer and ABCDEs of melanoma reviewed with patient. Patient encouraged to perform monthly self skin exams and educated on how to perform them. UV precautions reviewed with patient. Patient was asked about new or changing moles/lesions on body.   Wear a sunscreen with at least SPF 30 on your face, ears, neck and V of the chest daily. Wear sunscreen on other areas of the  body if those areas are exposed to the sun throughout the day. Sunscreens can contain physical and/or chemical blockers. Physical blockers are less likely to clog pores, these include zinc oxide and titanium dioxide. Reapply every two hour and after swimming. Sunscreen examples include Neutrogena, CeraVe, Blue Lizard, Elta MD and many others.    Proper skin care from [facility]:    -Eliminate harsh soaps as they strip the natural oils from the skin, often resulting in dry itchy skin ( i.e. Dial, Zest, [place])  -Use mild soaps such as Cetaphil or Dove Sensitive Skin in the shower. You do not need to use soap on arms, legs, and trunk every time you shower unless visibly soiled.   -Avoid hot or cold showers.  -After showering, lightly dry off and apply moisturizing within 2-3 minutes. This will help trap moisture in the skin.   -Aggressive use of a moisturizer at least 1-2 times a day to the entire body (including -Vanicream, Cetaphil, Aquaphor or Cerave) and moisturize hands after every washing.  -We recommend using moisturizers that come in a tub that needs to be scooped out, not a pump. This has more of an oil base. It will hold moisture in your skin much better than a water base moisturizer. The above recommended are non-pore clogging.       Bill to follow up with Primary Care provider regarding elevated blood pressure.          Follow up in 2-3 months to recheck aks

## 2019-11-11 NOTE — LETTER
11/11/2019         RE: Emery Zayas  [address] [place] [id number]        Dear Colleague,    Thank you for referring your patient, Emery Zayas, to the [facility]. Please see a copy of my visit note below.    HPI:  Emery Zayas is a 64 year old male patient here today for FBE. Pt has a history of aks on right dorsal hand and face.  Patient states this has been present for a while.  Patient reports the following symptoms: improvement .  Patient reports the following previous treatments: ln2.  Patient reports the following modifying factors: none.  Associated symptoms: none.  Patient has no other skin complaints today.  Remainder of the HPI, Meds, PMH, Allergies, FH, and SH was reviewed in chart.    Pertinent Hx:   Aks. No personal or family history of skin cancer.     Past Medical History:   Diagnosis Date     Alcoholism (H)     Treated 1980's, denies any recent problems with withdrawal when skips days drinking.     Arthritis      Chronic low back pain      Gout     possible     Herniated intervertebral disk     thoracic     Hypertension      RBBB (right bundle branch block)     Present for several years       Past Surgical History:   Procedure Laterality Date     BACK SURGERY       Back surgery x 4       ESOPHAGOSCOPY, GASTROSCOPY, DUODENOSCOPY (EGD), COMBINED  2000    normal     TONSILLECTOMY          Family History   Problem Relation Age of Onset     Hypertension Father      Heart Disease Father         Angioplasty in late 60's or early 70's.     Unknown/Adopted Father      Hypertension Sister      Kidney Disease Sister      Unknown/Adopted Sister      Cancer Mother         lung     Unknown/Adopted Mother      Diabetes Mother      Thyroid Disease Sister      Unknown/Adopted Sister      Unknown/Adopted Maternal Grandmother      Substance Abuse Maternal Grandfather      Depression Paternal Grandmother      Unknown/Adopted Paternal Grandfather       Diabetes Sister        Social History     Socioeconomic History     Marital status:      Spouse name: Not on file     Number of children: Not on file     Years of education: Not on file     Highest education level: Not on file   Occupational History     Not on file   Social Needs     Financial resource strain: Not on file     Food insecurity:     Worry: Not on file     Inability: Not on file     Transportation needs:     Medical: Not on file     Non-medical: Not on file   Tobacco Use     Smoking status: Former Smoker     Packs/day: 0.00     Last attempt to quit: 7/15/1993     Years since quittin.3     Smokeless tobacco: Never Used     Tobacco comment: Quit 20 years ago.   Substance and Sexual Activity     Alcohol use: Yes     Alcohol/week: 7.0 standard drinks     Types: 7 Shots of liquor per week     Frequency: 4 or more times a week     Drinks per session: 3 or 4     Binge frequency: Monthly     Comment: maybe 5-7 rum and cokes or beer a week     Drug use: No     Sexual activity: Yes     Partners: Female   Lifestyle     Physical activity:     Days per week: 7 days     Minutes per session: 30 min     Stress: Very much   Relationships     Social connections:     Talks on phone: More than three times a week     Gets together: Once a week     Attends Worship service: Never     Active member of club or organization: No     Attends meetings of clubs or organizations: Not on file     Relationship status:      Intimate partner violence:     Fear of current or ex partner: Not on file     Emotionally abused: Not on file     Physically abused: Not on file     Forced sexual activity: Not on file   Other Topics Concern     Parent/sibling w/ CABG, MI or angioplasty before 65F 55M? 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       Outpatient Encounter Medications as of 2019    Medication Sig Dispense Refill     allopurinol (ZYLOPRIM) 100 MG tablet Take 2 tablets (200 mg) by mouth 2 times daily 240 tablet 1     aspirin 81 MG tablet Take 1 tablet by mouth daily.       carvedilol (COREG) 12.5 MG tablet Take 1 tablet (12.5 mg) by mouth 2 times daily (with meals) 180 tablet 1     cyclobenzaprine (FLEXERIL) 5 MG tablet Take 1 tablet (5 mg) by mouth At Bedtime 30 tablet 3     fenofibrate (LOFIBRA) 54 MG tablet Take 1 tablet (54 mg) by mouth daily 90 tablet 3     indomethacin (INDOCIN) 50 MG capsule Take 1 capsule (50 mg) by mouth 3 times daily (with meals) 42 capsule 1     Multiple Vitamins-Minerals (MULTIVITAMIN ADULT) CHEW Take 1 chew tab by mouth daily       olmesartan (BENICAR) 40 MG tablet Take 1 tablet (40 mg) by mouth daily 90 tablet 1     omega-3 acid ethyl esters (LOVAZA) 1 g capsule TAKE 2 CAP BY MOUTH DAILY 180 capsule 1     simvastatin (ZOCOR) 40 MG tablet Take 1 tablet (40 mg) by mouth At Bedtime 90 tablet 3     colchicine (COLCRYS) 0.6 MG tablet Take 2 tablets at the first sign of flare, take 1 additional tablet one hour later. (Patient not taking: Reported on 7/25/2019) 30 tablet 3     metroNIDAZOLE (METROGEL) 0.75 % external gel Apply topically 2 times daily To face (Patient not taking: Reported on 7/8/2019) 45 g 3     order for DME BP cuff, severe HTN, needs to monitor daily (Patient not taking: Reported on 7/25/2019) 1 Device 0     sulfamethoxazole-trimethoprim (BACTRIM DS/SEPTRA DS) 800-160 MG tablet Take 1 tablet by mouth 2 times daily (Patient not taking: Reported on 7/25/2019) 20 tablet 10     No facility-administered encounter medications on file as of 11/11/2019.        Review Of Systems:  Skin: As above  Eyes: negative  Ears/Nose/Throat: negative  Respiratory: No shortness of breath, dyspnea on exertion, cough, or hemoptysis  Cardiovascular: negative  Gastrointestinal: negative  Genitourinary: negative  Musculoskeletal: negative  Neurologic: negative  Psychiatric:  negative  Hematologic/Lymphatic/Immunologic: negative  Endocrine: negative      Objective:     BP (!) 157/89   Pulse 52   SpO2 97%   Eyes: Conjunctivae/lids: Normal   ENT: Lips:  Normal  MSK: Normal  Cardiovascular: Peripheral edema none  Pulm: Breathing Normal  Neuro/Psych: Orientation: Normal; Mood/Affect: Normal, NAD, WDWN  Pt accompanied by: self  Following areas examined: Scalp, face, eyelids, lips, neck, chest, abdomen, back, buttocks, and R&L upper and lower extremities. Pt defers exam of groin and genitals.   Ro skin type:i   Findings:  Red smooth well-defined macules on trunk and extremities.  Brown, stuck-on scaly appearing papules on trunk and extremities.  Well circumscribed macules with symmetric color distribution on trunk and extremities.  Tan WD smooth macules on face, neck, trunk, and extremities.  Red david nose, cheeks, and chin. Blue/red smooth linear macules on nose and cheeks  Pink scaly macule/s on temples, forehead  Rhytides, hypo/hyperpigmentation, and atrophy  Yellow/brown deep seated papules on right medial foot x 4 papules    Assessment and Plan:     1) Cherry angiomas, Seborrheic keratoses, Benign nevi, Lentigines     I discussed the specifics of tumor, prognosis, and genetics of benign lesions.  I explained that treatment of these lesions would be purely cosmetic and not medically neccessary.  I discussed with patient different removal options including excision, cryotherapy, cautery and /or laser.  Lesion may recur and/or may not completely resolve. May need additional treatment.     2) rosacea  Continue metrogel bid  Pt pt is helping  Ttreatment options including topical Finacea, Sulfa, Metronidazole, Ivermectin, and oral Doxycyline. Redness of rosacea is more difficult to treat and treatment is considered cosmetic.  Redness can be treated with lasers, Mirvaso or Rofade.    Avoid triggers such as sunlight, spicy foods, and alcohol. Wear sunscreen everyday of the year.      Wear a sunscreen with at least SPF 30 on your face, ears, neck and V of the chest daily. Wear sunscreen on other areas of the body if those areas are exposed to the sun throughout the day. Sunscreens can contain physical and/or chemical blockers. Physical blockers are less likely to clog pores, these include zinc oxide and titanium dioxide. Reapply every two hour and after swimming. Sunscreen examples include Neutrogena, CeraVe, Blue Lizard, Elta MD and many others.  3) dyshidrotic eczema vs psoriasis  .  Apply a thin layer of lidex to affected area 2x a day for 2 weeks. Tapering with improvement. Do not use on face or body folds.  Discussed side effects of topical steroids including but not limited to atrophy (skin thinning), striae (stretch marks) telangiectasias, steroid acne, and others. Do not apply to normal skin. Do not apply to discolored skin that does not have rash present. Educated patient on post inflammatory hyperpigmentation.     4) Actinic keratoses x 12 and solar elastosis    There is a risk of AKs developing into a SCC.   Treatment options include LN2 vs PDT vs Efudex. Pt elects efudex  5 Fluorouracil (Efudex)     Apply a pea-sized amount two times a day for 2 weeks to temples and forehead.     Efudex is a topical chemotherapy medication. Do not use if you are pregnant or have a hypersensitive to to the drug, class, or any components of the medication. Very rare to have any systemic absorption.    Common reactions include and are not limited to:  Application site reaction, pruritis, pain, bleeding, edema, stinging, burning, scaling, crusting, erythema, photosensitivity, and allergic contact dermatitis. Do not let children get into medication and do not let pets eat medication.     Any questions or concerns please call or follow up in clinic.     Signs and Symptoms of non-melanoma skin cancer and ABCDEs of melanoma reviewed with patient. Patient encouraged to perform monthly self skin exams and  educated on how to perform them. UV precautions reviewed with patient. Patient was asked about new or changing moles/lesions on body.   Wear a sunscreen with at least SPF 30 on your face, ears, neck and V of the chest daily. Wear sunscreen on other areas of the body if those areas are exposed to the sun throughout the day. Sunscreens can contain physical and/or chemical blockers. Physical blockers are less likely to clog pores, these include zinc oxide and titanium dioxide. Reapply every two hour and after swimming. Sunscreen examples include Neutrogena, CeraVe, Blue Lizard, Elta MD and many others.    Proper skin care from [facility]:    -Eliminate harsh soaps as they strip the natural oils from the skin, often resulting in dry itchy skin ( i.e. Dial, Zest, [place])  -Use mild soaps such as Cetaphil or Dove Sensitive Skin in the shower. You do not need to use soap on arms, legs, and trunk every time you shower unless visibly soiled.   -Avoid hot or cold showers.  -After showering, lightly dry off and apply moisturizing within 2-3 minutes. This will help trap moisture in the skin.   -Aggressive use of a moisturizer at least 1-2 times a day to the entire body (including -Vanicream, Cetaphil, Aquaphor or Cerave) and moisturize hands after every washing.  -We recommend using moisturizers that come in a tub that needs to be scooped out, not a pump. This has more of an oil base. It will hold moisture in your skin much better than a water base moisturizer. The above recommended are non-pore clogging.               Follow up in 2-3 months to recheck aks      Again, thank you for allowing me to participate in the care of your patient.        Sincerely,        Tracy Pate PA-C

## 2019-11-11 NOTE — PATIENT INSTRUCTIONS
Proper skin care from [facility]:    -Eliminate harsh soaps as they strip the natural oils from the skin, often resulting in dry itchy skin ( i.e. Dial, Zest, [place])  -Use mild soaps such as Cetaphil or Dove Sensitive Skin in the shower. You do not need to use soap on arms, legs, and trunk every time you shower unless visibly soiled.   -Avoid hot or cold showers.  -After showering, lightly dry off and apply moisturizing within 2-3 minutes. This will help trap moisture in the skin.   -Aggressive use of a moisturizer at least 1-2 times a day to the entire body (including -Vanicream, Cetaphil, Aquaphor or Cerave) and moisturize hands after every washing.  -We recommend using moisturizers that come in a tub that needs to be scooped out, not a pump. This has more of an oil base. It will hold moisture in your skin much better than a water base moisturizer. The above recommended are non-pore clogging.      Wear a sunscreen with at least SPF 30 on your face, ears, neck and V of the chest daily. Wear sunscreen on other areas of the body if those areas are exposed to the sun throughout the day. Sunscreens can contain physical and/or chemical blockers. Physical blockers are less likely to clog pores, these include zinc oxide and titanium dioxide. Reapply every two hour and after swimming. Sunscreen examples include Neutrogena, CeraVe, Blue Lizard, Elta MD and many others.    UV radiation  UVA radiation remains constant throughout the day and throughout the year. It is a longer wavelength than UVB and therefore penetrates deeper into the skin leading to immediate and delayed tanning, photoaging, and skin cancer. 70-80% of UVA and UVB radiation occurs between the hours of 10am-2pm.  UVB radiation  UVB radiation causes the most harmful effects and is more significant during the summer months. However, snow and ice can reflect UVB radiation leading to skin damage during the winter months as well. UVB radiation is  responsible for tanning, burning, inflammation, delayed erythema (pinkness), pigmentation (brown spots), and skin cancer.     5 Fluorouracil (Efudex)     Apply a pea-sized amount two times a day for 2 weeks to temples and forehead.     Efudex is a topical chemotherapy medication. Do not use if you are pregnant or have a hypersensitive to to the drug, class, or any components of the medication. Very rare to have any systemic absorption.    Common reactions include and are not limited to:  Application site reaction, pruritis, pain, bleeding, edema, stinging, burning, scaling, crusting, erythema, photosensitivity, and allergic contact dermatitis. Do not let children get into medication and do not let pets eat medication.     Any questions or concerns please call or follow up in clinic.     Right foot  Apply a thin layer of lidex to affected area 2x a day for 2 weeks. Tapering with improvement. Do not use on face or body folds.  Discussed side effects of topical steroids including but not limited to atrophy (skin thinning), striae (stretch marks) telangiectasias, steroid acne, and others. Do not apply to normal skin. Do not apply to discolored skin that does not have rash present. Educated patient on post inflammatory hyperpigmentation.

## 2019-12-03 DIAGNOSIS — M1A.9XX0 CHRONIC GOUT WITHOUT TOPHUS, UNSPECIFIED CAUSE, UNSPECIFIED SITE: ICD-10-CM

## 2019-12-03 NOTE — TELEPHONE ENCOUNTER
"Requested Prescriptions   Pending Prescriptions Disp Refills     allopurinol (ZYLOPRIM) 100 MG tablet [Pharmacy Med Name: ALLOPURINOL 100 MG TABLET]    Last Written Prescription Date:  6/11/19  Last Fill Quantity: 240 tablet,  # refills: 1   Last office visit: 6/11/2019 with prescribing provider:  Jerry     Future Office Visit:   Next 5 appointments (look out 90 days)    Jan 13, 2020 10:00 AM CST  Return Visit with Tracy Pate PA-C  [facility] ([facility]) [address] [address] [phone]          360 tablet 1     Sig: TAKE 2 TABLETS (200 MG) BY MOUTH 2 TIMES DAILY       Gout Agents Protocol Passed - 12/3/2019  1:44 AM        Passed - CBC on file in past 12 months     Recent Labs   Lab Test 06/11/19 0818   WBC 6.1   RBC 4.36*   HGB 14.5   HCT 41.6                    Passed - ALT on file in past 12 months     Recent Labs   Lab Test 06/11/19 0818   ALT 27             Passed - Has Uric Acid on file in past 12 months and value is less than 6     Recent Labs   Lab Test 06/11/19 0818   URIC 3.4*     If level is 6mg/dL or greater, ok to refill one time and refer to provider.           Passed - Recent (12 mo) or future (30 days) visit within the authorizing provider's specialty     Patient has had an office visit with the authorizing provider or a provider within the authorizing providers department within the previous 12 mos or has a future within next 30 days. See \"Patient Info\" tab in inbasket, or \"Choose Columns\" in Meds & Orders section of the refill encounter.              Passed - Medication is active on med list        Passed - Patient is age 18 or older        Passed - Normal serum creatinine on file in the past 12 months     Recent Labs   Lab Test 06/11/19 0818   CR 1.00             "

## 2019-12-04 RX ORDER — ALLOPURINOL 100 MG/1
200 TABLET ORAL 2 TIMES DAILY
Qty: 360 TABLET | Refills: 1 | Status: SHIPPED | OUTPATIENT
Start: 2019-12-04 | End: 2020-05-28

## 2019-12-04 NOTE — TELEPHONE ENCOUNTER
Prescription approved per [facility] Refill Protocol.  Clarisa Farrell RN

## 2019-12-15 ENCOUNTER — APPOINTMENT (OUTPATIENT)
Dept: GENERAL RADIOLOGY | Facility: CLINIC | Age: 64
End: 2019-12-15
Attending: EMERGENCY MEDICINE
Payer: MEDICARE

## 2019-12-15 ENCOUNTER — HOSPITAL ENCOUNTER (EMERGENCY)
Facility: CLINIC | Age: 64
Discharge: HOME | End: 2019-12-15
Attending: EMERGENCY MEDICINE | Admitting: EMERGENCY MEDICINE
Payer: MEDICARE

## 2019-12-15 VITALS
HEART RATE: 59 BPM | TEMPERATURE: 97.3 F | SYSTOLIC BLOOD PRESSURE: 168 MMHG | WEIGHT: 200.18 LBS | DIASTOLIC BLOOD PRESSURE: 97 MMHG | RESPIRATION RATE: 16 BRPM | OXYGEN SATURATION: 96 % | BODY MASS INDEX: 32.31 KG/M2

## 2019-12-15 DIAGNOSIS — R07.9 CHEST PAIN, UNSPECIFIED TYPE: ICD-10-CM

## 2019-12-15 DIAGNOSIS — I10 BENIGN ESSENTIAL HYPERTENSION: ICD-10-CM

## 2019-12-15 LAB
ANION GAP SERPL CALCULATED.3IONS-SCNC: 6 MMOL/L (ref 3–14)
BASOPHILS # BLD AUTO: 0 10E9/L (ref 0–0.2)
BASOPHILS NFR BLD AUTO: 0.3 %
BUN SERPL-MCNC: 21 MG/DL (ref 7–30)
CALCIUM SERPL-MCNC: 9 MG/DL (ref 8.5–10.1)
CHLORIDE SERPL-SCNC: 107 MMOL/L (ref 94–109)
CO2 SERPL-SCNC: 27 MMOL/L (ref 20–32)
CREAT SERPL-MCNC: 0.92 MG/DL (ref 0.66–1.25)
DIFFERENTIAL METHOD BLD: NORMAL
EOSINOPHIL # BLD AUTO: 0.1 10E9/L (ref 0–0.7)
EOSINOPHIL NFR BLD AUTO: 1.7 %
ERYTHROCYTE [DISTWIDTH] IN BLOOD BY AUTOMATED COUNT: 12.7 % (ref 10–15)
GFR SERPL CREATININE-BSD FRML MDRD: 86 ML/MIN/{1.73_M2}
GLUCOSE SERPL-MCNC: 116 MG/DL (ref 70–99)
HCT VFR BLD AUTO: 42.8 % (ref 40–53)
HGB BLD-MCNC: 14.6 G/DL (ref 13.3–17.7)
IMM GRANULOCYTES # BLD: 0 10E9/L (ref 0–0.4)
IMM GRANULOCYTES NFR BLD: 0.2 %
LYMPHOCYTES # BLD AUTO: 0.9 10E9/L (ref 0.8–5.3)
LYMPHOCYTES NFR BLD AUTO: 15.6 %
MCH RBC QN AUTO: 32.6 PG (ref 26.5–33)
MCHC RBC AUTO-ENTMCNC: 34.1 G/DL (ref 31.5–36.5)
MCV RBC AUTO: 96 FL (ref 78–100)
MONOCYTES # BLD AUTO: 0.5 10E9/L (ref 0–1.3)
MONOCYTES NFR BLD AUTO: 8.8 %
NEUTROPHILS # BLD AUTO: 4.2 10E9/L (ref 1.6–8.3)
NEUTROPHILS NFR BLD AUTO: 73.4 %
NRBC # BLD AUTO: 0 10*3/UL
NRBC BLD AUTO-RTO: 0 /100
PLATELET # BLD AUTO: 178 10E9/L (ref 150–450)
POTASSIUM SERPL-SCNC: 4.2 MMOL/L (ref 3.4–5.3)
RBC # BLD AUTO: 4.48 10E12/L (ref 4.4–5.9)
SODIUM SERPL-SCNC: 140 MMOL/L (ref 133–144)
TROPONIN I SERPL-MCNC: <0.015 UG/L (ref 0–0.04)
TROPONIN I SERPL-MCNC: <0.015 UG/L (ref 0–0.04)
WBC # BLD AUTO: 5.8 10E9/L (ref 4–11)

## 2019-12-15 PROCEDURE — 25000125 ZZHC RX 250: Performed by: EMERGENCY MEDICINE

## 2019-12-15 PROCEDURE — 80048 BASIC METABOLIC PNL TOTAL CA: CPT | Performed by: EMERGENCY MEDICINE

## 2019-12-15 PROCEDURE — 25000132 ZZH RX MED GY IP 250 OP 250 PS 637: Mod: GY | Performed by: EMERGENCY MEDICINE

## 2019-12-15 PROCEDURE — 99285 EMERGENCY DEPT VISIT HI MDM: CPT | Mod: 25

## 2019-12-15 PROCEDURE — 93005 ELECTROCARDIOGRAM TRACING: CPT

## 2019-12-15 PROCEDURE — 84484 ASSAY OF TROPONIN QUANT: CPT | Mod: 91 | Performed by: EMERGENCY MEDICINE

## 2019-12-15 PROCEDURE — 85025 COMPLETE CBC W/AUTO DIFF WBC: CPT | Performed by: EMERGENCY MEDICINE

## 2019-12-15 PROCEDURE — 71046 X-RAY EXAM CHEST 2 VIEWS: CPT

## 2019-12-15 RX ORDER — NITROGLYCERIN 0.4 MG/1
0.4 TABLET SUBLINGUAL EVERY 5 MIN PRN
Status: DISCONTINUED | OUTPATIENT
Start: 2019-12-15 | End: 2019-12-15 | Stop reason: HOSPADM

## 2019-12-15 RX ADMIN — NITROGLYCERIN 0.4 MG: 0.4 TABLET SUBLINGUAL at 12:53

## 2019-12-15 RX ADMIN — LIDOCAINE HYDROCHLORIDE 30 ML: 20 SOLUTION ORAL; TOPICAL at 13:42

## 2019-12-15 ASSESSMENT — ENCOUNTER SYMPTOMS
HEADACHES: 0
LIGHT-HEADEDNESS: 1
NUMBNESS: 1
CHEST TIGHTNESS: 1

## 2019-12-15 NOTE — ED AVS SNAPSHOT
[facility] Emergency Department  [address]  [facility] [address]  Phone:  [phone]  Fax:  [phone]                                    Emery Zayas   MRN: [id number]    Department:  [facility] Emergency Department   Date of Visit:  12/15/2019           After Visit Summary Signature Page    I have received my discharge instructions, and my questions have been answered. I have discussed any challenges I see with this plan with the nurse or doctor.    ..........................................................................................................................................  Patient/Patient Representative Signature      ..........................................................................................................................................  Patient Representative Print Name and Relationship to Patient    ..................................................               ................................................  Date                                   Time    ..........................................................................................................................................  Reviewed by Signature/Title    ...................................................              ..............................................  Date                                               Time          22EPIC Rev 08/18

## 2019-12-15 NOTE — ED PROVIDER NOTES
History     Chief Complaint:  Chest Pain and Dizziness    HPI   Emery Zayas is a 64 year old male with a history of alcoholism, aortic dilation, hypertension, and anxiety who presents to the emergency department for evaluation of chest pain and dizziness. The patient reports that this morning he woke up around 0200 feeling light headed and had a hard time falling back asleep. Upon getting up later in the morning he was still very lightheaded and had some left arm numbness and chest tightness. He rates his tightness as a 5-6/10 and describes it as feeling like indigestion. He states that he has felt similar symptoms before when his blood pressure is high after missing some doses of his blood pressure medications. He also mentions a slight headache this morning. He states that he has not missed any doses of his blood pressure medication recently. He denies any current headache. The patient says that he last had an alcoholic beverage yesterday.    Allergies:  No Known Drug Allergies    Medications:    Zyloprim  Aspirin 81 mg  Coreg  Colcrys  Flexeril  Lofibra  Indocin  Benicar  Lovaza  Zorcor  Bactrim    Past Medical History:    Alcoholism  Arthritis  Chronic low back pain  Gout  Herniated intervertebral disk  Hypertension  RBBB  Aorta dilatation  Anxiety  Hyperlipidemia    Past Surgical History:    Back surgery x4  EGD  Tonsillectomy    Family History:    Hypertension  Heart disease  Kidney disease  Cancer  Thyroid disease  Diabetes    Social History:  The patient was accompanied to the ED by his wife.  Smoking Status: Former  Smokeless Tobacco: Never  Alcohol Use: Yes- several per day  Drug Use: No  Marital Status:        Review of Systems   Respiratory: Positive for chest tightness.    Neurological: Positive for light-headedness and numbness. Negative for headaches.   All other systems reviewed and are negative.      Physical Exam   Vitals:  Patient Vitals for the past 24 hrs:   BP Temp Temp src  Pulse Heart Rate Resp SpO2 Weight   12/15/19 1328 -- -- -- -- 57 27 96 % --   12/15/19 1324 (!) 152/80 -- -- 57 -- -- -- --   12/15/19 1312 137/80 -- -- 58 60 11 98 % --   12/15/19 1308 (!) 140/93 -- -- 61 56 10 97 % --   12/15/19 1304 124/83 -- -- 62 58 9 94 % --   12/15/19 1301 127/84 -- -- 66 64 8 94 % --   12/15/19 1300 127/84 -- -- 66 62 10 94 % --   12/15/19 1256 134/80 -- -- 57 55 13 95 % --   12/15/19 1245 (!) 157/80 -- -- 53 58 10 100 % --   12/15/19 1242 (!) 161/86 -- -- 55 -- -- -- --   12/15/19 1237 (!) 177/90 -- -- 57 -- -- -- --   12/15/19 1149 (!) 198/101 97.3  F (36.3  C) Temporal 55 -- 18 100 % 90.8 kg (200 lb 2.8 oz)     Physical Exam  Constitutional: Well developed, nontox appearance  Head: Atraumatic.   Mouth/Throat: Oropharynx is clear and moist.   Neck:  no stridor  Eyes: no scleral icterus  Cardiovascular: Reg bradycardia, 2+ bilat radial, PT pulses  Pulmonary/Chest: nml resp effort, Clear BS bilat, chest NT  Abdominal: ND, +BS, soft, NT, no rebound or guarding   Ext: Warm, well perfused, no edema  Neurological: A&O, symmetric facies, moves ext x4  Skin: Skin is warm and dry.   Psychiatric: Behavior is normal. Thought content normal.   Nursing note and vitals reviewed.        Emergency Department Course   ECG:  Indication: Chest pain  Completed at 1200.  Read at 1220.   Rate 57 bpm. OH interval 152. QRS duration 136. QT/QTc 428/416. P-R-T axes 52 -19 12.  Sinus bradycardia  Right bundle branch block  Abnormal ECG.  No significant change when compared to EKG dated 07/28/2019.    Imaging:   Radiographic findings were communicated with the patient who voiced understanding of the findings.     XR Chest 2 Views  No evidence for acute infiltrate or pleural effusions.  Stable cardiac size. No pulmonary vascular congestion.  Reading per radiology.    Laboratory:  CBC: WNL. (WBC 5.8, HGB 14.6, )   BMP: Glucose 116 (H) o/w AWNL (Creatinine 0.92)    Troponin (Collected 1246): <0.015  Troponin  (Collected 1510): <0.015    Interventions:  1253 Nitrostat 0.4 mg Sublingual  1342 Xylocaine 15 mL PO    Emergency Department Course:  Nursing notes and vitals reviewed. 1240 I performed an exam of the patient as documented above.     IV inserted. Medicine administered as documented above. Blood drawn. This was sent to the lab for further testing, results above.    The patient was sent for a chest XR while in the emergency department, findings above.     1503 I rechecked the patient and discussed the results of his workup thus far.     1555 I rechecked and updated the patient.    Findings and plan explained to the Patient. Patient discharged home with instructions regarding supportive care, medications, and reasons to return. The importance of close follow-up was reviewed. The patient was prescribed Prilosec.    I personally reviewed the laboratory results with the Patient and answered all related questions prior to discharge.    Impression & Plan      Medical Decision Makin y.o. M presenting w/ chest tightness, hypertension    DDx includes hypertensive urgency vs emergency, ACS, unstable angina, GERD, chest pain NOS, pna, ptx.  Pt with similar symptoms previously w/ hypertension for which he was admitted with neg stress test and serial troponin levels.  BP improved in ED after SL ntg was given and remained significantly improved for the rest of the pt's ED stay.  EKG as noted above.  Labs as noted above unremarkable with trop neg x2.  CXR neg for acute cardiopul dx.  GI cocktail given with most significant relief of symptoms.  Given neg trop x2, symptoms somewhat atypical for primary ACS, BP improved, I think the pt is safe for discharge w/ plan for outpt stress given most recent stress test 2 years ago.  Advised to monitor BP at home and f/u with PCP this week. Recommendations given regarding follow up with PCP and return to the emergency department as needed for new or worsening symptoms.  Pt and wife  counseled on all results, diagnosis and disposition.  They are understanding and agreeable to plan. Patient discharged in stable condition.        Diagnosis:    ICD-10-CM    1. Chest pain, unspecified type R07.9 Discharge Order: F/U with Cardiac  HIEN     NM Exercise stress test (nuc card)   2. Benign essential hypertension I10 Discharge Order: F/U with Cardiac  HIEN     NM Exercise stress test (nuc card)       Disposition:  discharged to home    Discharge Medications:  New Prescriptions    OMEPRAZOLE (PRILOSEC) 20 MG DR CAPSULE    Take 1 capsule (20 mg) by mouth daily     Arturo BOURNE, am serving as a scribe on 12/15/2019 at 12:20 PM to personally document services performed by Chuck Maravilla MD based on my observations and the provider's statements to me.     Arturo Gan  12/15/2019   [facility] EMERGENCY DEPARTMENT       Chuck Maravilla MD  12/15/19 2382

## 2019-12-15 NOTE — DISCHARGE INSTRUCTIONS
Please purchase a blood pressure cuff and keep track of your blood pressure by checking once or twice daily and recording the values.  Please discuss this with your primary doctor when you follow-up in the next 1 to 2 weeks.    Please return to the emergency department as needed for new or worsening symptoms including worsening chest pain, fainting, significant shortness of breath, vomiting and unable to keep anything down, severe confusion, any other concerning symptoms.          Discharge Instructions  Hypertension - High Blood Pressure    During you visit to the Emergency Department, your blood pressure was higher than the recommended blood pressure.  This may be related to stress, pain, medication or other temporary conditions. In these cases, your blood pressure may return to normal on its own. If you have a history of high blood pressure, you may need to have your provider adjust your medications. Sometimes, your high measurement here may indicate that you have developed high blood pressure that will stay high unless it is treated. As a general rule, high blood pressure causes problems over years rather than days, weeks, or months. So, while it is important to treat blood pressure, it is rarely important to treat blood pressure immediately. Occasionally we will begin a medication in the Emergency Department; more often we will recommend close follow-up for medications with a primary doctor/clinic.    Generally, every Emergency Department visit should have a follow-up clinic visit with either a primary or a specialty clinic/provider. Please follow-up as instructed by your emergency provider today.    Return to the Emergency Department if you start to have:  A severe headache.  Chest pain.  Shortness of breath.  Weakness or numbness that affects one part of the body.  Confusion.  Vision changes.  Significant swelling of legs and/or eyes.  A reaction to any medication started in the Emergency  Department.    What can I do to help myself?  Avoid alcohol.  Take any blood pressure medicine that you are prescribed.  Get a good night s sleep.  Lower your salt intake.  Exercise.  Lose weight.  Manage stress.  See your doctor regularly    If blood pressure medication was started in the Emergency Department:  The medicine may not have an immediate effect. The body and brain determine what blood pressure you have. The medicine s job is to retrain the body s  thermostat  to a lower blood pressure.  You will need to follow up with your provider to see how this medicine is working for you.  If you were given a prescription for medicine here today, be sure to read all of the information (including the package insert) that comes with your prescription.  This will include important information about the medicine, its side effects, and any warnings that you need to know about.  The pharmacist who fills the prescription can provide more information and answer questions you may have about the medicine.  If you have questions or concerns that the pharmacist cannot address, please call or return to the Emergency Department.   Remember that you can always come back to the Emergency Department if you are not able to see your regular provider in the amount of time listed above, if you get any new symptoms, or if there is anything that worries you.    Discharge Instructions  Chest Pain    You have been seen today for chest pain or discomfort.  At this time, your provider has found no signs that your chest pain is due to a serious or life-threatening condition, (or you have declined more testing and/or admission to the hospital). However, sometimes there is a serious problem that does not show up right away. Your evaluation today may not be complete and you may need further testing and evaluation.     Generally, every Emergency Department visit should have a follow-up clinic visit with either a primary or a specialty  clinic/provider. Please follow-up as instructed by your emergency provider today.  Return to the Emergency Department if:  Your chest pain changes, gets worse, starts to happen more often, or comes with less activity.  You are newly short of breath.  You get very weak or tired.  You pass out or faint.  You have any new symptoms, like fever, cough, numb legs, or you cough up blood.  You have anything else that worries you.    Until you follow-up with your regular provider, please do the following:  Take one aspirin daily unless you have an allergy or are told not to by your provider.  If a stress test appointment has been made, go to the appointment.  If you have questions, contact your regular provider.  Follow-up with your regular provider/clinic as directed; this is very important.    If you were given a prescription for medicine here today, be sure to read all of the information (including the package insert) that comes with your prescription.  This will include important information about the medicine, its side effects, and any warnings that you need to know about.  The pharmacist who fills the prescription can provide more information and answer questions you may have about the medicine.  If you have questions or concerns that the pharmacist cannot address, please call or return to the Emergency Department.       Remember that you can always come back to the Emergency Department if you are not able to see your regular provider in the amount of time listed above, if you get any new symptoms, or if there is anything that worries you.

## 2019-12-15 NOTE — ED TRIAGE NOTES
Patient comes in for evaluation of dizziness and chest pressure which woke him in the night. Patient said he had this happen previously and when he came in his SBP was in the 200s. ABCs intact. Patient notes arm tingly for the last couple of days but always when laying down at night.

## 2019-12-16 ENCOUNTER — TELEPHONE (OUTPATIENT)
Dept: FAMILY MEDICINE | Facility: CLINIC | Age: 64
End: 2019-12-16

## 2019-12-16 LAB — INTERPRETATION ECG - MUSE: NORMAL

## 2019-12-16 NOTE — TELEPHONE ENCOUNTER
Pt calling as he was in the ED yesterday due to high BP and chest pain.  He states he has been having issues with his BP being higher for the last few weeks.  He has not been checking his home BPs but will do so now.  He is scheduled for a follow up but not until January 2nd.    Please advise if he should be seen sooner or have any adjustments in his medications.  He is taking his 2 BP medications as directed    Live Peña RN, BSN

## 2019-12-17 NOTE — TELEPHONE ENCOUNTER
Appointment scheduled for Thursday 12/19/2019 @ 8:15am with Liv Menchaca MD    [facility] - Our clinic is located in the [facility] at [address], [address], [place] [address].  For your convenience we have parking in both our ramp and outside lot.  Please enter off of [employer] for easiest access.    From Sign In Desk:     Department Address: [address] [place] [address] [address] [place] [id number]   Department Phone: [phone]     Patients wife notified.    Clarisa Farrell RN

## 2019-12-19 ENCOUNTER — OFFICE VISIT (OUTPATIENT)
Dept: CARDIOLOGY | Facility: CLINIC | Age: 64
End: 2019-12-19
Payer: MEDICARE

## 2019-12-19 VITALS
HEIGHT: 66 IN | SYSTOLIC BLOOD PRESSURE: 150 MMHG | HEART RATE: 54 BPM | OXYGEN SATURATION: 97 % | BODY MASS INDEX: 32.3 KG/M2 | WEIGHT: 201 LBS | DIASTOLIC BLOOD PRESSURE: 80 MMHG

## 2019-12-19 DIAGNOSIS — E78.1 HYPERTRIGLYCERIDEMIA: ICD-10-CM

## 2019-12-19 DIAGNOSIS — R06.83 SNORING: ICD-10-CM

## 2019-12-19 DIAGNOSIS — F10.29 ALCOHOL DEPENDENCE WITH UNSPECIFIED ALCOHOL-INDUCED DISORDER (H): ICD-10-CM

## 2019-12-19 DIAGNOSIS — I10 BENIGN ESSENTIAL HYPERTENSION: Primary | ICD-10-CM

## 2019-12-19 DIAGNOSIS — R07.89 ATYPICAL CHEST PAIN: ICD-10-CM

## 2019-12-19 PROBLEM — I11.9 LVH (LEFT VENTRICULAR HYPERTROPHY) DUE TO HYPERTENSIVE DISEASE, WITHOUT HEART FAILURE: Status: RESOLVED | Noted: 2017-05-11 | Resolved: 2019-12-19

## 2019-12-19 PROBLEM — I77.810 ASCENDING AORTA DILATATION (H): Status: RESOLVED | Noted: 2017-05-11 | Resolved: 2019-12-19

## 2019-12-19 PROCEDURE — 99214 OFFICE O/P EST MOD 30 MIN: CPT | Performed by: INTERNAL MEDICINE

## 2019-12-19 RX ORDER — HYDROCHLOROTHIAZIDE 25 MG/1
25 TABLET ORAL EVERY MORNING
Qty: 30 TABLET | Refills: 3 | Status: SHIPPED | OUTPATIENT
Start: 2019-12-19 | End: 2020-01-17

## 2019-12-19 ASSESSMENT — MIFFLIN-ST. JEOR: SCORE: 1634.48

## 2019-12-19 NOTE — PROGRESS NOTES
Clinic visit note dictated. Dictation reference number - [id number]        REVIEW OF SYSTEMS:  A comprehensive 10-point review of systems was completed and the pertinent positives are documented in the history of present illness.    Skin:  Negative     Eyes:  Positive for glasses  ENT:  Negative    Respiratory:  Negative    Cardiovascular:    chest pain;Positive for  Gastroenterology: Negative    Genitourinary:  Negative    Musculoskeletal:  Positive for back pain  Neurologic:  Positive for numbness or tingling of hands  Psychiatric:  Positive for anxiety  Heme/Lymph/Imm:  Positive for easy bruising  Endocrine:         CURRENT MEDICATIONS:  Current Outpatient Medications   Medication Sig Dispense Refill     allopurinol (ZYLOPRIM) 100 MG tablet TAKE 2 TABLETS (200 MG) BY MOUTH 2 TIMES DAILY 360 tablet 1     aspirin 81 MG tablet Take 1 tablet by mouth daily.       carvedilol (COREG) 12.5 MG tablet Take 1 tablet (12.5 mg) by mouth 2 times daily (with meals) 180 tablet 1     colchicine (COLCRYS) 0.6 MG tablet Take 2 tablets at the first sign of flare, take 1 additional tablet one hour later. 30 tablet 3     fenofibrate (LOFIBRA) 54 MG tablet Take 1 tablet (54 mg) by mouth daily 90 tablet 3     hydrochlorothiazide (HYDRODIURIL) 25 MG tablet Take 1 tablet (25 mg) by mouth every morning 30 tablet 3     metroNIDAZOLE (METROGEL) 0.75 % external gel Apply topically 2 times daily To face 45 g 3     Multiple Vitamins-Minerals (MULTIVITAMIN ADULT) CHEW Take 1 chew tab by mouth daily       olmesartan (BENICAR) 40 MG tablet Take 1 tablet (40 mg) by mouth daily 90 tablet 1     omeprazole (PRILOSEC) 20 MG DR capsule Take 1 capsule (20 mg) by mouth daily (Patient taking differently: Take 20 mg by mouth as needed ) 30 capsule 0     simvastatin (ZOCOR) 40 MG tablet Take 1 tablet (40 mg) by mouth At Bedtime 90 tablet 3     cyclobenzaprine (FLEXERIL) 5 MG tablet Take 1 tablet (5 mg) by mouth At Bedtime (Patient not taking: Reported on  12/19/2019) 30 tablet 3     fluocinonide (LIDEX) 0.05 % external cream Apply topically 2 times daily A thin layer to aa on right foot x 2 weeks. Do not use on face or body folds. 30 days supply. (Patient not taking: Reported on 12/19/2019) 30 g 1     fluorouracil (EFUDEX) 5 % external cream Apply a pea sized amount to forehead and temples BID x 2 weeks. (Patient not taking: Reported on 12/19/2019) 60 g 1     indomethacin (INDOCIN) 50 MG capsule Take 1 capsule (50 mg) by mouth 3 times daily (with meals) (Patient not taking: Reported on 12/19/2019) 42 capsule 1     omega-3 acid ethyl esters (LOVAZA) 1 g capsule TAKE 2 CAP BY MOUTH DAILY (Patient not taking: Reported on 12/19/2019) 180 capsule 1     order for DME BP cuff, severe HTN, needs to monitor daily (Patient not taking: Reported on 7/25/2019) 1 Device 0     sulfamethoxazole-trimethoprim (BACTRIM DS/SEPTRA DS) 800-160 MG tablet Take 1 tablet by mouth 2 times daily (Patient not taking: Reported on 7/25/2019) 20 tablet 10         ALLERGIES:  Allergies   Allergen Reactions     No Known Drug Allergies        PAST MEDICAL HISTORY:    Past Medical History:   Diagnosis Date     Alcoholism (H)     Treated 1980's, denies any recent problems with withdrawal when skips days drinking.     Arthritis      Chronic low back pain      Gout     possible     Herniated intervertebral disk     thoracic     Hypertension      RBBB (right bundle branch block)     Present for several years       PAST SURGICAL HISTORY:    Past Surgical History:   Procedure Laterality Date     BACK SURGERY       Back surgery x 4       ESOPHAGOSCOPY, GASTROSCOPY, DUODENOSCOPY (EGD), COMBINED  2000    normal     TONSILLECTOMY         FAMILY HISTORY:    Family History   Problem Relation Age of Onset     Hypertension Father      Heart Disease Father         Angioplasty in late 60's or early 70's.     Unknown/Adopted Father      Hypertension Sister      Kidney Disease Sister      Unknown/Adopted Sister       Cancer Mother         lung     Unknown/Adopted Mother      Diabetes Mother      Thyroid Disease Sister      Unknown/Adopted Sister      Unknown/Adopted Maternal Grandmother      Substance Abuse Maternal Grandfather      Depression Paternal Grandmother      Unknown/Adopted Paternal Grandfather      Diabetes Sister        SOCIAL HISTORY:    Social History     Socioeconomic History     Marital status:      Spouse name: None     Number of children: None     Years of education: None     Highest education level: None   Occupational History     None   Social Needs     Financial resource strain: None     Food insecurity:     Worry: None     Inability: None     Transportation needs:     Medical: None     Non-medical: None   Tobacco Use     Smoking status: Former Smoker     Packs/day: 0.00     Last attempt to quit: 7/15/1993     Years since quittin.4     Smokeless tobacco: Never Used     Tobacco comment: Quit 20 years ago.   Substance and Sexual Activity     Alcohol use: Yes     Alcohol/week: 7.0 standard drinks     Types: 7 Shots of liquor per week     Frequency: 4 or more times a week     Drinks per session: 3 or 4     Binge frequency: Monthly     Comment: maybe 5-7 rum and cokes or beer a week     Drug use: No     Sexual activity: Yes     Partners: Female   Lifestyle     Physical activity:     Days per week: 7 days     Minutes per session: 30 min     Stress: Very much   Relationships     Social connections:     Talks on phone: More than three times a week     Gets together: Once a week     Attends Cheondo[employer] service: Never     Active member of club or organization: No     Attends meetings of clubs or organizations: None     Relationship status:      Intimate partner violence:     Fear of current or ex partner: None     Emotionally abused: None     Physically abused: None     Forced sexual activity: None   Other Topics Concern     Parent/sibling w/ CABG, MI or angioplasty before 65F 55M? No   Social  "History Narrative    On disability for back pain.  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       PHYSICAL EXAM:    Vitals: BP (!) 150/80 (BP Location: Left arm, Patient Position: Sitting, Cuff Size: Adult Large)   Pulse 54   Ht 1.676 m (5' 6\")   Wt 91.2 kg (201 lb)   SpO2 97%   BMI 32.44 kg/m    Wt Readings from Last 5 Encounters:   12/19/19 91.2 kg (201 lb)   12/15/19 90.8 kg (200 lb 2.8 oz)   07/25/19 88.5 kg (195 lb)   06/11/19 89.8 kg (197 lb 14.4 oz)   05/01/19 91.1 kg (200 lb 12.8 oz)       Constitutional: Comfortable at rest. Cooperative, alert and oriented, well developed, well nourished.  Eyes: Pupils equal and round, conjunctivae and lids unremarkable, sclera white, no xanthalasma,   ENT: Satisfactory dentition.  No cyanosis or pallor.  Neck: Carotid pulses are full and equal bilaterally, no carotid bruit, no thyromegaly    Respiratory: Normal respiratory effort with symmetrical chest wall movements and no use of accessory muscles. Good air entry with normal breath sounds and no rales or wheeze.  Cardiovascular: Normal jugular venous pulse and pressure.  Normal carotid pulse character and volume.  No carotid bruit.  Apical impulse is undisplaced and normal to palpation without parasternal heave or thrill.  Heart sounds are normal and regular. No audible murmur. No S3, S4 or friction rub.    GI: Soft, nontender, no hepatosplenomegaly, no masses, active bowel sounds.    Skin: No rash, erythema, ecchymosis.  Musculoskeletal: Normal muscle tone and strength. Normal gait. No spinal deformities.  Neuropsychiatric: Oriented to time place and person.  Affect normal.  No gross motor deficits.  Extremities: No edema. No clubbing or deformities.        Encounter Diagnoses   Name Primary?     Benign essential hypertension Yes     Atypical chest pain      Hypertriglyceridemia      Alcohol dependence with unspecified alcohol-induced disorder " (H)      Snoring        Orders Placed This Encounter   Procedures     Basic metabolic panel     Lipid Profile     CBC with platelets differential     Follow-Up with Cardiac Advanced Practice Provider     SLEEP EVALUATION & MANAGEMENT REFERRAL - [facility]  [phone] (Age 18 and up)     Follow-Up with Cardiologist     Echocardiogram Complete

## 2019-12-19 NOTE — LETTER
12/19/2019      Trish Edwards MD  [address]  [facility] [id number]      RE: Emery COLE Zayas       Dear Colleague,    I had the pleasure of seeing Emery Zayas in the [facility].    Service Date: 12/19/2019      LOCATION:  [facility].      PRIMARY CARE AND REFERRING PROVIDER:  Dr. Trish Edwards, [place] [address].      REASON FOR VISIT:   1.  Emergency Room referral for recent dizziness, uncontrolled hypertension and right-sided chest pain.      PRIMARY CARDIOLOGIST:  Dr. Travon Arce MD.      HISTORY OF PRESENT ILLNESS:     Oziel Zayas is new to me but has established care with his cardiologist, Dr. Travon Arce, here.  Oziel was unaccompanied today.  I am seeing him today after he was seen in the emergency room over the weekend for dizziness, uncontrolled hypertension of 198/100 mmHg, and right-sided chest pain.      Oziel's medical history is significant for:   1.  Longstanding essential hypertension.  He is currently on carvedilol 12.5 mg 2 times daily and olmesartan 40 mg daily.  His BP today is 150/80 (rechecked) and pulse is 54 BPM (sinus bradycardia and asymptomatic).   2.  History of alcohol dependence.  This has been a longstanding problem.  He has finished a rehabilitation program twice.  He drinks almost every day, 2-3 rum-and-Cokes and more over the weekend.  No DUIs.  Drinks at home.  He lives with his wife.   3.  Dyslipidemia with low HDL of 34 and hypertriglyceridemia of 371.  LDL is 33.  He is on a combination of fenofibrate 54 mg daily and simvastatin 40 mg daily.  This is most likely related to his excess alcohol intake and resulting metabolic syndrome.   4.  Mild ascending aorta dilatation, for which he initially saw Dr. Arce, 3.7 cm.  Probably normal if indexed to his BMI.      Last weekend, patient woke up at about 3:00 or 4:00 in the morning with dizziness and feeling unwell.  He does not have a  blood pressure machine at home.  When he came to the emergency room, his blood pressure was 198/100, serial troponins negative, ECG shows a chronic right bundle branch block with sinus bradycardia (on carvedilol).  Renal panel and hemoglobin normal.  His blood pressure improved while being monitored, and he was discharged home with his early Cardiology appointment.      He takes his medications daily, drinks 2-3 rum-and-Cokes every day, walks for 30 minutes with his neighbors and his dog every evening, sleep is disturbed (sleeps for 2-3 hours and wakes up in the morning at about 2:00 a.m. and finds it difficult to go back to sleep, he tells me his wife reports that he snores, no prior sleep evaluation).  There is no family history of strokes or congestive heart failure.  His father  last year at age 92 years.      He has a localized right-sided chest pain just below in the right mid chest.  He has had it for years.  It only happens when he is sitting or bending and improves and resolves when he stands up.  No exercise-related symptoms.  His exercise echocardiogram was negative for ischemia in 2017 at a high workload of 11.0 METs.      SOCIAL HISTORY:  .  Has 2 children and 6 grandchildren (ages 8-20) whom he is very close to.  Lives with his wife.  Retired in his early 50s.  Used to be an .  Alcohol dependency with current daily alcohol use.  Has insight into the problem.  Quit smoking several decades ago.  No other recreational drugs.  Regular exercise of 30 minutes walking every day.      PHYSICAL EXAMINATION:   His physical exam is pertinent for central adiposity, but heart sounds are regular, no audible murmur, no vascular bruit.  Lungs are clear.  Abdomen is soft and nontender.  No lower extremity edema.  Mentally alert and oriented.  Did not smell of alcohol.      DIAGNOSES, ASSESSMENT, PLAN:   1.  Uncontrolled hypertension.  He does consistently take his medications and is physically  active.  Main contributors are daily alcohol intake and probably undiagnosed sleep apnea.  Start hydrochlorothiazide 25 mg daily.  Continue current carvedilol and olmesartan.  Downstream, can up-titrate olmesartan dosage and as a next step add amlodipine.  Goal BP less than 130/80 mmHg.   2.  Follow up with Cardiology [facility] in 1 week.   3.  Dyslipidemia.  Probably secondary to alcohol use.  I am not making any changes to his lipid-lowering therapy today.  This will be addressed by his primary cardiology team downstream.   4.  Atypical right-sided chest pain.  Atypical for coronary ischemia, particularly since it gets better with standing up or exertion.  Reassuringly, he had a normal stress test at a high workload in 2017.   5.  Alcohol dependence.  I talked to the patient.  He has insight into the problem but currently not ready to quit.   6.  Followup:   -- With Cardiology [facility] in 1 month with pre-visit basic metabolic panel.   -- With his primary cardiologist, Dr. Travon Arce, in a year with pre-visit transthoracic echocardiogram and fasting labs.      Total time 35 minutes, greater than 50% counseling and coordination of care.         SABA SEGURA MD             D: 2019   T: 2019   MT: ELAYNE      Name:     SHERRY PRESLEY   MRN:      [id number]        Account:      [id number]   :      1955           Service Date: 2019      Document: [id number]         Outpatient Encounter Medications as of 2019   Medication Sig Dispense Refill     allopurinol (ZYLOPRIM) 100 MG tablet TAKE 2 TABLETS (200 MG) BY MOUTH 2 TIMES DAILY 360 tablet 1     aspirin 81 MG tablet Take 1 tablet by mouth daily.       carvedilol (COREG) 12.5 MG tablet Take 1 tablet (12.5 mg) by mouth 2 times daily (with meals) 180 tablet 1     colchicine (COLCRYS) 0.6 MG tablet Take 2 tablets at the first sign of flare, take 1 additional tablet one hour later. 30 tablet 3     fenofibrate (LOFIBRA) 54 MG tablet Take 1 tablet  (54 mg) by mouth daily 90 tablet 3     hydrochlorothiazide (HYDRODIURIL) 25 MG tablet Take 1 tablet (25 mg) by mouth every morning 30 tablet 3     metroNIDAZOLE (METROGEL) 0.75 % external gel Apply topically 2 times daily To face 45 g 3     Multiple Vitamins-Minerals (MULTIVITAMIN ADULT) CHEW Take 1 chew tab by mouth daily       olmesartan (BENICAR) 40 MG tablet Take 1 tablet (40 mg) by mouth daily 90 tablet 1     omeprazole (PRILOSEC) 20 MG DR capsule Take 1 capsule (20 mg) by mouth daily (Patient taking differently: Take 20 mg by mouth as needed ) 30 capsule 0     simvastatin (ZOCOR) 40 MG tablet Take 1 tablet (40 mg) by mouth At Bedtime 90 tablet 3     cyclobenzaprine (FLEXERIL) 5 MG tablet Take 1 tablet (5 mg) by mouth At Bedtime (Patient not taking: Reported on 12/19/2019) 30 tablet 3     fluocinonide (LIDEX) 0.05 % external cream Apply topically 2 times daily A thin layer to aa on right foot x 2 weeks. Do not use on face or body folds. 30 days supply. (Patient not taking: Reported on 12/19/2019) 30 g 1     fluorouracil (EFUDEX) 5 % external cream Apply a pea sized amount to forehead and temples BID x 2 weeks. (Patient not taking: Reported on 12/19/2019) 60 g 1     indomethacin (INDOCIN) 50 MG capsule Take 1 capsule (50 mg) by mouth 3 times daily (with meals) (Patient not taking: Reported on 12/19/2019) 42 capsule 1     omega-3 acid ethyl esters (LOVAZA) 1 g capsule TAKE 2 CAP BY MOUTH DAILY (Patient not taking: Reported on 12/19/2019) 180 capsule 1     order for DME BP cuff, severe HTN, needs to monitor daily (Patient not taking: Reported on 7/25/2019) 1 Device 0     sulfamethoxazole-trimethoprim (BACTRIM DS/SEPTRA DS) 800-160 MG tablet Take 1 tablet by mouth 2 times daily (Patient not taking: Reported on 7/25/2019) 20 tablet 10     No facility-administered encounter medications on file as of 12/19/2019.        Again, thank you for allowing me to participate in the care of your patient.       Sincerely,    Liv Menchaca MD     [facility]

## 2019-12-19 NOTE — PROGRESS NOTES
Service Date: 12/19/2019      LOCATION:  [facility].      PRIMARY CARE AND REFERRING PROVIDER:  Dr. Trish Edwards, [place] [address].      REASON FOR VISIT:   1.  Emergency Room referral for recent dizziness, uncontrolled hypertension and right-sided chest pain.      PRIMARY CARDIOLOGIST:  Dr. Travon Arce MD.      HISTORY OF PRESENT ILLNESS:     Oziel Zayas is new to me but has established care with his cardiologist, Dr. Travon Arce, here.  Oziel was unaccompanied today.  I am seeing him today after he was seen in the emergency room over the weekend for dizziness, uncontrolled hypertension of 198/100 mmHg, and right-sided chest pain.      Oziel's medical history is significant for:   1.  Longstanding essential hypertension.  He is currently on carvedilol 12.5 mg 2 times daily and olmesartan 40 mg daily.  His BP today is 150/80 (rechecked) and pulse is 54 BPM (sinus bradycardia and asymptomatic).   2.  History of alcohol dependence.  This has been a longstanding problem.  He has finished a rehabilitation program twice.  He drinks almost every day, 2-3 rum-and-Cokes and more over the weekend.  No DUIs.  Drinks at home.  He lives with his wife.   3.  Dyslipidemia with low HDL of 34 and hypertriglyceridemia of 371.  LDL is 33.  He is on a combination of fenofibrate 54 mg daily and simvastatin 40 mg daily.  This is most likely related to his excess alcohol intake and resulting metabolic syndrome.   4.  Mild ascending aorta dilatation, for which he initially saw Dr. Arce, 3.7 cm.  Probably normal if indexed to his BMI.      Last weekend, patient woke up at about 3:00 or 4:00 in the morning with dizziness and feeling unwell.  He does not have a blood pressure machine at home.  When he came to the emergency room, his blood pressure was 198/100, serial troponins negative, ECG shows a chronic right bundle branch block with sinus bradycardia (on carvedilol).  Renal panel and hemoglobin  normal.  His blood pressure improved while being monitored, and he was discharged home with his early Cardiology appointment.      He takes his medications daily, drinks 2-3 rum-and-Cokes every day, walks for 30 minutes with his neighbors and his dog every evening, sleep is disturbed (sleeps for 2-3 hours and wakes up in the morning at about 2:00 a.m. and finds it difficult to go back to sleep, he tells me his wife reports that he snores, no prior sleep evaluation).  There is no family history of strokes or congestive heart failure.  His father  last year at age 92 years.      He has a localized right-sided chest pain just below in the right mid chest.  He has had it for years.  It only happens when he is sitting or bending and improves and resolves when he stands up.  No exercise-related symptoms.  His exercise echocardiogram was negative for ischemia in 2017 at a high workload of 11.0 METs.      SOCIAL HISTORY:  .  Has 2 children and 6 grandchildren (ages 8-20) whom he is very close to.  Lives with his wife.  Retired in his early 50s.  Used to be an .  Alcohol dependency with current daily alcohol use.  Has insight into the problem.  Quit smoking several decades ago.  No other recreational drugs.  Regular exercise of 30 minutes walking every day.      PHYSICAL EXAMINATION:   His physical exam is pertinent for central adiposity, but heart sounds are regular, no audible murmur, no vascular bruit.  Lungs are clear.  Abdomen is soft and nontender.  No lower extremity edema.  Mentally alert and oriented.  Did not smell of alcohol.      DIAGNOSES, ASSESSMENT, PLAN:   1.  Uncontrolled hypertension.  He does consistently take his medications and is physically active.  Main contributors are daily alcohol intake and probably undiagnosed sleep apnea.  Start hydrochlorothiazide 25 mg daily.  Continue current carvedilol and olmesartan.  Downstream, can up-titrate olmesartan dosage and as a next step add  amlodipine.  Goal BP less than 130/80 mmHg.   2.  Follow up with Cardiology [facility] in 1 week.   3.  Dyslipidemia.  Probably secondary to alcohol use.  I am not making any changes to his lipid-lowering therapy today.  This will be addressed by his primary cardiology team downstream.   4.  Atypical right-sided chest pain.  Atypical for coronary ischemia, particularly since it gets better with standing up or exertion.  Reassuringly, he had a normal stress test at a high workload in 2017.   5.  Alcohol dependence.  I talked to the patient.  He has insight into the problem but currently not ready to quit.   6.  Followup:   -- With Cardiology [facility] in 1 month with pre-visit basic metabolic panel.   -- With his primary cardiologist, Dr. Travon Arce, in a year with pre-visit transthoracic echocardiogram and fasting labs.      Total time 35 minutes, greater than 50% counseling and coordination of care.         SABA SEGURA MD             D: 2019   T: 2019   MT: ELAYNE      Name:     SHERRY PRESLEY   MRN:      [id number]        Account:      [id number]   :      1955           Service Date: 2019      Document: [id number]

## 2019-12-19 NOTE — PATIENT INSTRUCTIONS
MEDICATIONS:  1.  To control your blood pressure, I am recommending a new blood pressure medication.  As discussed, this is a diuretic and will make you urinate more.  2.  Start hydrochlorothiazide 25 mg.  Take 1 pill in the morning.  New prescription sent.  3.  The new medication will be in addition to all your other medications as of now.    FOLLOW-UP:  1.  Follow-up with cardiology [facility] in 1 month with a basic metabolic panel (blood test) to check on your blood pressure.  2.  Follow-up with your cardiologist, Dr. Travon Arce in 6 to 9 months.  3.  Sleep clinic referral to rule out sleep apnea.    If you have any questions or concerns, please contact my nurses at [phone].

## 2019-12-19 NOTE — LETTER
12/19/2019    Trish Edwards MD  [address]  [facility] [address]    RE: Emery Zayas       Dear Colleague,    I had the pleasure of seeing Emery Zayas in the [facility].    Clinic visit note dictated. Dictation reference number - [id number]        REVIEW OF SYSTEMS:  A comprehensive 10-point review of systems was completed and the pertinent positives are documented in the history of present illness.    Skin:  Negative     Eyes:  Positive for glasses  ENT:  Negative    Respiratory:  Negative    Cardiovascular:    chest pain;Positive for  Gastroenterology: Negative    Genitourinary:  Negative    Musculoskeletal:  Positive for back pain  Neurologic:  Positive for numbness or tingling of hands  Psychiatric:  Positive for anxiety  Heme/Lymph/Imm:  Positive for easy bruising  Endocrine:         CURRENT MEDICATIONS:  Current Outpatient Medications   Medication Sig Dispense Refill     allopurinol (ZYLOPRIM) 100 MG tablet TAKE 2 TABLETS (200 MG) BY MOUTH 2 TIMES DAILY 360 tablet 1     aspirin 81 MG tablet Take 1 tablet by mouth daily.       carvedilol (COREG) 12.5 MG tablet Take 1 tablet (12.5 mg) by mouth 2 times daily (with meals) 180 tablet 1     colchicine (COLCRYS) 0.6 MG tablet Take 2 tablets at the first sign of flare, take 1 additional tablet one hour later. 30 tablet 3     fenofibrate (LOFIBRA) 54 MG tablet Take 1 tablet (54 mg) by mouth daily 90 tablet 3     hydrochlorothiazide (HYDRODIURIL) 25 MG tablet Take 1 tablet (25 mg) by mouth every morning 30 tablet 3     metroNIDAZOLE (METROGEL) 0.75 % external gel Apply topically 2 times daily To face 45 g 3     Multiple Vitamins-Minerals (MULTIVITAMIN ADULT) CHEW Take 1 chew tab by mouth daily       olmesartan (BENICAR) 40 MG tablet Take 1 tablet (40 mg) by mouth daily 90 tablet 1     omeprazole (PRILOSEC) 20 MG DR capsule Take 1 capsule (20 mg) by mouth daily (Patient taking differently: Take 20 mg by mouth  as needed ) 30 capsule 0     simvastatin (ZOCOR) 40 MG tablet Take 1 tablet (40 mg) by mouth At Bedtime 90 tablet 3     cyclobenzaprine (FLEXERIL) 5 MG tablet Take 1 tablet (5 mg) by mouth At Bedtime (Patient not taking: Reported on 12/19/2019) 30 tablet 3     fluocinonide (LIDEX) 0.05 % external cream Apply topically 2 times daily A thin layer to aa on right foot x 2 weeks. Do not use on face or body folds. 30 days supply. (Patient not taking: Reported on 12/19/2019) 30 g 1     fluorouracil (EFUDEX) 5 % external cream Apply a pea sized amount to forehead and temples BID x 2 weeks. (Patient not taking: Reported on 12/19/2019) 60 g 1     indomethacin (INDOCIN) 50 MG capsule Take 1 capsule (50 mg) by mouth 3 times daily (with meals) (Patient not taking: Reported on 12/19/2019) 42 capsule 1     omega-3 acid ethyl esters (LOVAZA) 1 g capsule TAKE 2 CAP BY MOUTH DAILY (Patient not taking: Reported on 12/19/2019) 180 capsule 1     order for DME BP cuff, severe HTN, needs to monitor daily (Patient not taking: Reported on 7/25/2019) 1 Device 0     sulfamethoxazole-trimethoprim (BACTRIM DS/SEPTRA DS) 800-160 MG tablet Take 1 tablet by mouth 2 times daily (Patient not taking: Reported on 7/25/2019) 20 tablet 10         ALLERGIES:  Allergies   Allergen Reactions     No Known Drug Allergies        PAST MEDICAL HISTORY:    Past Medical History:   Diagnosis Date     Alcoholism (H)     Treated 1980's, denies any recent problems with withdrawal when skips days drinking.     Arthritis      Chronic low back pain      Gout     possible     Herniated intervertebral disk     thoracic     Hypertension      RBBB (right bundle branch block)     Present for several years       PAST SURGICAL HISTORY:    Past Surgical History:   Procedure Laterality Date     BACK SURGERY       Back surgery x 4       ESOPHAGOSCOPY, GASTROSCOPY, DUODENOSCOPY (EGD), COMBINED  2000    normal     TONSILLECTOMY         FAMILY HISTORY:    Family History   Problem  Relation Age of Onset     Hypertension Father      Heart Disease Father         Angioplasty in late 60's or early 70's.     Unknown/Adopted Father      Hypertension Sister      Kidney Disease Sister      Unknown/Adopted Sister      Cancer Mother         lung     Unknown/Adopted Mother      Diabetes Mother      Thyroid Disease Sister      Unknown/Adopted Sister      Unknown/Adopted Maternal Grandmother      Substance Abuse Maternal Grandfather      Depression Paternal Grandmother      Unknown/Adopted Paternal Grandfather      Diabetes Sister        SOCIAL HISTORY:    Social History     Socioeconomic History     Marital status:      Spouse name: None     Number of children: None     Years of education: None     Highest education level: None   Occupational History     None   Social Needs     Financial resource strain: None     Food insecurity:     Worry: None     Inability: None     Transportation needs:     Medical: None     Non-medical: None   Tobacco Use     Smoking status: Former Smoker     Packs/day: 0.00     Last attempt to quit: 7/15/1993     Years since quittin.4     Smokeless tobacco: Never Used     Tobacco comment: Quit 20 years ago.   Substance and Sexual Activity     Alcohol use: Yes     Alcohol/week: 7.0 standard drinks     Types: 7 Shots of liquor per week     Frequency: 4 or more times a week     Drinks per session: 3 or 4     Binge frequency: Monthly     Comment: maybe 5-7 rum and cokes or beer a week     Drug use: No     Sexual activity: Yes     Partners: Female   Lifestyle     Physical activity:     Days per week: 7 days     Minutes per session: 30 min     Stress: Very much   Relationships     Social connections:     Talks on phone: More than three times a week     Gets together: Once a week     Attends [employer] service: Never     Active member of club or organization: No     Attends meetings of clubs or organizations: None     Relationship status:      Intimate partner violence:  "    Fear of current or ex partner: None     Emotionally abused: None     Physically abused: None     Forced sexual activity: None   Other Topics Concern     Parent/sibling w/ CABG, MI or angioplasty before 65F 55M? 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       PHYSICAL EXAM:    Vitals: BP (!) 150/80 (BP Location: Left arm, Patient Position: Sitting, Cuff Size: Adult Large)   Pulse 54   Ht 1.676 m (5' 6\")   Wt 91.2 kg (201 lb)   SpO2 97%   BMI 32.44 kg/m     Wt Readings from Last 5 Encounters:   12/19/19 91.2 kg (201 lb)   12/15/19 90.8 kg (200 lb 2.8 oz)   07/25/19 88.5 kg (195 lb)   06/11/19 89.8 kg (197 lb 14.4 oz)   05/01/19 91.1 kg (200 lb 12.8 oz)       Constitutional: Comfortable at rest. Cooperative, alert and oriented, well developed, well nourished.  Eyes: Pupils equal and round, conjunctivae and lids unremarkable, sclera white, no xanthalasma,   ENT: Satisfactory dentition.  No cyanosis or pallor.  Neck: Carotid pulses are full and equal bilaterally, no carotid bruit, no thyromegaly    Respiratory: Normal respiratory effort with symmetrical chest wall movements and no use of accessory muscles. Good air entry with normal breath sounds and no rales or wheeze.  Cardiovascular: Normal jugular venous pulse and pressure.  Normal carotid pulse character and volume.  No carotid bruit.  Apical impulse is undisplaced and normal to palpation without parasternal heave or thrill.  Heart sounds are normal and regular. No audible murmur. No S3, S4 or friction rub.    GI: Soft, nontender, no hepatosplenomegaly, no masses, active bowel sounds.    Skin: No rash, erythema, ecchymosis.  Musculoskeletal: Normal muscle tone and strength. Normal gait. No spinal deformities.  Neuropsychiatric: Oriented to time place and person.  Affect normal.  No gross motor deficits.  Extremities: No " edema. No clubbing or deformities.        Encounter Diagnoses   Name Primary?     Benign essential hypertension Yes     Atypical chest pain      Hypertriglyceridemia      Alcohol dependence with unspecified alcohol-induced disorder (H)      Snoring        Orders Placed This Encounter   Procedures     Basic metabolic panel     Lipid Profile     CBC with platelets differential     Follow-Up with Cardiac Advanced Practice Provider     SLEEP EVALUATION & MANAGEMENT REFERRAL - [facility]  [phone] (Age 18 and up)     Follow-Up with Cardiologist     Echocardiogram Complete           Thank you for allowing me to participate in the care of your patient.      Sincerely,     Liv Menchaca MD     [facility]    cc:   Trish Edwards MD  [id number]  [place] [address]

## 2019-12-27 DIAGNOSIS — Z12.11 SPECIAL SCREENING FOR MALIGNANT NEOPLASMS, COLON: ICD-10-CM

## 2019-12-27 PROCEDURE — 82274 ASSAY TEST FOR BLOOD FECAL: CPT | Performed by: FAMILY MEDICINE

## 2019-12-28 LAB — HEMOCCULT STL QL IA: POSITIVE

## 2019-12-31 DIAGNOSIS — R19.5 POSITIVE FIT (FECAL IMMUNOCHEMICAL TEST): Primary | ICD-10-CM

## 2020-01-02 ENCOUNTER — OFFICE VISIT (OUTPATIENT)
Dept: FAMILY MEDICINE | Facility: CLINIC | Age: 65
End: 2020-01-02
Payer: MEDICARE

## 2020-01-02 VITALS
WEIGHT: 202 LBS | RESPIRATION RATE: 12 BRPM | DIASTOLIC BLOOD PRESSURE: 70 MMHG | SYSTOLIC BLOOD PRESSURE: 134 MMHG | HEART RATE: 56 BPM | TEMPERATURE: 97.4 F | HEIGHT: 66 IN | BODY MASS INDEX: 32.47 KG/M2

## 2020-01-02 DIAGNOSIS — R07.89 ATYPICAL CHEST PAIN: ICD-10-CM

## 2020-01-02 DIAGNOSIS — R06.83 SNORING: ICD-10-CM

## 2020-01-02 DIAGNOSIS — I10 ESSENTIAL HYPERTENSION, BENIGN: Primary | ICD-10-CM

## 2020-01-02 DIAGNOSIS — E78.1 HYPERTRIGLYCERIDEMIA: ICD-10-CM

## 2020-01-02 DIAGNOSIS — I10 BENIGN ESSENTIAL HYPERTENSION: ICD-10-CM

## 2020-01-02 DIAGNOSIS — F10.29 ALCOHOL DEPENDENCE WITH UNSPECIFIED ALCOHOL-INDUCED DISORDER (H): ICD-10-CM

## 2020-01-02 LAB
BASOPHILS # BLD AUTO: 0 10E9/L (ref 0–0.2)
BASOPHILS NFR BLD AUTO: 0.5 %
DIFFERENTIAL METHOD BLD: ABNORMAL
EOSINOPHIL # BLD AUTO: 0.2 10E9/L (ref 0–0.7)
EOSINOPHIL NFR BLD AUTO: 2.5 %
ERYTHROCYTE [DISTWIDTH] IN BLOOD BY AUTOMATED COUNT: 12.8 % (ref 10–15)
HCT VFR BLD AUTO: 41.6 % (ref 40–53)
HGB BLD-MCNC: 14.2 G/DL (ref 13.3–17.7)
LYMPHOCYTES # BLD AUTO: 1.4 10E9/L (ref 0.8–5.3)
LYMPHOCYTES NFR BLD AUTO: 22 %
MCH RBC QN AUTO: 32.4 PG (ref 26.5–33)
MCHC RBC AUTO-ENTMCNC: 34.1 G/DL (ref 31.5–36.5)
MCV RBC AUTO: 95 FL (ref 78–100)
MONOCYTES # BLD AUTO: 0.8 10E9/L (ref 0–1.3)
MONOCYTES NFR BLD AUTO: 13.3 %
NEUTROPHILS # BLD AUTO: 3.9 10E9/L (ref 1.6–8.3)
NEUTROPHILS NFR BLD AUTO: 61.7 %
PLATELET # BLD AUTO: 189 10E9/L (ref 150–450)
RBC # BLD AUTO: 4.38 10E12/L (ref 4.4–5.9)
WBC # BLD AUTO: 6.3 10E9/L (ref 4–11)

## 2020-01-02 PROCEDURE — 85025 COMPLETE CBC W/AUTO DIFF WBC: CPT | Performed by: INTERNAL MEDICINE

## 2020-01-02 PROCEDURE — 80061 LIPID PANEL: CPT | Performed by: INTERNAL MEDICINE

## 2020-01-02 PROCEDURE — 99214 OFFICE O/P EST MOD 30 MIN: CPT | Performed by: PHYSICIAN ASSISTANT

## 2020-01-02 PROCEDURE — 36415 COLL VENOUS BLD VENIPUNCTURE: CPT | Performed by: INTERNAL MEDICINE

## 2020-01-02 PROCEDURE — 80048 BASIC METABOLIC PNL TOTAL CA: CPT | Performed by: INTERNAL MEDICINE

## 2020-01-02 ASSESSMENT — MIFFLIN-ST. JEOR: SCORE: 1639.02

## 2020-01-02 NOTE — PROGRESS NOTES
"Subjective     Emery Zayas is a 64 year old male who presents to clinic today for the following health issues:    History of Present Illness        Hypertension: He presents for follow up of hypertension.  He does not check blood pressure  regularly outside of the clinic. Outside blood pressures have been over 140/90. He does not follow a low salt diet.      ED/UC Followup:    Facility:  [facility]  Date of visit: 12/15/19  Reason for visit: chest pain  Current Status: with New medication feels like in a fog, when first started it when bending over would get light headed with it.      ED visit on 12/15/19 for chest pain and elevated BP up to 190/100.  EKG, trop normal.  Was then seen by cardiology on 12/19 for follow up.  Placed on 25mg hydrochlorothiazide for additional BP control.  Today he is noting that he has been feeling a little foggy and light headed since starting the med.  Thinks it is improving some but symptoms still present.  Requesting Rx for a blood pressure cuff to have at home.    Not having anymore chest pain or tightness.  Noticing that he goes to the bathroom a little more during the day- nothing noticed at night so far.    Declined flu shot and pneumonia today.    Says he does not need refills of any meds today.  Has cardiology follow up again on Jan 17th.      Reviewed and updated as needed this visit by Provider         Review of Systems   ROS COMP: Constitutional, HEENT, cardiovascular, pulmonary, gi and gu systems are negative, except as otherwise noted.      Objective    /70 (BP Location: Right arm, Patient Position: Sitting, Cuff Size: Adult Regular)   Pulse 56   Temp 97.4  F (36.3  C) (Oral)   Resp 12   Ht 1.676 m (5' 6\")   Wt 91.6 kg (202 lb)   BMI 32.60 kg/m    Body mass index is 32.6 kg/m .  Physical Exam   GENERAL: healthy, alert and no distress  CV: regular rate and rhythm, normal S1 S2, no S3 or S4, no murmur, click or rub, no peripheral edema and " "peripheral pulses strong  MS: no gross musculoskeletal defects noted, no edema  SKIN: no suspicious lesions or rashes  PSYCH: mentation appears normal, affect normal/bright    Diagnostic Test Results:  Labs reviewed in Epic        Assessment & Plan     1. Essential hypertension, benign  BP is nicely controlled today.  Will check labs ordered by cardiology.  Would worry some about 25mg hydrochlorothiazide due to hx of drinking-- need to make sure electrolytes are stable.  He is feeling some lightheadedness.  Consider cutting to 12.5mg? Though it seems to have brought BP down nicely.  - order for DME; Equipment being ordered: blood pressure cuff  Dispense: 1 each; Refill: 0    2. Alcohol dependence with unspecified alcohol-induced disorder (H)  Check labs.  Encourage stopping drinking.  - CBC with platelets differential  - Lipid Profile  - Basic metabolic panel    3. Benign essential hypertension  Labs per cardiology.  - CBC with platelets differential  - Lipid Profile  - Basic metabolic panel    4. Atypical chest pain    - CBC with platelets differential  - Lipid Profile  - Basic metabolic panel    5. Hypertriglyceridemia  He is fasting today.  - CBC with platelets differential  - Lipid Profile  - Basic metabolic panel    6. Snoring  Mentions that he will be completing a sleep study.  - Basic metabolic panel     BMI:   Estimated body mass index is 32.6 kg/m  as calculated from the following:    Height as of this encounter: 1.676 m (5' 6\").    Weight as of this encounter: 91.6 kg (202 lb).   Weight management plan: Discussed healthy diet and exercise guidelines      Return in about 15 days (around 1/17/2020) for for Specialty appointment.    Igor Vitale PA-C  [facility]    "

## 2020-01-03 LAB
ANION GAP SERPL CALCULATED.3IONS-SCNC: 6 MMOL/L (ref 3–14)
BUN SERPL-MCNC: 19 MG/DL (ref 7–30)
CALCIUM SERPL-MCNC: 9 MG/DL (ref 8.5–10.1)
CHLORIDE SERPL-SCNC: 105 MMOL/L (ref 94–109)
CHOLEST SERPL-MCNC: 173 MG/DL
CO2 SERPL-SCNC: 27 MMOL/L (ref 20–32)
CREAT SERPL-MCNC: 1.03 MG/DL (ref 0.66–1.25)
GFR SERPL CREATININE-BSD FRML MDRD: 75 ML/MIN/{1.73_M2}
GLUCOSE SERPL-MCNC: 123 MG/DL (ref 70–99)
HDLC SERPL-MCNC: 35 MG/DL
LDLC SERPL CALC-MCNC: 65 MG/DL
NONHDLC SERPL-MCNC: 138 MG/DL
POTASSIUM SERPL-SCNC: 3.9 MMOL/L (ref 3.4–5.3)
SODIUM SERPL-SCNC: 138 MMOL/L (ref 133–144)
TRIGL SERPL-MCNC: 367 MG/DL

## 2020-01-07 DIAGNOSIS — I10 ESSENTIAL HYPERTENSION, BENIGN: Primary | ICD-10-CM

## 2020-01-13 ENCOUNTER — OFFICE VISIT (OUTPATIENT)
Dept: DERMATOLOGY | Facility: CLINIC | Age: 65
End: 2020-01-13
Payer: MEDICARE

## 2020-01-13 VITALS — DIASTOLIC BLOOD PRESSURE: 81 MMHG | SYSTOLIC BLOOD PRESSURE: 135 MMHG | OXYGEN SATURATION: 97 % | HEART RATE: 58 BPM

## 2020-01-13 DIAGNOSIS — L85.3 XEROSIS OF SKIN: Primary | ICD-10-CM

## 2020-01-13 DIAGNOSIS — L30.1 DYSHIDROTIC ECZEMA: ICD-10-CM

## 2020-01-13 DIAGNOSIS — L57.0 ACTINIC KERATOSES: ICD-10-CM

## 2020-01-13 DIAGNOSIS — L57.8 SOLAR ELASTOSIS: ICD-10-CM

## 2020-01-13 PROCEDURE — 99213 OFFICE O/P EST LOW 20 MIN: CPT | Performed by: PHYSICIAN ASSISTANT

## 2020-01-13 NOTE — LETTER
1/13/2020         RE: Emery Zayas  [address] [facility] [address]        Dear Colleague,    Thank you for referring your patient, Emery Zayas, to the [facility]. Please see a copy of my visit note below.    HPI:  Emery Zayas is a 64 year old male patient here today for follow up aks on forehead and temples treated with efudex .  Patient states this has been present for a while.  Patient reports the following symptoms: improvement .  Patient reports the following previous treatments: efudex. Also here for follow up dyshidrotic eczema of feet. Treated with lidex. resolution.  Patient reports the following modifying factors: none.  Associated symptoms: none.  Patient has no other skin complaints today.  Remainder of the HPI, Meds, PMH, Allergies, FH, and SH was reviewed in chart.    Pertinent Hx:   No personal or family history of skin cancer    Past Medical History:   Diagnosis Date     Alcoholism (H)     Treated 1980's, denies any recent problems with withdrawal when skips days drinking.     Arthritis      Chronic low back pain      Gout     possible     Herniated intervertebral disk     thoracic     Hypertension      RBBB (right bundle branch block)     Present for several years       Past Surgical History:   Procedure Laterality Date     BACK SURGERY       Back surgery x 4       ESOPHAGOSCOPY, GASTROSCOPY, DUODENOSCOPY (EGD), COMBINED  2000    normal     TONSILLECTOMY          Family History   Problem Relation Age of Onset     Hypertension Father      Heart Disease Father         Angioplasty in late 60's or early 70's.     Unknown/Adopted Father      Hypertension Sister      Kidney Disease Sister      Unknown/Adopted Sister      Cancer Mother         lung     Unknown/Adopted Mother      Diabetes Mother      Thyroid Disease Sister      Unknown/Adopted Sister      Unknown/Adopted Maternal Grandmother      Substance Abuse Maternal Grandfather       Depression Paternal Grandmother      Unknown/Adopted Paternal Grandfather      Diabetes Sister        Social History     Socioeconomic History     Marital status:      Spouse name: Not on file     Number of children: Not on file     Years of education: Not on file     Highest education level: Not on file   Occupational History     Not on file   Social Needs     Financial resource strain: Not on file     Food insecurity:     Worry: Not on file     Inability: Not on file     Transportation needs:     Medical: Not on file     Non-medical: Not on file   Tobacco Use     Smoking status: Former Smoker     Packs/day: 0.00     Last attempt to quit: 7/15/1993     Years since quittin.5     Smokeless tobacco: Never Used     Tobacco comment: Quit 20 years ago.   Substance and Sexual Activity     Alcohol use: Yes     Alcohol/week: 7.0 standard drinks     Types: 7 Shots of liquor per week     Frequency: 4 or more times a week     Drinks per session: 3 or 4     Binge frequency: Monthly     Comment: maybe 5-7 rum and cokes or beer a week     Drug use: No     Sexual activity: Yes     Partners: Female   Lifestyle     Physical activity:     Days per week: 7 days     Minutes per session: 30 min     Stress: Very much   Relationships     Social connections:     Talks on phone: More than three times a week     Gets together: Once a week     Attends Pentecostal service: Never     Active member of club or organization: No     Attends meetings of clubs or organizations: Not on file     Relationship status:      Intimate partner violence:     Fear of current or ex partner: Not on file     Emotionally abused: Not on file     Physically abused: Not on file     Forced sexual activity: Not on file   Other Topics Concern     Parent/sibling w/ CABG, MI or angioplasty before 65F 55M? 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       Outpatient Encounter Medications as of 1/13/2020   Medication Sig Dispense Refill     allopurinol (ZYLOPRIM) 100 MG tablet TAKE 2 TABLETS (200 MG) BY MOUTH 2 TIMES DAILY 360 tablet 1     aspirin 81 MG tablet Take 1 tablet by mouth daily.       carvedilol (COREG) 12.5 MG tablet Take 1 tablet (12.5 mg) by mouth 2 times daily (with meals) 180 tablet 1     colchicine (COLCRYS) 0.6 MG tablet Take 2 tablets at the first sign of flare, take 1 additional tablet one hour later. 30 tablet 3     fenofibrate (LOFIBRA) 54 MG tablet Take 1 tablet (54 mg) by mouth daily 90 tablet 3     hydrochlorothiazide (HYDRODIURIL) 25 MG tablet Take 1 tablet (25 mg) by mouth every morning 30 tablet 3     metroNIDAZOLE (METROGEL) 0.75 % external gel Apply topically 2 times daily To face 45 g 3     Multiple Vitamins-Minerals (MULTIVITAMIN ADULT) CHEW Take 1 chew tab by mouth daily       olmesartan (BENICAR) 40 MG tablet Take 1 tablet (40 mg) by mouth daily 90 tablet 1     simvastatin (ZOCOR) 40 MG tablet Take 1 tablet (40 mg) by mouth At Bedtime 90 tablet 3     cyclobenzaprine (FLEXERIL) 5 MG tablet Take 1 tablet (5 mg) by mouth At Bedtime (Patient not taking: Reported on 12/19/2019) 30 tablet 3     fluocinonide (LIDEX) 0.05 % external cream Apply topically 2 times daily A thin layer to aa on right foot x 2 weeks. Do not use on face or body folds. 30 days supply. (Patient not taking: Reported on 12/19/2019) 30 g 1     fluorouracil (EFUDEX) 5 % external cream Apply a pea sized amount to forehead and temples BID x 2 weeks. (Patient not taking: Reported on 12/19/2019) 60 g 1     indomethacin (INDOCIN) 50 MG capsule Take 1 capsule (50 mg) by mouth 3 times daily (with meals) (Patient not taking: Reported on 12/19/2019) 42 capsule 1     omega-3 acid ethyl esters (LOVAZA) 1 g capsule TAKE 2 CAP BY MOUTH DAILY (Patient not taking: Reported on 12/19/2019) 180 capsule 1     omeprazole (PRILOSEC) 20 MG DR capsule Take 1  capsule (20 mg) by mouth daily (Patient not taking: Reported on 1/2/2020) 30 capsule 0     order for DME Equipment being ordered: blood pressure cuff (Patient not taking: Reported on 1/13/2020) 1 each 0     order for DME BP cuff, severe HTN, needs to monitor daily (Patient not taking: Reported on 7/25/2019) 1 Device 0     sulfamethoxazole-trimethoprim (BACTRIM DS/SEPTRA DS) 800-160 MG tablet Take 1 tablet by mouth 2 times daily (Patient not taking: Reported on 7/25/2019) 20 tablet 10     No facility-administered encounter medications on file as of 1/13/2020.        Review Of Systems:  Skin: aks on face  Eyes: negative  Ears/Nose/Throat: negative  Respiratory: No shortness of breath, dyspnea on exertion, cough, or hemoptysis  Cardiovascular: negative  Gastrointestinal: negative  Genitourinary: negative  Musculoskeletal: negative  Neurologic: negative  Psychiatric: negative  Hematologic/Lymphatic/Immunologic: negative  Endocrine: negative      Objective:     /81   Pulse 58   SpO2 97%   Eyes: Conjunctivae/lids: Normal   ENT: Lips:  Normal  MSK: Normal  Cardiovascular: Peripheral edema none  Pulm: Breathing Normal  Neuro/Psych: Orientation: A/O x 3 Normal; Mood/Affect: Normal, NAD, WDWN  Pt accompanied by: self  Following areas examined: face, frontal scalp,  neck, hands, pt defers exam of feet  Ro skin type:i   Findings:  Pink gritty papules on nose, right frontal scalp, temple and cheek  Assessment and Plan:  1) dyshidrotic eczema  Resolved.    2) Actinic keratoses x 6 and solar elastosis, xerosis of skin    There is a risk of AKs developing into a SCC.   Treatment options include LN2 vs PDT vs Efudex. Pt elected Efudex     After finishing treatment please begin moisturizing face daily for one month. Follow up.    5 Fluorouracil (Efudex)     Apply a thin layer  two times a day for 2 weeks-forehead( right forehead/scalp/temple) and nose.  Efudex is a topical chemotherapy medication. Do not use if you  are pregnant or have a hypersensitive to to the drug, class, or any components of the medication. Very rare to have any systemic absorption.    Common reactions include and are not limited to:  Application site reaction, pruritis, pain, bleeding, edema, stinging, burning, scaling, crusting, erythema, photosensitivity, and allergic contact dermatitis. Do not let children get into medication and do not let pets eat medication.     Any questions or concerns please call or follow up in clinic.     Follow up in 2 months      Again, thank you for allowing me to participate in the care of your patient.        Sincerely,        Tracy Pate PA-C

## 2020-01-13 NOTE — PROGRESS NOTES
HPI:  Emery Zayas is a 64 year old male patient here today for follow up aks on forehead and temples treated with efudex .  Patient states this has been present for a while.  Patient reports the following symptoms: improvement .  Patient reports the following previous treatments: efudex. Also here for follow up dyshidrotic eczema of feet. Treated with lidex. resolution.  Patient reports the following modifying factors: none.  Associated symptoms: none.  Patient has no other skin complaints today.  Remainder of the HPI, Meds, PMH, Allergies, FH, and SH was reviewed in chart.    Pertinent Hx:   No personal or family history of skin cancer    Past Medical History:   Diagnosis Date     Alcoholism (H)     Treated 1980's, denies any recent problems with withdrawal when skips days drinking.     Arthritis      Chronic low back pain      Gout     possible     Herniated intervertebral disk     thoracic     Hypertension      RBBB (right bundle branch block)     Present for several years       Past Surgical History:   Procedure Laterality Date     BACK SURGERY       Back surgery x 4       ESOPHAGOSCOPY, GASTROSCOPY, DUODENOSCOPY (EGD), COMBINED  2000    normal     TONSILLECTOMY          Family History   Problem Relation Age of Onset     Hypertension Father      Heart Disease Father         Angioplasty in late 60's or early 70's.     Unknown/Adopted Father      Hypertension Sister      Kidney Disease Sister      Unknown/Adopted Sister      Cancer Mother         lung     Unknown/Adopted Mother      Diabetes Mother      Thyroid Disease Sister      Unknown/Adopted Sister      Unknown/Adopted Maternal Grandmother      Substance Abuse Maternal Grandfather      Depression Paternal Grandmother      Unknown/Adopted Paternal Grandfather      Diabetes Sister        Social History     Socioeconomic History     Marital status:      Spouse name: Not on file     Number of children: Not on file     Years of education: Not on file      Highest education level: Not on file   Occupational History     Not on file   Social Needs     Financial resource strain: Not on file     Food insecurity:     Worry: Not on file     Inability: Not on file     Transportation needs:     Medical: Not on file     Non-medical: Not on file   Tobacco Use     Smoking status: Former Smoker     Packs/day: 0.00     Last attempt to quit: 7/15/1993     Years since quittin.5     Smokeless tobacco: Never Used     Tobacco comment: Quit 20 years ago.   Substance and Sexual Activity     Alcohol use: Yes     Alcohol/week: 7.0 standard drinks     Types: 7 Shots of liquor per week     Frequency: 4 or more times a week     Drinks per session: 3 or 4     Binge frequency: Monthly     Comment: maybe 5-7 rum and cokes or beer a week     Drug use: No     Sexual activity: Yes     Partners: Female   Lifestyle     Physical activity:     Days per week: 7 days     Minutes per session: 30 min     Stress: Very much   Relationships     Social connections:     Talks on phone: More than three times a week     Gets together: Once a week     Attends Muslim service: Never     Active member of club or organization: No     Attends meetings of clubs or organizations: Not on file     Relationship status:      Intimate partner violence:     Fear of current or ex partner: Not on file     Emotionally abused: Not on file     Physically abused: Not on file     Forced sexual activity: Not on file   Other Topics Concern     Parent/sibling w/ CABG, MI or angioplasty before 65F 55M? 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       Outpatient Encounter Medications as of 2020   Medication Sig Dispense Refill     allopurinol (ZYLOPRIM) 100 MG tablet TAKE 2 TABLETS (200 MG) BY MOUTH 2 TIMES DAILY 360 tablet 1     aspirin 81 MG tablet Take 1 tablet by mouth daily.        carvedilol (COREG) 12.5 MG tablet Take 1 tablet (12.5 mg) by mouth 2 times daily (with meals) 180 tablet 1     colchicine (COLCRYS) 0.6 MG tablet Take 2 tablets at the first sign of flare, take 1 additional tablet one hour later. 30 tablet 3     fenofibrate (LOFIBRA) 54 MG tablet Take 1 tablet (54 mg) by mouth daily 90 tablet 3     hydrochlorothiazide (HYDRODIURIL) 25 MG tablet Take 1 tablet (25 mg) by mouth every morning 30 tablet 3     metroNIDAZOLE (METROGEL) 0.75 % external gel Apply topically 2 times daily To face 45 g 3     Multiple Vitamins-Minerals (MULTIVITAMIN ADULT) CHEW Take 1 chew tab by mouth daily       olmesartan (BENICAR) 40 MG tablet Take 1 tablet (40 mg) by mouth daily 90 tablet 1     simvastatin (ZOCOR) 40 MG tablet Take 1 tablet (40 mg) by mouth At Bedtime 90 tablet 3     cyclobenzaprine (FLEXERIL) 5 MG tablet Take 1 tablet (5 mg) by mouth At Bedtime (Patient not taking: Reported on 12/19/2019) 30 tablet 3     fluocinonide (LIDEX) 0.05 % external cream Apply topically 2 times daily A thin layer to aa on right foot x 2 weeks. Do not use on face or body folds. 30 days supply. (Patient not taking: Reported on 12/19/2019) 30 g 1     fluorouracil (EFUDEX) 5 % external cream Apply a pea sized amount to forehead and temples BID x 2 weeks. (Patient not taking: Reported on 12/19/2019) 60 g 1     indomethacin (INDOCIN) 50 MG capsule Take 1 capsule (50 mg) by mouth 3 times daily (with meals) (Patient not taking: Reported on 12/19/2019) 42 capsule 1     omega-3 acid ethyl esters (LOVAZA) 1 g capsule TAKE 2 CAP BY MOUTH DAILY (Patient not taking: Reported on 12/19/2019) 180 capsule 1     omeprazole (PRILOSEC) 20 MG DR capsule Take 1 capsule (20 mg) by mouth daily (Patient not taking: Reported on 1/2/2020) 30 capsule 0     order for DME Equipment being ordered: blood pressure cuff (Patient not taking: Reported on 1/13/2020) 1 each 0     order for DME BP cuff, severe HTN, needs to monitor daily (Patient  not taking: Reported on 7/25/2019) 1 Device 0     sulfamethoxazole-trimethoprim (BACTRIM DS/SEPTRA DS) 800-160 MG tablet Take 1 tablet by mouth 2 times daily (Patient not taking: Reported on 7/25/2019) 20 tablet 10     No facility-administered encounter medications on file as of 1/13/2020.        Review Of Systems:  Skin: aks on face  Eyes: negative  Ears/Nose/Throat: negative  Respiratory: No shortness of breath, dyspnea on exertion, cough, or hemoptysis  Cardiovascular: negative  Gastrointestinal: negative  Genitourinary: negative  Musculoskeletal: negative  Neurologic: negative  Psychiatric: negative  Hematologic/Lymphatic/Immunologic: negative  Endocrine: negative      Objective:     /81   Pulse 58   SpO2 97%   Eyes: Conjunctivae/lids: Normal   ENT: Lips:  Normal  MSK: Normal  Cardiovascular: Peripheral edema none  Pulm: Breathing Normal  Neuro/Psych: Orientation: A/O x 3 Normal; Mood/Affect: Normal, NAD, WDWN  Pt accompanied by: self  Following areas examined: face, frontal scalp,  neck, hands, pt defers exam of feet  Ro skin type:i   Findings:  Pink gritty papules on nose, right frontal scalp, temple and cheek  Assessment and Plan:  1) dyshidrotic eczema  Resolved.    2) Actinic keratoses x 6 and solar elastosis, xerosis of skin    There is a risk of AKs developing into a SCC.   Treatment options include LN2 vs PDT vs Efudex. Pt elected Efudex     After finishing treatment please begin moisturizing face daily for one month. Follow up.    5 Fluorouracil (Efudex)     Apply a thin layer  two times a day for 2 weeks-forehead( right forehead/scalp/temple) and nose.  Efudex is a topical chemotherapy medication. Do not use if you are pregnant or have a hypersensitive to to the drug, class, or any components of the medication. Very rare to have any systemic absorption.    Common reactions include and are not limited to:  Application site reaction, pruritis, pain, bleeding, edema, stinging, burning,  scaling, crusting, erythema, photosensitivity, and allergic contact dermatitis. Do not let children get into medication and do not let pets eat medication.     Any questions or concerns please call or follow up in clinic.     Follow up in 2 months

## 2020-01-17 ENCOUNTER — OFFICE VISIT (OUTPATIENT)
Dept: CARDIOLOGY | Facility: CLINIC | Age: 65
End: 2020-01-17
Attending: INTERNAL MEDICINE
Payer: MEDICARE

## 2020-01-17 VITALS
DIASTOLIC BLOOD PRESSURE: 65 MMHG | HEIGHT: 66 IN | WEIGHT: 203.6 LBS | BODY MASS INDEX: 32.72 KG/M2 | HEART RATE: 54 BPM | SYSTOLIC BLOOD PRESSURE: 110 MMHG

## 2020-01-17 DIAGNOSIS — F10.29 ALCOHOL DEPENDENCE WITH UNSPECIFIED ALCOHOL-INDUCED DISORDER (H): ICD-10-CM

## 2020-01-17 DIAGNOSIS — E78.1 HYPERTRIGLYCERIDEMIA: ICD-10-CM

## 2020-01-17 DIAGNOSIS — I10 ESSENTIAL HYPERTENSION, BENIGN: ICD-10-CM

## 2020-01-17 DIAGNOSIS — I10 BENIGN ESSENTIAL HYPERTENSION: ICD-10-CM

## 2020-01-17 DIAGNOSIS — R06.83 SNORING: ICD-10-CM

## 2020-01-17 DIAGNOSIS — R07.89 ATYPICAL CHEST PAIN: ICD-10-CM

## 2020-01-17 LAB
ANION GAP SERPL CALCULATED.3IONS-SCNC: 3 MMOL/L (ref 3–14)
BUN SERPL-MCNC: 17 MG/DL (ref 7–30)
CALCIUM SERPL-MCNC: 8.8 MG/DL (ref 8.5–10.1)
CHLORIDE SERPL-SCNC: 105 MMOL/L (ref 94–109)
CO2 SERPL-SCNC: 31 MMOL/L (ref 20–32)
CREAT SERPL-MCNC: 0.99 MG/DL (ref 0.66–1.25)
GFR SERPL CREATININE-BSD FRML MDRD: 79 ML/MIN/{1.73_M2}
GLUCOSE SERPL-MCNC: 122 MG/DL (ref 70–99)
POTASSIUM SERPL-SCNC: 3.8 MMOL/L (ref 3.4–5.3)
SODIUM SERPL-SCNC: 139 MMOL/L (ref 133–144)

## 2020-01-17 PROCEDURE — 80048 BASIC METABOLIC PNL TOTAL CA: CPT | Performed by: INTERNAL MEDICINE

## 2020-01-17 PROCEDURE — 36415 COLL VENOUS BLD VENIPUNCTURE: CPT | Performed by: INTERNAL MEDICINE

## 2020-01-17 PROCEDURE — 99213 OFFICE O/P EST LOW 20 MIN: CPT | Performed by: NURSE PRACTITIONER

## 2020-01-17 RX ORDER — HYDROCHLOROTHIAZIDE 25 MG/1
25 TABLET ORAL EVERY MORNING
Qty: 90 TABLET | Refills: 3 | Status: SHIPPED | OUTPATIENT
Start: 2020-01-17 | End: 2020-07-06 | Stop reason: SINTOL

## 2020-01-17 ASSESSMENT — MIFFLIN-ST. JEOR: SCORE: 1646.27

## 2020-01-17 NOTE — PATIENT INSTRUCTIONS
Thanks for coming into the [facility] clinic today.    Today's plan:   1. Continue with your current medications.  2. Continue to work on lifestyle changes, including sticking to a low sodium diet, getting regular exercise, trying to cut back on alcohol, and trying to lose some weight to help get your blood pressure under better control.  3. Look into getting a home blood pressure cuff to monitor your blood pressure periodically. Omron is one good brand for home BP monitors.  4. Call if your blood pressure is consistently over 130/85 in your checks at home and we can see if we need to make further adjustments.  5. Follow up with Dr. Arce in December 2020 with an echocardiogram and labs beforehand.    If you have questions or concerns in the meantime, please call my nurse at [phone].    Scheduling phone number: [phone]    Reminder: Please bring in all current medications, any over the counter supplements, and any vitamin bottles to your next appointment.    It was a pleasure seeing you today!     SILVIANO Platt, CNP   01/17/20

## 2020-01-17 NOTE — LETTER
"1/17/2020    Trish Edwards MD  [address]  [facility] [address]    RE: Emery GALVAN Chana       Dear Colleague,    I had the pleasure of seeing Emery Zayas in the [facility].    Cardiology Clinic Progress Note    Service Date: January 17, 2020    PRIMARY CARDIOLOGIST:   Dr. Travon Arce      REASON FOR VISIT:  Follow up for hypertension    HPI:   I met Mr. Land \"Bill\" FITO Zayas today. He is a very nice 64 year old male with no known history of coronary artery disease, but with risk factors of hypertension and dyslipidemia. He previously completed a stress echocardiogram in May 2017 which showed no evidence for ischemia. A baseline echocardiogram was also completed in May 2017. This showed normal left ventricular function with an ejection fraction of 55 to 60%.  The ascending aorta was borderline dilated at 3.7 cm.    He has been seen in the Cardiology clinic by Dr. Travon Arce, and more recently was seen by Dr. Ericka Menchaca for follow-up after a visit to the emergency department last month for dizziness and right-sided chest pain. He was found to be significantly hypertensive in the emergency room with a blood pressure of 198/100. Serial troponins were negative. EKG showed a chronic right bundle branch block with sinus bradycardia (on carvedilol). His blood pressure improved while being monitored, and he was discharged home with an early Cardiology appointment. His blood pressure remained mildly elevated in the clinic at his visit with Dr. Menchaca, so hydrochlorothiazide 25 mg once daily was added.     Today, he presents to the clinic accompanied by his wife, John, in follow up primarily for reassessment of his blood pressure. He tells me that he has been feeling generally well and he denies any specific cardiac issues at this time. He has been tolerating the addition of hydrochlorothiazide without concerns. He doesn't have a working blood pressure cuff at home, so " he hasn't been monitoring it. His blood pressure was initially borderline elevated in the clinic today at 132/70, but improved to 110/65 on a recheck. A basic metabolic panel drawn today shows stable electrolytes and kidney function with a potassium of 3.8 and creatinine at 0.99.     He has a history of obesity with a BMI of 32. He admits that he has not maintained the most healthy diet, but he feels motivated to try make some healthier changes. He stays active by going for a 30-minute walk every day with his neighbor. He has been limited in his mobility somewhat by chronic low back pain. We discussed avoiding NSAIDs to help with blood pressure control and trying tylenol or topicals like Icy Hot as needed instead. He also has a history of alcohol dependence and typically has 2-3 drinks per day. Ideally, he would like to try to decrease the amount of medications he is on for blood pressure management longterm. We discussed that this could be possible moving forward if he works on his diet, tries to decrease his sodium intake, cuts back on alcohol, and gets into a more vigorous exercise routine to help him lose weight. He seemed receptive to giving this a shot and his wife is supportive of this goal.    ASSESSMENT AND PLAN:  1. Hypertension    Improved with the addition of hydrochlorothiazide 25 mg daily.     Continue current regimen of carvedilol, olmesartan, and hydrochlorothiazide unchanged.    Counseled on lifestyle changes to help manage his blood pressure, including sticking to a low sodium diet, cutting back on alcohol, getting regular exercise to try to lose some weight, and finding healthy ways to manage stress.     Recommended he get a home blood pressure cuff, monitor this periodically at home, and call if it begins to run consistently above 130/85.   2. Dyslipidemia    Treated with simvastatin 40 mg once daily and fenofibrate 54 mg once daily.  3. Borderline ascending aortic enlargement    Plan for a  repeat echocardiogram in 1 year.  4. Obesity    As above, counseled on diet and exercise to help him lose weight.   5. Alcohol dependence    Counseled on cutting back.      Thank you for the opportunity to participate in this pleasant patient's care. He will see Dr. Arce for an annual follow up with an echocardiogram and labs beforehand around this time next year. We would be happy to see him sooner if needed for any concerns in the meantime.     SILVIANO Platt, CNP   Text Page  (8am - 5pm, M-F)    Orders this Visit:  No orders of the defined types were placed in this encounter.    Orders Placed This Encounter   Medications     hydrochlorothiazide (HYDRODIURIL) 25 MG tablet     Sig: Take 1 tablet (25 mg) by mouth every morning     Dispense:  90 tablet     Refill:  3     Medications Discontinued During This Encounter   Medication Reason     hydrochlorothiazide (HYDRODIURIL) 25 MG tablet Reorder       Encounter Diagnoses   Name Primary?     Benign essential hypertension      Atypical chest pain      Hypertriglyceridemia      Alcohol dependence with unspecified alcohol-induced disorder (H)      Snoring      CURRENT MEDICATIONS:  Current Outpatient Medications   Medication Sig Dispense Refill     allopurinol (ZYLOPRIM) 100 MG tablet TAKE 2 TABLETS (200 MG) BY MOUTH 2 TIMES DAILY 360 tablet 1     aspirin 81 MG tablet Take 1 tablet by mouth daily.       carvedilol (COREG) 12.5 MG tablet Take 1 tablet (12.5 mg) by mouth 2 times daily (with meals) 180 tablet 1     fenofibrate (LOFIBRA) 54 MG tablet Take 1 tablet (54 mg) by mouth daily 90 tablet 3     fluorouracil (EFUDEX) 5 % external cream Apply a pea sized amount to forehead and temples BID x 2 weeks. 60 g 1     hydrochlorothiazide (HYDRODIURIL) 25 MG tablet Take 1 tablet (25 mg) by mouth every morning 90 tablet 3     Multiple Vitamins-Minerals (MULTIVITAMIN ADULT) CHEW Take 1 chew tab by mouth daily       olmesartan (BENICAR) 40 MG tablet Take 1 tablet (40 mg) by mouth  daily 90 tablet 1     simvastatin (ZOCOR) 40 MG tablet Take 1 tablet (40 mg) by mouth At Bedtime 90 tablet 3     colchicine (COLCRYS) 0.6 MG tablet Take 2 tablets at the first sign of flare, take 1 additional tablet one hour later. (Patient not taking: Reported on 1/17/2020) 30 tablet 3     cyclobenzaprine (FLEXERIL) 5 MG tablet Take 1 tablet (5 mg) by mouth At Bedtime (Patient not taking: Reported on 12/19/2019) 30 tablet 3     fluocinonide (LIDEX) 0.05 % external cream Apply topically 2 times daily A thin layer to aa on right foot x 2 weeks. Do not use on face or body folds. 30 days supply. (Patient not taking: Reported on 12/19/2019) 30 g 1     indomethacin (INDOCIN) 50 MG capsule Take 1 capsule (50 mg) by mouth 3 times daily (with meals) (Patient not taking: Reported on 12/19/2019) 42 capsule 1     metroNIDAZOLE (METROGEL) 0.75 % external gel Apply topically 2 times daily To face (Patient not taking: Reported on 1/17/2020) 45 g 3     omega-3 acid ethyl esters (LOVAZA) 1 g capsule TAKE 2 CAP BY MOUTH DAILY (Patient not taking: Reported on 12/19/2019) 180 capsule 1     order for DME Equipment being ordered: blood pressure cuff (Patient not taking: Reported on 1/13/2020) 1 each 0     order for DME BP cuff, severe HTN, needs to monitor daily (Patient not taking: Reported on 7/25/2019) 1 Device 0     sulfamethoxazole-trimethoprim (BACTRIM DS/SEPTRA DS) 800-160 MG tablet Take 1 tablet by mouth 2 times daily (Patient not taking: Reported on 7/25/2019) 20 tablet 10     ALLERGIES  Allergies   Allergen Reactions     No Known Drug Allergies      PAST MEDICAL, SURGICAL, FAMILY HISTORY:  History was reviewed and updated as needed, see medical record.    SOCIAL HISTORY:  He is . He and his wife live in a home in [place]. They have 2 children and 6 grandchildren whom he is very close to. He retired in his early 50s and previously worked as an . He drinks alcohol regularly, usually having 2-3 rum and  "cokes daily, sometimes more on the weekends. He previously struggled with more severe alcohol dependence and has received treatment for this in the past. He quit smoking several decades ago.  No other recreational drugs. He exercises by going for walks daily, usually for about 30 minutes.     Review of Systems:  Skin:  Negative     Eyes:  Positive for glasses  ENT:  Negative    Respiratory:  Negative    Cardiovascular:  Negative    Gastroenterology: Negative    Genitourinary:  Negative    Musculoskeletal:  Positive for back pain;nocturnal cramping  Neurologic:  Positive for numbness or tingling of hands  Psychiatric:  Positive for sleep disturbances  Heme/Lymph/Imm:  Positive for easy bruising  Endocrine:  Negative       Physical Exam:  Vitals: /65 (BP Location: Right arm, Patient Position: Sitting)   Pulse 54   Ht 1.676 m (5' 6\")   Wt 92.4 kg (203 lb 9.6 oz)   BMI 32.86 kg/m      Wt Readings from Last 4 Encounters:   01/17/20 92.4 kg (203 lb 9.6 oz)   01/02/20 91.6 kg (202 lb)   12/19/19 91.2 kg (201 lb)   12/15/19 90.8 kg (200 lb 2.8 oz)     GEN: Pleasant, obese gentleman, appears his stated age, sitting comfortably, and in no acute distress.  HEENT:  Pupils equal, round. Sclerae nonicteric.   C/V:  Regular rate and rhythm, no murmur, rub or gallop. Radial and dorsalis pedis pulses are 2+ bilaterally.  RESP: Respirations are unlabored. Clear to auscultation bilaterally without wheezing, rales, or rhonchi.  EXTREM: No clubbing, cyanosis, or lower extremity edema bilaterally.  NEURO: Alert and oriented, cooperative. Gait steady. No gross focal deficits.  PSYCH: Affect and mood appropriate, bright. Mentation normal.  SKIN: Warm and dry. No apparent rashes or bruising.     Recent Lab Results:  LIPID RESULTS:  Lab Results   Component Value Date    CHOL 173 01/02/2020    HDL 35 (L) 01/02/2020    LDL 65 01/02/2020    TRIG 367 (H) 01/02/2020    CHOLHDLRATIO 5.2 (H) 05/15/2015     LIVER ENZYME RESULTS:  Lab " Results   Component Value Date    AST 26 06/11/2019    ALT 27 06/11/2019     CBC RESULTS:  Lab Results   Component Value Date    WBC 6.3 01/02/2020    RBC 4.38 (L) 01/02/2020    HGB 14.2 01/02/2020    HCT 41.6 01/02/2020    MCV 95 01/02/2020    MCH 32.4 01/02/2020    MCHC 34.1 01/02/2020    RDW 12.8 01/02/2020     01/02/2020     BMP RESULTS:  Lab Results   Component Value Date     01/17/2020    POTASSIUM 3.8 01/17/2020    CHLORIDE 105 01/17/2020    CO2 31 01/17/2020    ANIONGAP 3 01/17/2020     (H) 01/17/2020    BUN 17 01/17/2020    CR 0.99 01/17/2020    GFRESTIMATED 79 01/17/2020    GFRESTBLACK >90 01/17/2020    RONN 8.8 01/17/2020      A1C RESULTS:  Lab Results   Component Value Date    A1C 5.4 01/08/2019     INR RESULTS:  Lab Results   Component Value Date    INR 0.99 05/05/2017    INR 1.02 02/05/2008     CC  Liv Menchaca MD  [address] [place] [address]    This note was completed in part using Dragon voice recognition software. Although reviewed after completion, some word and grammatical errors may occur.      Thank you for allowing me to participate in the care of your patient.      Sincerely,     Rito Bustos NP     [facility]

## 2020-01-17 NOTE — PROGRESS NOTES
"Cardiology Clinic Progress Note    Service Date: January 17, 2020    PRIMARY CARDIOLOGIST:  Dr. Travon Arce      REASON FOR VISIT:  Follow up for hypertension    HPI:   I met Mr. Land \"Oziel\" FITO Chana today. He is a very nice 64 year old male with no known history of coronary artery disease, but with risk factors of hypertension and dyslipidemia. He previously completed a stress echocardiogram in May 2017 which showed no evidence for ischemia. A baseline echocardiogram was also completed in May 2017. This showed normal left ventricular function with an ejection fraction of 55 to 60%.  The ascending aorta was borderline dilated at 3.7 cm.    He has been seen in the Cardiology clinic by Dr. Travon Arce, and more recently was seen by Dr. Ericka Menchaca for follow-up after a visit to the emergency department last month for dizziness and right-sided chest pain. He was found to be significantly hypertensive in the emergency room with a blood pressure of 198/100. Serial troponins were negative. EKG showed a chronic right bundle branch block with sinus bradycardia (on carvedilol). His blood pressure improved while being monitored, and he was discharged home with an early Cardiology appointment. His blood pressure remained mildly elevated in the clinic at his visit with Dr. Menchaca, so hydrochlorothiazide 25 mg once daily was added.     Today, he presents to the clinic accompanied by his wife, John, in follow up primarily for reassessment of his blood pressure. He tells me that he has been feeling generally well and he denies any specific cardiac issues at this time. He has been tolerating the addition of hydrochlorothiazide without concerns. He doesn't have a working blood pressure cuff at home, so he hasn't been monitoring it. His blood pressure was initially borderline elevated in the clinic today at 132/70, but improved to 110/65 on a recheck. A basic metabolic panel drawn today shows stable electrolytes and kidney function with " a potassium of 3.8 and creatinine at 0.99.     He has a history of obesity with a BMI of 32. He admits that he has not maintained the most healthy diet, but he feels motivated to try make some healthier changes. He stays active by going for a 30-minute walk every day with his neighbor. He has been limited in his mobility somewhat by chronic low back pain. We discussed avoiding NSAIDs to help with blood pressure control and trying tylenol or topicals like Icy Hot as needed instead. He also has a history of alcohol dependence and typically has 2-3 drinks per day. Ideally, he would like to try to decrease the amount of medications he is on for blood pressure management longterm. We discussed that this could be possible moving forward if he works on his diet, tries to decrease his sodium intake, cuts back on alcohol, and gets into a more vigorous exercise routine to help him lose weight. He seemed receptive to giving this a shot and his wife is supportive of this goal.    ASSESSMENT AND PLAN:  1. Hypertension    Improved with the addition of hydrochlorothiazide 25 mg daily.     Continue current regimen of carvedilol, olmesartan, and hydrochlorothiazide unchanged.    Counseled on lifestyle changes to help manage his blood pressure, including sticking to a low sodium diet, cutting back on alcohol, getting regular exercise to try to lose some weight, and finding healthy ways to manage stress.     Recommended he get a home blood pressure cuff, monitor this periodically at home, and call if it begins to run consistently above 130/85.   2. Dyslipidemia    Treated with simvastatin 40 mg once daily and fenofibrate 54 mg once daily.  3. Borderline ascending aortic enlargement    Plan for a repeat echocardiogram in 1 year.  4. Obesity    As above, counseled on diet and exercise to help him lose weight.   5. Alcohol dependence    Counseled on cutting back.      Thank you for the opportunity to participate in this pleasant  patient's care. He will see Dr. Arce for an annual follow up with an echocardiogram and labs beforehand around this time next year. We would be happy to see him sooner if needed for any concerns in the meantime.     SILVIANO Platt, CNP   Text Page  (8am - 5pm, M-F)    Orders this Visit:  No orders of the defined types were placed in this encounter.    Orders Placed This Encounter   Medications     hydrochlorothiazide (HYDRODIURIL) 25 MG tablet     Sig: Take 1 tablet (25 mg) by mouth every morning     Dispense:  90 tablet     Refill:  3     Medications Discontinued During This Encounter   Medication Reason     hydrochlorothiazide (HYDRODIURIL) 25 MG tablet Reorder       Encounter Diagnoses   Name Primary?     Benign essential hypertension      Atypical chest pain      Hypertriglyceridemia      Alcohol dependence with unspecified alcohol-induced disorder (H)      Snoring      CURRENT MEDICATIONS:  Current Outpatient Medications   Medication Sig Dispense Refill     allopurinol (ZYLOPRIM) 100 MG tablet TAKE 2 TABLETS (200 MG) BY MOUTH 2 TIMES DAILY 360 tablet 1     aspirin 81 MG tablet Take 1 tablet by mouth daily.       carvedilol (COREG) 12.5 MG tablet Take 1 tablet (12.5 mg) by mouth 2 times daily (with meals) 180 tablet 1     fenofibrate (LOFIBRA) 54 MG tablet Take 1 tablet (54 mg) by mouth daily 90 tablet 3     fluorouracil (EFUDEX) 5 % external cream Apply a pea sized amount to forehead and temples BID x 2 weeks. 60 g 1     hydrochlorothiazide (HYDRODIURIL) 25 MG tablet Take 1 tablet (25 mg) by mouth every morning 90 tablet 3     Multiple Vitamins-Minerals (MULTIVITAMIN ADULT) CHEW Take 1 chew tab by mouth daily       olmesartan (BENICAR) 40 MG tablet Take 1 tablet (40 mg) by mouth daily 90 tablet 1     simvastatin (ZOCOR) 40 MG tablet Take 1 tablet (40 mg) by mouth At Bedtime 90 tablet 3     colchicine (COLCRYS) 0.6 MG tablet Take 2 tablets at the first sign of flare, take 1 additional tablet one hour later.  (Patient not taking: Reported on 1/17/2020) 30 tablet 3     cyclobenzaprine (FLEXERIL) 5 MG tablet Take 1 tablet (5 mg) by mouth At Bedtime (Patient not taking: Reported on 12/19/2019) 30 tablet 3     fluocinonide (LIDEX) 0.05 % external cream Apply topically 2 times daily A thin layer to aa on right foot x 2 weeks. Do not use on face or body folds. 30 days supply. (Patient not taking: Reported on 12/19/2019) 30 g 1     indomethacin (INDOCIN) 50 MG capsule Take 1 capsule (50 mg) by mouth 3 times daily (with meals) (Patient not taking: Reported on 12/19/2019) 42 capsule 1     metroNIDAZOLE (METROGEL) 0.75 % external gel Apply topically 2 times daily To face (Patient not taking: Reported on 1/17/2020) 45 g 3     omega-3 acid ethyl esters (LOVAZA) 1 g capsule TAKE 2 CAP BY MOUTH DAILY (Patient not taking: Reported on 12/19/2019) 180 capsule 1     order for DME Equipment being ordered: blood pressure cuff (Patient not taking: Reported on 1/13/2020) 1 each 0     order for DME BP cuff, severe HTN, needs to monitor daily (Patient not taking: Reported on 7/25/2019) 1 Device 0     sulfamethoxazole-trimethoprim (BACTRIM DS/SEPTRA DS) 800-160 MG tablet Take 1 tablet by mouth 2 times daily (Patient not taking: Reported on 7/25/2019) 20 tablet 10     ALLERGIES  Allergies   Allergen Reactions     No Known Drug Allergies      PAST MEDICAL, SURGICAL, FAMILY HISTORY:  History was reviewed and updated as needed, see medical record.    SOCIAL HISTORY:  He is . He and his wife live in a home in [place]. They have 2 children and 6 grandchildren whom he is very close to. He retired in his early 50s and previously worked as an . He drinks alcohol regularly, usually having 2-3 rum and cokes daily, sometimes more on the weekends. He previously struggled with more severe alcohol dependence and has received treatment for this in the past. He quit smoking several decades ago.  No other recreational drugs. He exercises by  "going for walks daily, usually for about 30 minutes.     Review of Systems:  Skin:  Negative     Eyes:  Positive for glasses  ENT:  Negative    Respiratory:  Negative    Cardiovascular:  Negative    Gastroenterology: Negative    Genitourinary:  Negative    Musculoskeletal:  Positive for back pain;nocturnal cramping  Neurologic:  Positive for numbness or tingling of hands  Psychiatric:  Positive for sleep disturbances  Heme/Lymph/Imm:  Positive for easy bruising  Endocrine:  Negative       Physical Exam:  Vitals: /65 (BP Location: Right arm, Patient Position: Sitting)   Pulse 54   Ht 1.676 m (5' 6\")   Wt 92.4 kg (203 lb 9.6 oz)   BMI 32.86 kg/m     Wt Readings from Last 4 Encounters:   01/17/20 92.4 kg (203 lb 9.6 oz)   01/02/20 91.6 kg (202 lb)   12/19/19 91.2 kg (201 lb)   12/15/19 90.8 kg (200 lb 2.8 oz)     GEN: Pleasant, obese gentleman, appears his stated age, sitting comfortably, and in no acute distress.  HEENT:  Pupils equal, round. Sclerae nonicteric.   C/V:  Regular rate and rhythm, no murmur, rub or gallop. Radial and dorsalis pedis pulses are 2+ bilaterally.  RESP: Respirations are unlabored. Clear to auscultation bilaterally without wheezing, rales, or rhonchi.  EXTREM: No clubbing, cyanosis, or lower extremity edema bilaterally.  NEURO: Alert and oriented, cooperative. Gait steady. No gross focal deficits.  PSYCH: Affect and mood appropriate, bright. Mentation normal.  SKIN: Warm and dry. No apparent rashes or bruising.     Recent Lab Results:  LIPID RESULTS:  Lab Results   Component Value Date    CHOL 173 01/02/2020    HDL 35 (L) 01/02/2020    LDL 65 01/02/2020    TRIG 367 (H) 01/02/2020    CHOLHDLRATIO 5.2 (H) 05/15/2015     LIVER ENZYME RESULTS:  Lab Results   Component Value Date    AST 26 06/11/2019    ALT 27 06/11/2019     CBC RESULTS:  Lab Results   Component Value Date    WBC 6.3 01/02/2020    RBC 4.38 (L) 01/02/2020    HGB 14.2 01/02/2020    HCT 41.6 01/02/2020    MCV 95 01/02/2020 "    MCH 32.4 01/02/2020    MCHC 34.1 01/02/2020    RDW 12.8 01/02/2020     01/02/2020     BMP RESULTS:  Lab Results   Component Value Date     01/17/2020    POTASSIUM 3.8 01/17/2020    CHLORIDE 105 01/17/2020    CO2 31 01/17/2020    ANIONGAP 3 01/17/2020     (H) 01/17/2020    BUN 17 01/17/2020    CR 0.99 01/17/2020    GFRESTIMATED 79 01/17/2020    GFRESTBLACK >90 01/17/2020    RONN 8.8 01/17/2020      A1C RESULTS:  Lab Results   Component Value Date    A1C 5.4 01/08/2019     INR RESULTS:  Lab Results   Component Value Date    INR 0.99 05/05/2017    INR 1.02 02/05/2008     CC  Liv Menchaca MD  [address] [place] [id number]    This note was completed in part using Dragon voice recognition software. Although reviewed after completion, some word and grammatical errors may occur.

## 2020-01-23 ENCOUNTER — HOSPITAL ENCOUNTER (OUTPATIENT)
Facility: CLINIC | Age: 65
Discharge: HOME | End: 2020-01-23
Attending: SURGERY | Admitting: SURGERY
Payer: MEDICARE

## 2020-01-23 ENCOUNTER — APPOINTMENT (OUTPATIENT)
Dept: SURGERY | Facility: PHYSICIAN GROUP | Age: 65
End: 2020-01-23
Payer: MEDICARE

## 2020-01-23 VITALS
RESPIRATION RATE: 16 BRPM | WEIGHT: 200 LBS | HEART RATE: 51 BPM | HEIGHT: 66 IN | BODY MASS INDEX: 32.14 KG/M2 | OXYGEN SATURATION: 97 % | SYSTOLIC BLOOD PRESSURE: 98 MMHG | DIASTOLIC BLOOD PRESSURE: 56 MMHG

## 2020-01-23 DIAGNOSIS — I10 ESSENTIAL HYPERTENSION, BENIGN: ICD-10-CM

## 2020-01-23 LAB — COLONOSCOPY: NORMAL

## 2020-01-23 PROCEDURE — 45385 COLONOSCOPY W/LESION REMOVAL: CPT | Performed by: SURGERY

## 2020-01-23 PROCEDURE — G0500 MOD SEDAT ENDO SERVICE >5YRS: HCPCS | Mod: PT | Performed by: SURGERY

## 2020-01-23 PROCEDURE — 88305 TISSUE EXAM BY PATHOLOGIST: CPT | Mod: 26 | Performed by: SURGERY

## 2020-01-23 PROCEDURE — 25800030 ZZH RX IP 258 OP 636: Performed by: SURGERY

## 2020-01-23 PROCEDURE — 45380 COLONOSCOPY AND BIOPSY: CPT | Performed by: SURGERY

## 2020-01-23 PROCEDURE — 88305 TISSUE EXAM BY PATHOLOGIST: CPT | Performed by: SURGERY

## 2020-01-23 PROCEDURE — 45385 COLONOSCOPY W/LESION REMOVAL: CPT | Mod: PT | Performed by: SURGERY

## 2020-01-23 PROCEDURE — 25000128 H RX IP 250 OP 636: Performed by: SURGERY

## 2020-01-23 PROCEDURE — 45380 COLONOSCOPY AND BIOPSY: CPT | Mod: PT | Performed by: SURGERY

## 2020-01-23 PROCEDURE — G0500 MOD SEDAT ENDO SERVICE >5YRS: HCPCS | Performed by: SURGERY

## 2020-01-23 RX ORDER — ONDANSETRON 2 MG/ML
4 INJECTION INTRAMUSCULAR; INTRAVENOUS
Status: DISCONTINUED | OUTPATIENT
Start: 2020-01-23 | End: 2020-01-23 | Stop reason: HOSPADM

## 2020-01-23 RX ORDER — LIDOCAINE 40 MG/G
CREAM TOPICAL
Status: DISCONTINUED | OUTPATIENT
Start: 2020-01-23 | End: 2020-01-23 | Stop reason: HOSPADM

## 2020-01-23 RX ORDER — NALOXONE HYDROCHLORIDE 0.4 MG/ML
.1-.4 INJECTION, SOLUTION INTRAMUSCULAR; INTRAVENOUS; SUBCUTANEOUS
Status: DISCONTINUED | OUTPATIENT
Start: 2020-01-23 | End: 2020-01-23 | Stop reason: HOSPADM

## 2020-01-23 RX ORDER — FENTANYL CITRATE 50 UG/ML
INJECTION, SOLUTION INTRAMUSCULAR; INTRAVENOUS PRN
Status: DISCONTINUED | OUTPATIENT
Start: 2020-01-23 | End: 2020-01-23 | Stop reason: HOSPADM

## 2020-01-23 RX ORDER — CARVEDILOL 12.5 MG/1
12.5 TABLET ORAL 2 TIMES DAILY WITH MEALS
Qty: 180 TABLET | Refills: 1 | Status: SHIPPED | OUTPATIENT
Start: 2020-01-23 | End: 2020-06-22

## 2020-01-23 RX ORDER — ONDANSETRON 2 MG/ML
4 INJECTION INTRAMUSCULAR; INTRAVENOUS EVERY 6 HOURS PRN
Status: DISCONTINUED | OUTPATIENT
Start: 2020-01-23 | End: 2020-01-23 | Stop reason: HOSPADM

## 2020-01-23 RX ORDER — ONDANSETRON 4 MG/1
4 TABLET, ORALLY DISINTEGRATING ORAL EVERY 6 HOURS PRN
Status: DISCONTINUED | OUTPATIENT
Start: 2020-01-23 | End: 2020-01-23 | Stop reason: HOSPADM

## 2020-01-23 RX ORDER — FLUMAZENIL 0.1 MG/ML
0.2 INJECTION, SOLUTION INTRAVENOUS
Status: DISCONTINUED | OUTPATIENT
Start: 2020-01-23 | End: 2020-01-23 | Stop reason: HOSPADM

## 2020-01-23 ASSESSMENT — MIFFLIN-ST. JEOR: SCORE: 1629.94

## 2020-01-23 NOTE — DISCHARGE INSTRUCTIONS
Understanding Colon and Rectal Polyps     The colon has a smooth lining composed of millions of cells.     The colon (also called the large intestine) is a muscular tube that forms the last part of the digestive tract. It absorbs water and stores food waste. The colon is about 4 to 6 feet long. The rectum is the last 6 inches of the colon. The colon and rectum have a smooth lining composed of millions of cells. Changes in these cells can lead to growths in the colon that can become cancerous and should be removed.     When the Colon Lining Changes  Changes that occur in the cells that line the colon or rectum can lead to growths called polyps. Over a period of years, polyps can turn cancerous. Removing polyps early may prevent cancer from ever forming.      Polyps  Polyps are fleshy clumps of tissue that form on the lining of the colon or rectum. Small polyps are usually benign (not cancerous). However, over time, cells in a polyp can change and become cancerous. The larger a polyp grows, the more likely this is to happen. Also, certain types of polyps known as adenomatous polyps are considered premalignant. This means that they will almost always become cancerous if they re not removed.          Cancer  Almost all colorectal cancers start when polyp cells begin growing abnormally. As a cancerous tumor grows, it may involve more and more of the colon or rectum. In time, cancer can also grow beyond the colon or rectum and spread to nearby organs or to glands called lymph nodes. The cells can also travel to other parts of the body. This is known as metastasis. The earlier a cancerous tumor is removed, the better the chance of preventing its spread.        [id number] [facility] [place], [address] [place] [address], [place] [address]. All rights reserved. This information is not intended as a substitute for professional medical care. Always follow your healthcare professional's instructions.  .rhe

## 2020-01-23 NOTE — LETTER
January 7, 2020      Emery Zayas  [address] [facility] [address] [facility] [address]        Dear Emery,           Please be aware that coverage of these services is subject to the terms and limitations of your health insurance plan.  Call member services at your health plan with any benefit or coverage questions.    Thank you for choosing [facility]. You are scheduled for the following service(s):    Date:  1-23-20             Procedure:  COLONOSCOPY  Doctor:        Mike   Arrival Time:  0845  *Check in at Emergency/Endoscopy desk*  Procedure Time:  0915      Location:   [facility]        Endoscopy Department, First Floor (Enter through ER Doors) *        [address] [place], [place] [address]      [phone] or [phone] ([facility]) to reschedule      MIRALAX -GATORADE  PREP  Colonoscopy is the most accurate test to detect colon polyps and colon cancer; and the only test where polyps can be removed. During this procedure, a doctor examines the lining of your large intestine and rectum through a flexible tube.   Transportation  You must arrange for a ride for the day of your procedure with a responsible adult. A taxi , Uber, etc, is not an option unless you are accompanied by a responsible adult. If you fail to arrange transportation with a responsible adult, your procedure will be cancelled and rescheduled.    Purchase the  following supplies at your local pharmacy:  - 2 (two) bisacodyl tablets: each tablet contains 5 mg.  (Dulcolax  laxative NOT Dulcolax  stool softener)   - 1 (one) 8.3 oz bottle of Polyethylene Glycol (PEG) 3350 Powder   (MiraLAX , Smooth LAX , ClearLAX  or equivalent)  - 64 oz Gatorade    Regular Gatorade, Gatorade G2 , Powerade , Powerade Zero  or Pedialyte  is acceptable. Red colored flavors are not allowed; all other colors (yellow, green, orange, purple and blue) are okay. It is also okay to buy two 2.12 oz packets of  powdered Gatorade that can be mixed with water to a total volume of 64 oz of liquid.  - 1 (one) 10 oz bottle of Magnesium Citrate (Red colored flavors are not allowed)  It is also okay for you to use a 0.5 oz package of powdered magnesium citrate (17 g) mixed with 10 oz of water.      PREPARATION FOR COLONOSCOPY    7 days before:    Discontinue fiber supplements and medications containing iron. This includes Metamucil  and Fibercon ; and multivitamins with iron.    3 days before:    Begin a low-fiber diet. A low-fiber diet helps making the cleanout more effective.     Examples of a low-fiber diet include (but are not limited to): white bread, white rice, pasta, crackers, fish, chicken, eggs, ground beef, creamy peanut butter, cooked/steamed/boiled vegetables, canned fruit, bananas, melons, milk, plain yogurt cheese, salad dressing and other condiments.     The following are not allowed on a low-fiber diet: seeds, nuts, popcorn, bran, whole wheat, corn, quinoa, raw fruits and vegetables, berries and dried fruit, beans and lentils.    For additional details on low-fiber diet, please refer to the table on the last page.    2 days before:    Continue the low-fiber diet.     Drink at least 8 glasses of water throughout the day.     Stop eating solid foods at 11:45 pm.    1 day before:    In the morning: begin a clear liquid diet (liquids you can see through).     Examples of a clear liquid diet include: water, clear broth or bouillon, Gatorade, Pedialyte or Powerade, carbonated and non-carbonated soft drinks (Sprite , 7-Up , ginger ale), strained fruit juices without pulp (apple, white grape, white cranberry), Jell-O  and popsicles.     The following are not allowed on a clear liquid diet: red liquids, alcoholic beverages, dairy products (milk, creamer, and yogurt), protein shakes, creamy broths, juice with pulp and chewing tobacco.    At noon: take 2 (two) bisacodyl tablets     At 4 (and no later than 6pm): start  drinking the Miralax-Gatorade preparation (8.3 oz of Miralax mixed with 64 oz of Gatorade in a large pitcher). Drink 1(one) 8 oz glass every 15 minutes thereafter, until the mixture is gone.    COLON CLEANSING TIPS: drink adequate amounts of fluids before and after your colon cleansing to prevent dehydration. Stay near a toilet because you will have diarrhea. Even if you are sitting on the toilet, continue to drink the cleansing solution every 15 minutes. If you feel nauseous or vomit, rinse your mouth with water, take a 15 to 30-minute-break and then continue drinking the solution. You will be uncomfortable until the stool has flushed from your colon (in about 2 to 4 hours). You may feel chilled.    Day of your procedure  You may take all of your morning medications including blood pressure medications, blood thinners (if you have not been instructed to stop these by our office), methadone, anti-seizure medications with sips of water 3 hours prior to your procedure or earlier. Do not take insulin or vitamins prior to your procedure. Continue the clear liquid diet.       4 hours prior: drink 10 oz of magnesium citrate. It may be easier to drink it with a straw.    STOP consuming all liquids after that.     Do not take anything by mouth during this time.     Allow extra time to travel to your procedure as you may need to stop and use a restroom along the way.    You are ready for the procedure, if you followed all instructions and your stool is no longer formed, but clear or yellow liquid. If you are unsure whether your colon is clean, please call our office at [phone] before you leave for your appointment.    Bring the following to your procedure:  - Insurance Card/Photo ID.   - List of current medications including over-the-counter medications and supplements.   - Your rescue inhaler if you currently use one to control asthma.    Canceling or rescheduling your appointment:   If you must cancel or reschedule  your appointment, please call [phone] as soon as possible.      COLONOSCOPY PRE-PROCEDURE CHECKLIST    If you have diabetes, ask your regular doctor for diet and medication restrictions.  If you take an anticoagulant or anti-platelet medication (such as Coumadin , Lovenox , Pradaxa , Xarelto , Eliquis , etc.), please call your primary doctor for advice on holding this medication.  If you take aspirin you may continue to do so.  If you are or may be pregnant, please discuss the risks and benefits of this procedure with your doctor.        What happens during a colonoscopy?    Plan to spend up to two hours, starting at registration time, at the endoscopy center the day of your procedure. The colonoscopy takes an average of 15 to 30 minutes. Recovery time is about 30 minutes.      Before the exam:    You will change into a gown.    Your medical history and medication list will be reviewed with you, unless that has been done over the phone prior to the procedure.     A nurse will insert an intravenous (IV) line into your hand or arm.    The doctor will meet with you and will give you a consent form to sign.  During the exam:     Medicine will be given through the IV line to help you relax.     Your heart rate and oxygen levels will be monitored. If your blood pressure is low, you may be given fluids through the IV line.     The doctor will insert a flexible hollow tube, called a colonoscope, into your rectum. The scope will be advanced slowly through the large intestine (colon).    You may have a feeling of fullness or pressure.     If an abnormal tissue or a polyp is found, the doctor may remove it through the endoscope for closer examination, or biopsy. Tissue removal is painless    After the exam:           Any tissue samples removed during the exam will be sent to a lab for evaluation. It may take 5-7 working days for you to be notified of the results.     A nurse will provide you with complete discharge  instructions before you leave the endoscopy center. Be sure to ask the nurse for specific instructions if you take blood thinners such as Aspirin, Coumadin or Plavix.     The doctor will prepare a full report for you and for the physician who referred you for the procedure.     Your doctor will talk with you about the initial results of your exam.      Medication given during the exam will prohibit you from driving for the rest of the day.     Following the exam, you may resume your normal diet. Your first meal should be light, no greasy foods. Avoid alcohol until the next day.     You may resume your regular activities the day after the procedure.         LOW-FIBER DIET    Foods RECOMMENDED Foods to AVOID   Breads, Cereal, Rice and Pasta:   White bread, rolls, biscuits, croissant and gege toast.   Waffles, [place] toast and pancakes.   White rice, noodles, pasta, macaroni and peeled cooked potatoes.   Plain crackers and saltines.   Cooked cereals: farina, cream of rice.   Cold cereals: Puffed Rice , Rice Krispies , Corn Flakes  and Special K    Breads, Cereal, Rice and Pasta:   Breads or rolls with nuts, seeds or fruit.   Whole wheat, pumpernickel, rye breads and cornbread.   Potatoes with skin, brown or wild rice, and kasha (buckwheat).     Vegetables:   Tender cooked and canned vegetables without seeds: carrots, asparagus tips, green or wax beans, pumpkin, spinach, lima beans. Vegetables:   Raw or steamed vegetables.   Vegetables with seeds.   Sauerkraut.   Winter squash, peas, broccoli, Brussel sprouts, cabbage, onions, cauliflower, baked beans, peas and corn.   Fruits:   Strained fruit juice.   Canned fruit, except pineapple.   Ripe bananas and melon. Fruits:   Prunes and prune juice.   Raw fruits.   Dried fruits: figs, dates and raisins.   Milk/Dairy:   Milk: plain or flavored.   Yogurt, custard and ice cream.   Cheese and cottage cheese Milk/Dairy:     Meat and other proteins:   ground, well-cooked tender  beef, lamb, ham, veal, pork, fish, poultry and organ meats.   Eggs.   Peanut butter without nuts. Meat and other proteins:   Tough, fibrous meats with gristle.   Dry beans, peas and lentils.   Peanut butter with nuts.   Tofu.   Fats, Snack, Sweets, Condiments and Beverages:   Margarine, butter, oils, mayonnaise, sour cream and salad dressing, plain gravy.   Sugar, hard candy, clear jelly, honey and syrup.   Spices, cooked herbs, bouillon, broth and soups made with allowed vegetable, ketchup and mustard.   Coffee, tea and carbonated drinks.   Plain cakes, cookies and pretzels.   Gelatin, plain puddings, custard, ice cream, sherbet and popsicles. Fats, Snack, Sweets, Condiments and Beverages:   Nuts, seeds and coconut.   Jam, marmalade and preserves.   Pickles, olives, relish and horseradish.   All desserts containing nuts, seeds, dried fruit and coconut; or made from whole grains or bran.   Candy made with nuts or seeds.   Popcorn.           DIRECTIONS TO THE ENDOSCOPY DEPARTMENT     From the north ([facility])  Take [address] South, exit on [address] [address]. Get into the left hand [place], turn left (east), go one-half mile to [address] and turn left. Go north to the first stoplight, take a right on [address] and follow it to the Emergency entrance.    From the south ([facility])  Take [address]N to the [address] split and exit on [address] [address]. On [address] [address], turn left (west) to [address]. Turn right (north) on [address]. Go north to the first stoplight, take a right on [address] and follow it to the Emergency entrance.    From the east via 35E ([facility])  Take [address] south to [address] [address] exit. Turn right on [address] [address]. Go west to [address]. Turn right (north) on [address]. Go to the first stoplight, take a right and follow on [address] to the Emergency entrance.    From the east via [address] ([facility])  [address] to  [address]. Turn left (south) on [address] to [address]. Turn left (east) on [address] and follow it to the Emergency entrance.    From the west via [address] ([place])  Take [address] east to [address]. Turn right (south) on [address] to [address]. Turn left (east) on [address] and follow it to the Emergency entrance.

## 2020-01-23 NOTE — PRE-PROCEDURE
GENERAL PRE-PROCEDURE:   Procedure:  Colonoscopy  Date/Time:  1/23/2020 9:36 AM    Written consent obtained?: Yes    Risks and benefits: Risks, benefits and alternatives were discussed    Consent given by:  Patient  Patient states understanding of procedure being performed: Yes    Patient's understanding of procedure matches consent: Yes    Procedure consent matches procedure scheduled: Yes    Expected level of sedation:  Moderate  Appropriately NPO:  Yes  ASA Class:  Class 2- mild systemic disease, no acute problems, no functional limitations  Mallampati  :  Grade 1- soft palate, uvula, tonsillar pillars, and posterior pharyngeal wall visible  Lungs:  Lungs clear with good breath sounds bilaterally  Heart:  Normal heart sounds and rate  History & Physical reviewed:  History and physical reviewed and no updates needed  Statement of review:  I have reviewed the lab findings, diagnostic data, medications, and the plan for sedation

## 2020-01-23 NOTE — LETTER
[facility]    [address] [place] [address]           Emery Zayas  [address] [facility] [address] [facility] [address]  January 27, 2020      Dear Emery,    This letter is to inform you of the results of your pathology report on your colonoscopy.  Your pathology report:   Showed 5 Adenomatous polyp. These types of polyps are not cancer, but are pre-cancerous (can turn into cancer). Having this type of polyp puts you at an increased risk of developing colon cancer in the future. You will need to repeat your colonoscopy in approximately 3 years to assure no new polyps have developed.  If you have any further questions, please do not hesitate to call: [facility] [phone].    Sincerely,     Sherice Mckeon MD  [facility]        Understanding Your Pathology Report:   Colon Polyps   When your colon was biopsied, the samples taken were studied under the microscope by a Pathologist. The pathologist sends your doctor a report that gives a diagnosis for each sample taken. This report helps manage your care. The questions and answers that follow are meant to help you understand the medical language used in the pathology report you received for your biopsy.   What if my report mentions cecum, ascending colon, transverse colon, descending colon, sigmoid colon, or rectum?  The cecum is the beginning of the colon where the small intestine empties into the large intestine. The ascending colon, transverse colon, descending colon, sigmoid colon, and rectum are other parts of the colon after the cecum. The colon ends at the rectum and waste exits through the anus.  What is a polyp in the colon?  A polyp is a projection (growth) of tissue from the inner lining of the colon into the lumen (hollow center) of the colon. Different types of polyps look different under the microscope. Polyps are benign (non-cancerous) growths, but cancer can start in some types  of polyps.   What is an adenoma?  An adenoma is a polyp made up of tissue that looks much like the normal lining of your colon, although it is different in several important ways when it is looked at under the microscope. In some cases, a cancer can arise in the adenoma.   What are tubular adenomas, tubulovillous adenomas, and villous adenomas?  Adenomas have several different growth patterns that can be seen under the microscope by the pathologist. There are 2 major growth patterns: tubular and villous. Because many adenomas have a mixture of both growth patterns, some polyps may be called tubulovillous adenomas.  The most important thing is that your polyp has been completely removed and does not show cancer. The growth pattern is only important because it helps determine when you will need your next colonoscopy to make sure you don t develop colon cancer in the future.    What are hyperplastic polyps?  Hyperplastic polyps are totally benign (they aren t pre-cancers or cancers) and are not a cause for concern.       What does it mean if I have an adenoma, such as a sessile serrated adenoma, or an adenomatous polyp?  These types of polyps are not cancer, but are pre-cancerous (can turn into cancers). Someone who has had one of these types of polyps has an increased risk of later developing cancer of the colon. Most patients with these polyps, however, never develop cancer.   What if my report mentions dysplasia?  Dysplasia is a term that describes how much your polyp looks like cancer under the microscope. Polyps that are only mildly abnormal (don t look much like cancer) are said to have low-grade (mild or moderate) dysplasia. Polyps that are more abnormal and look more like cancer are said to have high-grade (severe) dysplasia. If dysplasia is found in your polyp, it might mean you need to have a repeat (follow-up) colonoscopy sooner than if dysplasia wasn t found, but otherwise you do not need to worry about  dysplasia in your polyp.   How does having the various types of adenoma affect my future treatment?  Since you had an adenoma, you will need to have another colonoscopy to make sure that you don t develop any more adenomas. When your next colonoscopy should be scheduled depends on a number of things, like how many adenomas were found, if any were villous, and if any had dysplasia. The timing of your next colonoscopy should be discussed with your treating doctor as he or she knows the details of your specific case.  What if my adenoma was not completely removed?  If your adenoma was biopsied but not completely removed, you will need talk to your doctor about what other treatment you ll need. Most of the time, all adenomas need to be completely removed. Sometimes, though, the adenoma may be too large to remove during colonoscopy. In such cases you may need surgery to have the adenoma removed.

## 2020-01-24 LAB — COPATH REPORT: NORMAL

## 2020-02-25 ENCOUNTER — OFFICE VISIT (OUTPATIENT)
Dept: SLEEP MEDICINE | Facility: CLINIC | Age: 65
End: 2020-02-25
Payer: MEDICARE

## 2020-02-25 VITALS
DIASTOLIC BLOOD PRESSURE: 62 MMHG | OXYGEN SATURATION: 97 % | WEIGHT: 201 LBS | SYSTOLIC BLOOD PRESSURE: 108 MMHG | HEIGHT: 66 IN | BODY MASS INDEX: 32.3 KG/M2

## 2020-02-25 DIAGNOSIS — I10 BENIGN ESSENTIAL HYPERTENSION: ICD-10-CM

## 2020-02-25 DIAGNOSIS — G47.33 OSA (OBSTRUCTIVE SLEEP APNEA): Primary | ICD-10-CM

## 2020-02-25 DIAGNOSIS — F10.29 ALCOHOL DEPENDENCE WITH UNSPECIFIED ALCOHOL-INDUCED DISORDER (H): ICD-10-CM

## 2020-02-25 DIAGNOSIS — R07.89 ATYPICAL CHEST PAIN: ICD-10-CM

## 2020-02-25 DIAGNOSIS — R06.83 SNORING: ICD-10-CM

## 2020-02-25 DIAGNOSIS — E78.1 HYPERTRIGLYCERIDEMIA: ICD-10-CM

## 2020-02-25 DIAGNOSIS — G47.01 INSOMNIA DUE TO MEDICAL CONDITION: ICD-10-CM

## 2020-02-25 PROCEDURE — 99205 OFFICE O/P NEW HI 60 MIN: CPT | Performed by: INTERNAL MEDICINE

## 2020-02-25 RX ORDER — ZOLPIDEM TARTRATE 5 MG/1
TABLET ORAL
Qty: 1 TABLET | Refills: 0 | Status: SHIPPED | OUTPATIENT
Start: 2020-02-25 | End: 2020-11-11

## 2020-02-25 ASSESSMENT — MIFFLIN-ST. JEOR: SCORE: 1634.23

## 2020-02-25 NOTE — PROGRESS NOTES
[facility]   Outpatient Sleep Medicine Consultation  February 25, 2020      Name: Emery Zayas MRN# [id number]   Age: 64 year old YOB: 1955     Date of Consultation: February 25, 2020  Consultation is requested by: Liv Menchaca MD  [address] [place] [id number] Liv Menchaca  Primary care provider: Trish Edwards         Reason for Sleep Consult:     Emery Zayas is a 64 year old male for complaints of insomnia and worsening snoring for 5 to 10 years          Assessment and Plan:     Summary Sleep Diagnoses:      Clinical signs and symptoms of obstructive sleep apnea    Severe psychophysiological insomnia 7/7 nights with anxiety and back pain at night    Hypertension    Obesity      Psychophysiological insomnia and chronic back pain with prolonged wakeful periods and poor sleep habits including television watching    Relatively heavy alcohol intake on a daily basis without daytime dysfunction may be contributing to sleep apnea and sleep disruption at night though the patient notes typically 3 to 4 hours between alcohol use and sleep time    Summary Recommendations:      Sleep study split-night with CPAP initiation the night of the study.  Polysomnography is preferred due to the severity of insomnia and prolonged wakeful periods he documents in his reported diary.  Zolpidem 5 mg would be given before sleep-is no prior history of parasomnias.    We will set up a virtual visit for closure on final decisions as there is management for both insomnia sleep habits and sleep apnea treatment.      Summary Counseling: Patient does not use MyChart and we provided counseling regarding sleep habits, screen use, alcohol use in addition to potential treatments for sleep apnea and complications of untreated disease in his setting of hypertension and risk of vascular disease.             History of Present Illness:     Emery Zayas is a 64  year old male has been gradually gaining weight to his current peak lifetime weight with a BMI of 34 and increasing difficulty initiating maintaining sleep now currently having insomnia on a nightly basis with difficulty initiating sleep and prolonged awakenings up to 3 hours.  He has had increase in severity of snoring with snort arousals that are particularly worse when he consumes alcohol in the afternoon and occurs in the setting of hypertension on multiple medications.    SLEEP-WAKE SCHEDULE:   Tendency for delayed sleep phase earlier in life with night shift work with bedtime typically 10 PM and awakening 9 AM currently with somewhat nonrestorative sleep but no inappropriate napping.  Due to insomnia has poor sleep habits using television through most of the night with intermittent wakefulness and sleep in an estimated wakeful time up to 3 hours.       SCALES       SLEEP APNEA: [place] score         INSOMNIA:  Insomnia severity score: 15       SLEEPINESS: [place] sleepiness scale: 4 [normal < 11]      SLEEP COMPLAINTS:  Cardio-respiratory    Snoring- 7/week  Dyspnea- denies  Morning headaches or confusion-denies  Coexisting Lung disease: denies    Coexisting Heart disease: denies    Does patient have a bed partner: denies  Has bed partner been sleeping separately because of snoring:  denies            RLS Screen: When you try to relax in the evening or sleep at  night, do you ever have unpleasant, restless feelings in your  legs that can be relieved by walking or movement? denies    Periodic limb movement: denies    Narcolepsy:  denies sudden urges of sleep attacks    Cataplexy:  denies     Sleep paralysis:  denies      Hallucinations:   denies       Sleep Behaviors:    Leg symptoms/movements: denies    Motor restlessness:denies    Night terrors: denies    Bruxism: denies    Automatic behaviors: none    Other subjective complaints:    Anxiety or rumination denies    Pain and discomfort at  night:  denies    Waking up with heart pounding or racing: denies    GERD or aspiration:denies         Parasomnia:   NREM - denies recurrent persistent confusional arousal, night eating, sleep walking or sleep terrors   REM  -denies dream enactment; injuries              Medications:     Current Outpatient Medications   Medication Sig     allopurinol (ZYLOPRIM) 100 MG tablet TAKE 2 TABLETS (200 MG) BY MOUTH 2 TIMES DAILY     aspirin 81 MG tablet Take 1 tablet by mouth daily.     carvedilol (COREG) 12.5 MG tablet TAKE 1 TABLET (12.5 MG) BY MOUTH 2 TIMES DAILY (WITH MEALS)     colchicine (COLCRYS) 0.6 MG tablet Take 2 tablets at the first sign of flare, take 1 additional tablet one hour later. (Patient not taking: Reported on 1/17/2020)     cyclobenzaprine (FLEXERIL) 5 MG tablet Take 1 tablet (5 mg) by mouth At Bedtime (Patient not taking: Reported on 12/19/2019)     fenofibrate (LOFIBRA) 54 MG tablet Take 1 tablet (54 mg) by mouth daily     fluocinonide (LIDEX) 0.05 % external cream Apply topically 2 times daily A thin layer to aa on right foot x 2 weeks. Do not use on face or body folds. 30 days supply. (Patient not taking: Reported on 12/19/2019)     fluorouracil (EFUDEX) 5 % external cream Apply a pea sized amount to forehead and temples BID x 2 weeks.     hydrochlorothiazide (HYDRODIURIL) 25 MG tablet Take 1 tablet (25 mg) by mouth every morning     indomethacin (INDOCIN) 50 MG capsule Take 1 capsule (50 mg) by mouth 3 times daily (with meals) (Patient not taking: Reported on 12/19/2019)     metroNIDAZOLE (METROGEL) 0.75 % external gel Apply topically 2 times daily To face (Patient not taking: Reported on 1/17/2020)     Multiple Vitamins-Minerals (MULTIVITAMIN ADULT) CHEW Take 1 chew tab by mouth daily     olmesartan (BENICAR) 40 MG tablet Take 1 tablet (40 mg) by mouth daily     omega-3 acid ethyl esters (LOVAZA) 1 g capsule TAKE 2 CAP BY MOUTH DAILY (Patient not taking: Reported on 12/19/2019)     order for DME  Equipment being ordered: blood pressure cuff (Patient not taking: Reported on 2020)     order for DME BP cuff, severe HTN, needs to monitor daily (Patient not taking: Reported on 2019)     simvastatin (ZOCOR) 40 MG tablet Take 1 tablet (40 mg) by mouth At Bedtime     sulfamethoxazole-trimethoprim (BACTRIM DS/SEPTRA DS) 800-160 MG tablet Take 1 tablet by mouth 2 times daily (Patient not taking: Reported on 2019)     No current facility-administered medications for this visit.         Allergies   Allergen Reactions     No Known Drug Allergies             Past Medical History:     Does not need 02 supplement at night   Past Medical History:   Diagnosis Date     Alcoholism (H)     Treated , denies any recent problems with withdrawal when skips days drinking.     Anxiety 2014     Arthritis      Chronic low back pain      Dyspepsia and other specified disorders of function of stomach 2003     Elevated liver enzymes 2013     Gout     possible     Herniated intervertebral disk     thoracic     High blood triglycerides 2013     Hyperlipidemia LDL goal <130 4/15/2013     Hypertension      Metabolic syndrome 2013     Obesity 4/15/2013     Other chest pain 2003     RBBB (right bundle branch block)     Present for several years             Past Surgical History:    Previous upper airway surgery -tonsillectomy   Past Surgical History:   Procedure Laterality Date     BACK SURGERY       Back surgery x 4       COLONOSCOPY N/A 2020    Procedure: COLONOSCOPY, WITH POLYPECTOMY AND BIOPSY; polypectomies  using jumbo bx forcep;  Surgeon: Sherice Mckeon MD;  Location:  GI     ESOPHAGOSCOPY, GASTROSCOPY, DUODENOSCOPY (EGD), COMBINED      normal     TONSILLECTOMY              Social History:     Social History     Tobacco Use     Smoking status: Former Smoker     Packs/day: 0.00     Last attempt to quit: 7/15/1993     Years since quittin.6     Smokeless tobacco: Never  Used     Tobacco comment: Quit 20 years ago.   Substance Use Topics     Alcohol use: Yes     Alcohol/week: 7.0 standard drinks     Types: 7 Shots of liquor per week     Frequency: 4 or more times a week     Drinks per session: 3 or 4     Binge frequency: Monthly     Comment: more than 5-7 rum and cokes or beer a week     Early afternoon alcohol though frequent use  Caffeine rare           Family History:     Family History   Problem Relation Age of Onset     Hypertension Father      Heart Disease Father         Angioplasty in late 60's or early 70's.     Unknown/Adopted Father      Hypertension Sister      Kidney Disease Sister      Unknown/Adopted Sister      Cancer Mother         lung     Unknown/Adopted Mother      Diabetes Mother      Thyroid Disease Sister      Unknown/Adopted Sister      Unknown/Adopted Maternal Grandmother      Substance Abuse Maternal Grandfather      Depression Paternal Grandmother      Unknown/Adopted Paternal Grandfather      Diabetes Sister      Colon Cancer No family hx of                  Review of Systems:     A complete 10 point review of systems was negative other than HPI or as commented below:   Tingling left hand  Hypertension  Runny nose  Dry cough         Physical Examination:   No flowsheet data found.    Constitutional: . Awake, alert, cooperative, dressed casually, good eye contact, comfortably sitting in a chair, in no apparent distress  Mood: euthymic; affect congruent with full range and intensity.  Attention/Concentration:  Normal   Eyes: No icterus.  ENT: Mallampati Class: IV.   Tonsillar Stage: 1  hidden by pillars   Retrognathia, micrognathia, small and crowded oropharynx,  low-lying soft palate macroglossia, tonsillar hypertrophy, elongated uvula, turbinate hypertrophy, deviated nasal septum, poor dentition  Cardiovascular: Regular S1 and S2, no gallops or murmurs. No carotid bruits  Neck: Supple, no thyroid enlargement.   Pulmonary:  Chest symmetric, lungs clear  bilaterally and no crackles, wheezes or rales  Extremities:  No pedal edema.  Muscle/joint: Strength and tone normal   Skin:  No rash or significant lesions.   Gait Normal.  Neurologic: Alert, oriented x3, no focal neurological deficit, cranial nerves grossly normal            Data: All pertinent previous laboratory data reviewed     No results found for: PH, PHARTERIAL, PO2, XM2IVCXTCDT, SAT, PCO2, HCO3, BASEEXCESS, MOIRA, BEB  Lab Results   Component Value Date    TSH 1.98 06/11/2019    TSH 1.42 01/03/2019     Lab Results   Component Value Date     (H) 01/17/2020     (H) 01/02/2020     Lab Results   Component Value Date    HGB 14.2 01/02/2020    HGB 14.6 12/15/2019     Lab Results   Component Value Date    BUN 17 01/17/2020    BUN 19 01/02/2020    CR 0.99 01/17/2020    CR 1.03 01/02/2020     Lab Results   Component Value Date    AST 26 06/11/2019    AST 16 01/03/2019    ALT 27 06/11/2019    ALT 27 01/03/2019    ALKPHOS 89 06/11/2019    ALKPHOS 76 01/03/2019    BILITOTAL 0.5 06/11/2019    BILITOTAL 0.4 01/03/2019    BILICONJ 0.0 07/31/2003     No results found for: UAMP, UBARB, BENZODIAZEUR, UCANN, UCOC, OPIT, UPCP    Copy to: Trish Edwards MD 2/25/2020     Total time spent with patient: 60 min >50% counseling

## 2020-02-25 NOTE — PATIENT INSTRUCTIONS
Your blood pressure was checked while you were in clinic today.  Please read the guidelines below about what these numbers mean and what you should do about them.  Your systolic blood pressure is the top number.  This is the pressure when the heart is pumping.  Your diastolic blood pressure is the bottom number.  This is the pressure in between beats.  If your systolic blood pressure is less than 120 and your diastolic blood pressure is less than 80, then your blood pressure is normal. There is nothing more that you need to do about it  If your systolic blood pressure is 120-139 or your diastolic blood pressure is 80-89, your blood pressure may be higher than it should be.  You should have your blood pressure re-checked within a year by a primary care provider.  If your systolic blood pressure is 140 or greater or your diastolic blood pressure is 90 or greater, you may have high blood pressure.  High blood pressure is treatable, but if left untreated over time it can put you at risk for heart attack, stroke, or kidney failure.  You should have your blood pressure re-checked by a primary care provider within the next four weeks.  Your BMI is There is no height or weight on file to calculate BMI.  Weight management is a personal decision.  If you are interested in exploring weight loss strategies, the following discussion covers the approaches that may be successful. Body mass index (BMI) is one way to tell whether you are at a healthy weight, overweight, or obese. It measures your weight in relation to your height.  A BMI of 18.5 to 24.9 is in the healthy range. A person with a BMI of 25 to 29.9 is considered overweight, and someone with a BMI of 30 or greater is considered obese. More than two-thirds of American adults are considered overweight or obese.  Being overweight or obese increases the risk for further weight gain. Excess weight may lead to heart disease and diabetes.  Creating and following plans for  healthy eating and physical activity may help you improve your health.  Weight control is part of healthy lifestyle and includes exercise, emotional health, and healthy eating habits. Careful eating habits lifelong are the mainstay of weight control. Though there are significant health benefits from weight loss, long-term weight loss with diet alone may be very difficult to achieve- studies show long-term success with dietary management in less than 10% of people. Attaining a healthy weight may be especially difficult to achieve in those with severe obesity. In some cases, medications, devices and surgical management might be considered.  What can you do?  If you are overweight or obese and are interested in methods for weight loss, you should discuss this with your provider.     Consider reducing daily calorie intake by 500 calories.     Keep a food journal.     Avoiding skipping meals, consider cutting portions instead.    Diet combined with exercise helps maintain muscle while optimizing fat loss. Strength training is particularly important for building and maintaining muscle mass. Exercise helps reduce stress, increase energy, and improves fitness. Increasing exercise without diet control, however, may not burn enough calories to loose weight.       Start walking three days a week 10-20 minutes at a time    Work towards walking thirty minutes five days a week     Eventually, increase the speed of your walking for 1-2 minutes at time    In addition, we recommend that you review healthy lifestyles and methods for weight loss available through the National Institutes of Health patient information sites:  http://win.niddk.nih.gov/publications/index.htm    And look into health and wellness programs that may be available through your health insurance provider, employer, local community center, or [employer].

## 2020-03-05 DIAGNOSIS — E78.1 HIGH BLOOD TRIGLYCERIDES: ICD-10-CM

## 2020-03-05 RX ORDER — OMEGA-3-ACID ETHYL ESTERS 1 G/1
CAPSULE, LIQUID FILLED ORAL
Qty: 180 CAPSULE | Refills: 1 | OUTPATIENT
Start: 2020-03-05

## 2020-05-27 DIAGNOSIS — M1A.9XX0 CHRONIC GOUT WITHOUT TOPHUS, UNSPECIFIED CAUSE, UNSPECIFIED SITE: ICD-10-CM

## 2020-05-27 NOTE — TELEPHONE ENCOUNTER
Per protocol pt will be due next month for some labs in particular a uric acid level and alt.  Pt was seen for HTN 4 months ago.  Do you want a lab only?  If so please place orders    Live Peña RN, BSN

## 2020-05-27 NOTE — LETTER
June 5, 2020      Emery Zayas  [address] [id number]        Emery Zayas      We recently received a refill request for your allopurinol (ZYLOPRIM) 100 MG tablet . We have sent in a refill for you to your pharmacy.    Our records show you are due for a follow up visit with your provider prior to any ongoing refills.     Please call the clinic at [phone] to schedule.            Sincerely,        Trish Edwards MD

## 2020-05-28 RX ORDER — ALLOPURINOL 100 MG/1
200 TABLET ORAL 2 TIMES DAILY
Qty: 120 TABLET | Refills: 0 | Status: SHIPPED | OUTPATIENT
Start: 2020-05-28 | End: 2020-06-24

## 2020-05-28 NOTE — TELEPHONE ENCOUNTER
Pt called back, I wasn't for sure on what to tell him, no orders were put in chart. Pt stated he won't be home until Wednesday 06/03 and to call his home phone [phone] then. Pt stated that he has enough meds to last for about a week or two. Pt also said he never checks cell phone so we should always call home phone.    Edie Russell Patient Representative

## 2020-06-12 ENCOUNTER — TELEPHONE (OUTPATIENT)
Dept: SLEEP MEDICINE | Facility: CLINIC | Age: 65
End: 2020-06-12

## 2020-06-12 NOTE — TELEPHONE ENCOUNTER
Left a message informing patient his in lab sleep study has been canceled and we will call him as soon as possible to reschedule.

## 2020-06-21 DIAGNOSIS — I10 ESSENTIAL HYPERTENSION, BENIGN: ICD-10-CM

## 2020-06-23 RX ORDER — CARVEDILOL 12.5 MG/1
TABLET ORAL
Qty: 30 TABLET | Refills: 0 | Status: SHIPPED | OUTPATIENT
Start: 2020-06-23 | End: 2020-06-26

## 2020-06-24 DIAGNOSIS — M1A.9XX0 CHRONIC GOUT WITHOUT TOPHUS, UNSPECIFIED CAUSE, UNSPECIFIED SITE: ICD-10-CM

## 2020-06-24 DIAGNOSIS — N18.30 CHRONIC KIDNEY DISEASE, STAGE 3 (MODERATE): ICD-10-CM

## 2020-06-24 DIAGNOSIS — I10 ESSENTIAL HYPERTENSION, BENIGN: Primary | ICD-10-CM

## 2020-06-24 RX ORDER — ALLOPURINOL 100 MG/1
200 TABLET ORAL 2 TIMES DAILY
Qty: 120 TABLET | Refills: 0 | Status: SHIPPED | OUTPATIENT
Start: 2020-06-24 | End: 2020-07-23

## 2020-06-24 NOTE — TELEPHONE ENCOUNTER
Routing refill request to provider for review/approval because:  Labs not current:  ALT, Uric Acid

## 2020-06-26 ENCOUNTER — TELEPHONE (OUTPATIENT)
Dept: FAMILY MEDICINE | Facility: CLINIC | Age: 65
End: 2020-06-26

## 2020-06-26 ENCOUNTER — VIRTUAL VISIT (OUTPATIENT)
Dept: FAMILY MEDICINE | Facility: CLINIC | Age: 65
End: 2020-06-26
Payer: MEDICARE

## 2020-06-26 DIAGNOSIS — I10 ESSENTIAL HYPERTENSION, BENIGN: Primary | ICD-10-CM

## 2020-06-26 DIAGNOSIS — F10.29 ALCOHOL DEPENDENCE WITH UNSPECIFIED ALCOHOL-INDUCED DISORDER (H): ICD-10-CM

## 2020-06-26 DIAGNOSIS — Z00.00 MEDICARE ANNUAL WELLNESS VISIT, SUBSEQUENT: ICD-10-CM

## 2020-06-26 DIAGNOSIS — E78.5 HYPERLIPIDEMIA LDL GOAL <130: ICD-10-CM

## 2020-06-26 DIAGNOSIS — Z87.891 HX OF SMOKING: ICD-10-CM

## 2020-06-26 DIAGNOSIS — R06.83 SNORING: ICD-10-CM

## 2020-06-26 DIAGNOSIS — E78.1 HIGH BLOOD TRIGLYCERIDES: ICD-10-CM

## 2020-06-26 DIAGNOSIS — M10.00 ACUTE IDIOPATHIC GOUT, UNSPECIFIED SITE: ICD-10-CM

## 2020-06-26 PROCEDURE — G0438 PPPS, INITIAL VISIT: HCPCS | Mod: 95 | Performed by: FAMILY MEDICINE

## 2020-06-26 RX ORDER — SIMVASTATIN 40 MG
40 TABLET ORAL AT BEDTIME
Qty: 90 TABLET | Refills: 3 | Status: SHIPPED | OUTPATIENT
Start: 2020-06-26 | End: 2021-02-04

## 2020-06-26 RX ORDER — CARVEDILOL 12.5 MG/1
12.5 TABLET ORAL 2 TIMES DAILY
Qty: 180 TABLET | Refills: 1 | Status: SHIPPED | OUTPATIENT
Start: 2020-06-26 | End: 2020-12-17

## 2020-06-26 RX ORDER — OMEGA-3-ACID ETHYL ESTERS 1 G/1
CAPSULE, LIQUID FILLED ORAL
Qty: 180 CAPSULE | Refills: 1 | Status: SHIPPED | OUTPATIENT
Start: 2020-06-26 | End: 2021-08-05

## 2020-06-26 RX ORDER — FENOFIBRATE 54 MG/1
54 TABLET ORAL DAILY
Qty: 90 TABLET | Refills: 3 | Status: SHIPPED | OUTPATIENT
Start: 2020-06-26 | End: 2021-02-04

## 2020-06-26 NOTE — TELEPHONE ENCOUNTER
Visit Disposition     Dispositions  Check-out Note    0 Return in about 6 months (around 12/26/2020) for In-Clinic Visit-BP and med check.  Needing lab only appt soon, needing nurse only BP same day as well as pneumonia shot      Called and left message for patient    If calls back please help schedule    Nica Choudhury/

## 2020-06-26 NOTE — PROGRESS NOTES
"Emery Zayas is a 64 year old male who is being evaluated via a billable telephone visit.      The patient has been notified of following:     \"This telephone visit will be conducted via a call between you and your physician/provider. We have found that certain health care needs can be provided without the need for a physical exam.  This service lets us provide the care you need with a short phone conversation.  If a prescription is necessary we can send it directly to your pharmacy.  If lab work is needed we can place an order for that and you can then stop by our lab to have the test done at a later time.    Telephone visits are billed at different rates depending on your insurance coverage. During this emergency period, for some insurers they may be billed the same as an in-person visit.  Please reach out to your insurance provider with any questions.    If during the course of the call the physician/provider feels a telephone visit is not appropriate, you will not be charged for this service.\"    Patient has given verbal consent for Telephone visit?  Yes    What phone number would you like to be contacted at? [phone]    How would you like to obtain your AVS? Mail a copy    Subjective     Emery Zayas is a 64 year old male who presents via phone visit today for the following health issues:    HPI  Hyperlipidemia Follow-Up      Are you regularly taking any medication or supplement to lower your cholesterol?   Yes- simvastatin    Are you having muscle aches or other side effects that you think could be caused by your cholesterol lowering medication?  No    Hypertension Follow-up      Do you check your blood pressure regularly outside of the clinic? No     Are you following a low salt diet? Yes    Are your blood pressures ever more than 140 on the top number (systolic) OR more   than 90 on the bottom number (diastolic), for example 140/90? No      How many servings of fruits and vegetables do you " "eat daily?  0-1 banana or cantalop not much for veggies    On average, how many sweetened beverages do you drink each day (Examples: soda, juice, sweet tea, etc.  Do NOT count diet or artificially sweetened beverages)?   3    How many days per week do you exercise enough to make your heart beat faster? 7    How many minutes a day do you exercise enough to make your heart beat faster? 30 - 60    How many days per week do you miss taking your medication? 0    He is up at the [facility] in [place]. [facility].  Walking every night.   Not much is new, trying to avoid COVID. Trying to adjust to new lifestyle, staying home a lot. Careful with grocery shopping, going early when it is not so crowded. Uses mask as much as possible.  begining of Feb was sick.         Annual Wellness Visit    Are you in the first 12 months of your Medicare Part B coverage?  No    Physical Health:    In general, how would you rate your overall physical health? good    Outside of work, how many days during the week do you exercise?6-7 days/week    Outside of work, approximately how many minutes a day do you exercise?30-45 minutes  If you drink alcohol do you typically have >3 drinks per day or >7 drinks per week? Yes - AUDIT SCORE:     No flowsheet data found.    Do you usually eat at least 4 servings of fruit and vegetables a day, include whole grains & fiber and avoid regularly eating high fat or \"junk\" foods? NO    Do you have any problems taking medications regularly? No    Do you have any side effects from medications? none    Needs assistance for the following daily activities: no assistance needed    Which of the following safety concerns are present in your home?  lack of grab bars in the bathroom     Hearing impairment: No    In the past 6 months, have you been bothered by leaking of urine? no    There were no vitals taken for this visit.  Weight: Provided by patient, 200#  Height: Provided by patient 5\"6\"  BMI: Based on " patient-provided information  Blood Pressure: Unable to obtain due to video visit    Mental Health:    In general, how would you rate your overall mental or emotional health? excellent  PHQ-2 Score: 0    Do you feel safe in your environment? Yes    Have you ever done Advance Care Planning? (For example, a Health Directive, POLST, or a discussion with a medical provider or your loved ones about your wishes)? No, advance care planning information given to patient to review.  Advanced care planning was discussed at today's visit.    Fall risk:  Fallen 2 or more times in the past year?: No  Any fall with injury in the past year?: No    Cognitive Screening: Unable to complete due to virtual visit; need for additional assessment in future face-to-face visit    Do you have sleep apnea, excessive snoring or daytime drowsiness?: yes   Snoring, but sleep study was cancel    Current providers sharing in care for this patient include:   Patient Care Team:  Trish Edwards MD as PCP - General (Family Practice)  Igor Vitale PA-C as Assigned PCP  Grisel Longo as Personal Advocate & Liaison (PAL)          Recent Labs   Lab Test 01/17/20  0733 01/02/20  0947  06/11/19  0818 01/08/19  0852 01/03/19  1050 07/30/18  1026  05/05/17  1152 08/02/16  0948   A1C  --   --   --   --  5.4  --   --   --  5.5 5.7   LDL  --  65  --  33  --  63 60   < >  --  Cannot estimate LDL when triglyceride exceeds 400 mg/dL  74   HDL  --  35*  --  34*  --  39* 36*   < >  --  34*   TRIG  --  367*  --  371*  --  144 193*   < >  --  462*   ALT  --   --   --  27  --  27 28   < > 48 45   CR 0.99 1.03   < > 1.00  --  0.92 0.97   < > 0.95 0.93   GFRESTIMATED 79 75   < > 78  --  87 78   < > 80 82   GFRESTBLACK >90 87   < > >90  --  >90 >90   < > >90   GFR Calc   >90   GFR Calc     POTASSIUM 3.8 3.9   < > 4.4  --  4.5 4.4   < > 3.9 4.3   TSH  --   --   --  1.98  --  1.42 1.88  --   --  1.47    < > = values in  this interval not displayed.      BP Readings from Last 3 Encounters:   02/25/20 108/62   01/23/20 98/56   01/17/20 110/65    Wt Readings from Last 3 Encounters:   02/25/20 91.2 kg (201 lb)   01/23/20 90.7 kg (200 lb)   01/17/20 92.4 kg (203 lb 9.6 oz)                    Reviewed and updated as needed this visit by Provider  Tobacco  Allergies  Meds  Problems  Med Hx  Surg Hx  Fam Hx         Review of Systems   Constitutional, HEENT, cardiovascular, pulmonary, gi and gu systems are negative, except as otherwise noted.       Objective   Reported vitals:  There were no vitals taken for this visit.   healthy, alert and no distress  PSYCH: Alert and oriented times 3; coherent speech, normal   rate and volume, able to articulate logical thoughts, able   to abstract reason, no tangential thoughts, no hallucinations   or delusions  His affect is normal  RESP: No cough, no audible wheezing, able to talk in full sentences  Remainder of exam unable to be completed due to telephone visits    Diagnostic Test Results:  Labs reviewed in Epic        Assessment/Plan:  1. Essential hypertension, benign  Good control, but due for BP check, nurse only appt made  - **Comprehensive metabolic panel FUTURE anytime; Future  - **CBC with platelets FUTURE anytime; Future  - Lipid panel reflex to direct LDL Fasting; Future  - **TSH with free T4 reflex FUTURE anytime; Future  - Abdominal Aortic Aneurysm Screening/Tracking  - carvedilol (COREG) 12.5 MG tablet; Take 1 tablet (12.5 mg) by mouth 2 times daily  Dispense: 180 tablet; Refill: 1  - OFFICE/OUTPT VISIT,EST,LEVL IV    2. Hyperlipidemia LDL goal <130  Due for labs, refilled meds  - **Comprehensive metabolic panel FUTURE anytime; Future  - **CBC with platelets FUTURE anytime; Future  - Abdominal Aortic Aneurysm Screening/Tracking  - fenofibrate (LOFIBRA) 54 MG tablet; Take 1 tablet (54 mg) by mouth daily  Dispense: 90 tablet; Refill: 3  - simvastatin (ZOCOR) 40 MG tablet; Take 1  tablet (40 mg) by mouth At Bedtime  Dispense: 90 tablet; Refill: 3  - OFFICE/OUTPT VISIT,EST,LEVL IV    3. Alcohol dependence with unspecified alcohol-induced disorder (H)  Drinking less, only 3 per day, which he feels is good and causing no issues  - OFFICE/OUTPT VISIT,EST,LEVL IV    4. Snoring  Due for sleep study  - SLEEP EVALUATION & MANAGEMENT REFERRAL - [facility]  [phone] (Age 18 and up); Future  - OFFICE/OUTPT VISIT,EST,LEVL IV    5. High blood triglycerides  Refilled, if to costly, change to OTC  - omega-3 acid ethyl esters (LOVAZA) 1 g capsule; TAKE 2 CAP BY MOUTH DAILY  Dispense: 180 capsule; Refill: 1  - simvastatin (ZOCOR) 40 MG tablet; Take 1 tablet (40 mg) by mouth At Bedtime  Dispense: 90 tablet; Refill: 3  - OFFICE/OUTPT VISIT,EST,LEVL IV    6. Medicare annual wellness visit, subsequent  Pt willing to get this done today    7. Hx of smoking  Pt stopped  Years ago, willing to get screening  -  Aorta Medicare AAA Screening; Future    8. Acute idiopathic gout, unspecified site  Avoid high protein diet, will check labs, refilled allopurinal last week cont same, no recent flairs  - **Uric acid FUTURE 2mo; Future  - OFFICE/OUTPT VISIT,ESTLEVL IV    Return in about 6 months (around 12/26/2020) for In-Clinic Visit-BP and med check.      Phone call duration:  24 minutes    Trish Edwards MD

## 2020-07-01 ENCOUNTER — ALLIED HEALTH/NURSE VISIT (OUTPATIENT)
Dept: FAMILY MEDICINE | Facility: CLINIC | Age: 65
End: 2020-07-01
Payer: MEDICARE

## 2020-07-01 VITALS
HEART RATE: 52 BPM | RESPIRATION RATE: 14 BRPM | BODY MASS INDEX: 32.39 KG/M2 | WEIGHT: 200.6 LBS | OXYGEN SATURATION: 98 % | DIASTOLIC BLOOD PRESSURE: 68 MMHG | SYSTOLIC BLOOD PRESSURE: 114 MMHG

## 2020-07-01 DIAGNOSIS — D64.9 ANEMIA, UNSPECIFIED TYPE: Primary | ICD-10-CM

## 2020-07-01 DIAGNOSIS — I10 ESSENTIAL HYPERTENSION, BENIGN: Primary | ICD-10-CM

## 2020-07-01 DIAGNOSIS — I10 ESSENTIAL HYPERTENSION, BENIGN: ICD-10-CM

## 2020-07-01 DIAGNOSIS — M10.00 ACUTE IDIOPATHIC GOUT, UNSPECIFIED SITE: ICD-10-CM

## 2020-07-01 DIAGNOSIS — E78.5 HYPERLIPIDEMIA LDL GOAL <130: ICD-10-CM

## 2020-07-01 LAB
ALBUMIN SERPL-MCNC: 3.6 G/DL (ref 3.4–5)
ALP SERPL-CCNC: 71 U/L (ref 40–150)
ALT SERPL W P-5'-P-CCNC: 29 U/L (ref 0–70)
ANION GAP SERPL CALCULATED.3IONS-SCNC: 7 MMOL/L (ref 3–14)
AST SERPL W P-5'-P-CCNC: 18 U/L (ref 0–45)
BILIRUB SERPL-MCNC: 0.4 MG/DL (ref 0.2–1.3)
BUN SERPL-MCNC: 30 MG/DL (ref 7–30)
CALCIUM SERPL-MCNC: 8.3 MG/DL (ref 8.5–10.1)
CHLORIDE SERPL-SCNC: 108 MMOL/L (ref 94–109)
CHOLEST SERPL-MCNC: 128 MG/DL
CO2 SERPL-SCNC: 23 MMOL/L (ref 20–32)
CREAT SERPL-MCNC: 1.39 MG/DL (ref 0.66–1.25)
ERYTHROCYTE [DISTWIDTH] IN BLOOD BY AUTOMATED COUNT: 13 % (ref 10–15)
GFR SERPL CREATININE-BSD FRML MDRD: 52 ML/MIN/{1.73_M2}
GLUCOSE SERPL-MCNC: 114 MG/DL (ref 70–99)
HCT VFR BLD AUTO: 37.3 % (ref 40–53)
HDLC SERPL-MCNC: 29 MG/DL
HGB BLD-MCNC: 13 G/DL (ref 13.3–17.7)
LDLC SERPL CALC-MCNC: 38 MG/DL
MCH RBC QN AUTO: 33.9 PG (ref 26.5–33)
MCHC RBC AUTO-ENTMCNC: 34.9 G/DL (ref 31.5–36.5)
MCV RBC AUTO: 97 FL (ref 78–100)
NONHDLC SERPL-MCNC: 99 MG/DL
PLATELET # BLD AUTO: 179 10E9/L (ref 150–450)
POTASSIUM SERPL-SCNC: 3.8 MMOL/L (ref 3.4–5.3)
PROT SERPL-MCNC: 7.3 G/DL (ref 6.8–8.8)
RBC # BLD AUTO: 3.84 10E12/L (ref 4.4–5.9)
SODIUM SERPL-SCNC: 138 MMOL/L (ref 133–144)
TRIGL SERPL-MCNC: 304 MG/DL
TSH SERPL DL<=0.005 MIU/L-ACNC: 2.43 MU/L (ref 0.4–4)
URATE SERPL-MCNC: 4.1 MG/DL (ref 3.5–7.2)
WBC # BLD AUTO: 5.8 10E9/L (ref 4–11)

## 2020-07-01 PROCEDURE — 80061 LIPID PANEL: CPT | Performed by: FAMILY MEDICINE

## 2020-07-01 PROCEDURE — 36415 COLL VENOUS BLD VENIPUNCTURE: CPT | Performed by: FAMILY MEDICINE

## 2020-07-01 PROCEDURE — 85027 COMPLETE CBC AUTOMATED: CPT | Performed by: FAMILY MEDICINE

## 2020-07-01 PROCEDURE — 84550 ASSAY OF BLOOD/URIC ACID: CPT | Performed by: FAMILY MEDICINE

## 2020-07-01 PROCEDURE — 80053 COMPREHEN METABOLIC PANEL: CPT | Performed by: FAMILY MEDICINE

## 2020-07-01 PROCEDURE — 84443 ASSAY THYROID STIM HORMONE: CPT | Performed by: FAMILY MEDICINE

## 2020-07-01 PROCEDURE — 99207 ZZC NO CHARGE NURSE ONLY: CPT

## 2020-07-01 NOTE — TELEPHONE ENCOUNTER
Patient presents to the clinic today for a Nurse Only blood pressure check. Results are as follows:     Patient additionally completed ordered labs today as well.         BP Readings from Last 6 Encounters:   07/01/20 114/68   02/25/20 108/62   01/23/20 98/56   01/17/20 110/65   01/13/20 135/81   01/02/20 134/70     Patient notes he recently purchased a blood pressure monitoring device not to long ago and successfully used it yesterday. He got 118/63 for is number.      Notes he is taking his medication as prescribed. Patient states he does notice some lightheadedness if he bends over-states he mentioned this at virtual visit on 6/26/2020.      Denies any symptoms of hypertension.      Routing to PCP for review and follow up if needed. Closing and routing encounter.     Ricardo Maki CMA ([facility])

## 2020-07-01 NOTE — PROGRESS NOTES
Patient presents to the clinic today for a Nurse Only blood pressure check. Results are as follows:    BP Readings from Last 6 Encounters:   07/01/20 114/68   02/25/20 108/62   01/23/20 98/56   01/17/20 110/65   01/13/20 135/81   01/02/20 134/70     Patient notes he recently purchased a blood pressure monitoring device not to long ago and successfully used it yesterday. He got 118/63 for is number.     Notes he is taking his medication as prescribed. Patient states he does notice some lightheadedness if he bends over-states he mentioned this at virtual visit on 6/26/2020.     Denies any symptoms of hypertension.     Routing to PCP for review and follow up if needed. Closing and routing encounter.    Ricardo Maki CMA ([facility])

## 2020-07-06 NOTE — TELEPHONE ENCOUNTER
I recommend he stop the hydrochlorothiazide and recheck BP and lab work in 2 weeks. His BP is on the low side and he is having symptoms of low BP. Also his kidney function looked like he was dry, so lets stop the diuretic and then recheck the lab and BP

## 2020-07-20 ENCOUNTER — TELEPHONE (OUTPATIENT)
Dept: SLEEP MEDICINE | Facility: CLINIC | Age: 65
End: 2020-07-20

## 2020-07-20 NOTE — TELEPHONE ENCOUNTER
Patient was called to schedule in lab sleep study at [facility] sleep [place].     Voicemail left requesting call back.

## 2020-07-22 DIAGNOSIS — N18.30 CHRONIC KIDNEY DISEASE, STAGE 3 (MODERATE): ICD-10-CM

## 2020-07-22 DIAGNOSIS — D64.9 ANEMIA, UNSPECIFIED TYPE: ICD-10-CM

## 2020-07-22 LAB
ANION GAP SERPL CALCULATED.3IONS-SCNC: 4 MMOL/L (ref 3–14)
BUN SERPL-MCNC: 15 MG/DL (ref 7–30)
CALCIUM SERPL-MCNC: 8.1 MG/DL (ref 8.5–10.1)
CHLORIDE SERPL-SCNC: 111 MMOL/L (ref 94–109)
CO2 SERPL-SCNC: 25 MMOL/L (ref 20–32)
CREAT SERPL-MCNC: 1.15 MG/DL (ref 0.66–1.25)
ERYTHROCYTE [DISTWIDTH] IN BLOOD BY AUTOMATED COUNT: 13.4 % (ref 10–15)
GFR SERPL CREATININE-BSD FRML MDRD: 66 ML/MIN/{1.73_M2}
GLUCOSE SERPL-MCNC: 117 MG/DL (ref 70–99)
HCT VFR BLD AUTO: 38.5 % (ref 40–53)
HGB BLD-MCNC: 13.2 G/DL (ref 13.3–17.7)
IRON SATN MFR SERPL: 34 % (ref 15–46)
IRON SERPL-MCNC: 111 UG/DL (ref 35–180)
MCH RBC QN AUTO: 33.8 PG (ref 26.5–33)
MCHC RBC AUTO-ENTMCNC: 34.3 G/DL (ref 31.5–36.5)
MCV RBC AUTO: 99 FL (ref 78–100)
PLATELET # BLD AUTO: 169 10E9/L (ref 150–450)
POTASSIUM SERPL-SCNC: 4.1 MMOL/L (ref 3.4–5.3)
RBC # BLD AUTO: 3.91 10E12/L (ref 4.4–5.9)
SODIUM SERPL-SCNC: 140 MMOL/L (ref 133–144)
TIBC SERPL-MCNC: 325 UG/DL (ref 240–430)
WBC # BLD AUTO: 5.1 10E9/L (ref 4–11)

## 2020-07-22 PROCEDURE — 36415 COLL VENOUS BLD VENIPUNCTURE: CPT | Performed by: FAMILY MEDICINE

## 2020-07-22 PROCEDURE — 83550 IRON BINDING TEST: CPT | Performed by: FAMILY MEDICINE

## 2020-07-22 PROCEDURE — 80048 BASIC METABOLIC PNL TOTAL CA: CPT | Performed by: FAMILY MEDICINE

## 2020-07-22 PROCEDURE — 85027 COMPLETE CBC AUTOMATED: CPT | Performed by: FAMILY MEDICINE

## 2020-07-22 PROCEDURE — 83540 ASSAY OF IRON: CPT | Performed by: FAMILY MEDICINE

## 2020-09-21 DIAGNOSIS — I10 ESSENTIAL HYPERTENSION, BENIGN: ICD-10-CM

## 2020-09-21 RX ORDER — OLMESARTAN MEDOXOMIL 40 MG/1
TABLET ORAL
Qty: 90 TABLET | Refills: 2 | Status: SHIPPED | OUTPATIENT
Start: 2020-09-21 | End: 2021-02-04

## 2020-09-21 NOTE — TELEPHONE ENCOUNTER
Prescription approved per [facility].    Maximo Pruitt RN

## 2020-10-20 ENCOUNTER — THERAPY VISIT (OUTPATIENT)
Dept: SLEEP MEDICINE | Facility: CLINIC | Age: 65
End: 2020-10-20
Payer: MEDICARE

## 2020-10-20 DIAGNOSIS — G47.33 OSA (OBSTRUCTIVE SLEEP APNEA): ICD-10-CM

## 2020-10-20 PROCEDURE — 95810 POLYSOM 6/> YRS 4/> PARAM: CPT | Performed by: INTERNAL MEDICINE

## 2020-10-26 LAB — SLPCOMP: NORMAL

## 2020-11-10 VITALS — WEIGHT: 198 LBS | BODY MASS INDEX: 31.82 KG/M2 | HEIGHT: 66 IN

## 2020-11-10 ASSESSMENT — MIFFLIN-ST. JEOR: SCORE: 1615.87

## 2020-11-10 NOTE — PATIENT INSTRUCTIONS
"MY TREATMENT INFORMATION FOR SLEEP APNEA-  Emery Zayas    DOCTOR : JAVON WETZEL MD      Frequently asked questions:  1. What is Obstructive Sleep Apnea (MONSE)? MONSE is the most common type of sleep apnea. Apnea means, \"without breath.\"  Apnea is most often caused by narrowing or collapse of the upper airway as muscles relax during sleep.   Almost everyone has occasional apneas. Most people with sleep apnea have had brief interruptions at night frequently for many years.  The severity of sleep apnea is related to how frequent and severe the events are.   2. What are the consequences of MONSE? Symptoms include: feeling sleepy during the day, snoring loudly, gasping or stopping of breathing, trouble sleeping, and occasionally morning headaches or heartburn at night.  Sleepiness can be serious and even increase the risk of falling asleep while driving. Other health consequences may include development of high blood pressure and other cardiovascular disease in persons who are susceptible. Untreated MONSE  can contribute to heart disease, stroke and diabetes.   3. What are the treatment options? In most situations, sleep apnea is a lifelong disease that must be managed with daily therapy. Medications are not effective for sleep apnea and surgery is generally not considered until other therapies have been tried. Your treatment is your choice . Continuous Positive Airway (CPAP) works right away and is the therapy that is effective in nearly everyone. An oral device to hold your jaw forward is usually the next most reliable option. Other options include postioning devices (to keep you off your back), weight loss, and surgery including a tongue pacing device. There is more detail about some of these options below.    Important tips for using CPAP and similar devices   Know your equipment:  CPAP is continuous positive airway pressure that prevents obstructive sleep apnea by keeping the throat from collapsing while you are " sleeping. In most cases, the device is  smart  and can slowly self-adjusts if your throat collapses and keeps a record every day of how well you are treated-this information is available to you and your care team.  BPAP is bilevel positive airway pressure that keeps your throat open and also assists each breath with a pressure boost to maintain adequate breathing.  Special kinds of BPAP are used in patients who have inadequate breathing from lung or heart disease. In most cases, the device is  smart  and can slowly self-adjusts to assist breathing. Like CPAP, the device keeps a record of how well you are treated.  Your mask is your connection to the device. You get to choose what feels most comfortable and the staff will help to make sure if fits. Here: are some examples of the different masks that are available:       Key points to remember on your journey with sleep apnea:  1. Sleep study.  PAP devices often need to be adjusted during a sleep study to show that they are effective and adjusted right.  2. Good tips to remember: Try wearing just the mask during a quiet time during the day so your body adapts to wearing it. A humidifier is recommended for comfort in most cases to prevent drying of your nose and throat. Allergy medication from your provider may help you if you are having nasal congestion.  3. Getting settled-in. It takes more than one night for most of us to get used to wearing a mask. Try wearing just the mask during a quiet time during the day so your body adapts to wearing it. A humidifier is recommended for comfort in most cases. Our team will work with you carefully on the first day and will be in contact within 4 days and again at 2 and 4 weeks for advice and remote device adjustments. Your therapy is evaluated by the device each day.   4. Use it every night. The more you are able to sleep naturally for 7-8 hours, the more likely you will have good sleep and to prevent health risks or symptoms  from sleep apnea. Even if you use it 4 hours it helps. Occasionally all of us are unable to use a medical therapy, in sleep apnea, it is not dangerous to miss one night.   5. Communicate. Call our skilled team on the number provided on the first day if your visit for problems that make it difficult to wear the device. Over 2 out of 3 patients can learn to wear the device long-term with help from our team. Remember to call our team or your sleep providers if you are unable to wear the device as we may have other solutions for those who cannot adapt to mask CPAP therapy. It is recommended that you sleep your sleep provider within the first 3 months and yearly after that if you are not having problems.   6. Use it for your health. We encourage use of CPAP masks during daytime quiet periods to allow your face and brain to adapt to the sensation of CPAP so that it will be a more natural sensation to awaken to at night or during naps. This can be very useful during the first few weeks or months of adapting to CPAP though it does not help medically to wear CPAP during wakefulness and  should not be used as a strategy just to meet guidelines.  7. Take care of your equipment. Make sure you clean your mask and tubing using directions every day and that your filter and mask are replaced as recommended or if they are not working.     BESIDES CPAP, WHAT OTHER THERAPIES ARE THERE?    Positioning Device  Positioning devices are generally used when sleep apnea is mild and only occurs on your back.This example shows a pillow that straps around the waist. It may be appropriate for those whose sleep study shows milder sleep apnea that occurs primarily when lying flat on one's back. Preliminary studies have shown benefit but effectiveness at home may need to be verified by a home sleep test. These devices are generally not covered by medical insurance.  Examples of devices that maintain sleeping on the back to prevent snoring and mild  sleep apnea.    Belt type body positioner  Http://[employer].com/    Electronic reminder  Http://nightshifttherapy.com/  Http://www.[employer].com.au/      Oral Appliance  What is oral appliance therapy?  An oral appliance device fits on your teeth at night like a retainer used after having braces. The device is made by a specialized dentist and requires several visits over 1-2 months before a manufactured device is made to fit your teeth and is adjusted to prevent your sleep apnea. Once an oral device is working properly, snoring should be improved. A home sleep test may be recommended at that time if to determine whether the sleep apnea is adequately treated.       Some things to remember:  -Oral devices are often, but not always, covered by your medical insurance. Be sure to check with your insurance provider.   -If you are referred for oral therapy, you will be given a list of specialized dentists to consider or you may choose to visit the Web site of the American Academy of Dental Sleep Medicine  -Oral devices are less likely to work if you have severe sleep apnea or are extremely overweight.     More detailed information  An oral appliance is a small acrylic device that fits over the upper and lower teeth  (similar to a retainer or a mouth guard). This device slightly moves jaw forward, which moves the base of the tongue forward, opens the airway, improves breathing for effective treat snoring and obstructive sleep apnea in perhaps 7 out of 10 people .  The best working devices are custom-made by a dental device  after a mold is made of the teeth 1, 2, 3.  When is an oral appliance indicated?  Oral appliance therapy is recommended as a first-line treatment for patients with primary snoring, mild sleep apnea, and for patients with moderate sleep apnea who prefer appliance therapy to use of CPAP4, 5. Severity of sleep apnea is determined by sleep testing and is based on the number of respiratory events  per hour of sleep.   How successful is oral appliance therapy?  The success rate of oral appliance therapy in patients with mild sleep apnea is 75-80% while in patients with moderate sleep apnea it is 50-70%. The chance of success in patients with severe sleep apnea is 40-50%. The research also shows that oral appliances have a beneficial effect on the cardiovascular health of MONSE patients at the same magnitude as CPAP therapy7.  Oral appliances should be a second-line treatment in cases of severe sleep apnea, but if not completely successful then a combination therapy utilizing CPAP plus oral appliance therapy may be effective. Oral appliances tend to be effective in a broad range of patients although studies show that the patients who have the highest success are females, younger patients, those with milder disease, and less severe obesity. 3, 6.   Finding a dentist that practices dental sleep medicine  Specific training is available through the American Academy of Dental Sleep Medicine for dentists interested in working in the field of sleep. To find a dentist who is educated in the field of sleep and the use of oral appliances, near you, visit the Web site of the American Academy of Dental Sleep Medicine.    References  1. Betzy et al. Objectively measured vs self-reported compliance during oral appliance therapy for sleep-disordered breathing. Chest 2013; 144(5): 8200-3034.  2. Hamzah et al. Objective measurement of compliance during oral appliance therapy for sleep-disordered breathing. Thorax 2013; 68(1): 91-96.  3. Rodrigue, et al. Mandibular advancement devices in 620 men and women with MONSE and snoring: tolerability and predictors of treatment success. Chest 2004; 125: 5536-7432.  4. Kisha, et al. Oral appliances for snoring and MONSE: a review. Sleep 2006; 29: 244-262.  5. Sue, et al. Oral appliance treatment for MONSE: an update. J Clin Sleep Med 2014; 10(2): 215-227.  6. Carol et al.  Predictors of OSAH treatment outcome. J Dent Res 2007; 86: 9166-5813.      Weight Loss:    Weight loss is a long-term strategy that may improve sleep apnea in some patients.    Weight management is a personal decision and the decision should be based on your interest and the potential benefits.  If you are interested in exploring weight loss strategies, the following discussion covers the impact on weight loss on sleep apnea and the approaches that may be successful.    Being overweight does not necessarily mean you will have health consequences.  Those who have BMI over 35 or over 27 with existing medical conditions carries greater risk.   Weight loss decreases severity of sleep apnea in most people with obesity. For those with mild obesity who have developed snoring with weight gain, even 15-30 pound weight loss can improve and occasionally eliminate sleep apnea.  Structured and life-long dietary and health habits are necessary to lose weight and keep healthier weight levels.     Though there may be significant health benefits from weight loss, long-term weight loss is very difficult to achieve- studies show success with dietary management in less than 10% of people. In addition, substantial weight loss may require years of dietary control and may be difficult if patients have severe obesity. In these cases, surgical management may be considered.  Finally, older individuals who have tolerated obesity without health complications may be less likely to benefit from weight loss strategies.        Your BMI is Body mass index is 31.96 kg/m .  Weight management is a personal decision.  If you are interested in exploring weight loss strategies, the following discussion covers the approaches that may be successful. Body mass index (BMI) is one way to tell whether you are at a healthy weight, overweight, or obese. It measures your weight in relation to your height.  A BMI of 18.5 to 24.9 is in the healthy range. A person  with a BMI of 25 to 29.9 is considered overweight, and someone with a BMI of 30 or greater is considered obese. More than two-thirds of American adults are considered overweight or obese.  Being overweight or obese increases the risk for further weight gain. Excess weight may lead to heart disease and diabetes.  Creating and following plans for healthy eating and physical activity may help you improve your health.  Weight control is part of healthy lifestyle and includes exercise, emotional health, and healthy eating habits. Careful eating habits lifelong are the mainstay of weight control. Though there are significant health benefits from weight loss, long-term weight loss with diet alone may be very difficult to achieve- studies show long-term success with dietary management in less than 10% of people. Attaining a healthy weight may be especially difficult to achieve in those with severe obesity. In some cases, medications, devices and surgical management might be considered.  What can you do?  If you are overweight or obese and are interested in methods for weight loss, you should discuss this with your provider.     Consider reducing daily calorie intake by 500 calories.     Keep a food journal.     Avoiding skipping meals, consider cutting portions instead.    Diet combined with exercise helps maintain muscle while optimizing fat loss. Strength training is particularly important for building and maintaining muscle mass. Exercise helps reduce stress, increase energy, and improves fitness. Increasing exercise without diet control, however, may not burn enough calories to loose weight.       Start walking three days a week 10-20 minutes at a time    Work towards walking thirty minutes five days a week     Eventually, increase the speed of your walking for 1-2 minutes at time    In addition, we recommend that you review healthy lifestyles and methods for weight loss available through the National Institutes of  Health patient information sites:  http://win.niddk.nih.gov/publications/index.htm    And look into health and wellness programs that may be available through your health insurance provider, employer, local community center, or [employer].          Surgery:    Surgery for obstructive sleep apnea is considered generally only when other therapies fail to work. Surgery may be discussed with you if you are having a difficult time tolerating CPAP and or when there is an abnormal structure that requires surgical correction.  Nose and throat surgeries often enlarge the airway to prevent collapse.  Most of these surgeries create pain for 1-2 weeks and up to half of the most common surgeries are not effective throughout life.  You should carefully discuss the benefits and drawbacks to surgery with your sleep provider and surgeon to determine if it is the best solution for you.   More information  Surgery for MONSE is directed at areas that are responsible for narrowing or complete obstruction of the airway during sleep.  There are a wide range of procedures available to enlarge and/or stabilize the airway to prevent blockage of breathing in the three major areas where it can occur: the palate, tongue, and nasal regions.  Successful surgical treatment depends on the accurate identification of the factors responsible for obstructive sleep apnea in each person.  A personalized approach is required because there is no single treatment that works well for everyone.  Because of anatomic variation, consultation with an examination by a sleep surgeon is a critical first step in determining what surgical options are best for each patient.  In some cases, examination during sedation may be recommended in order to guide the selection of procedures.  Patients will be counseled about risks and benefits as well as the typical recovery course after surgery. Surgery is typically not a cure for a person s MONSE.  However, surgery will often  significantly improve one s MONSE severity (termed  success rate ).  Even in the absence of a cure, surgery will decrease the cardiovascular risk associated with OSA7; improve overall quality of life8 (sleepiness, functionality, sleep quality, etc).      Palate Procedures:  Patients with MONSE often have narrowing of their airway in the region of their tonsils and uvula.  The goals of palate procedures are to widen the airway in this region as well as to help the tissues resist collapse.  Modern palate procedure techniques focus on tissue conservation and soft tissue rearrangement, rather than tissue removal.  Often the uvula is preserved in this procedure. Residual sleep apnea is common in patient after pharyngoplasty with an average reduction in sleep apnea events of 33%2.      Tongue Procedures:  ExamWhile patients are awake, the muscles that surround the throat are active and keep this region open for breathing. These muscles relax during sleep, allowing the tongue and other structures to collapse and block breathing.  There are several different tongue procedures available.  Selection of a tongue base procedure depends on characteristics seen on physical exam.  Generally, procedures are aimed at removing bulky tissues in this area or preventing the back of the tongue from falling back during sleep.  Success rates for tongue surgery range from 50-62%3.    Hypoglossal Nerve Stimulation:  Hypoglossal nerve stimulation has recently received approval from the [place] Food and Drug Administration for the treatment of obstructive sleep apnea.  This is based on research showing that the system was safe and effective in treating sleep apnea6.  Results showed that the median AHI score decreased 68%, from 29.3 to 9.0. This therapy uses an implant system that senses breathing patterns and delivers mild stimulation to airway muscles, which keeps the airway open during sleep.  The system consists of three fully implanted  components: a small generator (similar in size to a pacemaker), a breathing sensor, and a stimulation lead.  Using a small handheld remote, a patient turns the therapy on before bed and off upon awakening.    Candidates for this device must be greater than 22 years of age, have moderate to severe MONSE (AHI between 20-65), BMI less than 32, have tried CPAP/oral appliance without success, and have appropriate upper airway anatomy (determined by a sleep endoscopy performed by Dr. Mariscal).    Hypoglossal Nerve Stimulation Pathway:    The sleep surgeon s office will work with the patient through the insurance prior-authorization process (including communications and appeals).    Nasal Procedures:  Nasal obstruction can interfere with nasal breathing during the day and night.  Studies have shown that relief of nasal obstruction can improve the ability of some patients to tolerate positive airway pressure therapy for obstructive sleep apnea1.  Treatment options include medications such as nasal saline, topical corticosteroid and antihistamine sprays, and oral medications such as antihistamines or decongestants. Non-surgical treatments can include external nasal dilators for selected patients. If these are not successful by themselves, surgery can improve the nasal airway either alone or in combination with these other options.      Combination Procedures:  Combination of surgical procedures and other treatments may be recommended, particularly if patients have more than one area of narrowing or persistent positional disease.  The success rate of combination surgery ranges from 66-80%2,3.    References  1. Diego GALVAN. The Role of the Nose in Snoring and Obstructive Sleep Apnoea: An Update.  Eur Arch Otorhinolaryngol. 2011; 268: 1365-73.  2.  Aracely SM; Puja JA; Calvin JR; Pallanch JF; Alyssa INFANTE; To GOMES; Radhika CALHOUN. Surgical modifications of the upper airway for obstructive sleep apnea in adults: a systematic review and  meta-analysis. SLEEP 2010;33(10):8181-7833. Sparkle HAMM. Hypopharyngeal surgery in obstructive sleep apnea: an evidence-based medicine review.  Arch Otolaryngol Head Neck Surg. 2006 Feb;132(2):206-13.  3. Garry CHAVEZ, Shanel Y, Juan ROMAN. The efficacy of anatomically based multilevel surgery for obstructive sleep apnea. Otolaryngol Head Neck Surg. 2003 Oct;129(4):327-35.  4. Kezirian E, Goldberg A. Hypopharyngeal Surgery in Obstructive Sleep Apnea: An Evidence-Based Medicine Review. Arch Otolaryngol Head Neck Surg. 2006 Feb;132(2):206-13.  5. Kevin PEREZ et al. Upper-Airway Stimulation for Obstructive Sleep Apnea.  N Engl J Med. 2014 Jan 9;370(2):139-49.  6. Reno Y et al. Increased Incidence of Cardiovascular Disease in Middle-aged Men with Obstructive Sleep Apnea. Am J Respir Crit Care Med; 2002 166: 159-165  7. Asad EM et al. Studying Life Effects and Effectiveness of Palatopharyngoplasty (SLEEP) study: Subjective Outcomes of Isolated Uvulopalatopharyngoplasty. Otolaryngol Head Neck Surg. 2011; 144: 623-631.        Cognitive Behavioral Therapy for Insomnia (CBT-I)    Developing Healthy Sleep Thoughts    Insomnia is often is triggered by stressful events such as a change in employment, a separation, medical illness, or loss.  How you handle your sleep problem, mainly your thoughts and behaviors, determines in large part whether your sleeplessness is short -term or develops into chronic insomnia well after the stressful event is over.     This step of your program involves learning a set of skills to change negative and worrisome thoughts about sleep.   Insomnia is more likely to persist if you interpret the onset as a threat or loss of control.  Worry, fears and untrue beliefs about insomnia can become a vicious cycle.  Negative sleep thoughts activate the physical and emotional systems of your body.  This strengthens wakefulness and weakens your sleep system.  This in turn produces increased stress and pressure to  sleep.  We call this unhelpful sleep effort.     Changing How You Think About Insomnia    We now know that our thoughts and attitudes affect our stress response.  Negative sleep thoughts can worsen your insomnia. They can lead to greater fear or anxiety about sleep, which in turn can aggravate your sleep problem. Thinking more positively and realistically about your sleep promotes its health and healing.     Myths about Sleep    ? People need 8 hours of sleep     This is untrue.  Sleep needs vary from person to person.  Most people need between 6-8 hours to feel alert during the day.  People who sleep 7 hours live longer than those who sleep 8 hours.  Research also suggests people who sleep 5 hours live longer than those who sleep 9 hours    ? Insomnia is the same as sleep deprivation    Sleep deprivation is lack of sleep due to not allowing enough time for sleep.  This is typically not true for insomnia where people have enough time for sleep and even extend their sleep time trying to get more sleep.  Most people with insomnia get about the same amount of sleep as normal sleepers.  The difference is that with insomnia the sleep is fragmented and less consolidated.    You are Getting More Sleep than You Think    Research using objective sleep tests reveal that people with insomnia get an average of one hour more sleep than they think. This is due in part because the brain misperceives Stage 1 and 2 sleep as being awake.  In addition, stress and arousal while awake in bed changes the brain's perception of time awake.    Examples of Unhealthy Sleep Thoughts    Negative sleep thoughts can have a profound impact on your ability to get a good night's sleep. Below are some examples of negative thoughts associated with insomnia that may sound familiar to you:    ? I must get 8 hours of sleep to function during the day.  ? I won't get to sleep tonight.  ? Insomnia is going to cause health problems.  ? I am dreading going to  bed.  ? I woke up early again.  I know I won't get back to sleep.  ? The reason I feel terrible today is because of my insomnia.  ? I've totally lost control of my sleep.  ? I can't sleep without a sleeping pill.    Negative thoughts usually occur automatically and feel like a knee-jerk reaction.  They are often untrue or distorted, especially late in the evening as you become increasingly tired and others are asleep.      Changing Unhealthy Sleep Thoughts    Now that you understand the impact that negative thoughts can have on your sleep, you are ready to change these unhelpful thoughts.  The strategy is powerful and simple:  By recognizing and replacing your negative thoughts about sleep with more accurate, positive thoughts, you will be less anxious and frustrated about your sleep. A more realistic and positive attitude about sleep will allow you to relax and sleep more easily through the night.           There are several important steps involved in changing your unhelpful and negative thoughts about sleep:            Examples of Healthy Sleep Thoughts    ? My work will not suffer much if I have a poor night's sleep.    ? I'm probably getting more sleep than I think.    ? Other things than my sleep affect my daytime functioning.    ? Because I didn't sleep well last night, I am more likely to sleep well tonight because of increased sleep drive that leads to deeper sleep.    ? Sleep requirements vary from one person to another.    ? If I don't sleep well, most of the time the worst that can happen is that my mood might not be as bright the next day.    ? If I awaken after about 5 hours of sleep, I have gotten the core sleep I need for the day.    ? I'm more likely to fall asleep the longer I've been awake    ? I'm more likely to fall asleep as my body temperature begins to decrease through the night.    ? My body's wakefulness system takes charge during the day to promote daytime functioning.    ? These sleep  skills have worked for others, and they can work for me.    Other Recommendations for Changing Beliefs and Attitudes   About Your Sleep    ? Keep Realistic Expectations     There is a widespread belief that 8 hours of sleep is necessary to feel refreshed and function well during the day. Many believe that good sleep means never waking up at night.  Others come to expect that they should always wake up in the morning feeling full of energy.  Concerns may arise when your actual sleep falls short of these expectations. Try to avoid placing undue pressure on yourself to achieve certain sleep levels.  It only increases anxiety about sleeping.  Focus on quality sleep not quantity of sleep.    ? Revise Your Thoughts about the Causes of Insomnia      There is a natural tendency to attribute our sleep problems completely to external factors such as a chemical imbalance, pain, aging or things over which we may have little control.  Although these factors may contribute to your insomnia, research shows CBT-I is beneficial even if they are present.    ? Don't Blame Insomnia for All Daytime Impairments      Many individuals blame insomnia for every symptom or concern they experience during the day from fatigue to lack of concentration. Though poor sleep may produce some of these symptoms, it us usually untrue that all daytime impairment is attributable to insomnia.  It is more often that other factors such as stress and co-occurring medical problems contribute more to how you feel during the day.      ? Don't Catastrophize      Catastrophic thinking means making a sleep mountain out of a molehill.  Some people believe poor sleep will have catastrophic consequences to their physical health, mental health and appearance. Others see insomnia as a complete loss of control.  These perceived consequences of insomnia often prompt people to seek medication or other treatment.  Keep in mind insomnia can be very unpleasant but for the  most part is not dangerous.    ? Don't Focus on Sleep     Some people reduce their activity level because of poor sleep.  Although sleep is a necessary part of life, don't make it the focus of your thoughts and concern. Trust that if you engage in health sleep habits, your body will give you the sleep it needs.  Make sure you continue your normal activities despite your insomnia.    ? Never Try to Sleep      Of all the habits the most important one is this:  Never try to force yourself to sleep.  Doing so usually backfires and makes things worse. Instead, focus on keeping to your prescribed sleep schedule, getting up at the same time every day and using the bed only for sleep.            Your BMI is Body mass index is 31.96 kg/m .  Weight management is a personal decision.  If you are interested in exploring weight loss strategies, the following discussion covers the approaches that may be successful. Body mass index (BMI) is one way to tell whether you are at a healthy weight, overweight, or obese. It measures your weight in relation to your height.  A BMI of 18.5 to 24.9 is in the healthy range. A person with a BMI of 25 to 29.9 is considered overweight, and someone with a BMI of 30 or greater is considered obese. More than two-thirds of [place] adults are considered overweight or obese.  Being overweight or obese increases the risk for further weight gain. Excess weight may lead to heart disease and diabetes.  Creating and following plans for healthy eating and physical activity may help you improve your health.  Weight control is part of healthy lifestyle and includes exercise, emotional health, and healthy eating habits. Careful eating habits lifelong are the mainstay of weight control. Though there are significant health benefits from weight loss, long-term weight loss with diet alone may be very difficult to achieve- studies show long-term success with dietary management in less than 10% of people. Attaining  a healthy weight may be especially difficult to achieve in those with severe obesity. In some cases, medications, devices and surgical management might be considered.  What can you do?  If you are overweight or obese and are interested in methods for weight loss, you should discuss this with your provider.     Consider reducing daily calorie intake by 500 calories.     Keep a food journal.     Avoiding skipping meals, consider cutting portions instead.    Diet combined with exercise helps maintain muscle while optimizing fat loss. Strength training is particularly important for building and maintaining muscle mass. Exercise helps reduce stress, increase energy, and improves fitness. Increasing exercise without diet control, however, may not burn enough calories to loose weight.       Start walking three days a week 10-20 minutes at a time    Work towards walking thirty minutes five days a week     Eventually, increase the speed of your walking for 1-2 minutes at time    In addition, we recommend that you review healthy lifestyles and methods for weight loss available through the National Institutes of Health patient information sites:  http://win.niddk.nih.gov/publications/index.htm    And look into health and wellness programs that may be available through your health insurance provider, employer, local community center, or [employer].    Weight management plan: Patient was referred to their PCP to discuss a diet and exercise plan.

## 2020-11-11 ENCOUNTER — VIRTUAL VISIT (OUTPATIENT)
Dept: SLEEP MEDICINE | Facility: CLINIC | Age: 65
End: 2020-11-11
Payer: MEDICARE

## 2020-11-11 DIAGNOSIS — G47.33 OSA (OBSTRUCTIVE SLEEP APNEA): Primary | ICD-10-CM

## 2020-11-11 PROCEDURE — 99443 PR PHYSICIAN TELEPHONE EVALUATION 21-30 MIN: CPT | Performed by: INTERNAL MEDICINE

## 2020-12-16 DIAGNOSIS — I10 ESSENTIAL HYPERTENSION, BENIGN: ICD-10-CM

## 2020-12-17 RX ORDER — CARVEDILOL 12.5 MG/1
TABLET ORAL
Qty: 60 TABLET | Refills: 0 | Status: SHIPPED | OUTPATIENT
Start: 2020-12-17 | End: 2021-02-04

## 2020-12-17 NOTE — TELEPHONE ENCOUNTER
Patient is due for a medication check. Will forward to TC's to assist with scheduling.     Barbara Gutierrez RN on 12/17/2020 at 2:54 PM

## 2021-01-14 ENCOUNTER — HEALTH MAINTENANCE LETTER (OUTPATIENT)
Age: 66
End: 2021-01-14

## 2021-02-04 ENCOUNTER — ANCILLARY PROCEDURE (OUTPATIENT)
Dept: GENERAL RADIOLOGY | Facility: CLINIC | Age: 66
End: 2021-02-04
Attending: FAMILY MEDICINE
Payer: MEDICARE

## 2021-02-04 ENCOUNTER — TELEPHONE (OUTPATIENT)
Dept: ADDICTION MEDICINE | Facility: CLINIC | Age: 66
End: 2021-02-04

## 2021-02-04 ENCOUNTER — OFFICE VISIT (OUTPATIENT)
Dept: FAMILY MEDICINE | Facility: CLINIC | Age: 66
End: 2021-02-04
Payer: MEDICARE

## 2021-02-04 VITALS
HEIGHT: 66 IN | WEIGHT: 205 LBS | TEMPERATURE: 97.9 F | HEART RATE: 60 BPM | RESPIRATION RATE: 12 BRPM | DIASTOLIC BLOOD PRESSURE: 70 MMHG | BODY MASS INDEX: 32.95 KG/M2 | SYSTOLIC BLOOD PRESSURE: 148 MMHG

## 2021-02-04 DIAGNOSIS — M54.6 CHRONIC MIDLINE THORACIC BACK PAIN: ICD-10-CM

## 2021-02-04 DIAGNOSIS — F10.20 UNCOMPLICATED ALCOHOL DEPENDENCE (H): ICD-10-CM

## 2021-02-04 DIAGNOSIS — M25.541 ARTHRALGIA OF BOTH HANDS: ICD-10-CM

## 2021-02-04 DIAGNOSIS — G89.29 CHRONIC MIDLINE THORACIC BACK PAIN: ICD-10-CM

## 2021-02-04 DIAGNOSIS — E78.5 HYPERLIPIDEMIA LDL GOAL <130: Primary | ICD-10-CM

## 2021-02-04 DIAGNOSIS — R20.2 PARESTHESIA: ICD-10-CM

## 2021-02-04 DIAGNOSIS — Z23 NEED FOR TETANUS BOOSTER: ICD-10-CM

## 2021-02-04 DIAGNOSIS — E78.1 HIGH BLOOD TRIGLYCERIDES: ICD-10-CM

## 2021-02-04 DIAGNOSIS — R74.8 ELEVATED LIVER ENZYMES: ICD-10-CM

## 2021-02-04 DIAGNOSIS — M25.542 ARTHRALGIA OF BOTH HANDS: ICD-10-CM

## 2021-02-04 DIAGNOSIS — I10 ESSENTIAL HYPERTENSION, BENIGN: ICD-10-CM

## 2021-02-04 LAB
ERYTHROCYTE [DISTWIDTH] IN BLOOD BY AUTOMATED COUNT: 12.2 % (ref 10–15)
HCT VFR BLD AUTO: 40.9 % (ref 40–53)
HGB BLD-MCNC: 13.8 G/DL (ref 13.3–17.7)
MCH RBC QN AUTO: 32.9 PG (ref 26.5–33)
MCHC RBC AUTO-ENTMCNC: 33.7 G/DL (ref 31.5–36.5)
MCV RBC AUTO: 97 FL (ref 78–100)
PLATELET # BLD AUTO: 175 10E9/L (ref 150–450)
RBC # BLD AUTO: 4.2 10E12/L (ref 4.4–5.9)
WBC # BLD AUTO: 6.3 10E9/L (ref 4–11)

## 2021-02-04 PROCEDURE — 80061 LIPID PANEL: CPT | Performed by: FAMILY MEDICINE

## 2021-02-04 PROCEDURE — 90471 IMMUNIZATION ADMIN: CPT | Performed by: FAMILY MEDICINE

## 2021-02-04 PROCEDURE — 84443 ASSAY THYROID STIM HORMONE: CPT | Performed by: FAMILY MEDICINE

## 2021-02-04 PROCEDURE — 80053 COMPREHEN METABOLIC PANEL: CPT | Performed by: FAMILY MEDICINE

## 2021-02-04 PROCEDURE — 90715 TDAP VACCINE 7 YRS/> IM: CPT | Performed by: FAMILY MEDICINE

## 2021-02-04 PROCEDURE — 86431 RHEUMATOID FACTOR QUANT: CPT | Performed by: FAMILY MEDICINE

## 2021-02-04 PROCEDURE — 73130 X-RAY EXAM OF HAND: CPT | Mod: FY | Performed by: RADIOLOGY

## 2021-02-04 PROCEDURE — 96127 BRIEF EMOTIONAL/BEHAV ASSMT: CPT | Performed by: FAMILY MEDICINE

## 2021-02-04 PROCEDURE — 99214 OFFICE O/P EST MOD 30 MIN: CPT | Mod: 25 | Performed by: FAMILY MEDICINE

## 2021-02-04 PROCEDURE — 85027 COMPLETE CBC AUTOMATED: CPT | Performed by: FAMILY MEDICINE

## 2021-02-04 PROCEDURE — 36415 COLL VENOUS BLD VENIPUNCTURE: CPT | Performed by: FAMILY MEDICINE

## 2021-02-04 RX ORDER — SIMVASTATIN 40 MG
40 TABLET ORAL AT BEDTIME
Qty: 90 TABLET | Refills: 3 | Status: SHIPPED | OUTPATIENT
Start: 2021-02-04 | End: 2022-02-02

## 2021-02-04 RX ORDER — OLMESARTAN MEDOXOMIL 40 MG/1
40 TABLET ORAL DAILY
Qty: 90 TABLET | Refills: 1 | Status: SHIPPED | OUTPATIENT
Start: 2021-02-04 | End: 2021-09-22

## 2021-02-04 RX ORDER — FENOFIBRATE 54 MG/1
54 TABLET ORAL DAILY
Qty: 90 TABLET | Refills: 3 | Status: SHIPPED | OUTPATIENT
Start: 2021-02-04 | End: 2021-08-05 | Stop reason: DRUGHIGH

## 2021-02-04 RX ORDER — CARVEDILOL 12.5 MG/1
TABLET ORAL
Qty: 180 TABLET | Refills: 1 | Status: SHIPPED | OUTPATIENT
Start: 2021-02-04 | End: 2021-08-02

## 2021-02-04 RX ORDER — DICLOFENAC EPOLAMINE 0.01 G/1
1 SYSTEM TOPICAL 2 TIMES DAILY
Qty: 60 PATCH | Refills: 5 | Status: SHIPPED | OUTPATIENT
Start: 2021-02-04 | End: 2021-08-05

## 2021-02-04 ASSESSMENT — ANXIETY QUESTIONNAIRES
7. FEELING AFRAID AS IF SOMETHING AWFUL MIGHT HAPPEN: NOT AT ALL
2. NOT BEING ABLE TO STOP OR CONTROL WORRYING: NOT AT ALL
GAD7 TOTAL SCORE: 3
6. BECOMING EASILY ANNOYED OR IRRITABLE: NOT AT ALL
3. WORRYING TOO MUCH ABOUT DIFFERENT THINGS: SEVERAL DAYS
IF YOU CHECKED OFF ANY PROBLEMS ON THIS QUESTIONNAIRE, HOW DIFFICULT HAVE THESE PROBLEMS MADE IT FOR YOU TO DO YOUR WORK, TAKE CARE OF THINGS AT HOME, OR GET ALONG WITH OTHER PEOPLE: NOT DIFFICULT AT ALL
1. FEELING NERVOUS, ANXIOUS, OR ON EDGE: NOT AT ALL
5. BEING SO RESTLESS THAT IT IS HARD TO SIT STILL: SEVERAL DAYS

## 2021-02-04 ASSESSMENT — PATIENT HEALTH QUESTIONNAIRE - PHQ9
SUM OF ALL RESPONSES TO PHQ QUESTIONS 1-9: 4
5. POOR APPETITE OR OVEREATING: SEVERAL DAYS

## 2021-02-04 ASSESSMENT — MIFFLIN-ST. JEOR: SCORE: 1651.59

## 2021-02-04 NOTE — PROGRESS NOTES
Assessment & Plan     Hyperlipidemia LDL goal <130  Rechecking labs, cont same  - fenofibrate (LOFIBRA) 54 MG tablet; Take 1 tablet (54 mg) by mouth daily  - simvastatin (ZOCOR) 40 MG tablet; Take 1 tablet (40 mg) by mouth At Bedtime  - Lipid panel reflex to direct LDL Fasting  - Comprehensive metabolic panel    High blood triglycerides  See above  - simvastatin (ZOCOR) 40 MG tablet; Take 1 tablet (40 mg) by mouth At Bedtime  - Lipid panel reflex to direct LDL Fasting  - Comprehensive metabolic panel    Uncomplicated alcohol dependence (H)  Daily drinking, more since covid, very high trigs and in past with elevated liver enzymes, pt interested in getting help  - MENTAL HEALTH REFERRAL  - Adult; Addiction Medicine Provider; Addiction Medicine Evaluation & Treatment; Addiction Medicine Consultation, Evaluation & Treatment [phone]; Alcohol; Medication Assisted Treatment: Alcohol; We will contact you t...    Essential hypertension, benign  Not controlled, will need recheck in 2 weeks, cont same for now, alcohol is likely playing a roll  - carvedilol (COREG) 12.5 MG tablet; TAKE 1 TABLET BY MOUTH TWICE A DAY  - olmesartan (BENICAR) 40 MG tablet; Take 1 tablet (40 mg) by mouth daily  - Comprehensive metabolic panel  - CBC with platelets  - TSH with free T4 reflex    Arthralgia of both hands  Suspect OA, recommend heat, topical medication  - Rheumatoid factor  - XR Hand Bilateral G/E 3 Views  - diclofenac (VOLTAREN) 1 % topical gel; Apply 4 g topically 4 times daily    Elevated liver enzymes  recheck    Chronic midline thoracic back pain  Not new, pt using the patch and helps a lot, will refill  - diclofenac (FLECTOR) 1.3 % patch; Externally apply 1 patch topically 2 times daily  - diclofenac (VOLTAREN) 1 % topical gel; Apply 4 g topically 4 times daily    Paresthesia  Ulnar nerve distribution, both hands, on and off, recommend sleeping with straight arms, suggested a home made splint of towel and duct tape, if  worsens will refer to neurolog    Need for tetanus booster  Pt agrees  - TDAP VACCINE (Adacel, Boostrix)  [[id number]]  - ADMIN 1st VACCINE          Diagnosis or treatment significantly limited by social determinants of health - daily drinking, goals, motivation, concerns, options to cut back or stop    30 minutes spent on the date of the encounter doing chart review, patient visit and documentation            Work on weight loss  Regular exercise    Return in about 6 months (around 2021) for Preventive Visit and med check.    Trish Edwards MD  [facility] HELLEN Ludwig is a 65 year old who presents to clinic today for the following health issues     HPI       Hypertension Follow-up      Do you check your blood pressure regularly outside of the clinic? No     Are you following a low salt diet? No    Are your blood pressures ever more than 140 on the top number (systolic) OR more   than 90 on the bottom number (diastolic), for example 140/90? n/a    Anxiety Follow-Up    How are you doing with your anxiety since your last visit? No change    Are you having other symptoms that might be associated with anxiety? No    Have you had a significant life event? No     Are you feeling depressed? No    Do you have any concerns with your use of alcohol or other drugs? Yes:  alcohol    Social History     Tobacco Use     Smoking status: Former Smoker     Packs/day: 0.00     Quit date: 7/15/1993     Years since quittin.5     Smokeless tobacco: Never Used     Tobacco comment: Quit 20 years ago.   Substance Use Topics     Alcohol use: Yes     Alcohol/week: 7.0 standard drinks     Types: 7 Shots of liquor per week     Frequency: 4 or more times a week     Drinks per session: 3 or 4     Binge frequency: Monthly     Comment: more than 10-12 rum and cokes or beer a week     Drug use: No     CHARI-7 SCORE 2014   Total Score 5 4 1     No flowsheet data found.  Last  PHQ-9 2/4/2021   1.  Little interest or pleasure in doing things 0   2.  Feeling down, depressed, or hopeless 0   3.  Trouble falling or staying asleep, or sleeping too much 2   4.  Feeling tired or having little energy 1   5.  Poor appetite or overeating 1   6.  Feeling bad about yourself 0   7.  Trouble concentrating 0   8.  Moving slowly or restless 0   Q9: Thoughts of better off dead/self-harm past 2 weeks 0   PHQ-9 Total Score 4   Difficulty at work, home, or with people Not difficult at all     CHARI-7  2/4/2021   1. Feeling nervous, anxious, or on edge 0   2. Not being able to stop or control worrying 0   3. Worrying too much about different things 1   4. Trouble relaxing 1   5. Being so restless that it is hard to sit still 1   6. Becoming easily annoyed or irritable 0   7. Feeling afraid, as if something awful might happen 0   CHARI-7 Total Score 3   If you checked any problems, how difficult have they made it for you to do your work, take care of things at home, or get along with other people? Not difficult at all         How many servings of fruits and vegetables do you eat daily?  0-1    On average, how many sweetened beverages do you drink each day (Examples: soda, juice, sweet tea, etc.  Do NOT count diet or artificially sweetened beverages)?   2    How many days per week do you exercise enough to make your heart beat faster? 7    How many minutes a day do you exercise enough to make your heart beat faster? 30 - 60  How many days per week do you miss taking your medication? Once in a great while.     What makes it hard for you to take your medications?  remembering to take    Musculoskeletal problem/pain  Onset/Duration: 2 months ago  Description  Location: both hands  Joint Swelling: YES  Redness: no  Pain: no  Warmth: no  Intensity:  moderate  Progression of Symptoms:  worsening  Accompanying signs and symptoms:   Fevers: no  Numbness/tingling/weakness: YES  History  Trauma to the area: no  Recent  "illness:  no  Previous similar problem: no  Previous evaluation:  no  Precipitating or alleviating factors:  Aggravating factors include: cold weather.  Open a bottle.    Therapies tried and outcome: nothing      Numbness and tingeling, both hands, comes and goes, does not last all day, wakes him from sleep at times,  More noticeable in the pinky fingers and the lateral few fingers. Hx of CTS on right side and surgery and it does not feel like that at all. No neck pain. No elbow pain. Sleeps on stomach or on left side, tries to not have his arms at 90 degress when sleeping.     Sleep study was done. mild sleep apnea, worse on his back. Will drink daily, but not in the evening.     2-3 mixed drinks per day, 2-3 oz per drink. Has gone up during covid, lacks the will power some days. Goes to the [employer] a lot, tries to get away and will drink more there, worse in the summer. Wanting to cut back, hx of taking pills to help, but still has those pills, likely . He is not sure if he is ready to see an addiction.     Review of Systems   Constitutional, HEENT, cardiovascular, pulmonary, gi and gu systems are negative, except as otherwise noted.      Objective    BP (!) 144/82 (BP Location: Right arm, Patient Position: Sitting, Cuff Size: Adult Regular)   Pulse 60   Temp 97.9  F (36.6  C) (Oral)   Resp 12   Ht 1.683 m (5' 6.25\")   Wt 93 kg (205 lb)   BMI 32.84 kg/m    Body mass index is 32.84 kg/m .  Physical Exam   GENERAL: healthy, alert and no distress  EYES: Eyes grossly normal to inspection, PERRL and conjunctivae and sclerae normal  HENT: ear canals and TM's normal, nose and mouth without ulcers or lesions  NECK: no adenopathy, no asymmetry, masses, or scars and thyroid normal to palpation  RESP: lungs clear to auscultation - no rales, rhonchi or wheezes  CV: regular rate and rhythm, normal S1 S2, no S3 or S4, no murmur, click or rub, no peripheral edema and peripheral pulses strong  MS: no gross " musculoskeletal defects noted, no edema  SKIN: no suspicious lesions or rashes  NEURO: Normal strength and tone, mentation intact and speech normal  PSYCH: mentation appears normal, affect normal/bright

## 2021-02-04 NOTE — NURSING NOTE
"Chief Complaint   Patient presents with     Hypertension     Anxiety     Refill Request     Initial BP (!) 144/82 (BP Location: Right arm, Patient Position: Sitting, Cuff Size: Adult Regular)   Pulse 60   Temp 97.9  F (36.6  C) (Oral)   Resp 12   Ht 1.683 m (5' 6.25\")   Wt 93 kg (205 lb)   BMI 32.84 kg/m   Estimated body mass index is 32.84 kg/m  as calculated from the following:    Height as of this encounter: 1.683 m (5' 6.25\").    Weight as of this encounter: 93 kg (205 lb).  BP completed using cuff size regular long right arm    Lisa Magill, CMA    "

## 2021-02-04 NOTE — TELEPHONE ENCOUNTER
"Please schedule appointment for patient with   Addiction Medicine Provider     For alcohol use disorder    Appt type: patient preference      Our scheduling staff will offer the following information:   Please invite our patient to call [facility] assessment line - [phone]. They can ask for a \"substance use assessment\" or \"rule 25\". This will allow them to discuss possible psychosocial treatment options including individual therapy or any group options including outpatient, intensive outpatient, or residential (aka inpatient) treatment.     Rito Navas MD    "

## 2021-02-05 LAB
ALBUMIN SERPL-MCNC: 3.9 G/DL (ref 3.4–5)
ALP SERPL-CCNC: 94 U/L (ref 40–150)
ALT SERPL W P-5'-P-CCNC: 34 U/L (ref 0–70)
ANION GAP SERPL CALCULATED.3IONS-SCNC: 5 MMOL/L (ref 3–14)
AST SERPL W P-5'-P-CCNC: 22 U/L (ref 0–45)
BILIRUB SERPL-MCNC: 0.7 MG/DL (ref 0.2–1.3)
BUN SERPL-MCNC: 13 MG/DL (ref 7–30)
CALCIUM SERPL-MCNC: 9.8 MG/DL (ref 8.5–10.1)
CHLORIDE SERPL-SCNC: 107 MMOL/L (ref 94–109)
CHOLEST SERPL-MCNC: 156 MG/DL
CO2 SERPL-SCNC: 25 MMOL/L (ref 20–32)
CREAT SERPL-MCNC: 1 MG/DL (ref 0.66–1.25)
GFR SERPL CREATININE-BSD FRML MDRD: 78 ML/MIN/{1.73_M2}
GLUCOSE SERPL-MCNC: 110 MG/DL (ref 70–99)
HDLC SERPL-MCNC: 36 MG/DL
LDLC SERPL CALC-MCNC: 51 MG/DL
NONHDLC SERPL-MCNC: 120 MG/DL
POTASSIUM SERPL-SCNC: 4.4 MMOL/L (ref 3.4–5.3)
PROT SERPL-MCNC: 7.4 G/DL (ref 6.8–8.8)
RHEUMATOID FACT SER NEPH-ACNC: <7 IU/ML (ref 0–20)
SODIUM SERPL-SCNC: 137 MMOL/L (ref 133–144)
TRIGL SERPL-MCNC: 344 MG/DL
TSH SERPL DL<=0.005 MIU/L-ACNC: 1.68 MU/L (ref 0.4–4)

## 2021-02-05 ASSESSMENT — ANXIETY QUESTIONNAIRES: GAD7 TOTAL SCORE: 3

## 2021-03-29 DIAGNOSIS — M1A.9XX0 CHRONIC GOUT WITHOUT TOPHUS, UNSPECIFIED CAUSE, UNSPECIFIED SITE: ICD-10-CM

## 2021-03-29 RX ORDER — ALLOPURINOL 100 MG/1
200 TABLET ORAL 2 TIMES DAILY
Qty: 360 TABLET | Refills: 0 | Status: SHIPPED | OUTPATIENT
Start: 2021-03-29 | End: 2021-06-28

## 2021-03-29 NOTE — TELEPHONE ENCOUNTER
"Requested Prescriptions   Pending Prescriptions Disp Refills     allopurinol (ZYLOPRIM) 100 MG tablet [Pharmacy Med Name: ALLOPURINOL 100 MG TABLET]    Last Written Prescription Date:  07/23/2020  Last Fill Quantity: 360 tablet,  # refills: 1   Last office visit: 6/11/2019 with prescribing provider:  Jerry   Future Office Visit:       360 tablet 1     Sig: TAKE 2 TABLETS (200 MG) BY MOUTH 2 TIMES DAILY       Gout Agents Protocol Passed - 3/29/2021 11:15 AM        Passed - CBC on file in past 12 months     Recent Labs   Lab Test 02/04/21  1056   WBC 6.3   RBC 4.20*   HGB 13.8   HCT 40.9                    Passed - ALT on file in past 12 months     Recent Labs   Lab Test 02/04/21  1056   ALT 34             Passed - Has Uric Acid on file in past 12 months and value is less than 6     Recent Labs   Lab Test 07/01/20  0858   URIC 4.1     If level is 6mg/dL or greater, ok to refill one time and refer to provider.           Passed - Recent (12 mo) or future (30 days) visit within the authorizing provider's specialty     Patient has had an office visit with the authorizing provider or a provider within the authorizing providers department within the previous 12 mos or has a future within next 30 days. See \"Patient Info\" tab in inbasket, or \"Choose Columns\" in Meds & Orders section of the refill encounter.              Passed - Medication is active on med list        Passed - Patient is age 18 or older        Passed - Normal serum creatinine on file in the past 12 months     Recent Labs   Lab Test 02/04/21  1056   CR 1.00       Ok to refill medication if creatinine is low                "

## 2021-06-26 DIAGNOSIS — M1A.9XX0 CHRONIC GOUT WITHOUT TOPHUS, UNSPECIFIED CAUSE, UNSPECIFIED SITE: ICD-10-CM

## 2021-06-28 RX ORDER — ALLOPURINOL 100 MG/1
200 TABLET ORAL 2 TIMES DAILY
Qty: 360 TABLET | Refills: 0 | Status: SHIPPED | OUTPATIENT
Start: 2021-06-28 | End: 2021-09-22

## 2021-08-02 DIAGNOSIS — I10 ESSENTIAL HYPERTENSION, BENIGN: ICD-10-CM

## 2021-08-02 RX ORDER — CARVEDILOL 12.5 MG/1
TABLET ORAL
Qty: 180 TABLET | Refills: 0 | Status: SHIPPED | OUTPATIENT
Start: 2021-08-02 | End: 2021-10-26

## 2021-08-02 NOTE — TELEPHONE ENCOUNTER
Patient has an upcoming appointment.  Will give refill to get him through so he does not have to go without.    Pres[place] per [facility] [place].  Clarisa Farrell RN

## 2021-08-05 ENCOUNTER — OFFICE VISIT (OUTPATIENT)
Dept: FAMILY MEDICINE | Facility: CLINIC | Age: 66
End: 2021-08-05
Payer: MEDICARE

## 2021-08-05 ENCOUNTER — TELEPHONE (OUTPATIENT)
Dept: FAMILY MEDICINE | Facility: CLINIC | Age: 66
End: 2021-08-05

## 2021-08-05 VITALS
HEART RATE: 60 BPM | SYSTOLIC BLOOD PRESSURE: 136 MMHG | TEMPERATURE: 97.7 F | WEIGHT: 199 LBS | RESPIRATION RATE: 16 BRPM | OXYGEN SATURATION: 98 % | BODY MASS INDEX: 31.88 KG/M2 | DIASTOLIC BLOOD PRESSURE: 76 MMHG

## 2021-08-05 DIAGNOSIS — I10 ESSENTIAL HYPERTENSION, BENIGN: ICD-10-CM

## 2021-08-05 DIAGNOSIS — E78.1 HIGH BLOOD TRIGLYCERIDES: Primary | ICD-10-CM

## 2021-08-05 DIAGNOSIS — G89.29 CHRONIC MIDLINE THORACIC BACK PAIN: ICD-10-CM

## 2021-08-05 DIAGNOSIS — M54.6 CHRONIC MIDLINE THORACIC BACK PAIN: ICD-10-CM

## 2021-08-05 PROCEDURE — 99214 OFFICE O/P EST MOD 30 MIN: CPT | Performed by: FAMILY MEDICINE

## 2021-08-05 RX ORDER — FENOFIBRATE 145 MG/1
145 TABLET, COATED ORAL DAILY
Qty: 90 TABLET | Refills: 1 | Status: SHIPPED | OUTPATIENT
Start: 2021-08-05 | End: 2021-10-21

## 2021-08-05 RX ORDER — DICLOFENAC EPOLAMINE 0.01 G/1
1 SYSTEM TOPICAL 2 TIMES DAILY
Qty: 60 PATCH | Refills: 5 | Status: SHIPPED | OUTPATIENT
Start: 2021-08-05 | End: 2024-04-30

## 2021-08-05 ASSESSMENT — PAIN SCALES - GENERAL: PAINLEVEL: SEVERE PAIN (6)

## 2021-08-05 NOTE — PATIENT INSTRUCTIONS
Patient Education     Back Care Tips     Caring for your back  These are things you can do to prevent a recurrence of acute back pain and to reduce symptoms from chronic back pain:    Stay at a healthy weight. If you are overweight, losing weight will help most types of back pain.    Exercise is an important part of recovery from most types of back pain. The muscles behind and in front of the spine support the back. This means strengthening both the back muscles and the abdominal muscles will provide better support for your spine.     Swimming and brisk walking are good overall exercises to improve your fitness level.    Practice safe lifting methods (see below).    Practice good posture when sitting, standing, and walking. Don't sit for a long time. This puts more stress on the lower back than standing or walking.    Wear quality shoes with good arch support. Foot and ankle alignment can affect back symptoms. Don't wear high heels.    Therapeutic massage can help relax the back muscles without stretching them.    During the first 24 to 72 hours after an acute injury or flare-up of chronic back pain, put an ice pack on the painful area for 20 minutes and then remove it for 20 minutes. Do thisover a period of 60 to 90 minutes, or several times a day. As a safety precaution, don't use a heating pad at bedtime. Sleeping on a heating pad can lead to skin burns or tissue damage.    You can alternate using ice and heat.  Medicines  Talk with your healthcare provider before using medicines, especially if you have other health problems or are taking other medicines.    You may use over-the-counter medicines, such as acetaminophen, ibuprofen, or naprosyn to control pain, unless your healthcare provider prescribed other pain medicine. Talk with your healthcare provider before taking any medicines if you have a chronic condition such as diabetes, liver or kidney disease, stomach ulcers, or digestive bleeding, or are taking  blood thinners.    Be careful if you are given prescription pain medicines, opioids, or medicine for muscle spasm. They can cause drowsiness, and affect your coordination, reflexes, and judgment. Don't drive or operate heavy machinery while taking these types of medicines. Take prescription pain medicine only as prescribed by your healthcare provider.  Lumbar stretch  This simple stretch will help relax muscle spasm and keep your back more limber. If exercise makes your back pain worse, don t do it.    Lie on your back with your knees bent and both feet on the ground.    Slowly raise your left knee to your chest as you flatten your lower back against the floor. Hold for 5 seconds.    Relax and repeat the exercise with your right knee.    Do 10 of these exercises for each leg.  Safe lifting method    Don t bend over at the waist to lift an object off the floor.  Instead, bend your knees and hips in a squat.     Keep your back and head upright    Hold the object close to your body, directly in front of you.    Straighten your legs to lift the object.     Lower the object to the floor in the reverse fashion.    If you must slide something across the floor, push it.    Posture tips  Sitting  Sit in chairs with straight backs or low-back support. Keep your knees lower than your hips, with your feet flat on the floor.  When driving, sit up straight. Adjust the seat forward so you are not leaning toward the steering wheel.  A small pillow or rolled towel behind your lower back may help if you are driving long distances.   Standing  When standing for long periods, shift most of your weight to one leg at a time. Switch legs every few minutes.   Sleeping  The best way to sleep is on your side with your knees bent. Put a low pillow under your head to support your neck in a neutral spine position. Don't use thick pillows that bend your neck to one side. Put a pillow between your legs to further relax your lower back. If you  sleep on your back, put pillows under your knees to support your legs in a slightly flexed position. Use a firm mattress. If your mattress sags, replace it, or use a 1/2-inch plywood board under the mattress to add support.  Follow-up care  Follow up with your healthcare provider, or as advised.  If X-rays, a CT scan or an MRI scan were taken, they may be reviewed by a radiologist. You will be told of any new findings that may affect your care.  Call 911  Call 911 if any of the following occur:    Trouble breathing    Confusion    Very drowsy    Fainting or loss of consciousness    Rapid or very slow heart rate    Loss of  bowel or bladder control  When to seek medical advice  Call your healthcare provider right away if any of the following occur:    Pain becomes worse or spreads to your arms or legs    Weakness or numbness in one or both arms or legs    Numbness in the groin area  [employer] last reviewed this educational content on 11/1/2019 2000-2021 [employer]. All rights reserved. This information is not intended as a substitute for professional medical care. Always follow your healthcare professional's instructions.

## 2021-08-05 NOTE — PROGRESS NOTES
"    Assessment & Plan     High blood triglycerides  Will raise the dose, considered changing zocor to crestor, but pt hesitant to change, will keep on the zocor but raise tricor to 145, hopefully the cost will not be higher  - fenofibrate (TRICOR) 145 MG tablet; Take 1 tablet (145 mg) by mouth daily    Chronic midline thoracic back pain  Refilled, discussed pain clinic, PT, other medication options, like muscle relaxants or neurotin, pamelor, pt declined for now, recommended stretching daily  - diclofenac (FLECTOR) 1.3 % patch; Externally apply 1 patch topically 2 times daily    Essential hypertension, benign  Controlled, cont same      Review of the result(s) of each unique test - liver, kidney, lipids, and addiction med did reach out but pt declined for now         BMI:   Estimated body mass index is 31.88 kg/m  as calculated from the following:    Height as of 2/4/21: 1.683 m (5' 6.25\").    Weight as of this encounter: 90.3 kg (199 lb).   Weight management plan: Discussed healthy diet and exercise guidelines    Work on weight loss  Regular exercise    Return in about 4 months (around 12/7/2021) for medicare wellness visit.    Trish Edwards MD  [facility] HELLEN Ludwig is a 65 year old who presents for the following health issues     History of Present Illness       Back Pain:  He presents for follow up of back pain. Patient's back pain is a chronic problem.  Location of back pain:  Right lower back and left lower back  Description of back pain: sharp, shooting and stabbing  Back pain spreads: nowhere    Since patient first noticed back pain, pain is: gradually worsening  Does back pain interfere with his job:  Not applicable      Hyperlipidemia:  He presents for follow up of hyperlipidemia.  He is taking medication to lower cholesterol. He is not having myalgia or other side effects to statin medications.    Hypertension: He presents for follow up of hypertension.  He does " not check blood pressure  regularly outside of the clinic. Outside blood pressures have been over 140/90. He does not follow a low salt diet.     He eats 0-1 servings of fruits and vegetables daily.He consumes 3 sweetened beverage(s) daily.He exercises with enough effort to increase his heart rate 30 to 60 minutes per day.  He exercises with enough effort to increase his heart rate 7 days per week.   He is taking medications regularly.     Going to [employer] a lot and it helps him relax. Goes out on the boat with the [email or web address].      Walking daily, and trying to work on HDL, high trigs last time,   Cashews, but not so much almonds or walnuts, does like peanuts. Olives sometimes, using olive oil. Black olives, but not so much  Off the lovaza due to cost          Review of Systems   CONSTITUTIONAL: NEGATIVE for fever, chills, change in weight  ENT/MOUTH: NEGATIVE for ear, mouth and throat problems  RESP: NEGATIVE for significant cough or SOB  CV: NEGATIVE for chest pain, palpitations or peripheral edema      Objective    /76 (BP Location: Right arm, Patient Position: Sitting, Cuff Size: Adult Regular)   Pulse 60   Temp 97.7  F (36.5  C) (Oral)   Resp 16   Wt 90.3 kg (199 lb)   SpO2 98%   BMI 31.88 kg/m    Body mass index is 31.88 kg/m .  Physical Exam   GENERAL: healthy, alert and no distress  EYES: Eyes grossly normal to inspection,  and conjunctivae and sclerae normal  HENT: normal cephalic/atraumatic and ear canals and TM's normal  NECK: no adenopathy, no asymmetry, masses, or scars and thyroid normal to palpation  RESP: lungs clear to auscultation - no rales, rhonchi or wheezes  CV: regular rate and rhythm, normal S1 S2, no S3 or S4, no murmur, click or rub, no peripheral edema and peripheral pulses strong  MS: no gross musculoskeletal defects noted, no edema  MS: left low back examined, no tightness or reactive scoliosis, nontender to touch  NEURO: Normal strength and tone, mentation intact and speech  normal  DTRs normal at knee and ankle, strength and gait normal  PSYCH: mentation appears normal, affect normal/bright

## 2021-08-06 NOTE — TELEPHONE ENCOUNTER
Prior Authorization Approval    Authorization Effective Date: 8/5/2021  Authorization Expiration Date: 12/31/2099  Medication: diclofenac (FLECTOR) 1.3 % patch-APPROVED  Approved Dose/Quantity:    Reference #:     Insurance Company: [employer] - Phone [phone] Fax [phone]  Expected CoPay:       CoPay Card Available:      Foundation Assistance Needed:    Which Pharmacy is filling the prescription (Not needed for infusion/clinic administered): [id number] - [place], [place] - [address]  [address]  Pharmacy Notified: Yes  Patient Notified: Yes  **Instructed pharmacy to notify patient when script is ready to /ship.**

## 2021-08-06 NOTE — TELEPHONE ENCOUNTER
Central Prior Authorization Team   Phone: [phone]    PA Initiation    Medication: diclofenac (FLECTOR) 1.3 % patch  Insurance Company: [employer] - Phone [phone] Fax [phone]  Pharmacy Filling the Rx: [id number] - [place], [place] - [address]  Filling Pharmacy Phone: [phone]  Filling Pharmacy Fax: [phone]  Start Date: 8/6/2021

## 2021-08-29 ENCOUNTER — HEALTH MAINTENANCE LETTER (OUTPATIENT)
Age: 66
End: 2021-08-29

## 2021-09-20 DIAGNOSIS — M1A.9XX0 CHRONIC GOUT WITHOUT TOPHUS, UNSPECIFIED CAUSE, UNSPECIFIED SITE: ICD-10-CM

## 2021-09-20 DIAGNOSIS — I10 ESSENTIAL HYPERTENSION, BENIGN: ICD-10-CM

## 2021-09-22 RX ORDER — ALLOPURINOL 100 MG/1
200 TABLET ORAL 2 TIMES DAILY
Qty: 360 TABLET | Refills: 0 | Status: SHIPPED | OUTPATIENT
Start: 2021-09-22 | End: 2021-12-21

## 2021-09-22 RX ORDER — OLMESARTAN MEDOXOMIL 40 MG/1
TABLET ORAL
Qty: 90 TABLET | Refills: 0 | Status: SHIPPED | OUTPATIENT
Start: 2021-09-22 | End: 2021-12-14

## 2021-09-22 NOTE — TELEPHONE ENCOUNTER
Routing refill request to provider for review/approval because:  Labs not current:  Needs yearly Uric Acid    Clarisa Farrell RN

## 2021-10-18 DIAGNOSIS — I10 ESSENTIAL HYPERTENSION, BENIGN: ICD-10-CM

## 2021-10-18 DIAGNOSIS — E78.1 HIGH BLOOD TRIGLYCERIDES: ICD-10-CM

## 2021-10-18 NOTE — TELEPHONE ENCOUNTER
Patient is looking for a refill of fenofibrate. Pt states that when does was increased from 54 mg to 145 mg that the price tripled. Unaffordable for pt. So pt has been taking 54 mg. Pt would like new rx for 54 mg of fenofibrate.     Bill- [phone], ok to leave detailed message.     Noemy Ruff-

## 2021-10-21 RX ORDER — FENOFIBRATE 145 MG/1
145 TABLET, COATED ORAL DAILY
Qty: 90 TABLET | Refills: 1 | Status: SHIPPED | OUTPATIENT
Start: 2021-10-21 | End: 2022-02-02

## 2021-10-21 RX ORDER — OLMESARTAN MEDOXOMIL 40 MG/1
TABLET ORAL
Qty: 90 TABLET | Refills: 0 | OUTPATIENT
Start: 2021-10-21

## 2021-10-23 DIAGNOSIS — I10 ESSENTIAL HYPERTENSION, BENIGN: ICD-10-CM

## 2021-10-23 NOTE — TELEPHONE ENCOUNTER
Prior Authorization Retail Medication Request    Medication/Dose: diclofenac (FLECTOR) 1.3 % patch  ICD code (if different than what is on RX):     Previously Tried and Failed:     Rationale:       Insurance Name:   No information on drug coverage.  Please contact pharmacy.  Insurance ID:         Pharmacy Information (if different than what is on RX)  Name:     Phone:        ADL retraining/bed mobility training

## 2021-10-24 ENCOUNTER — HEALTH MAINTENANCE LETTER (OUTPATIENT)
Age: 66
End: 2021-10-24

## 2021-10-25 DIAGNOSIS — I10 ESSENTIAL HYPERTENSION, BENIGN: ICD-10-CM

## 2021-10-26 RX ORDER — CARVEDILOL 12.5 MG/1
TABLET ORAL
Qty: 180 TABLET | Refills: 0 | Status: SHIPPED | OUTPATIENT
Start: 2021-10-26 | End: 2022-01-18

## 2021-10-28 RX ORDER — OLMESARTAN MEDOXOMIL 40 MG/1
TABLET ORAL
Qty: 90 TABLET | Refills: 0 | OUTPATIENT
Start: 2021-10-28

## 2021-11-19 ENCOUNTER — TELEPHONE (OUTPATIENT)
Dept: FAMILY MEDICINE | Facility: CLINIC | Age: 66
End: 2021-11-19
Payer: MEDICARE

## 2021-11-19 NOTE — TELEPHONE ENCOUNTER
Patient calls very upset that his PX on 12/7 was cancelled.  Patient states he has to get in before the first of the year.  Please address and advise.

## 2021-12-11 DIAGNOSIS — I10 ESSENTIAL HYPERTENSION, BENIGN: ICD-10-CM

## 2021-12-14 RX ORDER — OLMESARTAN MEDOXOMIL 40 MG/1
TABLET ORAL
Qty: 90 TABLET | Refills: 0 | Status: SHIPPED | OUTPATIENT
Start: 2021-12-14 | End: 2022-02-02

## 2021-12-14 NOTE — TELEPHONE ENCOUNTER
Medication is being filled for 1 time refill only due to:  Patient needs to be seen because due for an appt - has upcoming appt scheduled. - this is for olmesartan

## 2021-12-18 DIAGNOSIS — M1A.9XX0 CHRONIC GOUT WITHOUT TOPHUS, UNSPECIFIED CAUSE, UNSPECIFIED SITE: ICD-10-CM

## 2021-12-21 RX ORDER — ALLOPURINOL 100 MG/1
200 TABLET ORAL 2 TIMES DAILY
Qty: 360 TABLET | Refills: 0 | Status: SHIPPED | OUTPATIENT
Start: 2021-12-21 | End: 2022-02-02

## 2021-12-21 NOTE — TELEPHONE ENCOUNTER
Routing refill request to provider for review/approval because:  Failed protocol for allopurinol - due for a uric acid - has upcoming appt scheduled

## 2022-01-16 DIAGNOSIS — I10 ESSENTIAL HYPERTENSION, BENIGN: ICD-10-CM

## 2022-01-18 RX ORDER — CARVEDILOL 12.5 MG/1
TABLET ORAL
Qty: 180 TABLET | Refills: 0 | Status: SHIPPED | OUTPATIENT
Start: 2022-01-18 | End: 2022-04-07

## 2022-01-18 NOTE — TELEPHONE ENCOUNTER
Prescription approved per [facility] Refill Protocol - this is for coreg - pt due for an appt - has upcoming appt scheduled.

## 2022-02-02 ENCOUNTER — ANCILLARY PROCEDURE (OUTPATIENT)
Dept: GENERAL RADIOLOGY | Facility: CLINIC | Age: 67
End: 2022-02-02
Attending: FAMILY MEDICINE
Payer: MEDICARE

## 2022-02-02 ENCOUNTER — OFFICE VISIT (OUTPATIENT)
Dept: FAMILY MEDICINE | Facility: CLINIC | Age: 67
End: 2022-02-02
Payer: MEDICARE

## 2022-02-02 VITALS
OXYGEN SATURATION: 96 % | BODY MASS INDEX: 32.95 KG/M2 | TEMPERATURE: 97.6 F | RESPIRATION RATE: 16 BRPM | DIASTOLIC BLOOD PRESSURE: 88 MMHG | WEIGHT: 205 LBS | SYSTOLIC BLOOD PRESSURE: 160 MMHG | HEART RATE: 64 BPM | HEIGHT: 66 IN

## 2022-02-02 DIAGNOSIS — I10 ESSENTIAL HYPERTENSION, BENIGN: ICD-10-CM

## 2022-02-02 DIAGNOSIS — E78.5 HYPERLIPIDEMIA LDL GOAL <130: ICD-10-CM

## 2022-02-02 DIAGNOSIS — F10.20 UNCOMPLICATED ALCOHOL DEPENDENCE (H): ICD-10-CM

## 2022-02-02 DIAGNOSIS — M1A.9XX0 CHRONIC GOUT WITHOUT TOPHUS, UNSPECIFIED CAUSE, UNSPECIFIED SITE: ICD-10-CM

## 2022-02-02 DIAGNOSIS — M54.50 CHRONIC RIGHT-SIDED LOW BACK PAIN WITHOUT SCIATICA: ICD-10-CM

## 2022-02-02 DIAGNOSIS — G89.29 CHRONIC RIGHT-SIDED LOW BACK PAIN WITHOUT SCIATICA: ICD-10-CM

## 2022-02-02 DIAGNOSIS — M24.849 LOCKING FINGER JOINT: ICD-10-CM

## 2022-02-02 DIAGNOSIS — Z12.5 SCREENING PSA (PROSTATE SPECIFIC ANTIGEN): ICD-10-CM

## 2022-02-02 DIAGNOSIS — E88.810 METABOLIC SYNDROME: ICD-10-CM

## 2022-02-02 DIAGNOSIS — Z23 FLU VACCINE NEED: ICD-10-CM

## 2022-02-02 DIAGNOSIS — E78.1 HIGH BLOOD TRIGLYCERIDES: ICD-10-CM

## 2022-02-02 DIAGNOSIS — Z00.00 ENCOUNTER FOR MEDICARE ANNUAL WELLNESS EXAM: Primary | ICD-10-CM

## 2022-02-02 DIAGNOSIS — F40.243 FEAR OF FLYING: ICD-10-CM

## 2022-02-02 PROBLEM — I16.1 HYPERTENSIVE EMERGENCY: Status: RESOLVED | Noted: 2017-05-05 | Resolved: 2022-02-02

## 2022-02-02 LAB
ALBUMIN SERPL-MCNC: 3.8 G/DL (ref 3.4–5)
ALBUMIN UR-MCNC: NEGATIVE MG/DL
ALP SERPL-CCNC: 66 U/L (ref 40–150)
ALT SERPL W P-5'-P-CCNC: 31 U/L (ref 0–70)
ANION GAP SERPL CALCULATED.3IONS-SCNC: 1 MMOL/L (ref 3–14)
APPEARANCE UR: CLEAR
AST SERPL W P-5'-P-CCNC: 21 U/L (ref 0–45)
BILIRUB SERPL-MCNC: 0.3 MG/DL (ref 0.2–1.3)
BILIRUB UR QL STRIP: NEGATIVE
BUN SERPL-MCNC: 18 MG/DL (ref 7–30)
CALCIUM SERPL-MCNC: 9 MG/DL (ref 8.5–10.1)
CHLORIDE BLD-SCNC: 107 MMOL/L (ref 94–109)
CHOLEST SERPL-MCNC: 167 MG/DL
CO2 SERPL-SCNC: 30 MMOL/L (ref 20–32)
COLOR UR AUTO: YELLOW
CREAT SERPL-MCNC: 1.05 MG/DL (ref 0.66–1.25)
ERYTHROCYTE [DISTWIDTH] IN BLOOD BY AUTOMATED COUNT: 12 % (ref 10–15)
FASTING STATUS PATIENT QL REPORTED: YES
GFR SERPL CREATININE-BSD FRML MDRD: 77 ML/MIN/1.73M2
GLUCOSE BLD-MCNC: 121 MG/DL (ref 70–99)
GLUCOSE UR STRIP-MCNC: NEGATIVE MG/DL
HCT VFR BLD AUTO: 42.7 % (ref 40–53)
HDLC SERPL-MCNC: 37 MG/DL
HGB BLD-MCNC: 14.4 G/DL (ref 13.3–17.7)
HGB UR QL STRIP: NEGATIVE
KETONES UR STRIP-MCNC: NEGATIVE MG/DL
LDLC SERPL CALC-MCNC: 73 MG/DL
LEUKOCYTE ESTERASE UR QL STRIP: NEGATIVE
MCH RBC QN AUTO: 32 PG (ref 26.5–33)
MCHC RBC AUTO-ENTMCNC: 33.7 G/DL (ref 31.5–36.5)
MCV RBC AUTO: 95 FL (ref 78–100)
NITRATE UR QL: NEGATIVE
NONHDLC SERPL-MCNC: 130 MG/DL
PH UR STRIP: 6.5 [PH] (ref 5–7)
PLATELET # BLD AUTO: 208 10E3/UL (ref 150–450)
POTASSIUM BLD-SCNC: 4.6 MMOL/L (ref 3.4–5.3)
PROT SERPL-MCNC: 7.6 G/DL (ref 6.8–8.8)
PSA SERPL-MCNC: 3.26 UG/L (ref 0–4)
RBC # BLD AUTO: 4.5 10E6/UL (ref 4.4–5.9)
SODIUM SERPL-SCNC: 138 MMOL/L (ref 133–144)
SP GR UR STRIP: 1.02 (ref 1–1.03)
TRIGL SERPL-MCNC: 285 MG/DL
URATE SERPL-MCNC: 4.8 MG/DL (ref 3.5–7.2)
UROBILINOGEN UR STRIP-ACNC: 1 E.U./DL
WBC # BLD AUTO: 6 10E3/UL (ref 4–11)

## 2022-02-02 PROCEDURE — 81003 URINALYSIS AUTO W/O SCOPE: CPT | Performed by: FAMILY MEDICINE

## 2022-02-02 PROCEDURE — 90662 IIV NO PRSV INCREASED AG IM: CPT | Performed by: FAMILY MEDICINE

## 2022-02-02 PROCEDURE — 85027 COMPLETE CBC AUTOMATED: CPT | Performed by: FAMILY MEDICINE

## 2022-02-02 PROCEDURE — 80053 COMPREHEN METABOLIC PANEL: CPT | Performed by: FAMILY MEDICINE

## 2022-02-02 PROCEDURE — G0439 PPPS, SUBSEQ VISIT: HCPCS | Performed by: FAMILY MEDICINE

## 2022-02-02 PROCEDURE — 36415 COLL VENOUS BLD VENIPUNCTURE: CPT | Performed by: FAMILY MEDICINE

## 2022-02-02 PROCEDURE — G0103 PSA SCREENING: HCPCS | Performed by: FAMILY MEDICINE

## 2022-02-02 PROCEDURE — 99214 OFFICE O/P EST MOD 30 MIN: CPT | Mod: 25 | Performed by: FAMILY MEDICINE

## 2022-02-02 PROCEDURE — G0008 ADMIN INFLUENZA VIRUS VAC: HCPCS | Performed by: FAMILY MEDICINE

## 2022-02-02 PROCEDURE — 72100 X-RAY EXAM L-S SPINE 2/3 VWS: CPT | Mod: FY | Performed by: RADIOLOGY

## 2022-02-02 PROCEDURE — 80061 LIPID PANEL: CPT | Performed by: FAMILY MEDICINE

## 2022-02-02 PROCEDURE — 84550 ASSAY OF BLOOD/URIC ACID: CPT | Performed by: FAMILY MEDICINE

## 2022-02-02 RX ORDER — METHOCARBAMOL 500 MG/1
500 TABLET, FILM COATED ORAL 4 TIMES DAILY PRN
Qty: 90 TABLET | Refills: 1 | Status: SHIPPED | OUTPATIENT
Start: 2022-02-02 | End: 2022-02-08

## 2022-02-02 RX ORDER — SIMVASTATIN 40 MG
40 TABLET ORAL AT BEDTIME
Qty: 90 TABLET | Refills: 3 | Status: SHIPPED | OUTPATIENT
Start: 2022-02-02 | End: 2022-11-15

## 2022-02-02 RX ORDER — FENOFIBRATE 145 MG/1
145 TABLET, COATED ORAL DAILY
Qty: 90 TABLET | Refills: 3 | Status: SHIPPED | OUTPATIENT
Start: 2022-02-02 | End: 2023-02-08

## 2022-02-02 RX ORDER — ALLOPURINOL 100 MG/1
200 TABLET ORAL 2 TIMES DAILY
Qty: 360 TABLET | Refills: 3 | Status: SHIPPED | OUTPATIENT
Start: 2022-02-02 | End: 2023-03-17

## 2022-02-02 RX ORDER — DICLOFENAC EPOLAMINE 0.01 G/1
1 SYSTEM TOPICAL 2 TIMES DAILY
Qty: 30 PATCH | Refills: 5 | Status: SHIPPED | OUTPATIENT
Start: 2022-02-02 | End: 2023-02-08

## 2022-02-02 RX ORDER — OLMESARTAN MEDOXOMIL 40 MG/1
40 TABLET ORAL DAILY
Qty: 90 TABLET | Refills: 1 | Status: SHIPPED | OUTPATIENT
Start: 2022-02-02 | End: 2022-10-20

## 2022-02-02 RX ORDER — LORAZEPAM 1 MG/1
1 TABLET ORAL EVERY 6 HOURS PRN
Qty: 10 TABLET | Refills: 0 | Status: SHIPPED | OUTPATIENT
Start: 2022-02-02 | End: 2023-06-13

## 2022-02-02 ASSESSMENT — ENCOUNTER SYMPTOMS
DIARRHEA: 0
WEAKNESS: 0
PALPITATIONS: 0
HEMATURIA: 0
CHILLS: 0
PARESTHESIAS: 1
DYSURIA: 0
ARTHRALGIAS: 1
FREQUENCY: 0
NAUSEA: 0
EYE PAIN: 0
SHORTNESS OF BREATH: 0
CONSTIPATION: 0
COUGH: 0
NERVOUS/ANXIOUS: 0
HEADACHES: 0
MYALGIAS: 1
ABDOMINAL PAIN: 0
DIZZINESS: 0
FEVER: 0
HEMATOCHEZIA: 0
JOINT SWELLING: 0
HEARTBURN: 0
SORE THROAT: 0

## 2022-02-02 ASSESSMENT — ACTIVITIES OF DAILY LIVING (ADL): CURRENT_FUNCTION: NO ASSISTANCE NEEDED

## 2022-02-02 ASSESSMENT — PAIN SCALES - GENERAL: PAINLEVEL: EXTREME PAIN (8)

## 2022-02-02 ASSESSMENT — MIFFLIN-ST. JEOR: SCORE: 1646.59

## 2022-02-02 NOTE — PATIENT INSTRUCTIONS
Patient Education   Personalized Prevention Plan  You are due for the preventive services outlined below.  Your care team is available to assist you in scheduling these services.  If you have already completed any of these items, please share that information with your care team to update in your medical record.  Health Maintenance Due   Topic Date Due     FALL RISK ASSESSMENT  06/26/2021     Flu Vaccine (1) 09/01/2021     Zoster (Shingles) Vaccine (2 of 2) 04/01/2021     Cholesterol Lab  02/04/2022     Creatinine Lab  02/04/2022     ANNUAL REVIEW OF HM ORDERS  02/04/2022      Patient Education   Alcohol Use     Many people can enjoy a glass of wine or beer without any negative consequences to their health. According to the Centers for Disease Control and Prevention (CDC), having one or fewer drinks per day for women and two or fewer per day for men is considered moderate drinking.     When people drink more than moderately, it can become concerning. Excessive drinking is defined as consuming 15 drinks or more per week for men and 8 drinks or more per week for women. There are various health problems associated with excessive drinking, which include:    Damage to vital organs like the heart, brain, liver and pancreas    Harm to the digestive tract    Weaken the immune system    Higher risk for heart disease and cancer    There are many resources available to people who are addicted to alcohol. A counselor or other health care provider can give you support. So can a , , or rabbi who is trained in substance abuse counseling. Friends and family may also help once you are connected with professionals.

## 2022-02-02 NOTE — PROGRESS NOTES
"SUBJECTIVE:   Emery Zayas is a 66 year old male who presents for Preventive Visit.      Patient has been advised of split billing requirements and indicates understanding: Yes  Are you in the first 12 months of your Medicare coverage?  No    Healthy Habits:     In general, how would you rate your overall health?  Good    Frequency of exercise:  6-7 days/week    Duration of exercise:  30-45 minutes    Do you usually eat at least 4 servings of fruit and vegetables a day, include whole grains    & fiber and avoid regularly eating high fat or \"junk\" foods?  No    Taking medications regularly:  Yes    Medication side effects:  None    Ability to successfully perform activities of daily living:  No assistance needed    Home Safety:  No safety concerns identified    Hearing Impairment:  No hearing concerns    In the past 6 months, have you been bothered by leaking of urine?  No    In general, how would you rate your overall mental or emotional health?  Excellent      PHQ-2 Total Score: 0    Additional concerns today:  Yes    Do you feel safe in your environment? Yes    Have you ever done Advance Care Planning? (For example, a Health Directive, POLST, or a discussion with a medical provider or your loved ones about your wishes): Yes, patient states has an Advance Care Planning document and will bring a copy to the clinic.    LBP-right side, stabs at times, worse with movement, sitting makes it really bad.   This has gradually gotten worse.   Does go walking in the evening with neighbor and dog, but the weather.  Hx of PT but does not think this is will work, something is not right. Some days it hurts to go up and down stairs. No radiation or numbness or weakness. No urine problems.  It is a bit different now, and gradually has gotten worse. extremely painful at times, 7-8/10 currently. Sleeping is ok. A few times per week, will wake up for 1 hr.  Does have a burning sensation on the right abd/flank    No prostate " issues, sleeps through the night.    Hand arthritis, locking fingers, it has gotten in the way now, unable to tie shoes     Fall risk  Fallen 2 or more times in the past year?: No  Any fall with injury in the past year?: No    Cognitive Screening   1) Repeat 3 items (Leader, Season, Table)    2) Clock draw: NORMAL  3) 3 item recall: Recalls 3 objects  Results: 3 items recalled: COGNITIVE IMPAIRMENT LESS LIKELY    Mini-CogTM Copyright JUDITH Barnes. Licensed by the author for use in [facility]; reprinted with permission (lina@[email or web address]). All rights reserved.      Normally does not get the flu vaccine, but is willing.    Flying to [place].very anxious, would like to take something to calm him.    Derm issue, right cheek    Reviewed and updated as needed this visit by clinical staff  Tobacco  Allergies  Meds  Problems  Med Hx  Surg Hx  Fam Hx  Soc Hx         Reviewed and updated as needed this visit by Provider     Problems           Social History     Tobacco Use     Smoking status: Former Smoker     Packs/day: 0.00     Quit date: 7/15/1993     Years since quittin.5     Smokeless tobacco: Never Used     Tobacco comment: Quit 20 years ago.   Substance Use Topics     Alcohol use: Yes     Alcohol/week: 7.0 standard drinks     Types: 7 Shots of liquor per week     Comment: more than 10-12 rum and cokes or beer a week         Alcohol Use 2022   Prescreen: >3 drinks/day or >7 drinks/week? Yes   Prescreen: >3 drinks/day or >7 drinks/week? -   AUDIT SCORE  13     Drinks daily      PROBLEMS TO ADD ON...    Current providers sharing in care for this patient include:   Patient Care Team:  Trish Edwards MD as PCP - General (Family Practice)  Cornelius Dao MD as Assigned Sleep Provider  Trish Edwards MD as Assigned PCP    The following health maintenance items are reviewed in Epic and correct as of today:  Health Maintenance Due   Topic Date Due     FALL RISK ASSESSMENT  2021      INFLUENZA VACCINE (1) 09/01/2021     ZOSTER IMMUNIZATION (2 of 2) 04/01/2021     LIPID  02/04/2022     CREATININE  02/04/2022     ANNUAL REVIEW OF HM ORDERS  02/04/2022     BP Readings from Last 3 Encounters:   02/02/22 (!) 160/88   08/05/21 136/76   02/04/21 (!) 148/70    Wt Readings from Last 3 Encounters:   02/02/22 93 kg (205 lb)   08/05/21 90.3 kg (199 lb)   02/04/21 93 kg (205 lb)                  Recent Labs   Lab Test 02/02/22  0805 02/04/21  1056 07/22/20  0934 07/01/20  0858 06/11/19  0818 01/08/19  0852 05/06/17  0505 05/05/17  1152 08/02/16  0948   A1C  --   --   --   --   --  5.4  --  5.5 5.7   LDL 73 51  --  38   < >  --    < >  --  Cannot estimate LDL when triglyceride exceeds 400 mg/dL  74   HDL 37* 36*  --  29*   < >  --    < >  --  34*   TRIG 285* 344*  --  304*   < >  --    < >  --  462*   ALT 31 34  --  29   < >  --    < > 48 45   CR 1.05 1.00 1.15 1.39*   < >  --    < > 0.95 0.93   GFRESTIMATED 77 78 66 52*   < >  --    < > 80 82   GFRESTBLACK  --  >90 76 60*   < >  --    < > >90   GFR Calc   >90   GFR Calc     POTASSIUM 4.6 4.4 4.1 3.8   < >  --    < > 3.9 4.3   TSH  --  1.68  --  2.43   < >  --    < >  --  1.47    < > = values in this interval not displayed.      Pneumonia Vaccine:Adults age 65+ who received Pneumovax (PPSV23) at 65 years or older: Should be given PCV13 > 1 year after their most recent PPSV23        Review of Systems   Constitutional: Negative for chills and fever.   HENT: Negative for congestion, ear pain, hearing loss and sore throat.    Eyes: Negative for pain and visual disturbance.   Respiratory: Negative for cough and shortness of breath.    Cardiovascular: Negative for chest pain, palpitations and peripheral edema.   Gastrointestinal: Negative for abdominal pain, constipation, diarrhea, heartburn, hematochezia and nausea.   Genitourinary: Negative for dysuria, frequency, genital sores, hematuria, impotence, penile discharge and urgency.  "  Musculoskeletal: Positive for arthralgias and myalgias. Negative for joint swelling.   Skin: Negative for rash.   Neurological: Positive for paresthesias. Negative for dizziness, weakness and headaches.   Psychiatric/Behavioral: Negative for mood changes. The patient is not nervous/anxious.          OBJECTIVE:   BP (!) 160/84 (BP Location: Right arm, Patient Position: Sitting, Cuff Size: Adult Large)   Pulse 64   Temp 97.6  F (36.4  C) (Oral)   Resp 16   Ht 1.683 m (5' 6.25\")   Wt 93 kg (205 lb)   SpO2 96%   BMI 32.84 kg/m   Estimated body mass index is 32.84 kg/m  as calculated from the following:    Height as of this encounter: 1.683 m (5' 6.25\").    Weight as of this encounter: 93 kg (205 lb).  Physical Exam  GENERAL: healthy, alert and no distress  EYES: Eyes grossly normal to inspection, PERRL and conjunctivae and sclerae normal  HENT: ear canals and TM's normal, nose and mouth without ulcers or lesions  NECK: no adenopathy, no asymmetry, masses, or scars and thyroid normal to palpation  RESP: lungs clear to auscultation - no rales, rhonchi or wheezes  CV: regular rate and rhythm, normal S1 S2, no S3 or S4, no murmur, click or rub, no peripheral edema and peripheral pulses strong  ABDOMEN: soft, nontender, no hepatosplenomegaly, no masses and bowel sounds normal  MS: no gross musculoskeletal defects noted, no edema  SKIN: no suspicious lesions or rashes  NEURO: Normal strength and tone, mentation intact and speech normal  PSYCH: mentation appears normal, affect normal/bright  Reflexes, strength normal in lower legs bilt  Diagnostic Test Results:  Labs reviewed in Epic    ASSESSMENT / PLAN:   (Z00.00) Encounter for Medicare annual wellness exam  (primary encounter diagnosis)  Comment: normal exam, labs pending  Plan:     (I10) Essential hypertension, benign  Comment: control is poor, recheck in 2 weeks, may need to add medication like norvasc  Plan: olmesartan (BENICAR) 40 MG tablet            (E78.1) " High blood triglycerides  Comment: cont same, fasting lab  Plan: simvastatin (ZOCOR) 40 MG tablet, fenofibrate         (TRICOR) 145 MG tablet, fenofibrate (TRICOR)         145 MG tablet            (E78.5) Hyperlipidemia LDL goal <130  Comment: recheck labs  Plan: Lipid panel reflex to direct LDL Fasting,         REVIEW OF HEALTH MAINTENANCE PROTOCOL ORDERS,         simvastatin (ZOCOR) 40 MG tablet, Comprehensive        metabolic panel (BMP + Alb, Alk Phos, ALT, AST,        Total. 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  Comment: will give small amount for up coming trip  Plan: LORazepam (ATIVAN) 1 MG tablet        Do not take with alcohol    (F10.20) Uncomplicated alcohol dependence  "(H)  Comment: pt drinks daily, no complications  Plan: other than HTN and high lipids,weight gain    Patient has been advised of split billing requirements and indicates understanding: Yes    COUNSELING:  Reviewed preventive health counseling, as reflected in patient instructions       Regular exercise       Healthy diet/nutrition       Immunizations    Vaccinated for: Influenza             Prostate cancer screening    Estimated body mass index is 32.84 kg/m  as calculated from the following:    Height as of this encounter: 1.683 m (5' 6.25\").    Weight as of this encounter: 93 kg (205 lb).    Weight management plan: Discussed healthy diet and exercise guidelines    He reports that he quit smoking about 28 years ago. He smoked 0.00 packs per day. He has never used smokeless tobacco.      Appropriate preventive services were discussed with this patient, including applicable screening as appropriate for cardiovascular disease, diabetes, osteopenia/osteoporosis, and glaucoma.  As appropriate for age/gender, discussed screening for colorectal cancer, prostate cancer, breast cancer, and cervical cancer. Checklist reviewing preventive services available has been given to the patient.    Reviewed patients plan of care and provided an AVS. The Intermediate Care Plan ( asthma action plan, low back pain action plan, and migraine action plan) for Emery meets the Care Plan requirement. This Care Plan has been established and reviewed with the Patient.    Counseling Resources:  ATP IV Guidelines  Pooled Cohorts Equation Calculator  Breast Cancer Risk Calculator  Breast Cancer: Medication to Reduce Risk  FRAX Risk Assessment  ICSI Preventive Guidelines  Dietary Guidelines for Americans, 2010  [employer]'s MyPlate  ASA Prophylaxis  Lung CA Screening    Trish Edwards MD  [facility]    Identified Health Risks:    The patient reports that he drinks more than one alcoholic drink per day and sometimes " engages in binge or excessive drinking. He was counseled and given information about possible harmful effects of excessive alcohol intake as well as where to get help for alcohol problems.

## 2022-02-03 ENCOUNTER — TELEPHONE (OUTPATIENT)
Dept: FAMILY MEDICINE | Facility: CLINIC | Age: 67
End: 2022-02-03

## 2022-02-03 ENCOUNTER — OFFICE VISIT (OUTPATIENT)
Dept: ORTHOPEDICS | Facility: CLINIC | Age: 67
End: 2022-02-03
Attending: FAMILY MEDICINE
Payer: MEDICARE

## 2022-02-03 DIAGNOSIS — M54.50 CHRONIC MIDLINE LOW BACK PAIN WITHOUT SCIATICA: Primary | ICD-10-CM

## 2022-02-03 DIAGNOSIS — G89.29 CHRONIC MIDLINE LOW BACK PAIN WITHOUT SCIATICA: Primary | ICD-10-CM

## 2022-02-03 DIAGNOSIS — M24.849 LOCKING FINGER JOINT: ICD-10-CM

## 2022-02-03 DIAGNOSIS — M65.349 TRIGGER RING FINGER, UNSPECIFIED LATERALITY: Primary | ICD-10-CM

## 2022-02-03 PROCEDURE — 99203 OFFICE O/P NEW LOW 30 MIN: CPT | Mod: 25 | Performed by: PHYSICIAN ASSISTANT

## 2022-02-03 PROCEDURE — 20550 NJX 1 TENDON SHEATH/LIGAMENT: CPT | Mod: 50 | Performed by: PHYSICIAN ASSISTANT

## 2022-02-03 RX ORDER — TIZANIDINE 2 MG/1
2 TABLET ORAL
Qty: 30 TABLET | Refills: 1 | Status: SHIPPED | OUTPATIENT
Start: 2022-02-03 | End: 2022-03-01

## 2022-02-03 RX ADMIN — DEXAMETHASONE SODIUM PHOSPHATE 4 MG: 4 INJECTION, SOLUTION INTRA-ARTICULAR; INTRALESIONAL; INTRAMUSCULAR; INTRAVENOUS; SOFT TISSUE at 17:20

## 2022-02-03 RX ADMIN — LIDOCAINE HYDROCHLORIDE 1 ML: 10 INJECTION, SOLUTION INFILTRATION; PERINEURAL at 17:20

## 2022-02-03 NOTE — LETTER
2/3/2022         RE: Emery Zayas  [address] [facility] [address]        Dear Colleague,    Thank you for referring your patient, Emery Zayas, to the [facility] [place]. Please see a copy of my visit note below.    HISTORY OF PRESENT ILLNESS:    Emery Zayas is a 66 year old male who is seen in consultation at the request of Dr. Edwards for bilateral hand pain and locking.    Referral for LBP, Hx lumbar surgery, spoke with pt, will contact previous surgery team.    Present symptoms:  Symptoms began  6-8 week(s) ago.  Reports insidious onset without acute precipitating event.  He reports sharp bilateral hand pain that is located at palm; radiation Absent.  Symptoms are generally in the morning and associated with locking of primarily ring fingers. Pain is 5/10 in maximal severity, and 1/10 currently.  Symptoms are generally worse with/at tying shoes, morning; better with/at rest until motion.   Overall the patient feels the condition is worsening. Other treatment so far has consisted of OTC meds with minimal relief.  Associated symptoms: feeling of catching/locking, currently not locking.  He denies history of similar or related problems.  He is not diabetic.  Orthopedic PMH: Multiple lumbar surgeries  Past Medical History:   Diagnosis Date     Acute idiopathic gout, unspecified site 8/23/2016     Alcoholism (H)     Treated 1980's, denies any recent problems with withdrawal when skips days drinking.     Anxiety 12/31/2014     Arthritis      Chronic low back pain      Dyspepsia and other specified disorders of function of stomach 7/31/2003     Elevated liver enzymes 12/30/2013     Gout     possible     Herniated intervertebral disk     thoracic     High blood triglycerides 12/30/2013     Hyperlipidemia LDL goal <130 4/15/2013     Hypertension      Hypertensive emergency 5/5/2017     Metabolic syndrome 12/30/2013     Obesity 4/15/2013     Other chest  pain 2003     RBBB (right bundle branch block)     Present for several years       Past Surgical History:   Procedure Laterality Date     BACK SURGERY       Back surgery x 4       COLONOSCOPY N/A 2020    Procedure: COLONOSCOPY, WITH POLYPECTOMY AND BIOPSY; polypectomies  using jumbo bx forcep;  Surgeon: Sherice Mckeon MD;  Location:  GI     ESOPHAGOSCOPY, GASTROSCOPY, DUODENOSCOPY (EGD), COMBINED      normal     TONSILLECTOMY         Family History   Problem Relation Age of Onset     Hypertension Father      Heart Disease Father         Angioplasty in late 60's or early 70's.     Unknown/Adopted Father      Hypertension Sister      Kidney Disease Sister      Unknown/Adopted Sister      Cancer Mother         lung     Unknown/Adopted Mother      Diabetes Mother      Thyroid Disease Sister      Unknown/Adopted Sister      Unknown/Adopted Maternal Grandmother      Substance Abuse Maternal Grandfather      Depression Paternal Grandmother      Unknown/Adopted Paternal Grandfather      Diabetes Sister      Colon Cancer No family hx of        Social History     Socioeconomic History     Marital status:      Spouse name: Not on file     Number of children: Not on file     Years of education: Not on file     Highest education level: Not on file   Occupational History     Not on file   Tobacco Use     Smoking status: Former Smoker     Packs/day: 0.00     Quit date: 7/15/1993     Years since quittin.5     Smokeless tobacco: Never Used     Tobacco comment: Quit 20 years ago.   Vaping Use     Vaping Use: Never used   Substance and Sexual Activity     Alcohol use: Yes     Alcohol/week: 7.0 standard drinks     Types: 7 Shots of liquor per week     Comment: more than 10-12 rum and cokes or beer a week     Drug use: No     Sexual activity: Yes     Partners: Female   Other Topics Concern     Parent/sibling w/ CABG, MI or angioplasty before 65F 55M? No   Social History Narrative    On disability for back  pain.  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     Social Determinants of Health     Financial Resource Strain: Not on file   Food Insecurity: Not on file   Transportation Needs: Not on file   Physical Activity: Not on file   Stress: Not on file   Social Connections: Not on file   Intimate Partner Violence: Not on file   Housing Stability: Not on file       Current Outpatient Medications   Medication Sig Dispense Refill     allopurinol (ZYLOPRIM) 100 MG tablet Take 2 tablets (200 mg) by mouth 2 times daily 360 tablet 3     aspirin 81 MG tablet Take 1 tablet by mouth daily.       carvedilol (COREG) 12.5 MG tablet TAKE 1 TABLET BY MOUTH TWICE A  tablet 0     diclofenac (FLECTOR) 1.3 % patch Externally apply 1 patch topically 2 times daily 30 patch 5     diclofenac (FLECTOR) 1.3 % patch Externally apply 1 patch topically 2 times daily 60 patch 5     fenofibrate (TRICOR) 145 MG tablet Take 1 tablet (145 mg) by mouth daily 90 tablet 3     fenofibrate (TRICOR) 145 MG tablet Take 1 tablet (145 mg) by mouth daily 90 tablet 3     LORazepam (ATIVAN) 1 MG tablet Take 1 tablet (1 mg) by mouth every 6 hours as needed for anxiety 10 tablet 0     methocarbamol (ROBAXIN) 500 MG tablet Take 1 tablet (500 mg) by mouth 4 times daily as needed for muscle spasms 90 tablet 1     Multiple Vitamins-Minerals (MULTIVITAMIN ADULT) CHEW Take 1 chew tab by mouth daily       olmesartan (BENICAR) 40 MG tablet Take 1 tablet (40 mg) by mouth daily 90 tablet 1     order for DME Equipment being ordered: blood pressure cuff 1 each 0     simvastatin (ZOCOR) 40 MG tablet Take 1 tablet (40 mg) by mouth At Bedtime 90 tablet 3     tiZANidine (ZANAFLEX) 2 MG tablet Take 1 tablet (2 mg) by mouth nightly as needed for muscle spasms 30 tablet 1       Allergies   Allergen Reactions     No Known Drug Allergies        Patient's past medical, surgical, social and family histories are  reviewed today.  Medical history includes: Obesity, past smoker, gout, anxiety taken into consideration today.  REVIEW OF SYSTEMS:  NO NEW CHANGES TO:  CONSTITUTIONAL:  NEGATIVE for fever, chills, change in weight  INTEGUMENTARY/SKIN:  NEGATIVE for worrisome rashes, moles or lesions  EYES:  NEGATIVE for vision changes or irritation  ENT/MOUTH:  NEGATIVE for ear, mouth and throat problems  RESP:  NEGATIVE for significant cough or SOB  BREAST:  NEGATIVE for masses, tenderness or discharge  CV:  NEGATIVE for chest pain, palpitations or peripheral edema  GI:  NEGATIVE for nausea, abdominal pain, heartburn, or change in bowel habits  :  Negative   MUSCULOSKELETAL:  See HPI above  NEURO:  NEGATIVE for weakness, dizziness or paresthesias  ENDOCRINE:  NEGATIVE for temperature intolerance, skin/hair changes  HEME/ALLERGY/IMMUNE:  NEGATIVE for bleeding problems  PSYCHIATRIC:  NEGATIVE for changes in mood or affect      PHYSICAL EXAM:  There were no vitals taken for this visit.  There is no height or weight on file to calculate BMI.   GENERAL APPEARANCE: healthy, well nourished. Obese.   NEURO: He is alert and oriented x 3, mentation intact and speech normal  POSTURE: Stands with slightly abnormal weight bearing line, anterior antalgia due to back pain out of chair.  PSYCH:  mentation appears normal and affect normal/bright    MUSCULOSKELETAL: Examination of bilateral hands.  GROSS OBSERVATION:  Mild diffuse global swelling, some degenerative deformation at distal index and long fingers.  Good coordination and control.  No other lesions or deformities.  PALPATION: mild nodules, mobile and uncomfortable, at ring flexor tendon at/near a1 PULLEY.  ROM:  Right:  Hand, wrist and fingers WNL and symmetric. No catching/triggering or locking noted.  LIGAMENTOUS:  Intact.  STRENGTH:  , opposition, abduction, and flex/Ext good.  SPECIAL TESTS:  Finglestiens, phalens, tinnels neg.    CONTRALATERAL JOINT:  no overlying skin change,  observable deformity, or effusion; range of motion as noted above; strength and function are within normal limits; no obvious ligamentous instability.     SKIN: otherwise no lesions, erythema noted bilaterally.  VASCULATURE:  Good capillary refill bilaterally.  SENSATION: Otherwise no gross deficits noted to light touch.  COORDINATION:  Able to move joint within above stated ROM with good control.    Imaging Interpretation:   Bilateral hand images reviewed today, independently by me:    Bilateral hands demonstrate mild degenerative changes most noteable  at right index and long and thumb DIP, right thumb MCP joints.  Mild degenerrative changes at left long and index and thumb DIP joints.  No acute fractures, no tophi or bone lesions, or other deformities noted.     ASSESSMENT:  1. Trigger ring finger, unspecified laterality    2. Locking finger joint      Due to short duration and no filiberto locking, will try cortisone.    PLAN:    1.  Cortisone injection due to bilateral ring A1 fingers.  2.  Ice or heat as needed.      Follow up in clinic in 3-4 weeks if not improving..    A total of 30 minutes spent with patient on care and care coordinating activities.      Chuck Huynh PA-C  [facility] - Surgery    Hand / Upper Extremity Injection/Arthrocentesis: bilateral ring A1    Date/Time: 2/3/2022 5:20 PM  Performed by: Chuck Huynh PA-C  Authorized by: Chuck Huynh PA-C     Indications:  Tendon swelling  Needle Size:  27 G  Guidance: landmark    Approach:  Volar  Condition: trigger finger    Laterality:  Bilateral  Location:  Ring finger    Site:  Bilateral ring A1  Medications (Right):  4 mg dexamethasone 4 MG/ML; 1 mL lidocaine 1 %  Medications (Left):  4 mg dexamethasone 4 MG/ML; 1 mL lidocaine 1 %  Consent Given by:  Patient                  Again, thank you for allowing me to participate in the care of your patient.        Sincerely,        Chuck Huynh PA-C

## 2022-02-03 NOTE — TELEPHONE ENCOUNTER
Rec alternative rx request for methocarbamol (ROBAXIN) 500 MG tablet     Preferred at Tizanidine 2 or 4mg tabs, or baclofen     [id number] - [place], [place] - [address]  ELIGIO Ruff-

## 2022-02-03 NOTE — PROGRESS NOTES
HISTORY OF PRESENT ILLNESS:    Emery Zayas is a 66 year old male who is seen in consultation at the request of Dr. Edwards for bilateral hand pain and locking.    Referral for LBP, Hx lumbar surgery, spoke with pt, will contact previous surgery team.    Present symptoms:  Symptoms began  6-8 week(s) ago.  Reports insidious onset without acute precipitating event.  He reports sharp bilateral hand pain that is located at palm; radiation Absent.  Symptoms are generally in the morning and associated with locking of primarily ring fingers. Pain is 5/10 in maximal severity, and 1/10 currently.  Symptoms are generally worse with/at tying shoes, morning; better with/at rest until motion.   Overall the patient feels the condition is worsening. Other treatment so far has consisted of OTC meds with minimal relief.  Associated symptoms: feeling of catching/locking, currently not locking.  He denies history of similar or related problems.  He is not diabetic.  Orthopedic PMH: Multiple lumbar surgeries  Past Medical History:   Diagnosis Date     Acute idiopathic gout, unspecified site 8/23/2016     Alcoholism (H)     Treated 1980's, denies any recent problems with withdrawal when skips days drinking.     Anxiety 12/31/2014     Arthritis      Chronic low back pain      Dyspepsia and other specified disorders of function of stomach 7/31/2003     Elevated liver enzymes 12/30/2013     Gout     possible     Herniated intervertebral disk     thoracic     High blood triglycerides 12/30/2013     Hyperlipidemia LDL goal <130 4/15/2013     Hypertension      Hypertensive emergency 5/5/2017     Metabolic syndrome 12/30/2013     Obesity 4/15/2013     Other chest pain 7/31/2003     RBBB (right bundle branch block)     Present for several years       Past Surgical History:   Procedure Laterality Date     BACK SURGERY       Back surgery x 4       COLONOSCOPY N/A 1/23/2020    Procedure: COLONOSCOPY, WITH POLYPECTOMY AND BIOPSY;  polypectomies  using jumbo bx forcep;  Surgeon: Sherice Mckeon MD;  Location:  GI     ESOPHAGOSCOPY, GASTROSCOPY, DUODENOSCOPY (EGD), COMBINED      normal     TONSILLECTOMY         Family History   Problem Relation Age of Onset     Hypertension Father      Heart Disease Father         Angioplasty in late 60's or early 70's.     Unknown/Adopted Father      Hypertension Sister      Kidney Disease Sister      Unknown/Adopted Sister      Cancer Mother         lung     Unknown/Adopted Mother      Diabetes Mother      Thyroid Disease Sister      Unknown/Adopted Sister      Unknown/Adopted Maternal Grandmother      Substance Abuse Maternal Grandfather      Depression Paternal Grandmother      Unknown/Adopted Paternal Grandfather      Diabetes Sister      Colon Cancer No family hx of        Social History     Socioeconomic History     Marital status:      Spouse name: Not on file     Number of children: Not on file     Years of education: Not on file     Highest education level: Not on file   Occupational History     Not on file   Tobacco Use     Smoking status: Former Smoker     Packs/day: 0.00     Quit date: 7/15/1993     Years since quittin.5     Smokeless tobacco: Never Used     Tobacco comment: Quit 20 years ago.   Vaping Use     Vaping Use: Never used   Substance and Sexual Activity     Alcohol use: Yes     Alcohol/week: 7.0 standard drinks     Types: 7 Shots of liquor per week     Comment: more than 10-12 rum and cokes or beer a week     Drug use: No     Sexual activity: Yes     Partners: Female   Other Topics Concern     Parent/sibling w/ CABG, MI or angioplasty before 65F 55M? 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     Social Determinants of Health     Financial Resource Strain: Not on file   Food Insecurity: Not on file   Transportation Needs: Not on file    Physical Activity: Not on file   Stress: Not on file   Social Connections: Not on file   Intimate Partner Violence: Not on file   Housing Stability: Not on file       Current Outpatient Medications   Medication Sig Dispense Refill     allopurinol (ZYLOPRIM) 100 MG tablet Take 2 tablets (200 mg) by mouth 2 times daily 360 tablet 3     aspirin 81 MG tablet Take 1 tablet by mouth daily.       carvedilol (COREG) 12.5 MG tablet TAKE 1 TABLET BY MOUTH TWICE A  tablet 0     diclofenac (FLECTOR) 1.3 % patch Externally apply 1 patch topically 2 times daily 30 patch 5     diclofenac (FLECTOR) 1.3 % patch Externally apply 1 patch topically 2 times daily 60 patch 5     fenofibrate (TRICOR) 145 MG tablet Take 1 tablet (145 mg) by mouth daily 90 tablet 3     fenofibrate (TRICOR) 145 MG tablet Take 1 tablet (145 mg) by mouth daily 90 tablet 3     LORazepam (ATIVAN) 1 MG tablet Take 1 tablet (1 mg) by mouth every 6 hours as needed for anxiety 10 tablet 0     methocarbamol (ROBAXIN) 500 MG tablet Take 1 tablet (500 mg) by mouth 4 times daily as needed for muscle spasms 90 tablet 1     Multiple Vitamins-Minerals (MULTIVITAMIN ADULT) CHEW Take 1 chew tab by mouth daily       olmesartan (BENICAR) 40 MG tablet Take 1 tablet (40 mg) by mouth daily 90 tablet 1     order for DME Equipment being ordered: blood pressure cuff 1 each 0     simvastatin (ZOCOR) 40 MG tablet Take 1 tablet (40 mg) by mouth At Bedtime 90 tablet 3     tiZANidine (ZANAFLEX) 2 MG tablet Take 1 tablet (2 mg) by mouth nightly as needed for muscle spasms 30 tablet 1       Allergies   Allergen Reactions     No Known Drug Allergies        Patient's past medical, surgical, social and family histories are reviewed today.  Medical history includes: Obesity, past smoker, gout, anxiety taken into consideration today.  REVIEW OF SYSTEMS:  NO NEW CHANGES TO:  CONSTITUTIONAL:  NEGATIVE for fever, chills, change in weight  INTEGUMENTARY/SKIN:  NEGATIVE for worrisome  rashes, moles or lesions  EYES:  NEGATIVE for vision changes or irritation  ENT/MOUTH:  NEGATIVE for ear, mouth and throat problems  RESP:  NEGATIVE for significant cough or SOB  BREAST:  NEGATIVE for masses, tenderness or discharge  CV:  NEGATIVE for chest pain, palpitations or peripheral edema  GI:  NEGATIVE for nausea, abdominal pain, heartburn, or change in bowel habits  :  Negative   MUSCULOSKELETAL:  See HPI above  NEURO:  NEGATIVE for weakness, dizziness or paresthesias  ENDOCRINE:  NEGATIVE for temperature intolerance, skin/hair changes  HEME/ALLERGY/IMMUNE:  NEGATIVE for bleeding problems  PSYCHIATRIC:  NEGATIVE for changes in mood or affect      PHYSICAL EXAM:  There were no vitals taken for this visit.  There is no height or weight on file to calculate BMI.   GENERAL APPEARANCE: healthy, well nourished. Obese.   NEURO: He is alert and oriented x 3, mentation intact and speech normal  POSTURE: Stands with slightly abnormal weight bearing line, anterior antalgia due to back pain out of chair.  PSYCH:  mentation appears normal and affect normal/bright    MUSCULOSKELETAL: Examination of bilateral hands.  GROSS OBSERVATION:  Mild diffuse global swelling, some degenerative deformation at distal index and long fingers.  Good coordination and control.  No other lesions or deformities.  PALPATION: mild nodules, mobile and uncomfortable, at ring flexor tendon at/near a1 PULLEY.  ROM:  Right:  Hand, wrist and fingers WNL and symmetric. No catching/triggering or locking noted.  LIGAMENTOUS:  Intact.  STRENGTH:  , opposition, abduction, and flex/Ext good.  SPECIAL TESTS:  Finglestiens, phalens, tinnels neg.    CONTRALATERAL JOINT:  no overlying skin change, observable deformity, or effusion; range of motion as noted above; strength and function are within normal limits; no obvious ligamentous instability.     SKIN: otherwise no lesions, erythema noted bilaterally.  VASCULATURE:  Good capillary refill  bilaterally.  SENSATION: Otherwise no gross deficits noted to light touch.  COORDINATION:  Able to move joint within above stated ROM with good control.    Imaging Interpretation:   Bilateral hand images reviewed today, independently by me:    Bilateral hands demonstrate mild degenerative changes most noteable  at right index and long and thumb DIP, right thumb MCP joints.  Mild degenerrative changes at left long and index and thumb DIP joints.  No acute fractures, no tophi or bone lesions, or other deformities noted.     ASSESSMENT:  1. Trigger ring finger, unspecified laterality    2. Locking finger joint      Due to short duration and no filiberto locking, will try cortisone.    PLAN:    1.  Cortisone injection due to bilateral ring A1 fingers.  2.  Ice or heat as needed.      Follow up in clinic in 3-4 weeks if not improving..    A total of 30 minutes spent with patient on care and care coordinating activities.      Chuck Huynh PA-C  [facility] - Surgery    Hand / Upper Extremity Injection/Arthrocentesis: bilateral ring A1    Date/Time: 2/3/2022 5:20 PM  Performed by: Chuck Huynh PA-C  Authorized by: Chuck Huynh PA-C     Indications:  Tendon swelling  Needle Size:  27 G  Guidance: landmark    Approach:  Volar  Condition: trigger finger    Laterality:  Bilateral  Location:  Ring finger    Site:  Bilateral ring A1  Medications (Right):  4 mg dexamethasone 4 MG/ML; 1 mL lidocaine 1 %  Medications (Left):  4 mg dexamethasone 4 MG/ML; 1 mL lidocaine 1 %  Consent Given by:  Patient

## 2022-02-04 DIAGNOSIS — G89.29 CHRONIC MIDLINE THORACIC BACK PAIN: ICD-10-CM

## 2022-02-04 DIAGNOSIS — M54.6 CHRONIC MIDLINE THORACIC BACK PAIN: ICD-10-CM

## 2022-02-04 RX ORDER — DEXAMETHASONE SODIUM PHOSPHATE 4 MG/ML
4 INJECTION, SOLUTION INTRA-ARTICULAR; INTRALESIONAL; INTRAMUSCULAR; INTRAVENOUS; SOFT TISSUE
Status: SHIPPED | OUTPATIENT
Start: 2022-02-03

## 2022-02-04 RX ORDER — LIDOCAINE HYDROCHLORIDE 10 MG/ML
1 INJECTION, SOLUTION INFILTRATION; PERINEURAL
Status: SHIPPED | OUTPATIENT
Start: 2022-02-03

## 2022-02-08 ENCOUNTER — OFFICE VISIT (OUTPATIENT)
Dept: ORTHOPEDICS | Facility: CLINIC | Age: 67
End: 2022-02-08
Payer: MEDICARE

## 2022-02-08 ENCOUNTER — ANCILLARY PROCEDURE (OUTPATIENT)
Dept: GENERAL RADIOLOGY | Facility: CLINIC | Age: 67
End: 2022-02-08
Attending: PHYSICAL MEDICINE & REHABILITATION
Payer: MEDICARE

## 2022-02-08 VITALS
OXYGEN SATURATION: 98 % | HEIGHT: 66 IN | DIASTOLIC BLOOD PRESSURE: 84 MMHG | BODY MASS INDEX: 32.95 KG/M2 | WEIGHT: 205 LBS | HEART RATE: 60 BPM | SYSTOLIC BLOOD PRESSURE: 176 MMHG

## 2022-02-08 DIAGNOSIS — M48.10 DIFFUSE IDIOPATHIC SKELETAL HYPEROSTOSIS: Primary | ICD-10-CM

## 2022-02-08 DIAGNOSIS — M48.10 DIFFUSE IDIOPATHIC SKELETAL HYPEROSTOSIS: ICD-10-CM

## 2022-02-08 DIAGNOSIS — M51.379 DDD (DEGENERATIVE DISC DISEASE), LUMBOSACRAL: ICD-10-CM

## 2022-02-08 DIAGNOSIS — M47.817 FACET ARTHROPATHY, LUMBOSACRAL: ICD-10-CM

## 2022-02-08 PROCEDURE — 72070 X-RAY EXAM THORAC SPINE 2VWS: CPT | Performed by: RADIOLOGY

## 2022-02-08 PROCEDURE — 99204 OFFICE O/P NEW MOD 45 MIN: CPT | Performed by: PHYSICAL MEDICINE & REHABILITATION

## 2022-02-08 RX ORDER — LORAZEPAM 0.5 MG/1
0.5 TABLET ORAL ONCE
Qty: 2 TABLET | Refills: 0 | Status: SHIPPED | OUTPATIENT
Start: 2022-02-08 | End: 2022-02-08

## 2022-02-08 ASSESSMENT — ENCOUNTER SYMPTOMS
BOWEL INCONTINENCE: 0
DIARRHEA: 0
WEIGHT GAIN: 0
SWOLLEN GLANDS: 0
LOSS OF CONSCIOUSNESS: 0
PALPITATIONS: 0
HEADACHES: 0
WHEEZING: 0
POOR WOUND HEALING: 0
WEIGHT LOSS: 0
BLOOD IN STOOL: 0
ARTHRALGIAS: 1
SEIZURES: 0
EYE PAIN: 0
HEMATURIA: 0
HEARTBURN: 0
NAUSEA: 0
MYALGIAS: 1
SHORTNESS OF BREATH: 0
CONSTIPATION: 0
FATIGUE: 0
BRUISES/BLEEDS EASILY: 0
INSOMNIA: 1
HOARSE VOICE: 0
COUGH: 0
ABDOMINAL PAIN: 0
LEG SWELLING: 0
NERVOUS/ANXIOUS: 0
DEPRESSION: 0
DIFFICULTY URINATING: 0
DIZZINESS: 0
FEVER: 0
VOMITING: 0
DYSURIA: 0
TROUBLE SWALLOWING: 0

## 2022-02-08 ASSESSMENT — MIFFLIN-ST. JEOR: SCORE: 1642.62

## 2022-02-08 ASSESSMENT — PAIN SCALES - GENERAL: PAINLEVEL: SEVERE PAIN (7)

## 2022-02-08 NOTE — LETTER
"    2/8/2022         RE: Emery Zayas  [address] [facility] [address]        Dear Colleague,    Thank you for referring your patient, Emery Zayas, to the [facility]. Please see a copy of my visit note below.    PHYSICAL MEDICINE & REHABILITATION / MEDICAL SPINE        Date:  Feb 8, 2022    Name:  Emery Zayas  YOB: 1955  MRN:  [id number]          REASON FOR CONSULTATION:  Chronic right-sided low back pain without sciatica.        REFERRING PROVIDER:  Trish Edwards MD        CHART REVIEW:  Reviewed 02/02/2022 note from Trish Edwards MD (family medicine).  Mr. Emery Zayas had right low back pain.  Pain was worse with movement.  Sitting caused even more pain.  Low back pain was stabbing at times.  Low back pain has been gradually worsening.  Pain was worsened with ascending and descending stairs.  There was no radiation.  There was no numbness or weakness.  There were no bladder problems.  Referral to orthopedic and spine  was placed.        HISTORY OF PRESENT ILLNESS:  Mr. Emery Zayas is a 66-year-old male.  He is retired.  Mr. Zayas states that he has been retired for 13 years.  In terms of exercise and activity, Mr. Farrell goes on 1/2-hour walk every evening.    Mr. Emery Zayas states that he had surgeries on his low back in the 1980s.  He had laminectomies and a lumbar fusion.  He also describes an RFA type procedure.  The last of the surgeries on his low back was done in approximately 1984.  Mr. Zayas states he had some injections into his low back in the past few years.  These injections did not provide any relief.    Mr. Emery Zayas notes pain in his right lower thoracic and upper lumbar back.  Pain is constant described as \"dull ache, sharp.\"  Pain is without radiation.  Pain has been worsening since it began.  Pain does not keep Mr. Zayas awake or wake him.  Coughing and " sneezing worsen his right lower thoracic and upper lumbar back pain.  Driving and hitting a pothole worsens Mr. Zayas's right lower thoracic and upper lumbar back pain.    Mr. Emery Zayas denies any weakness, but he does note that his left lower extremity seems to fatigue before his right lower extremity.  Mr. Zayas also notes some sensation of squeezing in his left calf at times.    Pain is currently rated 7/10 in severity.  Pain varies from a 4/10 to a 10/10.  In terms of pain medications, Mr. Emery Zayas takes a total of 4 tablets of ibuprofen per day or 4 tablets of Excedrin per day.    Mr. Emery Zayas denies any give way episodes of lower extremities.  He denies any tripping, stumbling, falling.  Mr. Zayas is not using any assistive devices for ambulation.  Mr. Emery Zayas denies any numbness, tingling, pins-and-needles.  He denies any saddle anesthesia, bowel incontinence, bladder incontinence.  Mr. Zayas denies that his bilateral lower extremities become weak and tired with walking.    Mr. Emery Zayas denies any personal or family history of autoimmune diseases, rheumatologic diseases, gout, pseudogout.  He denies any personal or family history of neurologic diseases.  Mr. Zayas denies any personal or family history of inflammatory bowel diseases.        REVIEW OF SYSTEMS:  Review of Systems     Constitutional:  Negative for fever, weight loss, weight gain and fatigue.   HENT:  Negative for tinnitus, trouble swallowing and hoarse voice.    Eyes:  Negative for pain, eye pain and decreased vision.   Respiratory:   Negative for cough, shortness of breath and wheezing.    Cardiovascular:  Negative for chest pain, palpitations and leg swelling.   Gastrointestinal:  Negative for heartburn, nausea, vomiting, abdominal pain, diarrhea, constipation, blood in stool and bowel incontinence.   Genitourinary:  Negative for bladder incontinence, dysuria, hematuria and  difficulty urinating.   Musculoskeletal:  Positive for myalgias and arthralgias.   Skin:  Negative for itching, poor wound healing and poor wound healing.   Neurological:  Negative for dizziness, seizures, loss of consciousness, headaches and difficulty walking.   Endo/Heme:  Negative for anemia, swollen glands and bruises/bleeds easily.   Psychiatric/Behavioral:  Negative for depression.          ALLERGIES:  Allergies   Allergen Reactions     No Known Drug Allergies          MEDICATIONS:  Current Outpatient Medications   Medication Sig Dispense Refill     allopurinol (ZYLOPRIM) 100 MG tablet Take 2 tablets (200 mg) by mouth 2 times daily 360 tablet 3     aspirin 81 MG tablet Take 1 tablet by mouth daily.       carvedilol (COREG) 12.5 MG tablet TAKE 1 TABLET BY MOUTH TWICE A  tablet 0     diclofenac (FLECTOR) 1.3 % patch Externally apply 1 patch topically 2 times daily 30 patch 5     diclofenac (FLECTOR) 1.3 % patch Externally apply 1 patch topically 2 times daily 60 patch 5     fenofibrate (TRICOR) 145 MG tablet Take 1 tablet (145 mg) by mouth daily 90 tablet 3     fenofibrate (TRICOR) 145 MG tablet Take 1 tablet (145 mg) by mouth daily 90 tablet 3     LORazepam (ATIVAN) 1 MG tablet Take 1 tablet (1 mg) by mouth every 6 hours as needed for anxiety 10 tablet 0     methocarbamol (ROBAXIN) 500 MG tablet Take 1 tablet (500 mg) by mouth 4 times daily as needed for muscle spasms 90 tablet 1     Multiple Vitamins-Minerals (MULTIVITAMIN ADULT) CHEW Take 1 chew tab by mouth daily       olmesartan (BENICAR) 40 MG tablet Take 1 tablet (40 mg) by mouth daily 90 tablet 1     order for DME Equipment being ordered: blood pressure cuff 1 each 0     simvastatin (ZOCOR) 40 MG tablet Take 1 tablet (40 mg) by mouth At Bedtime 90 tablet 3     tiZANidine (ZANAFLEX) 2 MG tablet Take 1 tablet (2 mg) by mouth nightly as needed for muscle spasms 30 tablet 1         PAST MEDICAL HISTORY:  Past Medical History:   Diagnosis Date      Acute idiopathic gout, unspecified site 8/23/2016     Alcoholism (H)     Treated 1980's, denies any recent problems with withdrawal when skips days drinking.     Anxiety 12/31/2014     Arthritis      Chronic low back pain      Dyspepsia and other specified disorders of function of stomach 7/31/2003     Elevated liver enzymes 12/30/2013     Gout     possible     Herniated intervertebral disk     thoracic     High blood triglycerides 12/30/2013     Hyperlipidemia LDL goal <130 4/15/2013     Hypertension      Hypertensive emergency 5/5/2017     Metabolic syndrome 12/30/2013     Obesity 4/15/2013     Other chest pain 7/31/2003     RBBB (right bundle branch block)     Present for several years         PAST SURGICAL HISTORY:  Past Surgical History:   Procedure Laterality Date     BACK SURGERY       Back surgery x 4       COLONOSCOPY N/A 1/23/2020    Procedure: COLONOSCOPY, WITH POLYPECTOMY AND BIOPSY; polypectomies  using Starteed bx forcep;  Surgeon: Sherice Mckeon MD;  Location:  GI     ESOPHAGOSCOPY, GASTROSCOPY, DUODENOSCOPY (EGD), COMBINED  2000    normal     TONSILLECTOMY           FAMILY HISTORY:  Family History   Problem Relation Age of Onset     Hypertension Father      Heart Disease Father         Angioplasty in late 60's or early 70's.     Unknown/Adopted Father      Hypertension Sister      Kidney Disease Sister      Unknown/Adopted Sister      Cancer Mother         lung     Unknown/Adopted Mother      Diabetes Mother      Thyroid Disease Sister      Unknown/Adopted Sister      Unknown/Adopted Maternal Grandmother      Substance Abuse Maternal Grandfather      Depression Paternal Grandmother      Unknown/Adopted Paternal Grandfather      Diabetes Sister      Colon Cancer No family hx of          SOCIAL HISTORY:  Social History     Socioeconomic History     Marital status:      Spouse name: Not on file     Number of children: Not on file     Years of education: Not on file     Highest education level:  "Not on file   Occupational History     Not on file   Tobacco Use     Smoking status: Former Smoker     Packs/day: 0.00     Quit date: 7/15/1993     Years since quittin.5     Smokeless tobacco: Never Used     Tobacco comment: Quit 20 years ago.   Vaping Use     Vaping Use: Never used   Substance and Sexual Activity     Alcohol use: Yes     Alcohol/week: 7.0 standard drinks     Types: 7 Shots of liquor per week     Comment: more than 10-12 rum and cokes or beer a week     Drug use: No     Sexual activity: Yes     Partners: Female   Other Topics Concern     Parent/sibling w/ CABG, MI or angioplasty before 65F 55M? 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     Social Determinants of Health     Financial Resource Strain: Not on file   Food Insecurity: Not on file   Transportation Needs: Not on file   Physical Activity: Not on file   Stress: Not on file   Social Connections: Not on file   Intimate Partner Violence: Not on file   Housing Stability: Not on file         PHYSICAL EXAMINATION:  Vitals:    22 0916   BP: (!) 176/84   Pulse: 60   SpO2: 98%   Weight: 93 kg (205 lb)   Height: 1.676 m (5' 6\")       GENERAL:  No acute distress.  Pleasant and cooperative.   PSYCH:  Normal mood and affect.  HEAD:  Normocephalic.  SPEECH:  No dysarthria.  EYES:  No scleral icterus.  EARS:  Hearing is intact to spoken voice.  NOSE/MOUTH:  Wearing a face mask.  LUNGS:  No respiratory distress.  No increased work of breathing.  VASCULAR/PULSES:  Posterior tibial:  Right 2+.  Left 2+.  Dorsalis pedis:  Right 2+.  Left 2+.  LOWER EXTREMITIES: No clubbing, cyanosis, or edema bilaterally.    BALANCE AND GAIT: Patient has reciprocal gait pattern without antalgia.  Is able heel walk, toe walk, tandem walk without difficulty.  Double leg squat is performed with a quadriceps dominant pattern.  Single-leg " squat on the right is performed with a quadriceps dominant pattern.  Single-leg squat on the left is performed with a quadriceps dominant pattern, mild dynamic knee valgus, and decreased range of motion.    INSPECTION:  There is no erythema or ecchymosis of the thoracic or lumbar back.  There is slight prominence of the lower thoracic and upper lumbar paraspinals on the right.  There is previous surgical scarring that is well-healed.    THORACIC PALPATION:  Thoracic Spinous Processes: Mild tenderness T1-T8, mild to moderate tenderness T9-T12.  Thoracic Paraspinals:  Right mild tenderness T9-T12.  Left mild tenderness T9-T12.  Levator Scapulae at the Scapular Insertion:  Right not tender.  Left not tender.  Rhomboid Minor:  Right not tender.  Left not tender.  Rhomboid Major:  Right not tender.  Left not tender.    LUMBOPELVIC PALPATION:  Lumbar Spinous Processes: Mild to moderate tenderness throughout.  Lumbar Paraspinals:  Right mild to moderate tenderness throughout.  Left mild to moderate tenderness throughout.  Posterior Superior Iliac Spine:  Right mild to moderate tenderness.  Left mild to moderate tenderness.  Iliac Crest:  Right not tender.  Left not tender.  Sacroiliac Joint:  Right mild tenderness.  Left mild tenderness.  Hip External Rotators:  Right not tender.  Left not tender.  Ischial Tuberosity:  Right not tender.  Left not tender.  Greater Trochanter:  Right not tender.  Left not tender.  Coccyx: Not tender.    THORACOLUMBAR RANGE OF MOTION:  Forward flexion (85 ): Mildly reduced range of motion, does not cause pain, does not cause radiating pain.  Extension (30 ): Moderately to severely reduced range of motion, causes slight increase in low back pain, does not cause radiating pain.  Lateral bending right (30 ): Moderately reduced range of motion, does not cause pain, does not cause radiating pain.  Lateral bending left (30 ):  Moderately reduced range of motion, does not cause pain, does not cause  radiating pain.  Twisting right with extension:  Moderately to severely reduced range of motion, causes slight increase in low back pain, does not cause radiating pain.  Twisting left with extension:  Moderately to severely reduced range of motion, causes slight increase in low back pain, does not cause radiating pain.  Sacroiliac joint dysfunction:  Right -.  Left -.    HIP RANGE OF MOTION:  Flexion (110 ):  Right 105 .  Left 105 .  Extension (30 ):  Right 30 .  Left 30 .  External rotation (45 ):  Right 40 .  Left 40 .  Internal rotation (35 ):  Right 30 .  Left 30 .    KNEE RANGE OF MOTION:  Extension (0 ):  Right 0 .  Left 0 .  Flexion (135 ):  Right 130 .  Left 130 .    STRENGTH:  Hip flexion:  Right 5/5.  Left 5/5.  Hip abduction:  Right 5/5.  Left 5/5.  Hip external rotation:  Right 5/5.  Left 5/5.  Hip extension:  Right 5/5.  Left 5/5.  Knee extension:  Right 5/5.  Left 4+/5.  Knee flexion:  Right 5/5.  Left 4+/5.  Ankle dorsiflexion:  Right 5/5.  Left 4+/5.  Great toe dorsiflexion:  Right 5/5.  Left 4-/5.  Ankle plantar flexion:  Right 5/5.  Left 5/5.    SENSATION:  Intact to light touch along right L2, L3, L4, L5, S1, S2.  Intact to light touch along left L2, L3, L5, S1, S2.  Diminished to light touch along left L4.    REFLEXES:  Patellar (L2-L4):  Right 2+.  Left 2+.  Medial hamstrings (L5-S1):  Right 2+.  Left 2+.  Achilles (S1-S2):  Right 2+.  Left 2+.  Babinski:  Right downgoing.  Left downgoing.  Forced ankle dorsiflexion (clonus):  Right 0 beats.  Left 0 beats.    LUMBOPELVIC SPECIAL TESTS:  Log roll:  Right -.  Left -.  Straight leg raise:  Right -.  Left -.  Crossover straight leg raise:  Right -.  Left -.  Resisted straight leg raise:  Right -.  Left -.  UZMA:  Right -.  Left -.  FADIR:  Right -.  Left -.  Hip scour:  Right -.  Left -.  Lateral sacral compression:  Right -.  Left -.  Clamshell:  Right -.  Left -.  Ely s:  Right +.  Left +.  Yeoman s:  Right -.  Left -.  Distraction:  Right -.   Left -.  Sacral thrust:  Right -.  Left -.  Noble compression:  Right -.  Left -.        IMAGING:  X-rays of the thoracic spine were ordered today and completed after today's appointment.  I reviewed x-rays of the thoracic spine from 02/08/2022.  Flowing anterolateral syndesmophytes were noted consistent with diffuse idiopathic skeletal hyperostosis.    I reviewed x-rays of the lumbar spine from 02/02/2022.  There are flowing anterolateral syndesmophytes consistent with diffuse idiopathic skeletal hyperostosis.  There is facet arthropathy most pronounced bilaterally at L4-L5 and L5-S1.  There is disc height loss noted at L5-S1.        ASSESSMENT/PLAN:  Mr. Emery Zayas is a 66-year-old male.  History and exam are most consistent with diffuse idiopathic skeletal hyperostosis.  Mr. Zayas does also have pain due to lumbosacral facet arthropathy and lumbosacral degenerative disc disease.  Discussed diagnoses, pathophysiologies, treatment options, and further work-up with Mr. Zayas.  Discussed the options of doing nothing/living with it, physical therapy, chiropractic care, oral medications and such as gabapentin pregabalin, lumbosacral epidural corticosteroid injection, acupuncture, massage.  Mr. Zayas will be set up with an MRI of his lumbar spine.  He is to follow-up in this clinic after the MRI lumbar spine.        David Vargas MD

## 2022-02-08 NOTE — PROGRESS NOTES
"PHYSICAL MEDICINE & REHABILITATION / MEDICAL SPINE        Date:  Feb 8, 2022    Name:  Emery Zayas  YOB: 1955  MRN:  [id number]          REASON FOR CONSULTATION:  Chronic right-sided low back pain without sciatica.        REFERRING PROVIDER:  Trish Edwards MD        CHART REVIEW:  Reviewed 02/02/2022 note from Trish Edwards MD (family medicine).  Mr. Emery Zayas had right low back pain.  Pain was worse with movement.  Sitting caused even more pain.  Low back pain was stabbing at times.  Low back pain has been gradually worsening.  Pain was worsened with ascending and descending stairs.  There was no radiation.  There was no numbness or weakness.  There were no bladder problems.  Referral to orthopedic and spine  was placed.        HISTORY OF PRESENT ILLNESS:  Mr. Emery Zayas is a 66-year-old male.  He is retired.  Mr. Zayas states that he has been retired for 13 years.  In terms of exercise and activity, Mr. Farrell goes on 1/2-hour walk every evening.    Mr. Emery Zayas states that he had surgeries on his low back in the 1980s.  He had laminectomies and a lumbar fusion.  He also describes an RFA type procedure.  The last of the surgeries on his low back was done in approximately 1984.  Mr. Zayas states he had some injections into his low back in the past few years.  These injections did not provide any relief.    Mr. Emery Zayas notes pain in his right lower thoracic and upper lumbar back.  Pain is constant described as \"dull ache, sharp.\"  Pain is without radiation.  Pain has been worsening since it began.  Pain does not keep Mr. Zayas awake or wake him.  Coughing and sneezing worsen his right lower thoracic and upper lumbar back pain.  Driving and hitting a pothole worsens Mr. Zayas's right lower thoracic and upper lumbar back pain.    Mr. Emery Zayas denies any weakness, but he does note that his left lower extremity " seems to fatigue before his right lower extremity.  Mr. Zayas also notes some sensation of squeezing in his left calf at times.    Pain is currently rated 7/10 in severity.  Pain varies from a 4/10 to a 10/10.  In terms of pain medications, Mr. Emery Zayas takes a total of 4 tablets of ibuprofen per day or 4 tablets of Excedrin per day.    Mr. Emery Zayas denies any give way episodes of lower extremities.  He denies any tripping, stumbling, falling.  Mr. Zayas is not using any assistive devices for ambulation.  Mr. Emery Zayas denies any numbness, tingling, pins-and-needles.  He denies any saddle anesthesia, bowel incontinence, bladder incontinence.  Mr. Zayas denies that his bilateral lower extremities become weak and tired with walking.    Mr. Emery Zayas denies any personal or family history of autoimmune diseases, rheumatologic diseases, gout, pseudogout.  He denies any personal or family history of neurologic diseases.  Mr. Zayas denies any personal or family history of inflammatory bowel diseases.        REVIEW OF SYSTEMS:  Review of Systems     Constitutional:  Negative for fever, weight loss, weight gain and fatigue.   HENT:  Negative for tinnitus, trouble swallowing and hoarse voice.    Eyes:  Negative for pain, eye pain and decreased vision.   Respiratory:   Negative for cough, shortness of breath and wheezing.    Cardiovascular:  Negative for chest pain, palpitations and leg swelling.   Gastrointestinal:  Negative for heartburn, nausea, vomiting, abdominal pain, diarrhea, constipation, blood in stool and bowel incontinence.   Genitourinary:  Negative for bladder incontinence, dysuria, hematuria and difficulty urinating.   Musculoskeletal:  Positive for myalgias and arthralgias.   Skin:  Negative for itching, poor wound healing and poor wound healing.   Neurological:  Negative for dizziness, seizures, loss of consciousness, headaches and difficulty walking.    Endo/Heme:  Negative for anemia, swollen glands and bruises/bleeds easily.   Psychiatric/Behavioral:  Negative for depression.          ALLERGIES:  Allergies   Allergen Reactions     No Known Drug Allergies          MEDICATIONS:  Current Outpatient Medications   Medication Sig Dispense Refill     allopurinol (ZYLOPRIM) 100 MG tablet Take 2 tablets (200 mg) by mouth 2 times daily 360 tablet 3     aspirin 81 MG tablet Take 1 tablet by mouth daily.       carvedilol (COREG) 12.5 MG tablet TAKE 1 TABLET BY MOUTH TWICE A  tablet 0     diclofenac (FLECTOR) 1.3 % patch Externally apply 1 patch topically 2 times daily 30 patch 5     diclofenac (FLECTOR) 1.3 % patch Externally apply 1 patch topically 2 times daily 60 patch 5     fenofibrate (TRICOR) 145 MG tablet Take 1 tablet (145 mg) by mouth daily 90 tablet 3     fenofibrate (TRICOR) 145 MG tablet Take 1 tablet (145 mg) by mouth daily 90 tablet 3     LORazepam (ATIVAN) 1 MG tablet Take 1 tablet (1 mg) by mouth every 6 hours as needed for anxiety 10 tablet 0     methocarbamol (ROBAXIN) 500 MG tablet Take 1 tablet (500 mg) by mouth 4 times daily as needed for muscle spasms 90 tablet 1     Multiple Vitamins-Minerals (MULTIVITAMIN ADULT) CHEW Take 1 chew tab by mouth daily       olmesartan (BENICAR) 40 MG tablet Take 1 tablet (40 mg) by mouth daily 90 tablet 1     order for DME Equipment being ordered: blood pressure cuff 1 each 0     simvastatin (ZOCOR) 40 MG tablet Take 1 tablet (40 mg) by mouth At Bedtime 90 tablet 3     tiZANidine (ZANAFLEX) 2 MG tablet Take 1 tablet (2 mg) by mouth nightly as needed for muscle spasms 30 tablet 1         PAST MEDICAL HISTORY:  Past Medical History:   Diagnosis Date     Acute idiopathic gout, unspecified site 8/23/2016     Alcoholism (H)     Treated 1980's, denies any recent problems with withdrawal when skips days drinking.     Anxiety 12/31/2014     Arthritis      Chronic low back pain      Dyspepsia and other specified disorders  of function of stomach 2003     Elevated liver enzymes 2013     Gout     possible     Herniated intervertebral disk     thoracic     High blood triglycerides 2013     Hyperlipidemia LDL goal <130 4/15/2013     Hypertension      Hypertensive emergency 2017     Metabolic syndrome 2013     Obesity 4/15/2013     Other chest pain 2003     RBBB (right bundle branch block)     Present for several years         PAST SURGICAL HISTORY:  Past Surgical History:   Procedure Laterality Date     BACK SURGERY       Back surgery x 4       COLONOSCOPY N/A 2020    Procedure: COLONOSCOPY, WITH POLYPECTOMY AND BIOPSY; polypectomies  using jumbo bx forcep;  Surgeon: Sherice Mckeon MD;  Location:  GI     ESOPHAGOSCOPY, GASTROSCOPY, DUODENOSCOPY (EGD), COMBINED      normal     TONSILLECTOMY           FAMILY HISTORY:  Family History   Problem Relation Age of Onset     Hypertension Father      Heart Disease Father         Angioplasty in late 60's or early 70's.     Unknown/Adopted Father      Hypertension Sister      Kidney Disease Sister      Unknown/Adopted Sister      Cancer Mother         lung     Unknown/Adopted Mother      Diabetes Mother      Thyroid Disease Sister      Unknown/Adopted Sister      Unknown/Adopted Maternal Grandmother      Substance Abuse Maternal Grandfather      Depression Paternal Grandmother      Unknown/Adopted Paternal Grandfather      Diabetes Sister      Colon Cancer No family hx of          SOCIAL HISTORY:  Social History     Socioeconomic History     Marital status:      Spouse name: Not on file     Number of children: Not on file     Years of education: Not on file     Highest education level: Not on file   Occupational History     Not on file   Tobacco Use     Smoking status: Former Smoker     Packs/day: 0.00     Quit date: 7/15/1993     Years since quittin.5     Smokeless tobacco: Never Used     Tobacco comment: Quit 20 years ago.   Vaping Use      "Vaping Use: Never used   Substance and Sexual Activity     Alcohol use: Yes     Alcohol/week: 7.0 standard drinks     Types: 7 Shots of liquor per week     Comment: more than 10-12 rum and cokes or beer a week     Drug use: No     Sexual activity: Yes     Partners: Female   Other Topics Concern     Parent/sibling w/ CABG, MI or angioplasty before 65F 55M? 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     Social Determinants of Health     Financial Resource Strain: Not on file   Food Insecurity: Not on file   Transportation Needs: Not on file   Physical Activity: Not on file   Stress: Not on file   Social Connections: Not on file   Intimate Partner Violence: Not on file   Housing Stability: Not on file         PHYSICAL EXAMINATION:  Vitals:    02/08/22 0916   BP: (!) 176/84   Pulse: 60   SpO2: 98%   Weight: 93 kg (205 lb)   Height: 1.676 m (5' 6\")       GENERAL:  No acute distress.  Pleasant and cooperative.   PSYCH:  Normal mood and affect.  HEAD:  Normocephalic.  SPEECH:  No dysarthria.  EYES:  No scleral icterus.  EARS:  Hearing is intact to spoken voice.  NOSE/MOUTH:  Wearing a face mask.  LUNGS:  No respiratory distress.  No increased work of breathing.  VASCULAR/PULSES:  Posterior tibial:  Right 2+.  Left 2+.  Dorsalis pedis:  Right 2+.  Left 2+.  LOWER EXTREMITIES: No clubbing, cyanosis, or edema bilaterally.    BALANCE AND GAIT: Patient has reciprocal gait pattern without antalgia.  Is able heel walk, toe walk, tandem walk without difficulty.  Double leg squat is performed with a quadriceps dominant pattern.  Single-leg squat on the right is performed with a quadriceps dominant pattern.  Single-leg squat on the left is performed with a quadriceps dominant pattern, mild dynamic knee valgus, and decreased range of motion.    INSPECTION:  There is no erythema or ecchymosis of the " thoracic or lumbar back.  There is slight prominence of the lower thoracic and upper lumbar paraspinals on the right.  There is previous surgical scarring that is well-healed.    THORACIC PALPATION:  Thoracic Spinous Processes: Mild tenderness T1-T8, mild to moderate tenderness T9-T12.  Thoracic Paraspinals:  Right mild tenderness T9-T12.  Left mild tenderness T9-T12.  Levator Scapulae at the Scapular Insertion:  Right not tender.  Left not tender.  Rhomboid Minor:  Right not tender.  Left not tender.  Rhomboid Major:  Right not tender.  Left not tender.    LUMBOPELVIC PALPATION:  Lumbar Spinous Processes: Mild to moderate tenderness throughout.  Lumbar Paraspinals:  Right mild to moderate tenderness throughout.  Left mild to moderate tenderness throughout.  Posterior Superior Iliac Spine:  Right mild to moderate tenderness.  Left mild to moderate tenderness.  Iliac Crest:  Right not tender.  Left not tender.  Sacroiliac Joint:  Right mild tenderness.  Left mild tenderness.  Hip External Rotators:  Right not tender.  Left not tender.  Ischial Tuberosity:  Right not tender.  Left not tender.  Greater Trochanter:  Right not tender.  Left not tender.  Coccyx: Not tender.    THORACOLUMBAR RANGE OF MOTION:  Forward flexion (85 ): Mildly reduced range of motion, does not cause pain, does not cause radiating pain.  Extension (30 ): Moderately to severely reduced range of motion, causes slight increase in low back pain, does not cause radiating pain.  Lateral bending right (30 ): Moderately reduced range of motion, does not cause pain, does not cause radiating pain.  Lateral bending left (30 ):  Moderately reduced range of motion, does not cause pain, does not cause radiating pain.  Twisting right with extension:  Moderately to severely reduced range of motion, causes slight increase in low back pain, does not cause radiating pain.  Twisting left with extension:  Moderately to severely reduced range of motion, causes slight  increase in low back pain, does not cause radiating pain.  Sacroiliac joint dysfunction:  Right -.  Left -.    HIP RANGE OF MOTION:  Flexion (110 ):  Right 105 .  Left 105 .  Extension (30 ):  Right 30 .  Left 30 .  External rotation (45 ):  Right 40 .  Left 40 .  Internal rotation (35 ):  Right 30 .  Left 30 .    KNEE RANGE OF MOTION:  Extension (0 ):  Right 0 .  Left 0 .  Flexion (135 ):  Right 130 .  Left 130 .    STRENGTH:  Hip flexion:  Right 5/5.  Left 5/5.  Hip abduction:  Right 5/5.  Left 5/5.  Hip external rotation:  Right 5/5.  Left 5/5.  Hip extension:  Right 5/5.  Left 5/5.  Knee extension:  Right 5/5.  Left 4+/5.  Knee flexion:  Right 5/5.  Left 4+/5.  Ankle dorsiflexion:  Right 5/5.  Left 4+/5.  Great toe dorsiflexion:  Right 5/5.  Left 4-/5.  Ankle plantar flexion:  Right 5/5.  Left 5/5.    SENSATION:  Intact to light touch along right L2, L3, L4, L5, S1, S2.  Intact to light touch along left L2, L3, L5, S1, S2.  Diminished to light touch along left L4.    REFLEXES:  Patellar (L2-L4):  Right 2+.  Left 2+.  Medial hamstrings (L5-S1):  Right 2+.  Left 2+.  Achilles (S1-S2):  Right 2+.  Left 2+.  Babinski:  Right downgoing.  Left downgoing.  Forced ankle dorsiflexion (clonus):  Right 0 beats.  Left 0 beats.    LUMBOPELVIC SPECIAL TESTS:  Log roll:  Right -.  Left -.  Straight leg raise:  Right -.  Left -.  Crossover straight leg raise:  Right -.  Left -.  Resisted straight leg raise:  Right -.  Left -.  UZMA:  Right -.  Left -.  FADIR:  Right -.  Left -.  Hip scour:  Right -.  Left -.  Lateral sacral compression:  Right -.  Left -.  Clamshell:  Right -.  Left -.  Ely s:  Right +.  Left +.  Yeoman s:  Right -.  Left -.  Distraction:  Right -.  Left -.  Sacral thrust:  Right -.  Left -.  Noble compression:  Right -.  Left -.        IMAGING:  X-rays of the thoracic spine were ordered today and completed after today's appointment.  I reviewed x-rays of the thoracic spine from 02/08/2022.  Flowing  anterolateral syndesmophytes were noted consistent with diffuse idiopathic skeletal hyperostosis.    I reviewed x-rays of the lumbar spine from 02/02/2022.  There are flowing anterolateral syndesmophytes consistent with diffuse idiopathic skeletal hyperostosis.  There is facet arthropathy most pronounced bilaterally at L4-L5 and L5-S1.  There is disc height loss noted at L5-S1.        ASSESSMENT/PLAN:  Mr. Emery Zayas is a 66-year-old male.  History and exam are most consistent with diffuse idiopathic skeletal hyperostosis.  Mr. Zayas does also have pain due to lumbosacral facet arthropathy and lumbosacral degenerative disc disease.  Discussed diagnoses, pathophysiologies, treatment options, and further work-up with Mr. Zayas.  Discussed the options of doing nothing/living with it, physical therapy, chiropractic care, oral medications and such as gabapentin pregabalin, lumbosacral epidural corticosteroid injection, acupuncture, massage.  Mr. Zayas will be set up with an MRI of his lumbar spine.  He is to follow-up in this clinic after the MRI lumbar spine.        David Vargas MD

## 2022-02-08 NOTE — PATIENT INSTRUCTIONS
Please schedule a follow-up appointment after MRI.  You can schedule this at the , or you can call [phone].    Please schedule an MRI.  [facility]uling team will call you to schedule your imaging exam in the next 0-3 days.  You can also call them directly at [facility] [phone] ([phone] [facility], [address], [place], [place]) and [facility] [phone] ([address] [place]) to schedule your appointment.

## 2022-02-10 RX ORDER — DICLOFENAC EPOLAMINE 0.01 G/1
1 SYSTEM TOPICAL 2 TIMES DAILY
Qty: 60 PATCH | Refills: 5 | Status: SHIPPED | OUTPATIENT
Start: 2022-02-10 | End: 2023-02-08

## 2022-02-16 ENCOUNTER — ALLIED HEALTH/NURSE VISIT (OUTPATIENT)
Dept: FAMILY MEDICINE | Facility: CLINIC | Age: 67
End: 2022-02-16
Payer: MEDICARE

## 2022-02-16 ENCOUNTER — HOSPITAL ENCOUNTER (OUTPATIENT)
Dept: MRI IMAGING | Facility: CLINIC | Age: 67
Discharge: HOME | End: 2022-02-16
Attending: PHYSICAL MEDICINE & REHABILITATION | Admitting: PHYSICAL MEDICINE & REHABILITATION
Payer: MEDICARE

## 2022-02-16 VITALS — HEART RATE: 56 BPM | SYSTOLIC BLOOD PRESSURE: 133 MMHG | DIASTOLIC BLOOD PRESSURE: 73 MMHG

## 2022-02-16 DIAGNOSIS — M48.10 DIFFUSE IDIOPATHIC SKELETAL HYPEROSTOSIS: ICD-10-CM

## 2022-02-16 DIAGNOSIS — M51.379 DDD (DEGENERATIVE DISC DISEASE), LUMBOSACRAL: ICD-10-CM

## 2022-02-16 DIAGNOSIS — M47.817 FACET ARTHROPATHY, LUMBOSACRAL: ICD-10-CM

## 2022-02-16 DIAGNOSIS — I10 ESSENTIAL HYPERTENSION, BENIGN: Primary | ICD-10-CM

## 2022-02-16 PROCEDURE — 72148 MRI LUMBAR SPINE W/O DYE: CPT

## 2022-02-16 PROCEDURE — 99207 PR NO CHARGE NURSE ONLY: CPT

## 2022-02-16 NOTE — PROGRESS NOTES
Emery Zayas is a 66 year old patient who comes in today for a Blood Pressure check.  Initial BP:  /73 (BP Location: Right arm, Patient Position: Sitting, Cuff Size: Adult Large)   Pulse 56      Data Unavailable  Disposition: results routed to provider      Elsa Prince CMA

## 2022-02-26 DIAGNOSIS — M54.50 CHRONIC MIDLINE LOW BACK PAIN WITHOUT SCIATICA: ICD-10-CM

## 2022-02-26 DIAGNOSIS — G89.29 CHRONIC MIDLINE LOW BACK PAIN WITHOUT SCIATICA: ICD-10-CM

## 2022-02-28 NOTE — TELEPHONE ENCOUNTER
Routing refill request to provider for review/approval because:  Drug not on the [facility] refill protocol     Shawna Bacon RN on 2/28/2022 at 7:39 AM

## 2022-03-01 RX ORDER — TIZANIDINE 2 MG/1
2 TABLET ORAL
Qty: 30 TABLET | Refills: 1 | Status: SHIPPED | OUTPATIENT
Start: 2022-03-01 | End: 2022-03-29

## 2022-03-07 ENCOUNTER — OFFICE VISIT (OUTPATIENT)
Dept: ORTHOPEDICS | Facility: CLINIC | Age: 67
End: 2022-03-07
Payer: MEDICARE

## 2022-03-07 VITALS
OXYGEN SATURATION: 97 % | HEIGHT: 66 IN | HEART RATE: 58 BPM | SYSTOLIC BLOOD PRESSURE: 175 MMHG | BODY MASS INDEX: 32.95 KG/M2 | DIASTOLIC BLOOD PRESSURE: 88 MMHG | WEIGHT: 205 LBS

## 2022-03-07 DIAGNOSIS — M47.817 FACET ARTHROPATHY, LUMBOSACRAL: ICD-10-CM

## 2022-03-07 DIAGNOSIS — M48.10 DIFFUSE IDIOPATHIC SKELETAL HYPEROSTOSIS: Primary | ICD-10-CM

## 2022-03-07 DIAGNOSIS — M51.379 DDD (DEGENERATIVE DISC DISEASE), LUMBOSACRAL: ICD-10-CM

## 2022-03-07 PROCEDURE — 99215 OFFICE O/P EST HI 40 MIN: CPT | Mod: GC | Performed by: PHYSICAL MEDICINE & REHABILITATION

## 2022-03-07 ASSESSMENT — PAIN SCALES - GENERAL: PAINLEVEL: EXTREME PAIN (8)

## 2022-03-07 NOTE — LETTER
"    3/7/2022         RE: Emery Zayas  [address] [facility] [address]        Dear Colleague,    Thank you for referring your patient, Emery Zayas, to the [facility]. Please see a copy of my visit note below.      Physical Medicine and Rehabilitation  Medical Spine Clinic - Progress Note    Chief Complaint: Mid back pain  Date of Last Visit: 02/08/2022     History of Present Illness     HPI  Emery Zayas is a 66 year old male with a long history of chronic low back pain who present today with worsening mid back pain.     Patient states back pain started in the 1980's which he previously had a laminectomy and lumbar fusion. He additionally has previously recived \"steroid injection\" and possibly and RFA performed in the 90's, all which did not provide any pain relief.     He was recent seen in clinic on 02/08/2022 reporting pain in his right lower thoracic and upper lumbar back.  Pain is constant described as \"dull ache, sharp.\"  Pain is without radiation.  Pain does not keep Mr. Zayas awake or wake him.  Coughing and sneezing worsen his right lower thoracic and upper lumbar back pain.  Driving and hitting a pothole worsens Mr. Zayas's right lower thoracic and upper lumbar back pain. XR were reviewed from that time which showed findings consistent with idiopathic skeletal hyperostosis.    Today patient reports symptoms have been progressively worsening. He localizes he main concerning symptoms to the mid thoracic back with right side worse than the left. He denies any radiation down the lower extremities. He was recently on vacation and pain pain prevented him from enjoying vacation and going on longer walks. He does continue to walk 30 minutes each night but this is becoming more uncomfortable.      Mr. Emery Zayas denies any weakness, but he does note that his left lower extremity seems to fatigue before his right lower extremity.  Mr. Zayas " also notes some sensation of squeezing in his left calf at times.     Mr. Emery Zayas denies any give way episodes of lower extremities.  He denies any tripping, stumbling, falling.  Mr. Zayas is not using any assistive devices for ambulation.  Mr. Emery Zayas denies any numbness, tingling, pins-and-needles.  He denies any saddle anesthesia, bowel incontinence, bladder incontinence.  Mr. Zayas denies that his bilateral lower extremities become weak and tired with walking.    Past Medical History:   Past Medical History:   Diagnosis Date     Acute idiopathic gout, unspecified site 8/23/2016     Alcoholism (H)     Treated 1980's, denies any recent problems with withdrawal when skips days drinking.     Anxiety 12/31/2014     Arthritis      Chronic low back pain      Dyspepsia and other specified disorders of function of stomach 7/31/2003     Elevated liver enzymes 12/30/2013     Gout     possible     Herniated intervertebral disk     thoracic     High blood triglycerides 12/30/2013     Hyperlipidemia LDL goal <130 4/15/2013     Hypertension      Hypertensive emergency 5/5/2017     Metabolic syndrome 12/30/2013     Obesity 4/15/2013     Other chest pain 7/31/2003     RBBB (right bundle branch block)     Present for several years       Past Surgical History:  Past Surgical History:   Procedure Laterality Date     BACK SURGERY       Back surgery x 4       COLONOSCOPY N/A 1/23/2020    Procedure: COLONOSCOPY, WITH POLYPECTOMY AND BIOPSY; polypectomies  using jumbo bx forcep;  Surgeon: Sherice Mckeon MD;  Location:  GI     ESOPHAGOSCOPY, GASTROSCOPY, DUODENOSCOPY (EGD), COMBINED  2000    normal     TONSILLECTOMY         Family Medical History:  Family History   Problem Relation Age of Onset     Hypertension Father      Heart Disease Father         Angioplasty in late 60's or early 70's.     Unknown/Adopted Father      Hypertension Sister      Kidney Disease Sister      Unknown/Adopted Sister       "Cancer Mother         lung     Unknown/Adopted Mother      Diabetes Mother      Thyroid Disease Sister      Unknown/Adopted Sister      Unknown/Adopted Maternal Grandmother      Substance Abuse Maternal Grandfather      Depression Paternal Grandmother      Unknown/Adopted Paternal Grandfather      Diabetes Sister      Colon Cancer No family hx of      Current Medications:  Current Outpatient Medications   Medication     allopurinol (ZYLOPRIM) 100 MG tablet     aspirin 81 MG tablet     carvedilol (COREG) 12.5 MG tablet     diclofenac (FLECTOR) 1.3 % patch     diclofenac (FLECTOR) 1.3 % patch     diclofenac (FLECTOR) 1.3 % patch     fenofibrate (TRICOR) 145 MG tablet     fenofibrate (TRICOR) 145 MG tablet     LORazepam (ATIVAN) 1 MG tablet     Multiple Vitamins-Minerals (MULTIVITAMIN ADULT) CHEW     olmesartan (BENICAR) 40 MG tablet     order for DME     simvastatin (ZOCOR) 40 MG tablet     tiZANidine (ZANAFLEX) 2 MG tablet     Current Facility-Administered Medications   Medication     dexamethasone (DECADRON) injection 4 mg     dexamethasone (DECADRON) injection 4 mg     lidocaine 1 % injection 1 mL     lidocaine 1 % injection 1 mL       Allergies:  Allergies   Allergen Reactions     No Known Drug Allergies          OBJECTIVE     VITALS  BP (!) 175/88   Pulse 58   Ht 1.676 m (5' 6\")   Wt 93 kg (205 lb)   SpO2 97%   BMI 33.09 kg/m        PHYSICAL EXAMINATION:  GENERAL: Sitting comfortably in chair. No acute distress.   CARDIAC: No apparent chest discomfort. Well perfused.  PULMONARY: Non-labored. No respiratory distress.    NEURO  Strength   Right  Left  Shoulder abduction  5/5  5/5  Elbow flexion   5/5  5/5  Elbow Extension  5/5  5/5  Wrist Extension  5/5  5/5  Wrist Flexion   5/5  5/5  Finger Flexion   5/5  5/5  Finger Abduction  5/5  5/5  Hip Flexion   5/5  5/5  Knee Extension  5/5  3/5  Ankle dorsiflexion  5/5  5/5  Extensor Hallicus Longus 5/5  5/5    Sensation  Sensation intact to light touch bilateral. No " difference in pin-prick sensation.    Reflexes   Right  Left  Patellar   2+  2+  Achilles   2+  2+  Clonus    NEG  NEG  Babinski   NEG  NEG    MSK  Inspection  Surgical scar present. Loss of lordosis    Palpation  Most signigicant tenderness over the right mid-thoracic spine R>L. mild tenderness over the lower lumbar facets No tenderness to palpation of midline spine or SI joint.    Range of motion  Back: Significant limited range of motion with forward flexion. Most significant decreased ROM in mid thoracic region. Pain elicted mostly in mid thoracic back with ROM    Imaging     XR THORACIC SPINE 02/08/2022  IMPRESSION: Alignment is normal. Vertebral body heights normal. No  fractures. Flowing right anterolateral syndesmophytes are noted  consistent with the patient's history of DISH.    XR, LUMBAR SPINE COMPLETE 02/08/2022  IMPRESSION: Five lumbar type vertebrae. Normal alignment. Vertebral  body heights are normal. No fractures. Flowing anterior syndesmophytes  from L2 down to L4. Facet arthropathy at L4-5 and L5-S1. Loss of disc  space height and degenerative endplate spurring at L5-S1.     MRI, LUMBAR SPINE 02/116/2022  IMPRESSION:  1.  Multilevel lumbar spondylosis with mild progression compared to the previous exam from 2009. This is superimposed upon diffuse developmental narrowing of the spinal canal.  2.  At L3-L4, severe trefoil type spinal canal stenosis and mild bilateral neural foraminal stenosis. Impingement upon traversing cauda equina nerve roots.  3.  At L5-S1, solid osseous fusion which has progressed since the previous exam. The left neural foramen is surgically decompressed with mild residual left neural foraminal stenosis.    Assessment and Plan     Emery Zayas is a pleasant 66 year old male who presents today with mid-thoracic back pain. After further examination and review of the patients imaging, his primary source of pain generation seems mostly consistent with axial thoracic back  pain secondary to his diffuse idiopathic skeletal hyperostosis.     He additionally likely has contributing chronic low back pain from lumbar spinal stenosis and chronic left L5 radiculopathy but over all he states these pain are separate and the low back pain is livable compared to pain the mid back.     We discussed further management including no intervention, medication adjustment and further procedures. At his point we would consider epidural steroid injection in the thoracic spine but need to obtain advanced imaging prior.     PLAN:  - MRI thoracic spine ordered.  - Interlaminar epidural steroid injection, right paramedian approach ordered with Dr. Erwin.   - May consider further discussion regarding epidural stimulator in the future pending on his progressiong  - RTC for procedure.    Ready to learn, no apparent learning barriers.  Education provided on treatment plan according to patient's preferred learning style.  Patient verbalizes understanding.   __________________________________  Adam Vitale MD  Physical Medicine and Rehabilitation PGY3  Pager: [phone]    60 minutes spent on the date of the encounter doing chart review, history and exam, documentation and further activities per the note    Attestation signed by David Vargas MD at 3/13/2022  2:32 PM:  Physician Attestation   I, David Vargas MD, saw this patient and agree with the findings and plan of care as documented in the note.      Items personally reviewed/procedural attestation: vitals and imaging and agree with the interpretation documented in the note.      Mr. Emery Zayas is a 66-year-old male.  He is accompanied to today's appointment by his wife, Ms. John Zayas.  Mr. Zaysa retired at age 53.  In terms of exercise and activity, Mr. Zayas continues to go on 1/2-hour walk every evening.    Mr. Emery Zayas had surgeries on his low back in the 1980s.  He stated he had laminectomies and a lumbar fusion.   He also described an RFA type procedure.  The last of the surgeries on his low back was done in approximately 1984.  Mr. Zayas has had injections into his low back in the past few years, and these have not provided any relief.    Mr. Emery Zayas notes bilateral lower thoracic pain and bilateral lower lumbar pain.  Thoracic pain is much worse than the lumbar pain.  Pain is currently rated as an 8/10.  There is no radiation of the pain.  Mr. Zayas states his pain has really worsened in the last month.   and Mrs. Zayas went to [place] and [place] in the past month.  Mr. Zayas had significant worsening of his pain with these trips.    Mr. Emery Zayas notes worsening pain with sitting.  Mr. Zayas denies that his bilateral lower extremities become weak and tired with walking.  He notes intense worsening pain in his lower thoracic back with walking.    On exam, there is tenderness to palpation of the bilateral thoracic paraspinals from T9-T12.  There is tenderness to palpation of the bilateral lower lumbar paraspinals from L4-L5.  Thoracolumbar extension has severely reduced range of motion, increases bilateral lower thoracic back and bilateral lower lumbar back pain, does not cause radiating pain.  Thoracolumbar twisting right with extension and twisting left with extension have severely reduced range of motion, increase bilateral lower thoracic back and bilateral lower lumbar back pain, do not cause radiating pain.  Thoracolumbar lateral bending right and lateral bending left have moderately to severely reduced range of motion, increase bilateral lower thoracic back and bilateral lower lumbar back pain, do not cause radiating pain.  Thoracolumbar forward flexion has moderately reduced range of motion, increases bilateral lower thoracic back pain, does not cause lower lumbar back pain, does not cause radiating pain.    Mr. Emery Zayas is a 58-year-old male.  He has diffuse  idiopathic skeletal hyperostosis.  Mr. Zayas does have some pain due to lumbosacral facet arthropathy.  However, today Mr. Zayas has significantly worse pain noted in the thoracic back than was present previously.  This thought to be most consistent with diffuse idiopathic skeletal hyperostosis but is also thought that thoracic degenerative disc disease may be playing a role.  It is unclear if thoracic facet arthropathy is playing much into this pain.  An MRI thoracic spine is being ordered.  Mr. Zayas is to follow-up in this clinic after the MRI thoracic pain.  We did also discuss other treatment options to include doing nothing/living with it, physical therapy, chiropractic care, oral medications such as gabapentin and pregabalin, thoracic epidural corticosteroid injection, lumbosacral facet joint medial branch blocks with following radiofrequency ablations.      Total Time on encounter by attending physician:  58 minutes were spent on one more or more of the following:  discussion with patient, history, exam, coordinating care, treatment goals, record review, documenting clinical information, and/or data review.      David Vargas MD

## 2022-03-07 NOTE — PATIENT INSTRUCTIONS
Please schedule a follow-up appointment after MRI.  You can schedule this at the , or you can call [phone].    Please schedule an MRI.  The [facility] Imaging Scheduling team will call you to schedule your imaging exam in the next 0-3 days.  You can also call them directly at [facility] [phone] ([address] [facility], [address], [place], [place]) and [facility] [phone] ([address], [place]) to schedule your appointment.

## 2022-03-08 NOTE — PROGRESS NOTES
"  Physical Medicine and Rehabilitation  Medical Spine Clinic - Progress Note    Chief Complaint: Mid back pain  Date of Last Visit: 02/08/2022     History of Present Illness     KAYLA Zayas is a 66 year old male with a long history of chronic low back pain who present today with worsening mid back pain.     Patient states back pain started in the 1980's which he previously had a laminectomy and lumbar fusion. He additionally has previously recived \"steroid injection\" and possibly and RFA performed in the 90's, all which did not provide any pain relief.     He was recent seen in clinic on 02/08/2022 reporting pain in his right lower thoracic and upper lumbar back.  Pain is constant described as \"dull ache, sharp.\"  Pain is without radiation.  Pain does not keep Mr. Zayas awake or wake him.  Coughing and sneezing worsen his right lower thoracic and upper lumbar back pain.  Driving and hitting a pothole worsens Mr. Zayas's right lower thoracic and upper lumbar back pain. XR were reviewed from that time which showed findings consistent with idiopathic skeletal hyperostosis.    Today patient reports symptoms have been progressively worsening. He localizes he main concerning symptoms to the mid thoracic back with right side worse than the left. He denies any radiation down the lower extremities. He was recently on vacation and pain pain prevented him from enjoying vacation and going on longer walks. He does continue to walk 30 minutes each night but this is becoming more uncomfortable.      Mr. Emery Zayas denies any weakness, but he does note that his left lower extremity seems to fatigue before his right lower extremity.  Mr. Zayas also notes some sensation of squeezing in his left calf at times.     Mr. Emery Zayas denies any give way episodes of lower extremities.  He denies any tripping, stumbling, falling.  Mr. Zayas is not using any assistive devices for ambulation.  Mr. Land" COLE Zayas denies any numbness, tingling, pins-and-needles.  He denies any saddle anesthesia, bowel incontinence, bladder incontinence.  Mr. Zayas denies that his bilateral lower extremities become weak and tired with walking.    Past Medical History:   Past Medical History:   Diagnosis Date     Acute idiopathic gout, unspecified site 8/23/2016     Alcoholism (H)     Treated 1980's, denies any recent problems with withdrawal when skips days drinking.     Anxiety 12/31/2014     Arthritis      Chronic low back pain      Dyspepsia and other specified disorders of function of stomach 7/31/2003     Elevated liver enzymes 12/30/2013     Gout     possible     Herniated intervertebral disk     thoracic     High blood triglycerides 12/30/2013     Hyperlipidemia LDL goal <130 4/15/2013     Hypertension      Hypertensive emergency 5/5/2017     Metabolic syndrome 12/30/2013     Obesity 4/15/2013     Other chest pain 7/31/2003     RBBB (right bundle branch block)     Present for several years       Past Surgical History:  Past Surgical History:   Procedure Laterality Date     BACK SURGERY       Back surgery x 4       COLONOSCOPY N/A 1/23/2020    Procedure: COLONOSCOPY, WITH POLYPECTOMY AND BIOPSY; polypectomies  using [employer] bx forcep;  Surgeon: Sherice Mckeon MD;  Location:  GI     ESOPHAGOSCOPY, GASTROSCOPY, DUODENOSCOPY (EGD), COMBINED  2000    normal     TONSILLECTOMY         Family Medical History:  Family History   Problem Relation Age of Onset     Hypertension Father      Heart Disease Father         Angioplasty in late 60's or early 70's.     Unknown/Adopted Father      Hypertension Sister      Kidney Disease Sister      Unknown/Adopted Sister      Cancer Mother         lung     Unknown/Adopted Mother      Diabetes Mother      Thyroid Disease Sister      Unknown/Adopted Sister      Unknown/Adopted Maternal Grandmother      Substance Abuse Maternal Grandfather      Depression Paternal Grandmother       "Unknown/Adopted Paternal Grandfather      Diabetes Sister      Colon Cancer No family hx of      Current Medications:  Current Outpatient Medications   Medication     allopurinol (ZYLOPRIM) 100 MG tablet     aspirin 81 MG tablet     carvedilol (COREG) 12.5 MG tablet     diclofenac (FLECTOR) 1.3 % patch     diclofenac (FLECTOR) 1.3 % patch     diclofenac (FLECTOR) 1.3 % patch     fenofibrate (TRICOR) 145 MG tablet     fenofibrate (TRICOR) 145 MG tablet     LORazepam (ATIVAN) 1 MG tablet     Multiple Vitamins-Minerals (MULTIVITAMIN ADULT) CHEW     olmesartan (BENICAR) 40 MG tablet     order for DME     simvastatin (ZOCOR) 40 MG tablet     tiZANidine (ZANAFLEX) 2 MG tablet     Current Facility-Administered Medications   Medication     dexamethasone (DECADRON) injection 4 mg     dexamethasone (DECADRON) injection 4 mg     lidocaine 1 % injection 1 mL     lidocaine 1 % injection 1 mL       Allergies:  Allergies   Allergen Reactions     No Known Drug Allergies          OBJECTIVE     VITALS  BP (!) 175/88   Pulse 58   Ht 1.676 m (5' 6\")   Wt 93 kg (205 lb)   SpO2 97%   BMI 33.09 kg/m        PHYSICAL EXAMINATION:  GENERAL: Sitting comfortably in chair. No acute distress.   CARDIAC: No apparent chest discomfort. Well perfused.  PULMONARY: Non-labored. No respiratory distress.    NEURO  Strength   Right  Left  Shoulder abduction  5/5  5/5  Elbow flexion   5/5  5/5  Elbow Extension  5/5  5/5  Wrist Extension  5/5  5/5  Wrist Flexion   5/5  5/5  Finger Flexion   5/5  5/5  Finger Abduction  5/5  5/5  Hip Flexion   5/5  5/5  Knee Extension  5/5  3/5  Ankle dorsiflexion  5/5  5/5  Extensor Hallicus Longus 5/5  5/5    Sensation  Sensation intact to light touch bilateral. No difference in pin-prick sensation.    Reflexes   Right  Left  Patellar   2+  2+  Achilles   2+  2+  Clonus    NEG  NEG  Babinski   NEG  NEG    MSK  Inspection  Surgical scar present. Loss of lordosis    Palpation  Most signigicant tenderness over the right " mid-thoracic spine R>L. mild tenderness over the lower lumbar facets No tenderness to palpation of midline spine or SI joint.    Range of motion  Back: Significant limited range of motion with forward flexion. Most significant decreased ROM in mid thoracic region. Pain elicted mostly in mid thoracic back with ROM    Imaging     XR THORACIC SPINE 02/08/2022  IMPRESSION: Alignment is normal. Vertebral body heights normal. No  fractures. Flowing right anterolateral syndesmophytes are noted  consistent with the patient's history of DISH.    XR, LUMBAR SPINE COMPLETE 02/08/2022  IMPRESSION: Five lumbar type vertebrae. Normal alignment. Vertebral  body heights are normal. No fractures. Flowing anterior syndesmophytes  from L2 down to L4. Facet arthropathy at L4-5 and L5-S1. Loss of disc  space height and degenerative endplate spurring at L5-S1.     MRI, LUMBAR SPINE 02/116/2022  IMPRESSION:  1.  Multilevel lumbar spondylosis with mild progression compared to the previous exam from 2009. This is superimposed upon diffuse developmental narrowing of the spinal canal.  2.  At L3-L4, severe trefoil type spinal canal stenosis and mild bilateral neural foraminal stenosis. Impingement upon traversing cauda equina nerve roots.  3.  At L5-S1, solid osseous fusion which has progressed since the previous exam. The left neural foramen is surgically decompressed with mild residual left neural foraminal stenosis.    Assessment and Plan     Emery Zayas is a pleasant 66 year old male who presents today with mid-thoracic back pain. After further examination and review of the patients imaging, his primary source of pain generation seems mostly consistent with axial thoracic back pain secondary to his diffuse idiopathic skeletal hyperostosis.     He additionally likely has contributing chronic low back pain from lumbar spinal stenosis and chronic left L5 radiculopathy but over all he states these pain are separate and the low back  pain is livable compared to pain the mid back.     We discussed further management including no intervention, medication adjustment and further procedures. At his point we would consider epidural steroid injection in the thoracic spine but need to obtain advanced imaging prior.     PLAN:  - MRI thoracic spine ordered.  - Interlaminar epidural steroid injection, right paramedian approach ordered with Dr. Erwin.   - May consider further discussion regarding epidural stimulator in the future pending on his progressiong  - RTC for procedure.    Ready to learn, no apparent learning barriers.  Education provided on treatment plan according to patient's preferred learning style.  Patient verbalizes understanding.   __________________________________  Adam Vitale MD  Physical Medicine and Rehabilitation PGY3  Pager: [phone]    60 minutes spent on the date of the encounter doing chart review, history and exam, documentation and further activities per the note

## 2022-03-16 ENCOUNTER — HOSPITAL ENCOUNTER (OUTPATIENT)
Dept: MRI IMAGING | Facility: CLINIC | Age: 67
Discharge: HOME | End: 2022-03-16
Attending: PHYSICAL MEDICINE & REHABILITATION | Admitting: PHYSICAL MEDICINE & REHABILITATION
Payer: MEDICARE

## 2022-03-16 DIAGNOSIS — M48.10 DIFFUSE IDIOPATHIC SKELETAL HYPEROSTOSIS: ICD-10-CM

## 2022-03-16 PROCEDURE — 72146 MRI CHEST SPINE W/O DYE: CPT

## 2022-03-25 ENCOUNTER — OFFICE VISIT (OUTPATIENT)
Dept: ORTHOPEDICS | Facility: CLINIC | Age: 67
End: 2022-03-25
Payer: MEDICARE

## 2022-03-25 VITALS
OXYGEN SATURATION: 95 % | HEART RATE: 65 BPM | HEIGHT: 66 IN | DIASTOLIC BLOOD PRESSURE: 82 MMHG | WEIGHT: 205 LBS | BODY MASS INDEX: 32.95 KG/M2 | SYSTOLIC BLOOD PRESSURE: 147 MMHG

## 2022-03-25 DIAGNOSIS — M48.07 LUMBOSACRAL SPINAL STENOSIS: ICD-10-CM

## 2022-03-25 DIAGNOSIS — M47.817 FACET ARTHROPATHY, LUMBOSACRAL: ICD-10-CM

## 2022-03-25 DIAGNOSIS — M51.34 DDD (DEGENERATIVE DISC DISEASE), THORACIC: ICD-10-CM

## 2022-03-25 DIAGNOSIS — M51.379 DDD (DEGENERATIVE DISC DISEASE), LUMBOSACRAL: ICD-10-CM

## 2022-03-25 DIAGNOSIS — M48.10 DIFFUSE IDIOPATHIC SKELETAL HYPEROSTOSIS: Primary | ICD-10-CM

## 2022-03-25 PROCEDURE — 99215 OFFICE O/P EST HI 40 MIN: CPT | Performed by: PHYSICAL MEDICINE & REHABILITATION

## 2022-03-25 ASSESSMENT — PAIN SCALES - GENERAL: PAINLEVEL: SEVERE PAIN (7)

## 2022-03-25 NOTE — PROGRESS NOTES
PHYSICAL MEDICINE & REHABILITATION / MEDICAL SPINE        Date:  Mar 25, 2022    Name:  Emery Zayas  YOB: 1955  MRN:  [id number]          CHART REVIEW:  Reviewed 02/02/2022 note from Trish Edwards MD (family medicine).  Mr. Emery Zayas had right low back pain.  Pain was worse with movement.  Sitting caused even more pain.  Low back pain was stabbing at times.  Low back pain has been gradually worsening.  Pain was worsened with ascending and descending stairs.  There was no radiation.  There was no numbness or weakness.  There were no bladder problems.  Referral to orthopedic and spine  was placed.        CHIEF COMPLAINT:  Mid and low back pain.        HISTORY OF PRESENT ILLNESS:  Mr. Emery Zayas is a 66-year-old male.  Mr. Zayas retired at age 53.  In terms of exercise and activity, Mr. Zayas continues to go on 1/2-hour walk every evening.    Mr. Emery Zayas had surgeries on his low back in the 1980s.  He stated he had laminectomies and a lumbar fusion.  He also described an RFA type procedure.  The last of the surgeries on his low back was done in approximately 1984.  Mr. Zayas has had injections into his low back in the past few years, and these have not provided any relief.    Mr. Emery Zayas was last seen in clinic on 03/07/2022.  He returns to clinic today, 03/25/2022.  Mr. Zayas is accompanied to today's appointment by his wife, Ms. John Zayas.    Mr. Emery Zayas continues to note mid back pain.  This back pain is really impacting his life.  Some days, he feels okay, but other days, he is in quite severe pain, and this really limits the things he would like to do and is able to do.  Back pain is currently rated 7/10.  Mid back pain varies from 5/10 to 10/10.  Mid back pain is worsened by reaching, bending, twisting, and too much physical activity.  Lying down improves Mr. Emery Zayas's mid and low back pain.    Chana is not taking any pain medications.        ALLERGIES:  Allergies   Allergen Reactions     No Known Drug Allergies          MEDICATIONS:  Current Outpatient Medications   Medication Sig Dispense Refill     allopurinol (ZYLOPRIM) 100 MG tablet Take 2 tablets (200 mg) by mouth 2 times daily 360 tablet 3     aspirin 81 MG tablet Take 1 tablet by mouth daily.       carvedilol (COREG) 12.5 MG tablet TAKE 1 TABLET BY MOUTH TWICE A  tablet 0     diclofenac (FLECTOR) 1.3 % patch EXTERNALLY APPLY 1 PATCH TOPICALLY 2 TIMES DAILY 60 patch 5     diclofenac (FLECTOR) 1.3 % patch Externally apply 1 patch topically 2 times daily 30 patch 5     diclofenac (FLECTOR) 1.3 % patch Externally apply 1 patch topically 2 times daily 60 patch 5     fenofibrate (TRICOR) 145 MG tablet Take 1 tablet (145 mg) by mouth daily 90 tablet 3     fenofibrate (TRICOR) 145 MG tablet Take 1 tablet (145 mg) by mouth daily 90 tablet 3     LORazepam (ATIVAN) 1 MG tablet Take 1 tablet (1 mg) by mouth every 6 hours as needed for anxiety 10 tablet 0     Multiple Vitamins-Minerals (MULTIVITAMIN ADULT) CHEW Take 1 chew tab by mouth daily       olmesartan (BENICAR) 40 MG tablet Take 1 tablet (40 mg) by mouth daily 90 tablet 1     order for DME Equipment being ordered: blood pressure cuff 1 each 0     simvastatin (ZOCOR) 40 MG tablet Take 1 tablet (40 mg) by mouth At Bedtime 90 tablet 3     tiZANidine (ZANAFLEX) 2 MG tablet TAKE 1 TABLET (2 MG) BY MOUTH NIGHTLY AS NEEDED FOR MUSCLE SPASMS 30 tablet 1         PAST MEDICAL HISTORY:  Past Medical History:   Diagnosis Date     Acute idiopathic gout, unspecified site 8/23/2016     Alcoholism (H)     Treated 1980's, denies any recent problems with withdrawal when skips days drinking.     Anxiety 12/31/2014     Arthritis      Chronic low back pain      Dyspepsia and other specified disorders of function of stomach 7/31/2003     Elevated liver enzymes 12/30/2013     Gout     possible     Herniated  intervertebral disk     thoracic     High blood triglycerides 2013     Hyperlipidemia LDL goal <130 4/15/2013     Hypertension      Hypertensive emergency 2017     Metabolic syndrome 2013     Obesity 4/15/2013     Other chest pain 2003     RBBB (right bundle branch block)     Present for several years         PAST SURGICAL HISTORY:  Past Surgical History:   Procedure Laterality Date     BACK SURGERY       Back surgery x 4       COLONOSCOPY N/A 2020    Procedure: COLONOSCOPY, WITH POLYPECTOMY AND BIOPSY; polypectomies  using jumbo bx forcep;  Surgeon: Sherice Mckeon MD;  Location:  GI     ESOPHAGOSCOPY, GASTROSCOPY, DUODENOSCOPY (EGD), COMBINED      normal     TONSILLECTOMY           FAMILY HISTORY:  Family History   Problem Relation Age of Onset     Hypertension Father      Heart Disease Father         Angioplasty in late 60's or early 70's.     Unknown/Adopted Father      Hypertension Sister      Kidney Disease Sister      Unknown/Adopted Sister      Cancer Mother         lung     Unknown/Adopted Mother      Diabetes Mother      Thyroid Disease Sister      Unknown/Adopted Sister      Unknown/Adopted Maternal Grandmother      Substance Abuse Maternal Grandfather      Depression Paternal Grandmother      Unknown/Adopted Paternal Grandfather      Diabetes Sister      Colon Cancer No family hx of          SOCIAL HISTORY:  Social History     Socioeconomic History     Marital status:      Spouse name: Not on file     Number of children: Not on file     Years of education: Not on file     Highest education level: Not on file   Occupational History     Not on file   Tobacco Use     Smoking status: Former Smoker     Packs/day: 0.00     Quit date: 7/15/1993     Years since quittin.7     Smokeless tobacco: Never Used     Tobacco comment: Quit 20 years ago.   Vaping Use     Vaping Use: Never used   Substance and Sexual Activity     Alcohol use: Yes     Alcohol/week: 7.0 standard  "drinks     Types: 7 Shots of liquor per week     Comment: more than 10-12 rum and cokes or beer a week     Drug use: No     Sexual activity: Yes     Partners: Female   Other Topics Concern     Parent/sibling w/ CABG, MI or angioplasty before 65F 55M? 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     Social Determinants of Health     Financial Resource Strain: Not on file   Food Insecurity: Not on file   Transportation Needs: Not on file   Physical Activity: Not on file   Stress: Not on file   Social Connections: Not on file   Intimate Partner Violence: Not on file   Housing Stability: Not on file         PHYSICAL EXAMINATION:  Vitals:    03/25/22 0837   BP: (!) 147/82   Pulse: 65   SpO2: 95%   Weight: 93 kg (205 lb)   Height: 1.676 m (5' 6\")       GENERAL:  No acute distress.  Pleasant and cooperative.   PSYCH:  Normal mood and affect.  HEAD:  Normocephalic.  SPEECH:  No dysarthria.  EYES:  No scleral icterus.  EARS:  Hearing is intact to spoken voice.  NOSE/MOUTH:  Wearing a face mask.  LUNGS:  No respiratory distress.  No increased work of breathing.        IMAGING:  MRI THORACIC SPINE WITHOUT CONTRAST  3/16/2022 10:09 AM      HISTORY: Diffuse idiopathic skeletal hyperostosis.     TECHNIQUE: Multiplanar multisequence MRI of the thoracic spine without contrast.     COMPARISON: Thoracic spine radiographs dated 2/8/2022.     FINDINGS:  Mild dextroconvex curvature of the thoracic spine, as seen on prior radiographs. Sagittal alignment is normal. Unchanged minimal chronic-appearing anterior wedging of the T11, T12, and L1 vertebral bodies, which may be physiologic. No significant vertebral height loss seen otherwise. Shallow multilevel chronic appearing Schmorl's nodes. No findings to suggest recent compression fracture. Probable small intraosseous hemangioma in the left aspect of the T1 " vertebral body demonstrating T2/STIR hyperintense signal and intrinsic T1 hyperintense signal. There is probably also a small intraosseous hemangioma in the right aspect of the T2 inferior endplate. Minimal scattered degenerative endplate signal changes. Bone marrow signal otherwise appears unremarkable. No spinal cord signal abnormality.     Mild/moderate multilevel degenerative disc height loss, appearing most pronounced in the mid to lower thoracic spine. Facet joints overall appear within normal limits. There is a small right central disc protrusion at T7-T8 indenting the ventral thecal sac and minimally indenting the right ventral aspect of the spinal cord. Probable shallow left central protrusion at T8-T9, and small central disc protrusion at T9-T10. No significant spinal canal stenosis is identified. No significant/high-grade neural foraminal narrowing seen in the thoracic spine. There is a 1.7 cm T2 hyperintense lesion in the superior pole of the right kidney, most likely representing a benign cyst.    IMPRESSION:  1. Multilevel degenerative changes of the thoracic spine, as described.  2. No high-grade spinal canal or neural foraminal stenosis.       EXAM: MR LUMBAR SPINE W/O CONTRAST  LOCATION: [facility]  DATE/TIME: 2/16/2022 4:53 PM     INDICATION:  Diffuse idiopathic skeletal hyperostosis, Facet arthropathy, lumbosacral, DDD (degenerative disc disease), lumbosacral  COMPARISON: MRI lumbar spine 04/27/2009  TECHNIQUE: Routine Lumbar Spine MRI without IV contrast.     FINDINGS:   Nomenclature is based on 5 lumbar type vertebral bodies. Slight lumbar dextrocurvature. Minor chronic anterior vertebral height loss at L1. Small limbus type vertebral fragment along the anterior superior margin of L3 similar to the previous exam. Diffusely heterogeneous marrow signal without pathologic marrow replacement. Minor facet complex edema at L4-L5. Interbody and posterior element ankylosis at  L5-S1 which has progressed compared to the previous exam. Normal distal spinal cord and cauda equina with conus medullaris at mid L1. Diffuse developmental narrowing of the lumbar spinal canal below L2 related to shortened pedicles. Suspected postoperative changes at L5-S1. Posterior paraspinal muscular atrophy. Diffusely heterogeneous marrow signal visualized bony pelvis. Right posterior iliac bone harvest site. Partial ankylosis of the bilateral sacroiliac joints.     T12-L1: Mild loss of disc height and signal. No herniation. Minimal facet arthropathy. No spinal canal or neural foraminal stenosis.      L1-L2: Mild loss of disc height and signal. Slight annular bulge without focal disc herniation. Mild facet arthropathy. No spinal canal or neural foraminal stenosis.     L2-L3: Moderate to advanced loss of disc height and signal. Partial ankylosis across the disc space, greater on the right. Circumferential disc bulge with ventral and right lateral bridging osteophytes. Mild facet arthropathy. Minor spinal canal stenosis. Partial effacement inferior neural foramina with minimal associated neural foraminal stenosis.      L3-L4: Mild loss of disc height and signal. Circumferential disc bulge and endplate spurring, greatest anteriorly. Mild to moderate facet arthropathy and ligamentum flavum thickening. Severe trefoil type spinal canal stenosis. Mild bilateral neural foraminal stenosis.     L4-L5: Disc desiccation with minor loss of disc height. Mild annular bulge. Moderate to advanced facet arthropathy and ligamentum flavum thickening. Minimal spinal canal stenosis. No significant neural foraminal stenosis.     L5-S1: Solid interbody and posterior element ankylosis. Partial left facetectomy. No spinal canal stenosis. No right neural foraminal stenosis. Surgically decompressed left neural foramen similar to the previous exam with mild residual left neural foraminal stenosis.    IMPRESSION:  1.  Multilevel lumbar  spondylosis with mild progression compared to the previous exam from 2009. This is superimposed upon diffuse developmental narrowing of the spinal canal.  2.  At L3-L4, severe trefoil type spinal canal stenosis and mild bilateral neural foraminal stenosis. Impingement upon traversing cauda equina nerve roots.  3.  At L5-S1, solid osseous fusion which has progressed since the previous exam. The left neural foramen is surgically decompressed with mild residual left neural foraminal stenosis.        ASSESSMENT/PLAN:  Mr. Emery Zayas is a 66-year-old male.  He has diffuse idiopathic skeletal hyperostosis.  His biggest pain is his mid back pain.  Mr. Emery Zayas does have some thoracic degenerative disc disease.  Mr. Zayas does have some facet arthropathy more pronounced in the lumbosacral spine.  Discussed diffuse idiopathic skeletal hyperostosis and thoracic degenerative disc disease with MrRae and Ms. Zayas.  Discussed the options of doing nothing/living with it, physical therapy, chiropractic care, oral medications such as gabapentin and pregabalin, thoracic epidural corticosteroid injection, continued physical activity.  Mr. Zayas would like to begin with physical therapy.  Referral to physical therapy was placed.  Mr. Zayas is follow-up in this clinic in 2 months.        Total Time on encounter:  43 minutes were spent on one more or more of the following:  discussion with patient, history, exam, coordinating care, treatment goals, record review, documenting clinical information, and/or data review.      David Vargas MD

## 2022-03-25 NOTE — PATIENT INSTRUCTIONS
Please schedule a follow-up appointment in 2 months.  You can schedule this at the , or you can call [phone].    Please schedule an appointment with Physical Therapy.

## 2022-03-25 NOTE — LETTER
3/25/2022         RE: Emery Zayas  [address] [facility] [address]        Dear Colleague,    Thank you for referring your patient, Emery Zayas, to the [facility]. Please see a copy of my visit note below.    PHYSICAL MEDICINE & REHABILITATION / MEDICAL SPINE        Date:  Mar 25, 2022    Name:  Emery Zayas  YOB: 1955  MRN:  [id number]          CHART REVIEW:  Reviewed 02/02/2022 note from Trish Edwards MD (family medicine).  Mr. Emery Zayas had right low back pain.  Pain was worse with movement.  Sitting caused even more pain.  Low back pain was stabbing at times.  Low back pain has been gradually worsening.  Pain was worsened with ascending and descending stairs.  There was no radiation.  There was no numbness or weakness.  There were no bladder problems.  Referral to orthopedic and spine  was placed.        CHIEF COMPLAINT:  Mid and low back pain.        HISTORY OF PRESENT ILLNESS:  Mr. Emery Zayas is a 66-year-old male.  Mr. Zayas retired at age 53.  In terms of exercise and activity, Mr. Zayas continues to go on 1/2-hour walk every evening.    Mr. Emery Zayas had surgeries on his low back in the 1980s.  He stated he had laminectomies and a lumbar fusion.  He also described an RFA type procedure.  The last of the surgeries on his low back was done in approximately 1984.  Mr. Zayas has had injections into his low back in the past few years, and these have not provided any relief.    Mr. Emery Zayas was last seen in clinic on 03/07/2022.  He returns to clinic today, 03/25/2022.  Mr. Zayas is accompanied to today's appointment by his wife, Ms. John Zayas.    Mr. Emery Zayas continues to note mid back pain.  This back pain is really impacting his life.  Some days, he feels okay, but other days, he is in quite severe pain, and this really limits the things he would like to  do and is able to do.  Back pain is currently rated 7/10.  Mid back pain varies from 5/10 to 10/10.  Mid back pain is worsened by reaching, bending, twisting, and too much physical activity.  Lying down improves Mr. Emery Zayas's mid and low back pain.  Mr. Zayas is not taking any pain medications.        ALLERGIES:  Allergies   Allergen Reactions     No Known Drug Allergies          MEDICATIONS:  Current Outpatient Medications   Medication Sig Dispense Refill     allopurinol (ZYLOPRIM) 100 MG tablet Take 2 tablets (200 mg) by mouth 2 times daily 360 tablet 3     aspirin 81 MG tablet Take 1 tablet by mouth daily.       carvedilol (COREG) 12.5 MG tablet TAKE 1 TABLET BY MOUTH TWICE A  tablet 0     diclofenac (FLECTOR) 1.3 % patch EXTERNALLY APPLY 1 PATCH TOPICALLY 2 TIMES DAILY 60 patch 5     diclofenac (FLECTOR) 1.3 % patch Externally apply 1 patch topically 2 times daily 30 patch 5     diclofenac (FLECTOR) 1.3 % patch Externally apply 1 patch topically 2 times daily 60 patch 5     fenofibrate (TRICOR) 145 MG tablet Take 1 tablet (145 mg) by mouth daily 90 tablet 3     fenofibrate (TRICOR) 145 MG tablet Take 1 tablet (145 mg) by mouth daily 90 tablet 3     LORazepam (ATIVAN) 1 MG tablet Take 1 tablet (1 mg) by mouth every 6 hours as needed for anxiety 10 tablet 0     Multiple Vitamins-Minerals (MULTIVITAMIN ADULT) CHEW Take 1 chew tab by mouth daily       olmesartan (BENICAR) 40 MG tablet Take 1 tablet (40 mg) by mouth daily 90 tablet 1     order for DME Equipment being ordered: blood pressure cuff 1 each 0     simvastatin (ZOCOR) 40 MG tablet Take 1 tablet (40 mg) by mouth At Bedtime 90 tablet 3     tiZANidine (ZANAFLEX) 2 MG tablet TAKE 1 TABLET (2 MG) BY MOUTH NIGHTLY AS NEEDED FOR MUSCLE SPASMS 30 tablet 1         PAST MEDICAL HISTORY:  Past Medical History:   Diagnosis Date     Acute idiopathic gout, unspecified site 8/23/2016     Alcoholism (H)     Treated 1980's, denies any recent problems  with withdrawal when skips days drinking.     Anxiety 12/31/2014     Arthritis      Chronic low back pain      Dyspepsia and other specified disorders of function of stomach 7/31/2003     Elevated liver enzymes 12/30/2013     Gout     possible     Herniated intervertebral disk     thoracic     High blood triglycerides 12/30/2013     Hyperlipidemia LDL goal <130 4/15/2013     Hypertension      Hypertensive emergency 5/5/2017     Metabolic syndrome 12/30/2013     Obesity 4/15/2013     Other chest pain 7/31/2003     RBBB (right bundle branch block)     Present for several years         PAST SURGICAL HISTORY:  Past Surgical History:   Procedure Laterality Date     BACK SURGERY       Back surgery x 4       COLONOSCOPY N/A 1/23/2020    Procedure: COLONOSCOPY, WITH POLYPECTOMY AND BIOPSY; polypectomies  using jumbo bx forcep;  Surgeon: Sherice Mckeon MD;  Location:  GI     ESOPHAGOSCOPY, GASTROSCOPY, DUODENOSCOPY (EGD), COMBINED  2000    normal     TONSILLECTOMY           FAMILY HISTORY:  Family History   Problem Relation Age of Onset     Hypertension Father      Heart Disease Father         Angioplasty in late 60's or early 70's.     Unknown/Adopted Father      Hypertension Sister      Kidney Disease Sister      Unknown/Adopted Sister      Cancer Mother         lung     Unknown/Adopted Mother      Diabetes Mother      Thyroid Disease Sister      Unknown/Adopted Sister      Unknown/Adopted Maternal Grandmother      Substance Abuse Maternal Grandfather      Depression Paternal Grandmother      Unknown/Adopted Paternal Grandfather      Diabetes Sister      Colon Cancer No family hx of          SOCIAL HISTORY:  Social History     Socioeconomic History     Marital status:      Spouse name: Not on file     Number of children: Not on file     Years of education: Not on file     Highest education level: Not on file   Occupational History     Not on file   Tobacco Use     Smoking status: Former Smoker     Packs/day:  "0.00     Quit date: 7/15/1993     Years since quittin.7     Smokeless tobacco: Never Used     Tobacco comment: Quit 20 years ago.   Vaping Use     Vaping Use: Never used   Substance and Sexual Activity     Alcohol use: Yes     Alcohol/week: 7.0 standard drinks     Types: 7 Shots of liquor per week     Comment: more than 10-12 rum and cokes or beer a week     Drug use: No     Sexual activity: Yes     Partners: Female   Other Topics Concern     Parent/sibling w/ CABG, MI or angioplasty before 65F 55M? 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     Social Determinants of Health     Financial Resource Strain: Not on file   Food Insecurity: Not on file   Transportation Needs: Not on file   Physical Activity: Not on file   Stress: Not on file   Social Connections: Not on file   Intimate Partner Violence: Not on file   Housing Stability: Not on file         PHYSICAL EXAMINATION:  Vitals:    22 0837   BP: (!) 147/82   Pulse: 65   SpO2: 95%   Weight: 93 kg (205 lb)   Height: 1.676 m (5' 6\")       GENERAL:  No acute distress.  Pleasant and cooperative.   PSYCH:  Normal mood and affect.  HEAD:  Normocephalic.  SPEECH:  No dysarthria.  EYES:  No scleral icterus.  EARS:  Hearing is intact to spoken voice.  NOSE/MOUTH:  Wearing a face mask.  LUNGS:  No respiratory distress.  No increased work of breathing.        IMAGING:  MRI THORACIC SPINE WITHOUT CONTRAST  3/16/2022 10:09 AM      HISTORY: Diffuse idiopathic skeletal hyperostosis.     TECHNIQUE: Multiplanar multisequence MRI of the thoracic spine without contrast.     COMPARISON: Thoracic spine radiographs dated 2022.     FINDINGS:  Mild dextroconvex curvature of the thoracic spine, as seen on prior radiographs. Sagittal alignment is normal. Unchanged minimal chronic-appearing anterior wedging of the T11, T12, and L1 vertebral " bodies, which may be physiologic. No significant vertebral height loss seen otherwise. Shallow multilevel chronic appearing Schmorl's nodes. No findings to suggest recent compression fracture. Probable small intraosseous hemangioma in the left aspect of the T1 vertebral body demonstrating T2/STIR hyperintense signal and intrinsic T1 hyperintense signal. There is probably also a small intraosseous hemangioma in the right aspect of the T2 inferior endplate. Minimal scattered degenerative endplate signal changes. Bone marrow signal otherwise appears unremarkable. No spinal cord signal abnormality.     Mild/moderate multilevel degenerative disc height loss, appearing most pronounced in the mid to lower thoracic spine. Facet joints overall appear within normal limits. There is a small right central disc protrusion at T7-T8 indenting the ventral thecal sac and minimally indenting the right ventral aspect of the spinal cord. Probable shallow left central protrusion at T8-T9, and small central disc protrusion at T9-T10. No significant spinal canal stenosis is identified. No significant/high-grade neural foraminal narrowing seen in the thoracic spine. There is a 1.7 cm T2 hyperintense lesion in the superior pole of the right kidney, most likely representing a benign cyst.    IMPRESSION:  1. Multilevel degenerative changes of the thoracic spine, as described.  2. No high-grade spinal canal or neural foraminal stenosis.       EXAM: MR LUMBAR SPINE W/O CONTRAST  LOCATION: [facility]  DATE/TIME: 2/16/2022 4:53 PM     INDICATION:  Diffuse idiopathic skeletal hyperostosis, Facet arthropathy, lumbosacral, DDD (degenerative disc disease), lumbosacral  COMPARISON: MRI lumbar spine 04/27/2009  TECHNIQUE: Routine Lumbar Spine MRI without IV contrast.     FINDINGS:   Nomenclature is based on 5 lumbar type vertebral bodies. Slight lumbar dextrocurvature. Minor chronic anterior vertebral height loss at L1. Small  limbus type vertebral fragment along the anterior superior margin of L3 similar to the previous exam. Diffusely heterogeneous marrow signal without pathologic marrow replacement. Minor facet complex edema at L4-L5. Interbody and posterior element ankylosis at L5-S1 which has progressed compared to the previous exam. Normal distal spinal cord and cauda equina with conus medullaris at mid L1. Diffuse developmental narrowing of the lumbar spinal canal below L2 related to shortened pedicles. Suspected postoperative changes at L5-S1. Posterior paraspinal muscular atrophy. Diffusely heterogeneous marrow signal visualized bony pelvis. Right posterior iliac bone harvest site. Partial ankylosis of the bilateral sacroiliac joints.     T12-L1: Mild loss of disc height and signal. No herniation. Minimal facet arthropathy. No spinal canal or neural foraminal stenosis.      L1-L2: Mild loss of disc height and signal. Slight annular bulge without focal disc herniation. Mild facet arthropathy. No spinal canal or neural foraminal stenosis.     L2-L3: Moderate to advanced loss of disc height and signal. Partial ankylosis across the disc space, greater on the right. Circumferential disc bulge with ventral and right lateral bridging osteophytes. Mild facet arthropathy. Minor spinal canal stenosis. Partial effacement inferior neural foramina with minimal associated neural foraminal stenosis.      L3-L4: Mild loss of disc height and signal. Circumferential disc bulge and endplate spurring, greatest anteriorly. Mild to moderate facet arthropathy and ligamentum flavum thickening. Severe trefoil type spinal canal stenosis. Mild bilateral neural foraminal stenosis.     L4-L5: Disc desiccation with minor loss of disc height. Mild annular bulge. Moderate to advanced facet arthropathy and ligamentum flavum thickening. Minimal spinal canal stenosis. No significant neural foraminal stenosis.     L5-S1: Solid interbody and posterior element  ankylosis. Partial left facetectomy. No spinal canal stenosis. No right neural foraminal stenosis. Surgically decompressed left neural foramen similar to the previous exam with mild residual left neural foraminal stenosis.    IMPRESSION:  1.  Multilevel lumbar spondylosis with mild progression compared to the previous exam from 2009. This is superimposed upon diffuse developmental narrowing of the spinal canal.  2.  At L3-L4, severe trefoil type spinal canal stenosis and mild bilateral neural foraminal stenosis. Impingement upon traversing cauda equina nerve roots.  3.  At L5-S1, solid osseous fusion which has progressed since the previous exam. The left neural foramen is surgically decompressed with mild residual left neural foraminal stenosis.        ASSESSMENT/PLAN:  Mr. Emery Zayas is a 66-year-old male.  He has diffuse idiopathic skeletal hyperostosis.  His biggest pain is his mid back pain.  Mr. Emery Zayas does have some thoracic degenerative disc disease.  Mr. Zayas does have some facet arthropathy more pronounced in the lumbosacral spine.  Discussed diffuse idiopathic skeletal hyperostosis and thoracic degenerative disc disease with Mr. and Ms. Zayas.  Discussed the options of doing nothing/living with it, physical therapy, chiropractic care, oral medications such as gabapentin and pregabalin, thoracic epidural corticosteroid injection, continued physical activity.  Mr. Zayas would like to begin with physical therapy.  Referral to physical therapy was placed.  Mr. Zayas is follow-up in this clinic in 2 months.        Total Time on encounter:  43 minutes were spent on one more or more of the following:  discussion with patient, history, exam, coordinating care, treatment goals, record review, documenting clinical information, and/or data review.      David Vargas MD

## 2022-03-27 DIAGNOSIS — G89.29 CHRONIC MIDLINE LOW BACK PAIN WITHOUT SCIATICA: ICD-10-CM

## 2022-03-27 DIAGNOSIS — M54.50 CHRONIC MIDLINE LOW BACK PAIN WITHOUT SCIATICA: ICD-10-CM

## 2022-03-29 RX ORDER — TIZANIDINE 2 MG/1
2 TABLET ORAL
Qty: 30 TABLET | Refills: 1 | Status: SHIPPED | OUTPATIENT
Start: 2022-03-29 | End: 2022-04-07

## 2022-03-31 ENCOUNTER — THERAPY VISIT (OUTPATIENT)
Dept: PHYSICAL THERAPY | Facility: CLINIC | Age: 67
End: 2022-03-31
Payer: MEDICARE

## 2022-03-31 DIAGNOSIS — G89.29 CHRONIC BILATERAL LOW BACK PAIN WITHOUT SCIATICA: ICD-10-CM

## 2022-03-31 DIAGNOSIS — M54.50 CHRONIC BILATERAL LOW BACK PAIN WITHOUT SCIATICA: ICD-10-CM

## 2022-03-31 PROCEDURE — 97161 PT EVAL LOW COMPLEX 20 MIN: CPT | Mod: GP | Performed by: PHYSICAL THERAPIST

## 2022-03-31 PROCEDURE — 97110 THERAPEUTIC EXERCISES: CPT | Mod: GP | Performed by: PHYSICAL THERAPIST

## 2022-03-31 NOTE — PROGRESS NOTES
[facility]    OUTPATIENT Physical Therapy ORTHOPEDIC EVALUATION  PLAN OF TREATMENT FOR OUTPATIENT REHABILITATION  (COMPLETE FOR INITIAL CLAIMS ONLY)  Patient's Last Name, First Name, M.I.  YOB: 1955  Emery Zayas    Provider s Name:  [facility]   Medical Record No.  [id number]   Start of Care Date:  03/31/22   Onset Date:       Type:     _X__PT   ___OT Medical Diagnosis:  No diagnosis found.     Treatment Diagnosis:  LBP        Goals:     03/31/22 0500   Body Part   Goals listed below are for LBP   Goal #1   Goal #1 sitting   Previous Functional Level No restrictions   Current Functional Level Hours patient can sit   Performance level 1 with pain 6/10   STG Target Performance Hours patient will be able to sit   Performance level 1 with pain 3/10   Rationale for personal hygiene;to allow rest from standing;for community transportation   Due date 04/28/22   LTG Target Performance Hours patient will be able to sit   Performance Level 1 with pain 1/10   Rationale for personal hygiene;to allow rest from standing;for community transportation   Due date 05/26/22       Therapy Frequency:  2 x week  Predicted Duration of Therapy Intervention:  8 weeks    Alfredito Gomez, PT                 I CERTIFY THE NEED FOR THESE SERVICES FURNISHED UNDER        THIS PLAN OF TREATMENT AND WHILE UNDER MY CARE     (Physician attestation of this document indicates review and certification of the therapy plan).                       Certification Date From:  03/31/22   Certification Date To:  05/26/22    Referring Provider:  David Vargas    Initial Assessment        See Epic Evaluation SOC Date: 03/31/22

## 2022-03-31 NOTE — PROGRESS NOTES
Physical Therapy Initial Evaluation  Subjective:  The history is provided by the patient. No  was used.   Patient Health History  Emery Zayas being seen for LBP.     Date of Onset: Progressing over jerman past few months.   HPI: Documentation: old age?   Pain is reported as 6/10 on pain scale.  General health as reported by patient is good.  Pertinent medical history includes: chemical dependency, high blood pressure and overweight.   Red flags:  None as reported by patient.  Medical allergies: none.   Surgeries include:  Orthopedic surgery. Other surgery history details: L5-S1 fusion,  laminectomy in 80's.    Current medications:  High blood pressure medication.    Current occupation is retired.                     Therapist Generated HPI Evaluation  Problem details: Pt. complains of bilateral LBP, (R>L) that has been present for years.  Sx's have slowly increased over the past few months.  No known recent trauma.  PT order dated 3/25/22.      .         Type of problem:  Lumbar.    This is a chronic condition.  Condition occurred with:  Insidious onset.    Patient reports pain:  Upper lumbar spine, mid lumbar spine, lower lumbar spine, central lumbar spine, lumbar spine left and lumbar spine right.  Pain is described as aching and sharp and is intermittent.  Pain radiates to:  No radiation. Pain is worse during the day.  Since onset symptoms are gradually worsening.  Associated symptoms:  Loss of motion/stiffness, loss of strength and loss of motion. Symptoms are exacerbated by bending, sitting and standing  and relieved by rest.  Special tests included:  X-ray.  Previous treatment includes physical therapy and surgery. There was moderate improvement following previous treatment.  Barriers include:  None as reported by patient.                        Objective:  Standing Alignment:        Lumbar:  Lordosis decr                Flexibility/Screens:   Positive screens:  ThoracicNegative  screens: Hip                    Lumbar/SI Evaluation  ROM:    AROM Lumbar:   Flexion:          50% with end range tightness  Ext:                    <25% with end range tightness   Side Bend:        Left:  50% with end range tightness    Right:  50% with end range tightness  Rotation:           Left:     Right:   Side Glide:        Left:     Right:           Lumbar Myotomes:  normal                Lumbar Dermtomes:  normal                  Lumbar Palpation:    Tenderness present at Left:    Quadratus Lumborum and Erector Spinae  Tenderness not present at Left:    Vertebral  Tenderness present at Right: Quadratus Lumborum and Erector Spinae  Tenderness not present at Right:  Vertebral      Spinal Segmental Conclusions:     Level: Hypo noted at T10, T11, T12, L1, L2, L3, L4, L5 and S1                                                   General     ROS    Assessment/Plan:    Patient is a 66 year old male with lumbar complaints.    Patient has the following significant findings with corresponding treatment plan.                Diagnosis 1:  LBP  Pain -  self management, education, directional preference exercise and home program  Decreased ROM/flexibility - manual therapy and therapeutic exercise  Decreased joint mobility - manual therapy and therapeutic exercise  Decreased strength - therapeutic exercise and therapeutic activities  Decreased proprioception - neuro re-education and therapeutic activities  Impaired muscle performance - neuro re-education  Decreased function - therapeutic activities    Therapy Evaluation Codes:     1) Clinical presentation characteristics are:   Stable/Uncomplicated.  2) Decision-Making    Low complexity using standardized patient assessment instrument and/or measureable assessment of functional outcome.  Cumulative Therapy Evaluation is: Low complexity.    Previous and current functional limitations:  (See Goal Flow Sheet for this information)    Short term and Long term goals: (See Goal  Flow Sheet for this information)     Communication ability:  Patient appears to be able to clearly communicate and understand verbal and written communication and follow directions correctly.  Treatment Explanation - The following has been discussed with the patient:   RX ordered/plan of care  Anticipated outcomes  Possible risks and side effects  This patient would benefit from PT intervention to resume normal activities.   Rehab potential is good.    Frequency:  2 X week, once daily  Duration:  for 8 weeks  Discharge Plan:  Achieve all LTG.  Independent in home treatment program.  Reach maximal therapeutic benefit.    Please refer to the daily flowsheet for treatment today, total treatment time and time spent performing 1:1 timed codes.

## 2022-04-05 ENCOUNTER — THERAPY VISIT (OUTPATIENT)
Dept: PHYSICAL THERAPY | Facility: CLINIC | Age: 67
End: 2022-04-05
Payer: MEDICARE

## 2022-04-05 DIAGNOSIS — M54.50 CHRONIC BILATERAL LOW BACK PAIN WITHOUT SCIATICA: ICD-10-CM

## 2022-04-05 DIAGNOSIS — G89.29 CHRONIC BILATERAL LOW BACK PAIN WITHOUT SCIATICA: ICD-10-CM

## 2022-04-05 PROCEDURE — 97112 NEUROMUSCULAR REEDUCATION: CPT | Mod: GP | Performed by: PHYSICAL THERAPIST

## 2022-04-05 PROCEDURE — 97110 THERAPEUTIC EXERCISES: CPT | Mod: GP | Performed by: PHYSICAL THERAPIST

## 2022-04-06 DIAGNOSIS — I10 ESSENTIAL HYPERTENSION, BENIGN: ICD-10-CM

## 2022-04-07 RX ORDER — CARVEDILOL 12.5 MG/1
TABLET ORAL
Qty: 180 TABLET | Refills: 0 | Status: SHIPPED | OUTPATIENT
Start: 2022-04-07 | End: 2022-08-09

## 2022-04-07 NOTE — TELEPHONE ENCOUNTER
Routing refill request to provider for review/approval because:  Failing bp    Yenni Colon, RN

## 2022-04-12 ENCOUNTER — THERAPY VISIT (OUTPATIENT)
Dept: PHYSICAL THERAPY | Facility: CLINIC | Age: 67
End: 2022-04-12
Payer: MEDICARE

## 2022-04-12 DIAGNOSIS — M54.50 CHRONIC BILATERAL LOW BACK PAIN WITHOUT SCIATICA: Primary | ICD-10-CM

## 2022-04-12 DIAGNOSIS — G89.29 CHRONIC BILATERAL LOW BACK PAIN WITHOUT SCIATICA: Primary | ICD-10-CM

## 2022-04-12 PROCEDURE — 97110 THERAPEUTIC EXERCISES: CPT | Mod: GP | Performed by: PHYSICAL THERAPIST

## 2022-04-12 PROCEDURE — 97112 NEUROMUSCULAR REEDUCATION: CPT | Mod: GP | Performed by: PHYSICAL THERAPIST

## 2022-04-19 ENCOUNTER — THERAPY VISIT (OUTPATIENT)
Dept: PHYSICAL THERAPY | Facility: CLINIC | Age: 67
End: 2022-04-19
Payer: MEDICARE

## 2022-04-19 DIAGNOSIS — M54.50 CHRONIC BILATERAL LOW BACK PAIN WITHOUT SCIATICA: Primary | ICD-10-CM

## 2022-04-19 DIAGNOSIS — G89.29 CHRONIC BILATERAL LOW BACK PAIN WITHOUT SCIATICA: Primary | ICD-10-CM

## 2022-04-19 PROCEDURE — 97110 THERAPEUTIC EXERCISES: CPT | Mod: GP | Performed by: PHYSICAL THERAPIST

## 2022-04-19 PROCEDURE — 97112 NEUROMUSCULAR REEDUCATION: CPT | Mod: GP | Performed by: PHYSICAL THERAPIST

## 2022-04-20 ENCOUNTER — OFFICE VISIT (OUTPATIENT)
Dept: DERMATOLOGY | Facility: CLINIC | Age: 67
End: 2022-04-20
Payer: MEDICARE

## 2022-04-20 VITALS — HEART RATE: 57 BPM | DIASTOLIC BLOOD PRESSURE: 68 MMHG | SYSTOLIC BLOOD PRESSURE: 122 MMHG | OXYGEN SATURATION: 97 %

## 2022-04-20 DIAGNOSIS — L57.8 SOLAR ELASTOSIS: ICD-10-CM

## 2022-04-20 DIAGNOSIS — I10 ESSENTIAL HYPERTENSION, BENIGN: ICD-10-CM

## 2022-04-20 DIAGNOSIS — L57.0 ACTINIC KERATOSIS: Primary | ICD-10-CM

## 2022-04-20 PROCEDURE — 99214 OFFICE O/P EST MOD 30 MIN: CPT | Performed by: PHYSICIAN ASSISTANT

## 2022-04-20 RX ORDER — CALCIPOTRIENE 50 UG/G
CREAM TOPICAL
Qty: 60 G | Refills: 1 | Status: SHIPPED | OUTPATIENT
Start: 2022-04-20 | End: 2023-02-27

## 2022-04-20 RX ORDER — CARVEDILOL 12.5 MG/1
TABLET ORAL
Qty: 180 TABLET | Refills: 0 | OUTPATIENT
Start: 2022-04-20

## 2022-04-20 RX ORDER — FLUOROURACIL 50 MG/G
CREAM TOPICAL
Qty: 40 G | Refills: 1 | Status: SHIPPED | OUTPATIENT
Start: 2022-04-20 | End: 2023-02-08

## 2022-04-20 NOTE — PROGRESS NOTES
HPI:  Emery Zayas is a 66 year old male patient here today for new spots on  face .  Patient states this has been present for a while.  Has a history of similar spots on face. Patient reports the following symptoms: scaly .  Patient reports the following previous treatments: ln2, efudex with improvement.  Patient reports the following modifying factors: none.  Associated symptoms: none.  Patient has no other skin complaints today.  Remainder of the HPI, Meds, PMH, Allergies, FH, and SH was reviewed in chart.    Pertinent Hx:   No personal or family history of skin cancer    Past Medical History:   Diagnosis Date     Acute idiopathic gout, unspecified site 08/23/2016     Alcoholism (H)     Treated 1980's, denies any recent problems with withdrawal when skips days drinking.     Anxiety 12/31/2014     Arthritis      Chronic low back pain      Dyspepsia and other specified disorders of function of stomach 07/31/2003     Elevated liver enzymes 12/30/2013     Gout     possible     Herniated intervertebral disk     thoracic     High blood triglycerides 12/30/2013     Hyperlipidemia LDL goal <130 04/15/2013     Hypertension      Hypertensive emergency 05/05/2017     Metabolic syndrome 12/30/2013     Obesity 04/15/2013     Other chest pain 07/31/2003     RBBB (right bundle branch block)     Present for several years       Past Surgical History:   Procedure Laterality Date     BACK SURGERY       Back surgery x 4       COLONOSCOPY N/A 1/23/2020    Procedure: COLONOSCOPY, WITH POLYPECTOMY AND BIOPSY; polypectomies  using jumbo bx forcep;  Surgeon: Sherice Mckeon MD;  Location:  GI     ESOPHAGOSCOPY, GASTROSCOPY, DUODENOSCOPY (EGD), COMBINED  2000    normal     TONSILLECTOMY          Family History   Problem Relation Age of Onset     Hypertension Father      Heart Disease Father         Angioplasty in late 60's or early 70's.     Unknown/Adopted Father      Hypertension Sister      Kidney Disease Sister       Unknown/Adopted Sister      Cancer Mother         lung     Unknown/Adopted Mother      Diabetes Mother      Thyroid Disease Sister      Unknown/Adopted Sister      Unknown/Adopted Maternal Grandmother      Substance Abuse Maternal Grandfather      Depression Paternal Grandmother      Unknown/Adopted Paternal Grandfather      Diabetes Sister      Colon Cancer No family hx of        Social History     Socioeconomic History     Marital status:      Spouse name: Not on file     Number of children: Not on file     Years of education: Not on file     Highest education level: Not on file   Occupational History     Not on file   Tobacco Use     Smoking status: Former Smoker     Packs/day: 0.00     Quit date: 7/15/1993     Years since quittin.7     Smokeless tobacco: Never Used     Tobacco comment: Quit 20 years ago.   Vaping Use     Vaping Use: Never used   Substance and Sexual Activity     Alcohol use: Yes     Alcohol/week: 7.0 standard drinks     Types: 7 Shots of liquor per week     Comment: more than 10-12 rum and cokes or beer a week     Drug use: No     Sexual activity: Yes     Partners: Female   Other Topics Concern     Parent/sibling w/ CABG, MI or angioplasty before 65F 55M? 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     Social Determinants of Health     Financial Resource Strain: Not on file   Food Insecurity: Not on file   Transportation Needs: Not on file   Physical Activity: Not on file   Stress: Not on file   Social Connections: Not on file   Intimate Partner Violence: Not on file   Housing Stability: Not on file       Outpatient Encounter Medications as of 2022   Medication Sig Dispense Refill     allopurinol (ZYLOPRIM) 100 MG tablet Take 2 tablets (200 mg) by mouth 2 times daily 360 tablet 3     aspirin 81 MG tablet Take 1 tablet by mouth daily.        calcipotriene (DOVONOX) 0.005 % external cream Apply a pea sized amount to face bid x 2 weeks. 60 g 1     carvedilol (COREG) 12.5 MG tablet TAKE 1 TABLET BY MOUTH TWICE A  tablet 0     fenofibrate (TRICOR) 145 MG tablet Take 1 tablet (145 mg) by mouth daily 90 tablet 3     fenofibrate (TRICOR) 145 MG tablet Take 1 tablet (145 mg) by mouth daily 90 tablet 3     fluorouracil (EFUDEX) 5 % external cream Apply a pea sized amount to face bid x 2 weeks. 40 g 1     LORazepam (ATIVAN) 1 MG tablet Take 1 tablet (1 mg) by mouth every 6 hours as needed for anxiety 10 tablet 0     Multiple Vitamins-Minerals (MULTIVITAMIN ADULT) CHEW Take 1 chew tab by mouth daily       olmesartan (BENICAR) 40 MG tablet Take 1 tablet (40 mg) by mouth daily 90 tablet 1     order for DME Equipment being ordered: blood pressure cuff 1 each 0     simvastatin (ZOCOR) 40 MG tablet Take 1 tablet (40 mg) by mouth At Bedtime 90 tablet 3     tiZANidine (ZANAFLEX) 2 MG tablet Take 1 tablet (2 mg) by mouth nightly as needed for muscle spasms 90 tablet 1     diclofenac (FLECTOR) 1.3 % patch EXTERNALLY APPLY 1 PATCH TOPICALLY 2 TIMES DAILY (Patient not taking: Reported on 4/20/2022) 60 patch 5     diclofenac (FLECTOR) 1.3 % patch Externally apply 1 patch topically 2 times daily (Patient not taking: Reported on 4/20/2022) 30 patch 5     diclofenac (FLECTOR) 1.3 % patch Externally apply 1 patch topically 2 times daily (Patient not taking: Reported on 4/20/2022) 60 patch 5     Facility-Administered Encounter Medications as of 4/20/2022   Medication Dose Route Frequency Provider Last Rate Last Admin     dexamethasone (DECADRON) injection 4 mg  4 mg   Chuck Huynh PA-C   4 mg at 02/03/22 1720     dexamethasone (DECADRON) injection 4 mg  4 mg   Chuck Huynh PA-C   4 mg at 02/03/22 1720     lidocaine 1 % injection 1 mL  1 mL   Chuck Huynh PA-C   1 mL at 02/03/22 1720     lidocaine 1 % injection 1 mL  1 mL   Chuck Huynh  AIXA SHAW   1 mL at 02/03/22 1720       Review Of Systems:  Skin: spot  Eyes: negative  Ears/Nose/Throat: negative  Respiratory: No shortness of breath, dyspnea on exertion, cough, or hemoptysis  Cardiovascular: negative  Gastrointestinal: negative  Genitourinary: negative  Musculoskeletal: negative  Neurologic: negative  Psychiatric: negative  Hematologic/Lymphatic/Immunologic: negative  Endocrine: negative      Objective:     /68   Pulse 57   SpO2 97%   Eyes: Conjunctivae/lids: Normal   ENT: Lips:  Normal  MSK: Normal  Cardiovascular: Peripheral edema none  Pulm: Breathing Normal  Neuro/Psych: Orientation: A/O x 3. Normal; Mood/Affect: Normal, NAD, WDWN  Pt accompanied by: self  Following areas examined: face,  Neck, ears  Ro skin type:ii   Findings:  Rhytides, hypo/hyperpigmentation, and atrophy  Pink gritty macule/s on forehead, temples, cheeks  Assessment and Plan:     1) Actinic keratoses x 11 and solar elastosis  Due to chronic sun exposure.    LN2 for 5 seconds x 2. Discussed AE include hypopigmentation (white spot) and recurrence. Follow up in 2-3 months to recheck lesions. There is a risk of AKs developing into a SCC.   Treatment options include LN2 vs PDT vs Efudex. Pt elected efudex  5 Fluorouracil (Efudex) and dovonox   Apply a pea-sized amount two times a day for 2 weeks to face.  Efudex is a topical chemotherapy medication. Do not use if you are pregnant or have a hypersensitive to to the drug, class, or any components of the medication. Very rare to have any systemic absorption.  Common reactions include and are not limited to:  Application site reaction, pruritis, pain, bleeding, edema, stinging, burning, scaling, crusting, erythema, photosensitivity, and allergic contact dermatitis. Do not let children get into medication and do not let pets eat medication.   Reviewed pertinent charts and labs prior to office visit.   Proper skin care from [facility]:    -Eliminate harsh  soaps as they strip the natural oils from the skin, often resulting in dry itchy skin ( i.e. Dial, Zest, Maltese Spring)  -Use mild soaps such as Cetaphil or Dove Sensitive Skin in the shower. You do not need to use soap on arms, legs, and trunk every time you shower unless visibly soiled.   -Avoid hot or cold showers.  -After showering, lightly dry off and apply moisturizing within 2-3 minutes. This will help trap moisture in the skin.   -Aggressive use of a moisturizer at least 1-2 times a day to the entire body (including -Vanicream, Cetaphil, Aquaphor or Cerave) and moisturize hands after every washing.  -We recommend using moisturizers that come in a tub that needs to be scooped out, not a pump. This has more of an oil base. It will hold moisture in your skin much better than a water base moisturizer. The above recommended are non-pore clogging.         It was a pleasure speaking with Emery Zayas today.       Follow up in 3-4 months

## 2022-04-20 NOTE — LETTER
4/20/2022         RE: Emery Zayas  [address] [facility] [address]        Dear Colleague,    Thank you for referring your patient, Emery Zayas, to the [facility]. Please see a copy of my visit note below.    HPI:  Emery Zayas is a 66 year old male patient here today for new spots on  face .  Patient states this has been present for a while.  Has a history of similar spots on face. Patient reports the following symptoms: scaly .  Patient reports the following previous treatments: ln2, efudex with improvement.  Patient reports the following modifying factors: none.  Associated symptoms: none.  Patient has no other skin complaints today.  Remainder of the HPI, Meds, PMH, Allergies, FH, and SH was reviewed in chart.    Pertinent Hx:   No personal or family history of skin cancer    Past Medical History:   Diagnosis Date     Acute idiopathic gout, unspecified site 08/23/2016     Alcoholism (H)     Treated 1980's, denies any recent problems with withdrawal when skips days drinking.     Anxiety 12/31/2014     Arthritis      Chronic low back pain      Dyspepsia and other specified disorders of function of stomach 07/31/2003     Elevated liver enzymes 12/30/2013     Gout     possible     Herniated intervertebral disk     thoracic     High blood triglycerides 12/30/2013     Hyperlipidemia LDL goal <130 04/15/2013     Hypertension      Hypertensive emergency 05/05/2017     Metabolic syndrome 12/30/2013     Obesity 04/15/2013     Other chest pain 07/31/2003     RBBB (right bundle branch block)     Present for several years       Past Surgical History:   Procedure Laterality Date     BACK SURGERY       Back surgery x 4       COLONOSCOPY N/A 1/23/2020    Procedure: COLONOSCOPY, WITH POLYPECTOMY AND BIOPSY; polypectomies  using jumbo bx forcep;  Surgeon: Sherice Mckeon MD;  Location:  GI     ESOPHAGOSCOPY, GASTROSCOPY, DUODENOSCOPY (EGD), COMBINED  2000     normal     TONSILLECTOMY          Family History   Problem Relation Age of Onset     Hypertension Father      Heart Disease Father         Angioplasty in late 60's or early 70's.     Unknown/Adopted Father      Hypertension Sister      Kidney Disease Sister      Unknown/Adopted Sister      Cancer Mother         lung     Unknown/Adopted Mother      Diabetes Mother      Thyroid Disease Sister      Unknown/Adopted Sister      Unknown/Adopted Maternal Grandmother      Substance Abuse Maternal Grandfather      Depression Paternal Grandmother      Unknown/Adopted Paternal Grandfather      Diabetes Sister      Colon Cancer No family hx of        Social History     Socioeconomic History     Marital status:      Spouse name: Not on file     Number of children: Not on file     Years of education: Not on file     Highest education level: Not on file   Occupational History     Not on file   Tobacco Use     Smoking status: Former Smoker     Packs/day: 0.00     Quit date: 7/15/1993     Years since quittin.7     Smokeless tobacco: Never Used     Tobacco comment: Quit 20 years ago.   Vaping Use     Vaping Use: Never used   Substance and Sexual Activity     Alcohol use: Yes     Alcohol/week: 7.0 standard drinks     Types: 7 Shots of liquor per week     Comment: more than 10-12 rum and cokes or beer a week     Drug use: No     Sexual activity: Yes     Partners: Female   Other Topics Concern     Parent/sibling w/ CABG, MI or angioplasty before 65F 55M? 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     Social Determinants of Health     Financial Resource Strain: Not on file   Food Insecurity: Not on file   Transportation Needs: Not on file   Physical Activity: Not on file   Stress: Not on file   Social Connections: Not on file   Intimate Partner Violence: Not on file   Housing Stability: Not  on file       Outpatient Encounter Medications as of 4/20/2022   Medication Sig Dispense Refill     allopurinol (ZYLOPRIM) 100 MG tablet Take 2 tablets (200 mg) by mouth 2 times daily 360 tablet 3     aspirin 81 MG tablet Take 1 tablet by mouth daily.       calcipotriene (DOVONOX) 0.005 % external cream Apply a pea sized amount to face bid x 2 weeks. 60 g 1     carvedilol (COREG) 12.5 MG tablet TAKE 1 TABLET BY MOUTH TWICE A  tablet 0     fenofibrate (TRICOR) 145 MG tablet Take 1 tablet (145 mg) by mouth daily 90 tablet 3     fenofibrate (TRICOR) 145 MG tablet Take 1 tablet (145 mg) by mouth daily 90 tablet 3     fluorouracil (EFUDEX) 5 % external cream Apply a pea sized amount to face bid x 2 weeks. 40 g 1     LORazepam (ATIVAN) 1 MG tablet Take 1 tablet (1 mg) by mouth every 6 hours as needed for anxiety 10 tablet 0     Multiple Vitamins-Minerals (MULTIVITAMIN ADULT) CHEW Take 1 chew tab by mouth daily       olmesartan (BENICAR) 40 MG tablet Take 1 tablet (40 mg) by mouth daily 90 tablet 1     order for DME Equipment being ordered: blood pressure cuff 1 each 0     simvastatin (ZOCOR) 40 MG tablet Take 1 tablet (40 mg) by mouth At Bedtime 90 tablet 3     tiZANidine (ZANAFLEX) 2 MG tablet Take 1 tablet (2 mg) by mouth nightly as needed for muscle spasms 90 tablet 1     diclofenac (FLECTOR) 1.3 % patch EXTERNALLY APPLY 1 PATCH TOPICALLY 2 TIMES DAILY (Patient not taking: Reported on 4/20/2022) 60 patch 5     diclofenac (FLECTOR) 1.3 % patch Externally apply 1 patch topically 2 times daily (Patient not taking: Reported on 4/20/2022) 30 patch 5     diclofenac (FLECTOR) 1.3 % patch Externally apply 1 patch topically 2 times daily (Patient not taking: Reported on 4/20/2022) 60 patch 5     Facility-Administered Encounter Medications as of 4/20/2022   Medication Dose Route Frequency Provider Last Rate Last Admin     dexamethasone (DECADRON) injection 4 mg  4 mg   Chuck Huynh PA-C   4 mg at 02/03/22 8890      dexamethasone (DECADRON) injection 4 mg  4 mg   Chuck Huynh PA-C   4 mg at 02/03/22 1720     lidocaine 1 % injection 1 mL  1 mL   Chuck Huynh PA-C   1 mL at 02/03/22 1720     lidocaine 1 % injection 1 mL  1 mL   Chuck Huynh PA-C   1 mL at 02/03/22 1720       Review Of Systems:  Skin: spot  Eyes: negative  Ears/Nose/Throat: negative  Respiratory: No shortness of breath, dyspnea on exertion, cough, or hemoptysis  Cardiovascular: negative  Gastrointestinal: negative  Genitourinary: negative  Musculoskeletal: negative  Neurologic: negative  Psychiatric: negative  Hematologic/Lymphatic/Immunologic: negative  Endocrine: negative      Objective:     /68   Pulse 57   SpO2 97%   Eyes: Conjunctivae/lids: Normal   ENT: Lips:  Normal  MSK: Normal  Cardiovascular: Peripheral edema none  Pulm: Breathing Normal  Neuro/Psych: Orientation: A/O x 3. Normal; Mood/Affect: Normal, NAD, WDWN  Pt accompanied by: self  Following areas examined: face,  Neck, ears  Ro skin type:ii   Findings:  Rhytides, hypo/hyperpigmentation, and atrophy  Pink gritty macule/s on forehead, temples, cheeks  Assessment and Plan:     1) Actinic keratoses x 11 and solar elastosis  Due to chronic sun exposure.    LN2 for 5 seconds x 2. Discussed AE include hypopigmentation (white spot) and recurrence. Follow up in 2-3 months to recheck lesions. There is a risk of AKs developing into a SCC.   Treatment options include LN2 vs PDT vs Efudex. Pt elected efudex  5 Fluorouracil (Efudex) and dovonox   Apply a pea-sized amount two times a day for 2 weeks to face.  Efudex is a topical chemotherapy medication. Do not use if you are pregnant or have a hypersensitive to to the drug, class, or any components of the medication. Very rare to have any systemic absorption.  Common reactions include and are not limited to:  Application site reaction, pruritis, pain, bleeding, edema, stinging, burning, scaling, crusting,  erythema, photosensitivity, and allergic contact dermatitis. Do not let children get into medication and do not let pets eat medication.   Reviewed pertinent charts and labs prior to office visit.   Proper skin care from [facility]:    -Eliminate harsh soaps as they strip the natural oils from the skin, often resulting in dry itchy skin ( i.e. Dial, Zest, [place]ese Spring)  -Use mild soaps such as Cetaphil or Dove Sensitive Skin in the shower. You do not need to use soap on arms, legs, and trunk every time you shower unless visibly soiled.   -Avoid hot or cold showers.  -After showering, lightly dry off and apply moisturizing within 2-3 minutes. This will help trap moisture in the skin.   -Aggressive use of a moisturizer at least 1-2 times a day to the entire body (including -Vanicream, Cetaphil, Aquaphor or Cerave) and moisturize hands after every washing.  -We recommend using moisturizers that come in a tub that needs to be scooped out, not a pump. This has more of an oil base. It will hold moisture in your skin much better than a water base moisturizer. The above recommended are non-pore clogging.         It was a pleasure speaking with Emery Zayas today.       Follow up in 3-4 months        Again, thank you for allowing me to participate in the care of your patient.        Sincerely,        Tracy Pate PA-C

## 2022-04-26 ENCOUNTER — THERAPY VISIT (OUTPATIENT)
Dept: PHYSICAL THERAPY | Facility: CLINIC | Age: 67
End: 2022-04-26
Payer: MEDICARE

## 2022-04-26 DIAGNOSIS — M54.50 CHRONIC BILATERAL LOW BACK PAIN WITHOUT SCIATICA: Primary | ICD-10-CM

## 2022-04-26 DIAGNOSIS — G89.29 CHRONIC BILATERAL LOW BACK PAIN WITHOUT SCIATICA: Primary | ICD-10-CM

## 2022-04-26 PROCEDURE — 97110 THERAPEUTIC EXERCISES: CPT | Mod: GP | Performed by: PHYSICAL THERAPIST

## 2022-04-26 PROCEDURE — 97112 NEUROMUSCULAR REEDUCATION: CPT | Mod: GP | Performed by: PHYSICAL THERAPIST

## 2022-05-03 ENCOUNTER — THERAPY VISIT (OUTPATIENT)
Dept: PHYSICAL THERAPY | Facility: CLINIC | Age: 67
End: 2022-05-03
Payer: MEDICARE

## 2022-05-03 DIAGNOSIS — G89.29 CHRONIC BILATERAL LOW BACK PAIN WITHOUT SCIATICA: Primary | ICD-10-CM

## 2022-05-03 DIAGNOSIS — M54.50 CHRONIC BILATERAL LOW BACK PAIN WITHOUT SCIATICA: Primary | ICD-10-CM

## 2022-05-03 PROCEDURE — 97110 THERAPEUTIC EXERCISES: CPT | Mod: GP | Performed by: PHYSICAL THERAPIST

## 2022-05-03 PROCEDURE — 97112 NEUROMUSCULAR REEDUCATION: CPT | Mod: GP | Performed by: PHYSICAL THERAPIST

## 2022-05-12 ENCOUNTER — THERAPY VISIT (OUTPATIENT)
Dept: PHYSICAL THERAPY | Facility: CLINIC | Age: 67
End: 2022-05-12
Payer: MEDICARE

## 2022-05-12 DIAGNOSIS — M54.50 CHRONIC BILATERAL LOW BACK PAIN WITHOUT SCIATICA: Primary | ICD-10-CM

## 2022-05-12 DIAGNOSIS — G89.29 CHRONIC BILATERAL LOW BACK PAIN WITHOUT SCIATICA: Primary | ICD-10-CM

## 2022-05-12 PROCEDURE — 97112 NEUROMUSCULAR REEDUCATION: CPT | Mod: GP | Performed by: PHYSICAL THERAPIST

## 2022-05-12 PROCEDURE — 97110 THERAPEUTIC EXERCISES: CPT | Mod: GP | Performed by: PHYSICAL THERAPIST

## 2022-05-17 ENCOUNTER — THERAPY VISIT (OUTPATIENT)
Dept: PHYSICAL THERAPY | Facility: CLINIC | Age: 67
End: 2022-05-17
Payer: MEDICARE

## 2022-05-17 DIAGNOSIS — M54.50 CHRONIC BILATERAL LOW BACK PAIN WITHOUT SCIATICA: Primary | ICD-10-CM

## 2022-05-17 DIAGNOSIS — G89.29 CHRONIC BILATERAL LOW BACK PAIN WITHOUT SCIATICA: Primary | ICD-10-CM

## 2022-05-17 PROCEDURE — 97112 NEUROMUSCULAR REEDUCATION: CPT | Mod: GP | Performed by: PHYSICAL THERAPIST

## 2022-05-17 PROCEDURE — 97110 THERAPEUTIC EXERCISES: CPT | Mod: GP | Performed by: PHYSICAL THERAPIST

## 2022-05-17 NOTE — PROGRESS NOTES
DISCHARGE REPORT    Progress reporting period is from eval to 5/17/22.       SUBJECTIVE  Subjective changes noted by patient:  .  Subjective: Better.  He feels comfortable going on his own with HEP.  Functioning at 80% of where he wants to be.    Current pain level is  Current Pain level: 3/10.     Previous pain level was   Initial Pain level: 6/10.   Changes in function:  Yes (See Goal flowsheet attached for changes in current functional level)  Adverse reaction to treatment or activity: None    OBJECTIVE  Changes noted in objective findings:  Yes,   Objective: LROM WNL.  Hamstring tightness persits.     ASSESSMENT/PLAN  Updated problem list and treatment plan: Diagnosis 1:  LBP  Pain -  self management, education, directional preference exercise and home program  Decreased strength - therapeutic exercise and therapeutic activities  Impaired muscle performance - neuro re-education  Decreased function - therapeutic activities  STG/LTGs have been met or progress has been made towards goals:  Yes (See Goal flow sheet completed today.)  Assessment of Progress: The patient's condition is improving.  The patient's condition has potential to improve.  Self Management Plans:  Patient has been instructed in a home treatment program.  Patient is independent in a home treatment program.  I have re-evaluated this patient and find that the nature, scope, duration and intensity of the therapy is appropriate for the medical condition of the patient.      Recommendations:  This patient is ready to be discharged from therapy and continue their home treatment program.    Please refer to the daily flowsheet for treatment today, total treatment time and time spent performing 1:1 timed codes.

## 2022-05-24 ENCOUNTER — OFFICE VISIT (OUTPATIENT)
Dept: ORTHOPEDICS | Facility: CLINIC | Age: 67
End: 2022-05-24
Payer: MEDICARE

## 2022-05-24 VITALS
HEART RATE: 54 BPM | BODY MASS INDEX: 32.95 KG/M2 | DIASTOLIC BLOOD PRESSURE: 82 MMHG | HEIGHT: 66 IN | OXYGEN SATURATION: 96 % | WEIGHT: 205 LBS | SYSTOLIC BLOOD PRESSURE: 162 MMHG

## 2022-05-24 DIAGNOSIS — M48.10 DIFFUSE IDIOPATHIC SKELETAL HYPEROSTOSIS: Primary | ICD-10-CM

## 2022-05-24 DIAGNOSIS — M51.34 DDD (DEGENERATIVE DISC DISEASE), THORACIC: ICD-10-CM

## 2022-05-24 DIAGNOSIS — M47.817 FACET ARTHROPATHY, LUMBOSACRAL: ICD-10-CM

## 2022-05-24 DIAGNOSIS — M51.379 DDD (DEGENERATIVE DISC DISEASE), LUMBOSACRAL: ICD-10-CM

## 2022-05-24 PROCEDURE — 99214 OFFICE O/P EST MOD 30 MIN: CPT | Performed by: PHYSICAL MEDICINE & REHABILITATION

## 2022-05-24 ASSESSMENT — PAIN SCALES - GENERAL: PAINLEVEL: MODERATE PAIN (5)

## 2022-05-24 NOTE — PROGRESS NOTES
"PHYSICAL MEDICINE & REHABILITATION / MEDICAL SPINE        Date:  May 24, 2022    Name:  Emery Zayas  YOB: 1955  MRN:  [id number]          CHART REVIEW:  Reviewed 02/02/2022 note from Trish Edwards MD (family medicine).  Mr. Emery Zayas had right low back pain.  Pain was worse with movement.  Sitting caused even more pain.  Low back pain was stabbing at times.  Low back pain has been gradually worsening.  Pain was worsened with ascending and descending stairs.  There was no radiation.  There was no numbness or weakness.  There were no bladder problems.  Referral to orthopedic and spine  was placed.        CHIEF COMPLAINT:  Mid back pain.        HISTORY OF PRESENT ILLNESS:  Mr. Emery Zayas is a 66-year-old male.  Mr. Zayas retired at age 53.     Mr. Emery Zayas had surgeries on his low back in the 1980s.  He stated he had laminectomies and a lumbar fusion.  He also described an RFA type procedure.  The last of the surgeries on his low back was done in approximately 1984.  Mr. Zayas has had injections into his low back in the past few years, and these have not provided any relief.    Mr. Emery Zayas was last seen in this clinic note 3/25/2022.  He returns to clinic today, 05/24/2022.  He is accompanied to today's appointment by his wife, Ms. John Zayas.    Mr. Emery Zayas states that he has been discharged from physical therapy.  He continues to do the home exercise program from physical therapy 4-5 times per week.  Mr. Zayas does find that the home exercise program from physical therapy is helping.  Mr. Zayas found physical therapy beneficial.  Mr. Zayas noted that he seemed to plateau with physical therapy.    Mr. Zayas continues to note mid back pain, right greater than left.  Pain is constant and described as \"steady ache.\".  At times, pain becomes \"shocking, stabbing.\"  There is no radiation of his mid back pain.  Pain " occurs in the lower thoracic back and will extend into the upper thoracic back.  There is no radiation of the pain.  Mid back pain seems to worsen throughout the day.  Mid back pain is worsened by all movements.  Coughing and sneezing worsen Mr. Emery Zayas's nid back pain.  Mid back pain is currently rated as 5/10.  Mid back pain varies from 3/10 to 10/10.    . Emery Zayas notes that his left lower extremity has always been weaker since his back surgeries.  He notes that his left lower extremity fatigues first.        ALLERGIES:  Allergies   Allergen Reactions     No Known Drug Allergies          MEDICATIONS:  Current Outpatient Medications   Medication Sig Dispense Refill     allopurinol (ZYLOPRIM) 100 MG tablet Take 2 tablets (200 mg) by mouth 2 times daily 360 tablet 3     aspirin 81 MG tablet Take 1 tablet by mouth daily.       calcipotriene (DOVONOX) 0.005 % external cream Apply a pea sized amount to face bid x 2 weeks. 60 g 1     carvedilol (COREG) 12.5 MG tablet TAKE 1 TABLET BY MOUTH TWICE A  tablet 0     diclofenac (FLECTOR) 1.3 % patch EXTERNALLY APPLY 1 PATCH TOPICALLY 2 TIMES DAILY (Patient not taking: Reported on 4/20/2022) 60 patch 5     diclofenac (FLECTOR) 1.3 % patch Externally apply 1 patch topically 2 times daily (Patient not taking: Reported on 4/20/2022) 30 patch 5     diclofenac (FLECTOR) 1.3 % patch Externally apply 1 patch topically 2 times daily (Patient not taking: Reported on 4/20/2022) 60 patch 5     fenofibrate (TRICOR) 145 MG tablet Take 1 tablet (145 mg) by mouth daily 90 tablet 3     fenofibrate (TRICOR) 145 MG tablet Take 1 tablet (145 mg) by mouth daily 90 tablet 3     fluorouracil (EFUDEX) 5 % external cream Apply a pea sized amount to face bid x 2 weeks. 40 g 1     LORazepam (ATIVAN) 1 MG tablet Take 1 tablet (1 mg) by mouth every 6 hours as needed for anxiety 10 tablet 0     Multiple Vitamins-Minerals (MULTIVITAMIN ADULT) CHEW Take 1 chew tab by mouth  daily       olmesartan (BENICAR) 40 MG tablet Take 1 tablet (40 mg) by mouth daily 90 tablet 1     order for DME Equipment being ordered: blood pressure cuff 1 each 0     simvastatin (ZOCOR) 40 MG tablet Take 1 tablet (40 mg) by mouth At Bedtime 90 tablet 3     tiZANidine (ZANAFLEX) 2 MG tablet Take 1 tablet (2 mg) by mouth nightly as needed for muscle spasms 90 tablet 1         PAST MEDICAL HISTORY:  Past Medical History:   Diagnosis Date     Acute idiopathic gout, unspecified site 08/23/2016     Alcoholism (H)     Treated 1980's, denies any recent problems with withdrawal when skips days drinking.     Anxiety 12/31/2014     Arthritis      Chronic low back pain      Dyspepsia and other specified disorders of function of stomach 07/31/2003     Elevated liver enzymes 12/30/2013     Gout     possible     Herniated intervertebral disk     thoracic     High blood triglycerides 12/30/2013     Hyperlipidemia LDL goal <130 04/15/2013     Hypertension      Hypertensive emergency 05/05/2017     Metabolic syndrome 12/30/2013     Obesity 04/15/2013     Other chest pain 07/31/2003     RBBB (right bundle branch block)     Present for several years         PAST SURGICAL HISTORY:  Past Surgical History:   Procedure Laterality Date     BACK SURGERY       Back surgery x 4       COLONOSCOPY N/A 1/23/2020    Procedure: COLONOSCOPY, WITH POLYPECTOMY AND BIOPSY; polypectomies  using jumbo bx forcep;  Surgeon: Sherice Mckeon MD;  Location:  GI     ESOPHAGOSCOPY, GASTROSCOPY, DUODENOSCOPY (EGD), COMBINED  2000    normal     TONSILLECTOMY           FAMILY HISTORY:  Family History   Problem Relation Age of Onset     Hypertension Father      Heart Disease Father         Angioplasty in late 60's or early 70's.     Unknown/Adopted Father      Hypertension Sister      Kidney Disease Sister      Unknown/Adopted Sister      Cancer Mother         lung     Unknown/Adopted Mother      Diabetes Mother      Thyroid Disease Sister       "Unknown/Adopted Sister      Unknown/Adopted Maternal Grandmother      Substance Abuse Maternal Grandfather      Depression Paternal Grandmother      Unknown/Adopted Paternal Grandfather      Diabetes Sister      Colon Cancer No family hx of          SOCIAL HISTORY:  Social History     Socioeconomic History     Marital status:      Spouse name: Not on file     Number of children: Not on file     Years of education: Not on file     Highest education level: Not on file   Occupational History     Not on file   Tobacco Use     Smoking status: Former Smoker     Packs/day: 0.00     Quit date: 7/15/1993     Years since quittin.8     Smokeless tobacco: Never Used     Tobacco comment: Quit 20 years ago.   Vaping Use     Vaping Use: Never used   Substance and Sexual Activity     Alcohol use: Yes     Alcohol/week: 7.0 standard drinks     Types: 7 Shots of liquor per week     Comment: more than 10-12 rum and cokes or beer a week     Drug use: No     Sexual activity: Yes     Partners: Female   Other Topics Concern     Parent/sibling w/ CABG, MI or angioplasty before 65F 55M? 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     Social Determinants of Health     Financial Resource Strain: Not on file   Food Insecurity: Not on file   Transportation Needs: Not on file   Physical Activity: Not on file   Stress: Not on file   Social Connections: Not on file   Intimate Partner Violence: Not on file   Housing Stability: Not on file         PHYSICAL EXAMINATION:  Vitals:    22 0839   BP: (!) 162/82   Pulse: 54   SpO2: 96%   Weight: 93 kg (205 lb)   Height: 1.676 m (5' 6\")       GENERAL:  No acute distress.  Pleasant and cooperative.   PSYCH:  Normal mood and affect.  HEAD:  Normocephalic.  SPEECH:  No dysarthria.  EYES:  No scleral icterus.  EARS:  Hearing is intact to spoken voice.  NOSE/MOUTH:  " Wearing a face mask.  LUNGS:  No respiratory distress.  No increased work of breathing.        IMAGING:  MRI THORACIC SPINE WITHOUT CONTRAST  3/16/2022 10:09 AM      HISTORY: Diffuse idiopathic skeletal hyperostosis.     TECHNIQUE: Multiplanar multisequence MRI of the thoracic spine without contrast.     COMPARISON: Thoracic spine radiographs dated 2/8/2022.     FINDINGS:  Mild dextroconvex curvature of the thoracic spine, as seen on prior radiographs. Sagittal alignment is normal. Unchanged minimal chronic-appearing anterior wedging of the T11, T12, and L1 vertebral bodies, which may be physiologic. No significant vertebral height loss seen otherwise. Shallow multilevel chronic appearing Schmorl's nodes. No findings to suggest recent compression fracture. Probable small intraosseous hemangioma in the left aspect of the T1 vertebral body demonstrating T2/STIR hyperintense signal and intrinsic T1 hyperintense signal. There is probably also a small intraosseous hemangioma in the right aspect of the T2 inferior endplate. Minimal scattered degenerative endplate signal changes. Bone marrow signal otherwise appears unremarkable. No spinal cord signal abnormality.     Mild/moderate multilevel degenerative disc height loss, appearing most pronounced in the mid to lower thoracic spine. Facet joints overall appear within normal limits. There is a small right central disc protrusion at T7-T8 indenting the ventral thecal sac and minimally indenting the right ventral aspect of the spinal cord. Probable shallow left central protrusion at T8-T9, and small central disc protrusion at T9-T10. No significant spinal canal stenosis is identified. No significant/high-grade neural foraminal narrowing seen in the thoracic spine. There is a 1.7 cm T2 hyperintense lesion in the superior pole of the right kidney, most likely representing a benign cyst.     IMPRESSION:  1. Multilevel degenerative changes of the thoracic spine, as  described.  2. No high-grade spinal canal or neural foraminal stenosis.      I reviewed x-rays of the thoracic spine from 02/08/2022.  Flowing anterolateral syndesmophytes were noted consistent with diffuse idiopathic skeletal hyperostosis.        ASSESSMENT/PLAN:  Mr. Emery Zayas is a 66-year-old male.  He has diffuse idiopathic skeletal hyperostosis.  Mr. Zayas does have some thoracic degenerative disc disease, lumbosacral facet arthropathy, lumbosacral degenerative disc disease.  However, Mr. Zayas's mid back pain is most consistent with diffuse idiopathic skeletal hyperostosis.  Discussed diagnoses, pathophysiologies, and treatment options with Mr. Zayas.  Discussed the options of doing nothing/living with it, physical therapy, chiropractic care, oral medications such as gabapentin and pregabalin, acupuncture, thoracic epidural corticosteroid injection.  Mr. Zayas is being referred to chiropractic medicine.  He is to continue with a home exercise program from physical therapy.  He may follow-up with physical therapy as needed.  Mr. Zayas is to follow-up in this clinic in 2 to 3 months.        Total Time on encounter:  33 minutes were spent on one more or more of the following:  discussion with patient, history, exam, coordinating care, treatment goals, record review, documenting clinical information, and/or data review.      David Vargas MD

## 2022-05-24 NOTE — LETTER
5/24/2022         RE: Emery Zayas  [address] [place] [facility] [address]        Dear Colleague,    Thank you for referring your patient, Emery Zayas, to the [facility]. Please see a copy of my visit note below.    PHYSICAL MEDICINE & REHABILITATION / MEDICAL SPINE        Date:  May 24, 2022    Name:  Emery Zayas  YOB: 1955  MRN:  [id number]          CHART REVIEW:  Reviewed 02/02/2022 note from Trish Edwards MD (family medicine).  Mr. Emery Zayas had right low back pain.  Pain was worse with movement.  Sitting caused even more pain.  Low back pain was stabbing at times.  Low back pain has been gradually worsening.  Pain was worsened with ascending and descending stairs.  There was no radiation.  There was no numbness or weakness.  There were no bladder problems.  Referral to orthopedic and spine  was placed.        CHIEF COMPLAINT:  Mid back pain.        HISTORY OF PRESENT ILLNESS:  Mr. Emery Zayas is a 66-year-old male.  Mr. Zayas retired at age 53.     Mr. Emery Zayas had surgeries on his low back in the 1980s.  He stated he had laminectomies and a lumbar fusion.  He also described an RFA type procedure.  The last of the surgeries on his low back was done in approximately 1984.  Mr. Zayas has had injections into his low back in the past few years, and these have not provided any relief.    Mr. Emery Zayas was last seen in this clinic note 3/25/2022.  He returns to clinic today, 05/24/2022.  He is accompanied to today's appointment by his wife, Ms. John Zayas.    Mr. Emery Zayas states that he has been discharged from physical therapy.  He continues to do the home exercise program from physical therapy 4-5 times per week.  Mr. Zayas does find that the home exercise program from physical therapy is helping.  Mr. Zayas found physical therapy beneficial.  Mr. Zayas noted that  "he seemed to plateau with physical therapy.    Mr. Zayas continues to note mid back pain, right greater than left.  Pain is constant and described as \"steady ache.\".  At times, pain becomes \"shocking, stabbing.\"  There is no radiation of his mid back pain.  Pain occurs in the lower thoracic back and will extend into the upper thoracic back.  There is no radiation of the pain.  Mid back pain seems to worsen throughout the day.  Mid back pain is worsened by all movements.  Coughing and sneezing worsen Mr. Emery Zayas's nid back pain.  Mid back pain is currently rated as 5/10.  Mid back pain varies from 3/10 to 10/10.    Mr. Emery Zayas notes that his left lower extremity has always been weaker since his back surgeries.  He notes that his left lower extremity fatigues first.        ALLERGIES:  Allergies   Allergen Reactions     No Known Drug Allergies          MEDICATIONS:  Current Outpatient Medications   Medication Sig Dispense Refill     allopurinol (ZYLOPRIM) 100 MG tablet Take 2 tablets (200 mg) by mouth 2 times daily 360 tablet 3     aspirin 81 MG tablet Take 1 tablet by mouth daily.       calcipotriene (DOVONOX) 0.005 % external cream Apply a pea sized amount to face bid x 2 weeks. 60 g 1     carvedilol (COREG) 12.5 MG tablet TAKE 1 TABLET BY MOUTH TWICE A  tablet 0     diclofenac (FLECTOR) 1.3 % patch EXTERNALLY APPLY 1 PATCH TOPICALLY 2 TIMES DAILY (Patient not taking: Reported on 4/20/2022) 60 patch 5     diclofenac (FLECTOR) 1.3 % patch Externally apply 1 patch topically 2 times daily (Patient not taking: Reported on 4/20/2022) 30 patch 5     diclofenac (FLECTOR) 1.3 % patch Externally apply 1 patch topically 2 times daily (Patient not taking: Reported on 4/20/2022) 60 patch 5     fenofibrate (TRICOR) 145 MG tablet Take 1 tablet (145 mg) by mouth daily 90 tablet 3     fenofibrate (TRICOR) 145 MG tablet Take 1 tablet (145 mg) by mouth daily 90 tablet 3     fluorouracil (EFUDEX) 5 % " external cream Apply a pea sized amount to face bid x 2 weeks. 40 g 1     LORazepam (ATIVAN) 1 MG tablet Take 1 tablet (1 mg) by mouth every 6 hours as needed for anxiety 10 tablet 0     Multiple Vitamins-Minerals (MULTIVITAMIN ADULT) CHEW Take 1 chew tab by mouth daily       olmesartan (BENICAR) 40 MG tablet Take 1 tablet (40 mg) by mouth daily 90 tablet 1     order for DME Equipment being ordered: blood pressure cuff 1 each 0     simvastatin (ZOCOR) 40 MG tablet Take 1 tablet (40 mg) by mouth At Bedtime 90 tablet 3     tiZANidine (ZANAFLEX) 2 MG tablet Take 1 tablet (2 mg) by mouth nightly as needed for muscle spasms 90 tablet 1         PAST MEDICAL HISTORY:  Past Medical History:   Diagnosis Date     Acute idiopathic gout, unspecified site 08/23/2016     Alcoholism (H)     Treated 1980's, denies any recent problems with withdrawal when skips days drinking.     Anxiety 12/31/2014     Arthritis      Chronic low back pain      Dyspepsia and other specified disorders of function of stomach 07/31/2003     Elevated liver enzymes 12/30/2013     Gout     possible     Herniated intervertebral disk     thoracic     High blood triglycerides 12/30/2013     Hyperlipidemia LDL goal <130 04/15/2013     Hypertension      Hypertensive emergency 05/05/2017     Metabolic syndrome 12/30/2013     Obesity 04/15/2013     Other chest pain 07/31/2003     RBBB (right bundle branch block)     Present for several years         PAST SURGICAL HISTORY:  Past Surgical History:   Procedure Laterality Date     BACK SURGERY       Back surgery x 4       COLONOSCOPY N/A 1/23/2020    Procedure: COLONOSCOPY, WITH POLYPECTOMY AND BIOPSY; polypectomies  using jumbo bx forcep;  Surgeon: Sherice Mckeon MD;  Location:  GI     ESOPHAGOSCOPY, GASTROSCOPY, DUODENOSCOPY (EGD), COMBINED  2000    normal     TONSILLECTOMY           FAMILY HISTORY:  Family History   Problem Relation Age of Onset     Hypertension Father      Heart Disease Father          Angioplasty in late 60's or early 70's.     Unknown/Adopted Father      Hypertension Sister      Kidney Disease Sister      Unknown/Adopted Sister      Cancer Mother         lung     Unknown/Adopted Mother      Diabetes Mother      Thyroid Disease Sister      Unknown/Adopted Sister      Unknown/Adopted Maternal Grandmother      Substance Abuse Maternal Grandfather      Depression Paternal Grandmother      Unknown/Adopted Paternal Grandfather      Diabetes Sister      Colon Cancer No family hx of          SOCIAL HISTORY:  Social History     Socioeconomic History     Marital status:      Spouse name: Not on file     Number of children: Not on file     Years of education: Not on file     Highest education level: Not on file   Occupational History     Not on file   Tobacco Use     Smoking status: Former Smoker     Packs/day: 0.00     Quit date: 7/15/1993     Years since quittin.8     Smokeless tobacco: Never Used     Tobacco comment: Quit 20 years ago.   Vaping Use     Vaping Use: Never used   Substance and Sexual Activity     Alcohol use: Yes     Alcohol/week: 7.0 standard drinks     Types: 7 Shots of liquor per week     Comment: more than 10-12 rum and cokes or beer a week     Drug use: No     Sexual activity: Yes     Partners: Female   Other Topics Concern     Parent/sibling w/ CABG, MI or angioplasty before 65F 55M? 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     Social Determinants of Health     Financial Resource Strain: Not on file   Food Insecurity: Not on file   Transportation Needs: Not on file   Physical Activity: Not on file   Stress: Not on file   Social Connections: Not on file   Intimate Partner Violence: Not on file   Housing Stability: Not on file         PHYSICAL EXAMINATION:  Vitals:    22 0839   BP: (!) 162/82   Pulse: 54   SpO2: 96%   Weight: 93 kg  "(205 lb)   Height: 1.676 m (5' 6\")       GENERAL:  No acute distress.  Pleasant and cooperative.   PSYCH:  Normal mood and affect.  HEAD:  Normocephalic.  SPEECH:  No dysarthria.  EYES:  No scleral icterus.  EARS:  Hearing is intact to spoken voice.  NOSE/MOUTH:  Wearing a face mask.  LUNGS:  No respiratory distress.  No increased work of breathing.        IMAGING:  MRI THORACIC SPINE WITHOUT CONTRAST  3/16/2022 10:09 AM      HISTORY: Diffuse idiopathic skeletal hyperostosis.     TECHNIQUE: Multiplanar multisequence MRI of the thoracic spine without contrast.     COMPARISON: Thoracic spine radiographs dated 2/8/2022.     FINDINGS:  Mild dextroconvex curvature of the thoracic spine, as seen on prior radiographs. Sagittal alignment is normal. Unchanged minimal chronic-appearing anterior wedging of the T11, T12, and L1 vertebral bodies, which may be physiologic. No significant vertebral height loss seen otherwise. Shallow multilevel chronic appearing Schmorl's nodes. No findings to suggest recent compression fracture. Probable small intraosseous hemangioma in the left aspect of the T1 vertebral body demonstrating T2/STIR hyperintense signal and intrinsic T1 hyperintense signal. There is probably also a small intraosseous hemangioma in the right aspect of the T2 inferior endplate. Minimal scattered degenerative endplate signal changes. Bone marrow signal otherwise appears unremarkable. No spinal cord signal abnormality.     Mild/moderate multilevel degenerative disc height loss, appearing most pronounced in the mid to lower thoracic spine. Facet joints overall appear within normal limits. There is a small right central disc protrusion at T7-T8 indenting the ventral thecal sac and minimally indenting the right ventral aspect of the spinal cord. Probable shallow left central protrusion at T8-T9, and small central disc protrusion at T9-T10. No significant spinal canal stenosis is identified. No significant/high-grade " neural foraminal narrowing seen in the thoracic spine. There is a 1.7 cm T2 hyperintense lesion in the superior pole of the right kidney, most likely representing a benign cyst.     IMPRESSION:  1. Multilevel degenerative changes of the thoracic spine, as described.  2. No high-grade spinal canal or neural foraminal stenosis.      I reviewed x-rays of the thoracic spine from 02/08/2022.  Flowing anterolateral syndesmophytes were noted consistent with diffuse idiopathic skeletal hyperostosis.        ASSESSMENT/PLAN:  Mr. Emery Zayas is a 66-year-old male.  He has diffuse idiopathic skeletal hyperostosis.  Mr. Zayas does have some thoracic degenerative disc disease, lumbosacral facet arthropathy, lumbosacral degenerative disc disease.  However, Mr. Zayas's mid back pain is most consistent with diffuse idiopathic skeletal hyperostosis.  Discussed diagnoses, pathophysiologies, and treatment options with Mr. Zayas.  Discussed the options of doing nothing/living with it, physical therapy, chiropractic care, oral medications such as gabapentin and pregabalin, acupuncture, thoracic epidural corticosteroid injection.  Mr. Zayas is being referred to chiropractic medicine.  He is to continue with a home exercise program from physical therapy.  He may follow-up with physical therapy as needed.  Mr. Zayas is to follow-up in this clinic in 2 to 3 months.        Total Time on encounter:  33 minutes were spent on one more or more of the following:  discussion with patient, history, exam, coordinating care, treatment goals, record review, documenting clinical information, and/or data review.      David Vargas MD

## 2022-05-24 NOTE — PATIENT INSTRUCTIONS
Please schedule a follow-up appointment in 2-3 months.  You can schedule this at the , or you can call [phone].      Please continue with Physical Therapy as needed.      Please schedule an appointment with Chiropractic Medicine.

## 2022-08-07 DIAGNOSIS — I10 ESSENTIAL HYPERTENSION, BENIGN: ICD-10-CM

## 2022-08-09 RX ORDER — CARVEDILOL 12.5 MG/1
TABLET ORAL
Qty: 180 TABLET | Refills: 0 | Status: SHIPPED | OUTPATIENT
Start: 2022-08-09 | End: 2022-11-04

## 2022-08-22 ENCOUNTER — MYC MEDICAL ADVICE (OUTPATIENT)
Dept: ORTHOPEDICS | Facility: CLINIC | Age: 67
End: 2022-08-22

## 2022-08-22 NOTE — TELEPHONE ENCOUNTER
To: Oziel Zayas      From: David Valenzuela MD      Created: 8/22/2022 5:49 PM        Mr. Emery Zayas,     If you contact medical records, they can provide you with a CD of the images as well as the radiology reports.     David Valenzuela MD            [facility]      From: Oziel Zayas      Created: 8/22/2022 10:00 AM        *-*-*This message was handled on 8/22/2022 5:49 PM by DAVID VALENZUELA K*-*-*    I would like to  a copy of my latest ct scan or xray that you have on file. Would you please let me know when and where I can pick these up.

## 2022-10-15 ENCOUNTER — HEALTH MAINTENANCE LETTER (OUTPATIENT)
Age: 67
End: 2022-10-15

## 2022-10-15 DIAGNOSIS — I10 ESSENTIAL HYPERTENSION, BENIGN: ICD-10-CM

## 2022-10-18 NOTE — TELEPHONE ENCOUNTER
Routing refill request to provider for review/approval because:  BP failed  Emi Valenzuela RN, BSN  [facility]

## 2022-10-20 RX ORDER — OLMESARTAN MEDOXOMIL 40 MG/1
TABLET ORAL
Qty: 90 TABLET | Refills: 0 | Status: SHIPPED | OUTPATIENT
Start: 2022-10-20 | End: 2022-11-15

## 2022-10-25 ENCOUNTER — MYC REFILL (OUTPATIENT)
Dept: FAMILY MEDICINE | Facility: CLINIC | Age: 67
End: 2022-10-25

## 2022-10-25 DIAGNOSIS — I10 ESSENTIAL HYPERTENSION, BENIGN: ICD-10-CM

## 2022-10-26 RX ORDER — OLMESARTAN MEDOXOMIL 40 MG/1
40 TABLET ORAL DAILY
Qty: 90 TABLET | Refills: 0
Start: 2022-10-26

## 2022-11-04 DIAGNOSIS — I10 ESSENTIAL HYPERTENSION, BENIGN: ICD-10-CM

## 2022-11-04 RX ORDER — CARVEDILOL 12.5 MG/1
TABLET ORAL
Qty: 60 TABLET | Refills: 0 | Status: SHIPPED | OUTPATIENT
Start: 2022-11-04 | End: 2022-11-15

## 2022-11-04 NOTE — TELEPHONE ENCOUNTER
Routing refill request to provider for review/approval because:  BP not at goal  Next 5 appointments (look out 90 days)      Nov 07, 2022  2:30 PM  (Arrive by 2:10 PM)  Provider Visit with SILVIANO Chao CNP  [facility] ([facility] [address] [facility] [address] [phone]          Tanesha Monge RN

## 2022-11-07 ENCOUNTER — OFFICE VISIT (OUTPATIENT)
Dept: FAMILY MEDICINE | Facility: CLINIC | Age: 67
End: 2022-11-07
Payer: MEDICARE

## 2022-11-07 VITALS
HEART RATE: 60 BPM | SYSTOLIC BLOOD PRESSURE: 146 MMHG | DIASTOLIC BLOOD PRESSURE: 88 MMHG | WEIGHT: 204.3 LBS | BODY MASS INDEX: 32.83 KG/M2 | TEMPERATURE: 98.1 F | RESPIRATION RATE: 16 BRPM | OXYGEN SATURATION: 98 % | HEIGHT: 66 IN

## 2022-11-07 DIAGNOSIS — Z23 HIGH PRIORITY FOR 2019-NCOV VACCINE: ICD-10-CM

## 2022-11-07 DIAGNOSIS — E78.1 HIGH BLOOD TRIGLYCERIDES: ICD-10-CM

## 2022-11-07 DIAGNOSIS — I10 ESSENTIAL HYPERTENSION, BENIGN: Primary | ICD-10-CM

## 2022-11-07 PROCEDURE — 91312 COVID-19,PF,PFIZER BOOSTER BIVALENT: CPT | Performed by: NURSE PRACTITIONER

## 2022-11-07 PROCEDURE — 99214 OFFICE O/P EST MOD 30 MIN: CPT | Mod: 25 | Performed by: NURSE PRACTITIONER

## 2022-11-07 PROCEDURE — 0124A COVID-19,PF,PFIZER BOOSTER BIVALENT: CPT | Performed by: NURSE PRACTITIONER

## 2022-11-07 RX ORDER — AMLODIPINE BESYLATE 5 MG/1
5 TABLET ORAL DAILY
Qty: 60 TABLET | Refills: 0 | Status: SHIPPED | OUTPATIENT
Start: 2022-11-07 | End: 2022-11-15

## 2022-11-07 RX ORDER — OMEGA-3-ACID ETHYL ESTERS 1 G/1
CAPSULE, LIQUID FILLED ORAL
Qty: 180 CAPSULE | Refills: 1 | Status: SHIPPED | OUTPATIENT
Start: 2022-11-07 | End: 2023-04-20

## 2022-11-07 ASSESSMENT — PAIN SCALES - GENERAL: PAINLEVEL: EXTREME PAIN (8)

## 2022-11-07 NOTE — PROGRESS NOTES
"  Assessment & Plan     Essential hypertension, benign  Chronic unstable; bp is elevated during todays visit and shown to be on previous visits; patient had been on amlodipine in the past about 6-8 years ago will restart on medication. Advised to follow up in two weeks to recheck bp in clinic. Did discuss diet and exercise as well as reduced ETOH use to help with bp. Will order cardiac referral per patient request.    - amLODIPine (NORVASC) 5 MG tablet; Take 1 tablet (5 mg) by mouth daily  - Adult Cardiology Eval [facility] Referral; Future    High blood triglycerides  Chronic stable; patient requesting written rx d/t new insurance would like to see if someone would cover better than in the past.     - omega-3 acid ethyl esters (LOVAZA) 1 g capsule; TAKE 2 CAP BY MOUTH DAILY    High priority for 2019-nCoV vaccine  Routine vaccination.          BMI:   Estimated body mass index is 32.73 kg/m  as calculated from the following:    Height as of this encounter: 1.683 m (5' 6.25\").    Weight as of this encounter: 92.7 kg (204 lb 4.8 oz).   Weight management plan: Discussed healthy diet and exercise guidelines      Return in about 2 weeks (around 11/21/2022) for blood pressure recheck.    SILVIANO Chao CNP [facility] HELLEN Ludwig is a 67 year old, presenting for the following health issues:  Hypertension and Imm/Inj (COVID-19 VACCINE)      History of Present Illness       He eats 0-1 servings of fruits and vegetables daily.He consumes 3 sweetened beverage(s) daily.He exercises with enough effort to increase his heart rate 30 to 60 minutes per day.  He exercises with enough effort to increase his heart rate 7 days per week.   He is taking medications regularly.       Hypertension Follow-up      Do you check your blood pressure regularly outside of the clinic? No     Are you following a low salt diet? No    Are your blood pressures ever more than 140 on the top number (systolic) OR " "more   than 90 on the bottom number (diastolic), for example 140/90? n/a    BP Readings from Last 6 Encounters:   11/07/22 (!) 146/88   05/24/22 (!) 162/82   04/20/22 122/68   03/25/22 (!) 147/82   03/07/22 (!) 175/88   02/16/22 133/73       Patient presents to clinic for bp follow up; states he does not check at home but is concerned about frequently elevated numbers. Feels his bp is not being well controlled and would also like to be seen by cardiology at this point d/t ongoing htn concerns.       Review of Systems   Constitutional, HEENT, cardiovascular, pulmonary, gi and gu systems are negative, except as otherwise noted.      Objective    BP (!) 146/88 (BP Location: Right arm, Patient Position: Sitting, Cuff Size: Adult Large)   Pulse 60   Temp 98.1  F (36.7  C) (Oral)   Resp 16   Ht 1.683 m (5' 6.25\")   Wt 92.7 kg (204 lb 4.8 oz)   SpO2 98%   BMI 32.73 kg/m    Body mass index is 32.73 kg/m .  Physical Exam   GENERAL: healthy, alert and no distress  RESP: lungs clear to auscultation - no rales, rhonchi or wheezes  CV: regular rate and rhythm, normal S1 S2, no S3 or S4, no murmur, click or rub, no peripheral edema and peripheral pulses strong  MS: no gross musculoskeletal defects noted, no edema              "

## 2022-11-07 NOTE — NURSING NOTE
"Chief Complaint   Patient presents with     Hypertension     Imm/Inj     COVID-19 VACCINE     Initial BP (!) 160/80 (BP Location: Right arm, Patient Position: Sitting, Cuff Size: Adult Large)   Pulse 60   Temp 98.1  F (36.7  C) (Oral)   Resp 16   Ht 1.683 m (5' 6.25\")   Wt 92.7 kg (204 lb 4.8 oz)   SpO2 98%   BMI 32.73 kg/m   Estimated body mass index is 32.73 kg/m  as calculated from the following:    Height as of this encounter: 1.683 m (5' 6.25\").    Weight as of this encounter: 92.7 kg (204 lb 4.8 oz).  BP completed using cuff size large right arm    Lisa Magill, CMA    "

## 2022-11-15 ENCOUNTER — OFFICE VISIT (OUTPATIENT)
Dept: CARDIOLOGY | Facility: CLINIC | Age: 67
End: 2022-11-15
Attending: NURSE PRACTITIONER
Payer: MEDICARE

## 2022-11-15 VITALS
HEIGHT: 66 IN | WEIGHT: 202.2 LBS | HEART RATE: 59 BPM | OXYGEN SATURATION: 97 % | BODY MASS INDEX: 32.5 KG/M2 | DIASTOLIC BLOOD PRESSURE: 74 MMHG | SYSTOLIC BLOOD PRESSURE: 118 MMHG

## 2022-11-15 DIAGNOSIS — F32.A ANXIETY AND DEPRESSION: ICD-10-CM

## 2022-11-15 DIAGNOSIS — F41.9 ANXIETY AND DEPRESSION: ICD-10-CM

## 2022-11-15 DIAGNOSIS — E78.5 HYPERLIPIDEMIA LDL GOAL <130: Primary | ICD-10-CM

## 2022-11-15 DIAGNOSIS — I10 ESSENTIAL HYPERTENSION, BENIGN: ICD-10-CM

## 2022-11-15 PROCEDURE — 93000 ELECTROCARDIOGRAM COMPLETE: CPT | Performed by: INTERNAL MEDICINE

## 2022-11-15 PROCEDURE — 99215 OFFICE O/P EST HI 40 MIN: CPT | Performed by: INTERNAL MEDICINE

## 2022-11-15 RX ORDER — ROSUVASTATIN CALCIUM 20 MG/1
20 TABLET, COATED ORAL AT BEDTIME
Qty: 90 TABLET | Refills: 3 | Status: SHIPPED | OUTPATIENT
Start: 2022-11-15 | End: 2023-11-06

## 2022-11-15 RX ORDER — AMLODIPINE BESYLATE 2.5 MG/1
2.5 TABLET ORAL 2 TIMES DAILY
Qty: 180 TABLET | Refills: 3 | Status: SHIPPED | OUTPATIENT
Start: 2022-11-15 | End: 2023-11-06

## 2022-11-15 RX ORDER — OLMESARTAN MEDOXOMIL 40 MG/1
40 TABLET ORAL DAILY
Qty: 90 TABLET | Refills: 3 | Status: SHIPPED | OUTPATIENT
Start: 2022-11-15 | End: 2023-12-27

## 2022-11-15 RX ORDER — CARVEDILOL 12.5 MG/1
12.5 TABLET ORAL 2 TIMES DAILY
Qty: 60 TABLET | Refills: 0 | Status: SHIPPED | OUTPATIENT
Start: 2022-11-15 | End: 2023-01-03

## 2022-11-15 NOTE — PROGRESS NOTES
Cardiology Clinic Progress Note:    November 15, 2022   Patient Name: Emery Zayas  Patient MRN: [id number]     Consult indication: HTN    HPI:    I had the opportunity to see patient Emery Zayas in cardiology clinic for a follow up visit. Patient is followed by our colleague Trish Edwards MD with Primary Care.     As you know, patient is a pleasant 67-year-old male with a past medical history significant for obesity, hypertension, dyslipidemia, alcohol dependence, severe back pain, gout, mild sleep apnea, who presents for follow-up regarding challenging to control hypertension.    Patient was previously seen by my colleague Dr. Arce in cardiology clinic for hypertension.  Blood pressures on prior clinic visits have been persistently elevated.  Patient denies any chest pain, chest pressure, normal shortness of breath, though physical exertion/activity is limited by chronic back pain.  He does note some weakness in his legs related to his prior back surgeries.    Patient was previously assessed with a sleep study 10/20/2020 that demonstrated mild sleep apnea.  This was felt to be largely related to his alcohol use, he does consume about 5 or 6 alcoholic drinks (mixed) daily.    He is accompanied today by his wife.  Patient also endorses recent life changes, and feels that he has been drinking at least in part to cope with these changes.  Previously other family members have lived with them, however now they are alone.  In their spare time, they are heavily involved with autoracing, stock car racing.    Exercise stress echocardiogram 5/7/2017  Interpretation Summary  This was a normal stress echocardiogram with no evidence of stress-induced  ischemia.  The patient exhibited no chest pain during exercise.  Contrast was used without apparent complications.    Assessment and Plan/Recommendations:    # HTN, blood pressures remain elevated and challenging to control likely due to several factors  including alcohol dependence, untreated sleep apnea.  # MONSE  # EtOH dependence  # HL  # Obesity     - We discussed the factors of alcohol dependence and sleep apnea which are likely driving his hypertension.  Patient previously had quit alcohol for about 10 years, though started up again for unclear reasons.  He does endorse some anxiety, possibly some depression as well, after an in-depth discussion, we placed a referral to Mental Health.  - Encouraged patient to cut back on alcohol use as able.  Agree with amlodipine, will start at 2.5 mg twice daily.  Previously had adverse reaction to lisinopril, however has been tolerating olmesartan well, will continue this.  He has a right bundle branch block however heart rates are around 60 bpm, will continue carvedilol.  - Will change simvastatin to rosuvastatin  - Agree with aspirin  - Reasonable to continue fenofibrate  - Follow-up with cardiology HIEN in 1 month for reassessment  - Fasting lipids and ALT in 3 months with follow-up at that time, or sooner as needed    Thank you for allowing our team to participate in the care of Emery Zayas.  Please do not hesitate to call or page me with any questions or concerns.    Sincerely,     Alcides Bundy MD, [facility]  Cardiology  Text Page   November 15, 2022    cc  SILVIANO Chao CNP  [address] [place] [address]    Voice recognition software utilized.     Total time spent on this encounter: 45 minutes, providing care in this encounter including, but not limited to, reviewing prior medical records, laboratory data, imaging studies, diagnostic studies, procedure notes, formulating an assessment and plan, recommendations, discussion and counseling with patient face to face, dictation.    Past Medical History:   The 10-year ASCVD risk score (Nicolette DK, et al., 2019) is: 17.9%    Values used to calculate the score:      Age: 67 years      Sex: Male      Is Non- : No       Diabetic: No      Tobacco smoker: No      Systolic Blood Pressure: 118 mmHg      Is BP treated: Yes      HDL Cholesterol: 37 mg/dL      Total Cholesterol: 167 mg/dL  Past Medical History:   Diagnosis Date     Acute idiopathic gout, unspecified site 08/23/2016     Alcoholism (H)     Treated 1980's, denies any recent problems with withdrawal when skips days drinking.     Anxiety 12/31/2014     Arthritis      Chronic low back pain      Dyspepsia and other specified disorders of function of stomach 07/31/2003     Elevated liver enzymes 12/30/2013     Gout     possible     Herniated intervertebral disk     thoracic     High blood triglycerides 12/30/2013     Hyperlipidemia LDL goal <130 04/15/2013     Hypertension      Hypertensive emergency 05/05/2017     Metabolic syndrome 12/30/2013     Obesity 04/15/2013     Other chest pain 07/31/2003     RBBB (right bundle branch block)     Present for several years        Past Surgical History:   Past Surgical History:   Procedure Laterality Date     BACK SURGERY       Back surgery x 4       COLONOSCOPY N/A 1/23/2020    Procedure: COLONOSCOPY, WITH POLYPECTOMY AND BIOPSY; polypectomies  using jumbo bx forcep;  Surgeon: Sherice Mckeon MD;  Location:  GI     ESOPHAGOSCOPY, GASTROSCOPY, DUODENOSCOPY (EGD), COMBINED  2000    normal     TONSILLECTOMY         Medications (outpatient):  Current Outpatient Medications   Medication Sig Dispense Refill     allopurinol (ZYLOPRIM) 100 MG tablet Take 2 tablets (200 mg) by mouth 2 times daily (Patient taking differently: Take 200 mg by mouth daily) 360 tablet 3     amLODIPine (NORVASC) 2.5 MG tablet Take 1 tablet (2.5 mg) by mouth 2 times daily 180 tablet 3     aspirin 81 MG tablet Take 1 tablet by mouth daily.       carvedilol (COREG) 12.5 MG tablet Take 1 tablet (12.5 mg) by mouth 2 times daily 60 tablet 0     diclofenac (FLECTOR) 1.3 % patch Externally apply 1 patch topically 2 times daily 60 patch 5     fenofibrate (TRICOR) 145 MG  tablet Take 1 tablet (145 mg) by mouth daily 90 tablet 3     fluorouracil (EFUDEX) 5 % external cream Apply a pea sized amount to face bid x 2 weeks. 40 g 1     LORazepam (ATIVAN) 1 MG tablet Take 1 tablet (1 mg) by mouth every 6 hours as needed for anxiety 10 tablet 0     Multiple Vitamins-Minerals (MULTIVITAMIN ADULT) CHEW Take 1 chew tab by mouth daily       olmesartan (BENICAR) 40 MG tablet Take 1 tablet (40 mg) by mouth daily 90 tablet 3     omega-3 acid ethyl esters (LOVAZA) 1 g capsule TAKE 2 CAP BY MOUTH DAILY 180 capsule 1     order for DME Equipment being ordered: blood pressure cuff 1 each 0     rosuvastatin (CRESTOR) 20 MG tablet Take 1 tablet (20 mg) by mouth At Bedtime 90 tablet 3     tiZANidine (ZANAFLEX) 2 MG tablet Take 1 tablet (2 mg) by mouth nightly as needed for muscle spasms 90 tablet 1     calcipotriene (DOVONOX) 0.005 % external cream Apply a pea sized amount to face bid x 2 weeks. (Patient not taking: Reported on 11/7/2022) 60 g 1     diclofenac (FLECTOR) 1.3 % patch EXTERNALLY APPLY 1 PATCH TOPICALLY 2 TIMES DAILY (Patient not taking: Reported on 11/7/2022) 60 patch 5     diclofenac (FLECTOR) 1.3 % patch Externally apply 1 patch topically 2 times daily (Patient not taking: Reported on 11/7/2022) 30 patch 5     fenofibrate (TRICOR) 145 MG tablet Take 1 tablet (145 mg) by mouth daily (Patient not taking: Reported on 11/7/2022) 90 tablet 3       Allergies:  Allergies   Allergen Reactions     No Known Drug Allergies        Social History:   History   Drug Use No      History   Smoking Status     Former     Packs/day: 0.00     Types: Cigarettes     Quit date: 7/15/1993   Smokeless Tobacco     Never     Social History    Substance and Sexual Activity      Alcohol use: Yes        Alcohol/week: 7.0 standard drinks        Types: 7 Shots of liquor per week        Comment: more than 12-15 rum and cokes or beer a week       Family History:  Family History   Problem Relation Age of Onset      "Hypertension Father      Heart Disease Father         Angioplasty in late 60's or early 70's.     Unknown/Adopted Father      Hypertension Sister      Kidney Disease Sister      Unknown/Adopted Sister      Cancer Mother         lung     Unknown/Adopted Mother      Diabetes Mother      Thyroid Disease Sister      Unknown/Adopted Sister      Unknown/Adopted Maternal Grandmother      Substance Abuse Maternal Grandfather      Depression Paternal Grandmother      Unknown/Adopted Paternal Grandfather      Diabetes Sister      Colon Cancer No family hx of        Review of Systems:   A complete review of systems was negative except as mentioned in the History of Present Illness.     Objective & Physical Exam:  /74 (BP Location: Right arm, Patient Position: Sitting, Cuff Size: Adult Regular)   Pulse 59   Ht 1.676 m (5' 6\")   Wt 91.7 kg (202 lb 3.2 oz)   SpO2 97%   BMI 32.64 kg/m    Wt Readings from Last 2 Encounters:   11/15/22 91.7 kg (202 lb 3.2 oz)   11/07/22 92.7 kg (204 lb 4.8 oz)     Body mass index is 32.64 kg/m .   Body surface area is 2.07 meters squared.    Constitutional: appears stated age, in no apparent distress, appears to be well nourished  Head: normocephalic, atraumatic  Neck: supple, trachea midline, no bruit bilaterally   Pulmonary: clear to auscultation bilaterally, no wheezes, no rales, no increased work of breathing  Cardiovascular: JVP normal, regular rate, regular rhythm, normal S1 and S2, no S3, S4, no murmur appreciated, no lower extremity edema  Gastrointestinal: no guarding, non-rigid   Neurologic: awake, alert, moves all extremities  Skin: no jaundice, warm on limited exam  Psychiatric: affect is normal, answers questions appropriately, oriented to self and place    Data reviewed:  Lab Results   Component Value Date    WBC 6.0 02/02/2022    WBC 6.3 02/04/2021    RBC 4.50 02/02/2022    RBC 4.20 (L) 02/04/2021    HGB 14.4 02/02/2022    HGB 13.8 02/04/2021    HCT 42.7 02/02/2022    HCT " 40.9 02/04/2021    MCV 95 02/02/2022    MCV 97 02/04/2021    MCH 32.0 02/02/2022    MCH 32.9 02/04/2021    MCHC 33.7 02/02/2022    MCHC 33.7 02/04/2021    RDW 12.0 02/02/2022    RDW 12.2 02/04/2021     02/02/2022     02/04/2021     Sodium   Date Value Ref Range Status   02/02/2022 138 133 - 144 mmol/L Final   02/04/2021 137 133 - 144 mmol/L Final     Potassium   Date Value Ref Range Status   02/02/2022 4.6 3.4 - 5.3 mmol/L Final   02/04/2021 4.4 3.4 - 5.3 mmol/L Final     Chloride   Date Value Ref Range Status   02/02/2022 107 94 - 109 mmol/L Final   02/04/2021 107 94 - 109 mmol/L Final     Carbon Dioxide   Date Value Ref Range Status   02/04/2021 25 20 - 32 mmol/L Final     Carbon Dioxide (CO2)   Date Value Ref Range Status   02/02/2022 30 20 - 32 mmol/L Final     Anion Gap   Date Value Ref Range Status   02/02/2022 1 (L) 3 - 14 mmol/L Final   02/04/2021 5 3 - 14 mmol/L Final     Glucose   Date Value Ref Range Status   02/02/2022 121 (H) 70 - 99 mg/dL Final   02/04/2021 110 (H) 70 - 99 mg/dL Final     Comment:     Fasting specimen     Urea Nitrogen   Date Value Ref Range Status   02/02/2022 18 7 - 30 mg/dL Final   02/04/2021 13 7 - 30 mg/dL Final     Creatinine   Date Value Ref Range Status   02/02/2022 1.05 0.66 - 1.25 mg/dL Final   02/04/2021 1.00 0.66 - 1.25 mg/dL Final     GFR Estimate   Date Value Ref Range Status   02/02/2022 77 >60 mL/min/1.73m2 Final     Comment:     Effective December 21, 2021 eGFRcr in adults is calculated using the 2021 CKD-EPI creatinine equation which includes age and gender (Shante blackwood al., NEJM, DOI: 10.1056/LDFKbm1007919)   02/04/2021 78 >60 mL/min/[1.73_m2] Final     Comment:     Non  GFR Calc  Starting 12/18/2018, serum creatinine based estimated GFR (eGFR) will be   calculated using the Chronic Kidney Disease Epidemiology Collaboration   (CKD-EPI) equation.       Calcium   Date Value Ref Range Status   02/02/2022 9.0 8.5 - 10.1 mg/dL Final   02/04/2021  9.8 8.5 - 10.1 mg/dL Final     Bilirubin Total   Date Value Ref Range Status   02/02/2022 0.3 0.2 - 1.3 mg/dL Final   02/04/2021 0.7 0.2 - 1.3 mg/dL Final     Alkaline Phosphatase   Date Value Ref Range Status   02/02/2022 66 40 - 150 U/L Final   02/04/2021 94 40 - 150 U/L Final     ALT   Date Value Ref Range Status   02/02/2022 31 0 - 70 U/L Final   02/04/2021 34 0 - 70 U/L Final     AST   Date Value Ref Range Status   02/02/2022 21 0 - 45 U/L Final   02/04/2021 22 0 - 45 U/L Final     Recent Labs   Lab Test 02/02/22  0805 02/04/21  1056 08/02/16  0948 05/15/15  0833   CHOL 167 156   < > 183   HDL 37* 36*   < > 35*   LDL 73 51   < > Cannot estimate LDL when triglyceride exceeds 400 mg/dL  76   TRIG 285* 344*   < > 523*   CHOLHDLRATIO  --   --   --  5.2*    < > = values in this interval not displayed.      Lab Results   Component Value Date    A1C 5.4 01/08/2019    A1C 5.5 05/05/2017    A1C 5.7 08/02/2016    A1C 5.5 12/30/2013    A1C 5.4 05/16/2013        No results found for this or any previous visit (from the past 4320 hour(s)).

## 2022-11-15 NOTE — LETTER
11/15/2022    Trish Edwards MD  [address]  [facility] [address]    RE: Emery Stormon       Dear Colleague,     I had the pleasure of seeing Emery Zayas in the [facility].      Cardiology Clinic Progress Note:    November 15, 2022   Patient Name: Emery Zayas  Patient MRN: [id number]     Consult indication: HTN    HPI:    I had the opportunity to see patient Emery Zayas in cardiology clinic for a follow up visit. Patient is followed by our colleague Trish Edwards MD with Primary Care.     As you know, patient is a pleasant 67-year-old male with a past medical history significant for obesity, hypertension, dyslipidemia, alcohol dependence, severe back pain, gout, mild sleep apnea, who presents for follow-up regarding challenging to control hypertension.    Patient was previously seen by my colleague Dr. Arce in cardiology clinic for hypertension.  Blood pressures on prior clinic visits have been persistently elevated.  Patient denies any chest pain, chest pressure, normal shortness of breath, though physical exertion/activity is limited by chronic back pain.  He does note some weakness in his legs related to his prior back surgeries.    Patient was previously assessed with a sleep study 10/20/2020 that demonstrated mild sleep apnea.  This was felt to be largely related to his alcohol use, he does consume about 5 or 6 alcoholic drinks (mixed) daily.    He is accompanied today by his wife.  Patient also endorses recent life changes, and feels that he has been drinking at least in part to cope with these changes.  Previously other family members have lived with them, however now they are alone.  In their spare time, they are heavily involved with autoracing, stock car racing.    Exercise stress echocardiogram 5/7/2017  Interpretation Summary  This was a normal stress echocardiogram with no evidence of stress-induced  ischemia.  The patient exhibited no  chest pain during exercise.  Contrast was used without apparent complications.    Assessment and Plan/Recommendations:    # HTN, blood pressures remain elevated and challenging to control likely due to several factors including alcohol dependence, untreated sleep apnea.  # MONSE  # EtOH dependence  # HL  # Obesity     - We discussed the factors of alcohol dependence and sleep apnea which are likely driving his hypertension.  Patient previously had quit alcohol for about 10 years, though started up again for unclear reasons.  He does endorse some anxiety, possibly some depression as well, after an in-depth discussion, we placed a referral to Mental Health.  - Encouraged patient to cut back on alcohol use as able.  Agree with amlodipine, will start at 2.5 mg twice daily.  Previously had adverse reaction to lisinopril, however has been tolerating olmesartan well, will continue this.  He has a right bundle branch block however heart rates are around 60 bpm, will continue carvedilol.  - Will change simvastatin to rosuvastatin  - Agree with aspirin  - Reasonable to continue fenofibrate  - Follow-up with cardiology HIEN in 1 month for reassessment  - Fasting lipids and ALT in 3 months with follow-up at that time, or sooner as needed    Thank you for allowing our team to participate in the care of Emery Zayas.  Please do not hesitate to call or page me with any questions or concerns.    Sincerely,     Alcides Bundy MD, [facility]  Cardiology  Text Page   November 15, 2022    cc  SILVIANO Chao CNP  [address] [place] [address]    Voice recognition software utilized.     Total time spent on this encounter: 45 minutes, providing care in this encounter including, but not limited to, reviewing prior medical records, laboratory data, imaging studies, diagnostic studies, procedure notes, formulating an assessment and plan, recommendations, discussion and counseling with patient face to face,  dictation.    Past Medical History:   The 10-year ASCVD risk score (Nicolette DK, et al., 2019) is: 17.9%    Values used to calculate the score:      Age: 67 years      Sex: Male      Is Non- : No      Diabetic: No      Tobacco smoker: No      Systolic Blood Pressure: 118 mmHg      Is BP treated: Yes      HDL Cholesterol: 37 mg/dL      Total Cholesterol: 167 mg/dL  Past Medical History:   Diagnosis Date     Acute idiopathic gout, unspecified site 08/23/2016     Alcoholism (H)     Treated 1980's, denies any recent problems with withdrawal when skips days drinking.     Anxiety 12/31/2014     Arthritis      Chronic low back pain      Dyspepsia and other specified disorders of function of stomach 07/31/2003     Elevated liver enzymes 12/30/2013     Gout     possible     Herniated intervertebral disk     thoracic     High blood triglycerides 12/30/2013     Hyperlipidemia LDL goal <130 04/15/2013     Hypertension      Hypertensive emergency 05/05/2017     Metabolic syndrome 12/30/2013     Obesity 04/15/2013     Other chest pain 07/31/2003     RBBB (right bundle branch block)     Present for several years        Past Surgical History:   Past Surgical History:   Procedure Laterality Date     BACK SURGERY       Back surgery x 4       COLONOSCOPY N/A 1/23/2020    Procedure: COLONOSCOPY, WITH POLYPECTOMY AND BIOPSY; polypectomies  using jumbo bx forcep;  Surgeon: Sherice Mckeon MD;  Location:  GI     ESOPHAGOSCOPY, GASTROSCOPY, DUODENOSCOPY (EGD), COMBINED  2000    normal     TONSILLECTOMY         Medications (outpatient):  Current Outpatient Medications   Medication Sig Dispense Refill     allopurinol (ZYLOPRIM) 100 MG tablet Take 2 tablets (200 mg) by mouth 2 times daily (Patient taking differently: Take 200 mg by mouth daily) 360 tablet 3     amLODIPine (NORVASC) 2.5 MG tablet Take 1 tablet (2.5 mg) by mouth 2 times daily 180 tablet 3     aspirin 81 MG tablet Take 1 tablet by mouth daily.        carvedilol (COREG) 12.5 MG tablet Take 1 tablet (12.5 mg) by mouth 2 times daily 60 tablet 0     diclofenac (FLECTOR) 1.3 % patch Externally apply 1 patch topically 2 times daily 60 patch 5     fenofibrate (TRICOR) 145 MG tablet Take 1 tablet (145 mg) by mouth daily 90 tablet 3     fluorouracil (EFUDEX) 5 % external cream Apply a pea sized amount to face bid x 2 weeks. 40 g 1     LORazepam (ATIVAN) 1 MG tablet Take 1 tablet (1 mg) by mouth every 6 hours as needed for anxiety 10 tablet 0     Multiple Vitamins-Minerals (MULTIVITAMIN ADULT) CHEW Take 1 chew tab by mouth daily       olmesartan (BENICAR) 40 MG tablet Take 1 tablet (40 mg) by mouth daily 90 tablet 3     omega-3 acid ethyl esters (LOVAZA) 1 g capsule TAKE 2 CAP BY MOUTH DAILY 180 capsule 1     order for DME Equipment being ordered: blood pressure cuff 1 each 0     rosuvastatin (CRESTOR) 20 MG tablet Take 1 tablet (20 mg) by mouth At Bedtime 90 tablet 3     tiZANidine (ZANAFLEX) 2 MG tablet Take 1 tablet (2 mg) by mouth nightly as needed for muscle spasms 90 tablet 1     calcipotriene (DOVONOX) 0.005 % external cream Apply a pea sized amount to face bid x 2 weeks. (Patient not taking: Reported on 11/7/2022) 60 g 1     diclofenac (FLECTOR) 1.3 % patch EXTERNALLY APPLY 1 PATCH TOPICALLY 2 TIMES DAILY (Patient not taking: Reported on 11/7/2022) 60 patch 5     diclofenac (FLECTOR) 1.3 % patch Externally apply 1 patch topically 2 times daily (Patient not taking: Reported on 11/7/2022) 30 patch 5     fenofibrate (TRICOR) 145 MG tablet Take 1 tablet (145 mg) by mouth daily (Patient not taking: Reported on 11/7/2022) 90 tablet 3       Allergies:  Allergies   Allergen Reactions     No Known Drug Allergies        Social History:   History   Drug Use No      History   Smoking Status     Former     Packs/day: 0.00     Types: Cigarettes     Quit date: 7/15/1993   Smokeless Tobacco     Never     Social History    Substance and Sexual Activity      Alcohol use: Yes         "Alcohol/week: 7.0 standard drinks        Types: 7 Shots of liquor per week        Comment: more than 12-15 rum and cokes or beer a week       Family History:  Family History   Problem Relation Age of Onset     Hypertension Father      Heart Disease Father         Angioplasty in late 60's or early 70's.     Unknown/Adopted Father      Hypertension Sister      Kidney Disease Sister      Unknown/Adopted Sister      Cancer Mother         lung     Unknown/Adopted Mother      Diabetes Mother      Thyroid Disease Sister      Unknown/Adopted Sister      Unknown/Adopted Maternal Grandmother      Substance Abuse Maternal Grandfather      Depression Paternal Grandmother      Unknown/Adopted Paternal Grandfather      Diabetes Sister      Colon Cancer No family hx of        Review of Systems:   A complete review of systems was negative except as mentioned in the History of Present Illness.     Objective & Physical Exam:  /74 (BP Location: Right arm, Patient Position: Sitting, Cuff Size: Adult Regular)   Pulse 59   Ht 1.676 m (5' 6\")   Wt 91.7 kg (202 lb 3.2 oz)   SpO2 97%   BMI 32.64 kg/m    Wt Readings from Last 2 Encounters:   11/15/22 91.7 kg (202 lb 3.2 oz)   11/07/22 92.7 kg (204 lb 4.8 oz)     Body mass index is 32.64 kg/m .   Body surface area is 2.07 meters squared.    Constitutional: appears stated age, in no apparent distress, appears to be well nourished  Head: normocephalic, atraumatic  Neck: supple, trachea midline, no bruit bilaterally   Pulmonary: clear to auscultation bilaterally, no wheezes, no rales, no increased work of breathing  Cardiovascular: JVP normal, regular rate, regular rhythm, normal S1 and S2, no S3, S4, no murmur appreciated, no lower extremity edema  Gastrointestinal: no guarding, non-rigid   Neurologic: awake, alert, moves all extremities  Skin: no jaundice, warm on limited exam  Psychiatric: affect is normal, answers questions appropriately, oriented to self and place    Data " reviewed:  Lab Results   Component Value Date    WBC 6.0 02/02/2022    WBC 6.3 02/04/2021    RBC 4.50 02/02/2022    RBC 4.20 (L) 02/04/2021    HGB 14.4 02/02/2022    HGB 13.8 02/04/2021    HCT 42.7 02/02/2022    HCT 40.9 02/04/2021    MCV 95 02/02/2022    MCV 97 02/04/2021    MCH 32.0 02/02/2022    MCH 32.9 02/04/2021    MCHC 33.7 02/02/2022    MCHC 33.7 02/04/2021    RDW 12.0 02/02/2022    RDW 12.2 02/04/2021     02/02/2022     02/04/2021     Sodium   Date Value Ref Range Status   02/02/2022 138 133 - 144 mmol/L Final   02/04/2021 137 133 - 144 mmol/L Final     Potassium   Date Value Ref Range Status   02/02/2022 4.6 3.4 - 5.3 mmol/L Final   02/04/2021 4.4 3.4 - 5.3 mmol/L Final     Chloride   Date Value Ref Range Status   02/02/2022 107 94 - 109 mmol/L Final   02/04/2021 107 94 - 109 mmol/L Final     Carbon Dioxide   Date Value Ref Range Status   02/04/2021 25 20 - 32 mmol/L Final     Carbon Dioxide (CO2)   Date Value Ref Range Status   02/02/2022 30 20 - 32 mmol/L Final     Anion Gap   Date Value Ref Range Status   02/02/2022 1 (L) 3 - 14 mmol/L Final   02/04/2021 5 3 - 14 mmol/L Final     Glucose   Date Value Ref Range Status   02/02/2022 121 (H) 70 - 99 mg/dL Final   02/04/2021 110 (H) 70 - 99 mg/dL Final     Comment:     Fasting specimen     Urea Nitrogen   Date Value Ref Range Status   02/02/2022 18 7 - 30 mg/dL Final   02/04/2021 13 7 - 30 mg/dL Final     Creatinine   Date Value Ref Range Status   02/02/2022 1.05 0.66 - 1.25 mg/dL Final   02/04/2021 1.00 0.66 - 1.25 mg/dL Final     GFR Estimate   Date Value Ref Range Status   02/02/2022 77 >60 mL/min/1.73m2 Final     Comment:     Effective December 21, 2021 eGFRcr in adults is calculated using the 2021 CKD-EPI creatinine equation which includes age and gender (Shante blackwood al., NEJ, DOI: 10.1056/JXEPni0442980)   02/04/2021 78 >60 mL/min/[1.73_m2] Final     Comment:     Non  GFR Calc  Starting 12/18/2018, serum creatinine based  estimated GFR (eGFR) will be   calculated using the Chronic Kidney Disease Epidemiology Collaboration   (CKD-EPI) equation.       Calcium   Date Value Ref Range Status   02/02/2022 9.0 8.5 - 10.1 mg/dL Final   02/04/2021 9.8 8.5 - 10.1 mg/dL Final     Bilirubin Total   Date Value Ref Range Status   02/02/2022 0.3 0.2 - 1.3 mg/dL Final   02/04/2021 0.7 0.2 - 1.3 mg/dL Final     Alkaline Phosphatase   Date Value Ref Range Status   02/02/2022 66 40 - 150 U/L Final   02/04/2021 94 40 - 150 U/L Final     ALT   Date Value Ref Range Status   02/02/2022 31 0 - 70 U/L Final   02/04/2021 34 0 - 70 U/L Final     AST   Date Value Ref Range Status   02/02/2022 21 0 - 45 U/L Final   02/04/2021 22 0 - 45 U/L Final     Recent Labs   Lab Test 02/02/22  0805 02/04/21  1056 08/02/16  0948 05/15/15  0833   CHOL 167 156   < > 183   HDL 37* 36*   < > 35*   LDL 73 51   < > Cannot estimate LDL when triglyceride exceeds 400 mg/dL  76   TRIG 285* 344*   < > 523*   CHOLHDLRATIO  --   --   --  5.2*    < > = values in this interval not displayed.      Lab Results   Component Value Date    A1C 5.4 01/08/2019    A1C 5.5 05/05/2017    A1C 5.7 08/02/2016    A1C 5.5 12/30/2013    A1C 5.4 05/16/2013        No results found for this or any previous visit (from the past 4320 hour(s)).         Thank you for allowing me to participate in the care of your patient.      Sincerely,     Alcides Bundy MD     [facility]  cc:   SILVIANO Chao CNP  [address]  [place] [address]

## 2022-11-15 NOTE — PATIENT INSTRUCTIONS
November 15, 2022    Thank you for allowing our Cardiology team to participate in your care.     Please note the following changes to your heart treatment plan:     Medication changes:   - start amlodipine 2.5mg twice daily  - stop simvastatin   - start rosuvastatin 20mg at bedtime     Tests to be done:  - FASTING labs in 3 months    Follow up:  - Follow up in 1 month with cardiology HIEN and with me in 3 months, or sooner as needed.      For scheduling, please call [phone].    Please contact our team at [phone] (Joan BARNHART) or [phone] for any questions or concerns.     If you are having a medical emergency, please call [phone].     Sincerely,    Alcides Bundy MD, FACC  Cardiology    [facility] [place]

## 2022-11-21 ENCOUNTER — ALLIED HEALTH/NURSE VISIT (OUTPATIENT)
Dept: FAMILY MEDICINE | Facility: CLINIC | Age: 67
End: 2022-11-21
Payer: MEDICARE

## 2022-11-21 VITALS
DIASTOLIC BLOOD PRESSURE: 88 MMHG | RESPIRATION RATE: 17 BRPM | OXYGEN SATURATION: 97 % | HEART RATE: 54 BPM | TEMPERATURE: 98.2 F | SYSTOLIC BLOOD PRESSURE: 122 MMHG

## 2022-11-21 DIAGNOSIS — I10 ESSENTIAL HYPERTENSION, BENIGN: Primary | ICD-10-CM

## 2022-11-21 PROCEDURE — 99207 PR NO CHARGE NURSE ONLY: CPT

## 2022-11-21 NOTE — PROGRESS NOTES
Emery Zayas is a 67 year old patient who comes in today for a Blood Pressure check.  Initial BP:  /88   Pulse 54   Temp 98.2  F (36.8  C) (Tympanic)   Resp 17   SpO2 97%      Data Unavailable  Disposition: follow-up as previously indicated by provider and results routed to provider

## 2022-11-29 DIAGNOSIS — I10 ESSENTIAL HYPERTENSION, BENIGN: ICD-10-CM

## 2022-11-29 RX ORDER — CARVEDILOL 12.5 MG/1
TABLET ORAL
Qty: 60 TABLET | Refills: 0 | OUTPATIENT
Start: 2022-11-29

## 2022-11-29 NOTE — TELEPHONE ENCOUNTER
Cardiology prescribed #60 on 11/15/22, short term f/u needed, not due yet  Emi Valenzuela, RN, BSN  [facility]

## 2022-12-12 ENCOUNTER — OFFICE VISIT (OUTPATIENT)
Dept: URGENT CARE | Facility: URGENT CARE | Age: 67
End: 2022-12-12
Payer: MEDICARE

## 2022-12-12 ENCOUNTER — ANCILLARY PROCEDURE (OUTPATIENT)
Dept: GENERAL RADIOLOGY | Facility: CLINIC | Age: 67
End: 2022-12-12
Attending: PHYSICIAN ASSISTANT
Payer: MEDICARE

## 2022-12-12 VITALS
TEMPERATURE: 97.6 F | SYSTOLIC BLOOD PRESSURE: 171 MMHG | HEART RATE: 55 BPM | DIASTOLIC BLOOD PRESSURE: 92 MMHG | OXYGEN SATURATION: 97 %

## 2022-12-12 DIAGNOSIS — M25.532 LEFT WRIST PAIN: ICD-10-CM

## 2022-12-12 DIAGNOSIS — M25.532 LEFT WRIST PAIN: Primary | ICD-10-CM

## 2022-12-12 PROCEDURE — 73110 X-RAY EXAM OF WRIST: CPT | Mod: TC | Performed by: RADIOLOGY

## 2022-12-12 PROCEDURE — 99214 OFFICE O/P EST MOD 30 MIN: CPT | Performed by: PHYSICIAN ASSISTANT

## 2022-12-12 NOTE — PROGRESS NOTES
URGENT CARE VISIT:    SUBJECTIVE:   Chief Complaint   Patient presents with     Urgent Care     Pt fell Saturday 12/10/22 and injured left arm, and left wrist pain.      Emery Zayas is a 67 year old male who presents with a chief complaint of left elbow and wrist pain.  Symptoms began 2 day(s) ago, are moderate and sudden onset  He slipped on ice and fell on hand.  Pain exacerbated by movement. Relieved by rest.  He treated it initially with ice. This is the first time this type of injury has occurred to this patient.     PMH:   Past Medical History:   Diagnosis Date     Acute idiopathic gout, unspecified site 08/23/2016     Alcoholism (H)     Treated 1980's, denies any recent problems with withdrawal when skips days drinking.     Anxiety 12/31/2014     Arthritis      Chronic low back pain      Dyspepsia and other specified disorders of function of stomach 07/31/2003     Elevated liver enzymes 12/30/2013     Gout     possible     Herniated intervertebral disk     thoracic     High blood triglycerides 12/30/2013     Hyperlipidemia LDL goal <130 04/15/2013     Hypertension      Hypertensive emergency 05/05/2017     Metabolic syndrome 12/30/2013     Obesity 04/15/2013     Other chest pain 07/31/2003     RBBB (right bundle branch block)     Present for several years     Allergies: No known drug allergies   Medications:   Current Outpatient Medications   Medication Sig Dispense Refill     allopurinol (ZYLOPRIM) 100 MG tablet Take 2 tablets (200 mg) by mouth 2 times daily (Patient taking differently: Take 200 mg by mouth daily) 360 tablet 3     amLODIPine (NORVASC) 2.5 MG tablet Take 1 tablet (2.5 mg) by mouth 2 times daily 180 tablet 3     aspirin 81 MG tablet Take 1 tablet by mouth daily.       calcipotriene (DOVONOX) 0.005 % external cream Apply a pea sized amount to face bid x 2 weeks. 60 g 1     carvedilol (COREG) 12.5 MG tablet Take 1 tablet (12.5 mg) by mouth 2 times daily 60 tablet 0     diclofenac  (FLECTOR) 1.3 % patch EXTERNALLY APPLY 1 PATCH TOPICALLY 2 TIMES DAILY 60 patch 5     diclofenac (FLECTOR) 1.3 % patch Externally apply 1 patch topically 2 times daily 30 patch 5     diclofenac (FLECTOR) 1.3 % patch Externally apply 1 patch topically 2 times daily 60 patch 5     fenofibrate (TRICOR) 145 MG tablet Take 1 tablet (145 mg) by mouth daily 90 tablet 3     fenofibrate (TRICOR) 145 MG tablet Take 1 tablet (145 mg) by mouth daily 90 tablet 3     fluorouracil (EFUDEX) 5 % external cream Apply a pea sized amount to face bid x 2 weeks. 40 g 1     LORazepam (ATIVAN) 1 MG tablet Take 1 tablet (1 mg) by mouth every 6 hours as needed for anxiety 10 tablet 0     Multiple Vitamins-Minerals (MULTIVITAMIN ADULT) CHEW Take 1 chew tab by mouth daily       olmesartan (BENICAR) 40 MG tablet Take 1 tablet (40 mg) by mouth daily 90 tablet 3     omega-3 acid ethyl esters (LOVAZA) 1 g capsule TAKE 2 CAP BY MOUTH DAILY 180 capsule 1     order for DME Equipment being ordered: blood pressure cuff 1 each 0     rosuvastatin (CRESTOR) 20 MG tablet Take 1 tablet (20 mg) by mouth At Bedtime 90 tablet 3     tiZANidine (ZANAFLEX) 2 MG tablet Take 1 tablet (2 mg) by mouth nightly as needed for muscle spasms 90 tablet 1     Social History:   Social History     Tobacco Use     Smoking status: Former     Packs/day: 0.00     Types: Cigarettes     Quit date: 7/15/1993     Years since quittin.4     Smokeless tobacco: Never     Tobacco comments:     Quit 20 years ago.   Substance Use Topics     Alcohol use: Yes     Alcohol/week: 7.0 standard drinks     Types: 7 Shots of liquor per week     Comment: more than 12-15 rum and cokes or beer a week       ROS:  Review of systems negative except as stated above.    OBJECTIVE:  BP (!) 171/92 (BP Location: Right arm, Patient Position: Sitting, Cuff Size: Adult Regular)   Pulse 55   Temp 97.6  F (36.4  C) (Tympanic)   SpO2 97%   GENERAL APPEARANCE: healthy, alert and no distress  MUSCULOSKELETAL:  moderate TTP over dorsal aspect of left wrist. FROM. Mild pain with lateral deviation. No TTP over left olecranon or radial head. Mild pain over distal forearm. FROM. Mild pain with extension.   EXTREMITIES: peripheral pulses normal  SKIN: no edema or ecchymosis over left elbow or wrist  NEURO: sensation intact     IMAGING:  Results for orders placed or performed in visit on 12/12/22   XR Wrist Left G/E 3 Views     Status: None    Narrative    WRIST LEFT THREE OR MORE VIEWS December 12, 2022 1:24 PM     INDICATION: Left-sided wrist pain.     COMPARISON: None.      Impression    IMPRESSION:  1.  No fracture or joint malalignment.  2.  Mild thumb CMC degenerative arthrosis.    TERESSA HANCOCK MD         SYSTEM ID:  [id number]       ASSESSMENT:    ICD-10-CM    1. Left wrist pain  M25.532 XR Wrist Left G/E 3 Views          PLAN:  30 minutes spent on the date of the encounter doing chart review, review of outside records, review of test results, interpretation of tests, patient visit and documentation   Patient Instructions   Patient was educated on the natural course of injury.  No acute fracture or dislocation seen on x-ray. Conservative measures discussed including rest, ice, compression, elevation, and over-the-counter analgesics (Tylenol or Ibuprofen) as needed. See your primary care provider if symptoms worsen or do not improve in 7 days. Seek emergency care if you develop severe pain/swelling, inability to move extremity, skin paleness, or weakness.     Patient verbalized understanding and is agreeable to plan. The patient was discharged ambulatory and in stable condition.    Stephenie Banerjee PA-C on 12/12/2022 at 2:19 PM

## 2022-12-12 NOTE — PATIENT INSTRUCTIONS
Patient was educated on the natural course of injury.  No acute fracture or dislocation seen on x-ray. Conservative measures discussed including rest, ice, compression, elevation, and over-the-counter analgesics (Tylenol or Ibuprofen) as needed. See your primary care provider if symptoms worsen or do not improve in 7 days. Seek emergency care if you develop severe pain/swelling, inability to move extremity, skin paleness, or weakness.

## 2022-12-22 ENCOUNTER — OFFICE VISIT (OUTPATIENT)
Dept: CARDIOLOGY | Facility: CLINIC | Age: 67
End: 2022-12-22
Attending: INTERNAL MEDICINE
Payer: MEDICARE

## 2022-12-22 VITALS
BODY MASS INDEX: 32.48 KG/M2 | DIASTOLIC BLOOD PRESSURE: 68 MMHG | HEIGHT: 66 IN | HEART RATE: 60 BPM | OXYGEN SATURATION: 98 % | SYSTOLIC BLOOD PRESSURE: 122 MMHG | WEIGHT: 202.1 LBS

## 2022-12-22 DIAGNOSIS — I10 ESSENTIAL HYPERTENSION, BENIGN: ICD-10-CM

## 2022-12-22 PROCEDURE — 99214 OFFICE O/P EST MOD 30 MIN: CPT | Performed by: PHYSICIAN ASSISTANT

## 2022-12-22 NOTE — LETTER
2022    Trish Edwards [place]  [address] [facility] [place] [address]    RE: Emery Stormon       Dear Colleague,     I had the pleasure of seeing Emery Zayas in the [facility].      CARDIOLOGY CLINIC PROGRESS NOTE    DOS: 2022      Emery Zayas  : 1955, 67 year old  MRN: [id number]      History:   I am seeing Emery Zayas today for follow up.  He is a patient of Dr. Alcides Bundy.     Emery Zayas is a pleasant 67-year-old male with a past medical history significant for obesity, hypertension, dyslipidemia, alcohol dependence (5-6 drinks daily), severe back pain, gout, mild sleep apnea (diagnosed 10/2020).    He is accompanied today by his wife.  Patient also endorses recent life changes, and feels that he has been drinking at least in part to cope with these changes.  Previously other family members have lived with them, however now they are alone.  In their spare time, they are heavily involved with autoracing, stock car racing.       When he saw Dr. Bundy, amlodipine 2.5 mg BID was added to olmesartan 40 mg and Coreg 12.5 mg BID.     He has had a rough couple weeks.   22 fell and hit his face and broke his tooth.  He was seen in urgent care and BP was 22.   Just this last week, he tore his left rotator cuff.   Mental health called but he declined to set up an appt.   BP today initially 146/78.  On repeat 118/68, 122/68.  He is cutting back one drink each day, so 4-5 drinks a day instead of 5-6 drinks.         ROS:  Skin:        Eyes:       ENT:       Respiratory:  Negative    Cardiovascular:  Negative    Gastroenterology:      Genitourinary:       Musculoskeletal:       Neurologic:       Psychiatric:       Heme/Lymph/Imm:       Endocrine:         PAST MEDICAL HISTORY:  Past Medical History:   Diagnosis Date     Acute idiopathic gout, unspecified site 2016     Alcoholism (H)     Treated , denies any recent problems with  withdrawal when skips days drinking.     Anxiety 2014     Arthritis      Chronic low back pain      Dyspepsia and other specified disorders of function of stomach 2003     Elevated liver enzymes 2013     Gout     possible     Herniated intervertebral disk     thoracic     High blood triglycerides 2013     Hyperlipidemia LDL goal <130 04/15/2013     Hypertension      Hypertensive emergency 2017     Metabolic syndrome 2013     Obesity 04/15/2013     Other chest pain 2003     RBBB (right bundle branch block)     Present for several years       PAST SURGICAL HISTORY:  Past Surgical History:   Procedure Laterality Date     BACK SURGERY       Back surgery x 4       COLONOSCOPY N/A 2020    Procedure: COLONOSCOPY, WITH POLYPECTOMY AND BIOPSY; polypectomies  using jumbo bx forcep;  Surgeon: Sherice Mckeon MD;  Location:  GI     ESOPHAGOSCOPY, GASTROSCOPY, DUODENOSCOPY (EGD), COMBINED      normal     TONSILLECTOMY         SOCIAL HISTORY:  Social History     Socioeconomic History     Marital status:    Tobacco Use     Smoking status: Former     Packs/day: 0.00     Types: Cigarettes     Quit date: 7/15/1993     Years since quittin.4     Smokeless tobacco: Never     Tobacco comments:     Quit 20 years ago.   Vaping Use     Vaping Use: Never used   Substance and Sexual Activity     Alcohol use: Yes     Alcohol/week: 7.0 standard drinks     Types: 7 Shots of liquor per week     Comment: more than 12-15 rum and cokes or beer a week     Drug use: No     Sexual activity: Yes     Partners: Female   Other Topics Concern     Parent/sibling w/ CABG, MI or angioplasty before 65F 55M? 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       FAMILY HISTORY:  Family History   Problem Relation Age of Onset     Hypertension Father      Heart Disease  Father         Angioplasty in late 60's or early 70's.     Unknown/Adopted Father      Hypertension Sister      Kidney Disease Sister      Unknown/Adopted Sister      Cancer Mother         lung     Unknown/Adopted Mother      Diabetes Mother      Thyroid Disease Sister      Unknown/Adopted Sister      Unknown/Adopted Maternal Grandmother      Substance Abuse Maternal Grandfather      Depression Paternal Grandmother      Unknown/Adopted Paternal Grandfather      Diabetes Sister      Colon Cancer No family hx of        MEDS: allopurinol (ZYLOPRIM) 100 MG tablet, Take 2 tablets (200 mg) by mouth 2 times daily (Patient taking differently: Take 200 mg by mouth daily)  amLODIPine (NORVASC) 2.5 MG tablet, Take 1 tablet (2.5 mg) by mouth 2 times daily  aspirin 81 MG tablet, Take 1 tablet by mouth daily.  calcipotriene (DOVONOX) 0.005 % external cream, Apply a pea sized amount to face bid x 2 weeks.  carvedilol (COREG) 12.5 MG tablet, Take 1 tablet (12.5 mg) by mouth 2 times daily  diclofenac (FLECTOR) 1.3 % patch, EXTERNALLY APPLY 1 PATCH TOPICALLY 2 TIMES DAILY  diclofenac (FLECTOR) 1.3 % patch, Externally apply 1 patch topically 2 times daily  diclofenac (FLECTOR) 1.3 % patch, Externally apply 1 patch topically 2 times daily  fenofibrate (TRICOR) 145 MG tablet, Take 1 tablet (145 mg) by mouth daily  fenofibrate (TRICOR) 145 MG tablet, Take 1 tablet (145 mg) by mouth daily  fluorouracil (EFUDEX) 5 % external cream, Apply a pea sized amount to face bid x 2 weeks.  LORazepam (ATIVAN) 1 MG tablet, Take 1 tablet (1 mg) by mouth every 6 hours as needed for anxiety  Multiple Vitamins-Minerals (MULTIVITAMIN ADULT) CHEW, Take 1 chew tab by mouth daily  olmesartan (BENICAR) 40 MG tablet, Take 1 tablet (40 mg) by mouth daily  omega-3 acid ethyl esters (LOVAZA) 1 g capsule, TAKE 2 CAP BY MOUTH DAILY  order for DME, Equipment being ordered: blood pressure cuff  rosuvastatin (CRESTOR) 20 MG tablet, Take 1 tablet (20 mg) by mouth At  "Bedtime  tiZANidine (ZANAFLEX) 2 MG tablet, Take 1 tablet (2 mg) by mouth nightly as needed for muscle spasms    dexamethasone (DECADRON) injection 4 mg  dexamethasone (DECADRON) injection 4 mg  lidocaine 1 % injection 1 mL  lidocaine 1 % injection 1 mL        ALLERGIES:   Allergies   Allergen Reactions     No Known Drug Allergies        PHYSICAL EXAM:  Vitals: /68 (BP Location: Right arm, Patient Position: Sitting, Cuff Size: Adult Large)   Pulse 60   Ht 1.676 m (5' 6\")   Wt 91.7 kg (202 lb 1.6 oz)   SpO2 98%   BMI 32.62 kg/m    Constitutional:  cooperative, alert and oriented, well developed, well nourished, in no acute distress obese      Skin:  warm and dry to the touch, no apparent skin lesions or masses noted        Head:  normocephalic, no masses or lesions        Eyes:  pupils equal and round;conjunctivae and lids unremarkable        ENT:  no pallor or cyanosis        Neck:  not assessed this visit        Respiratory:  clear to auscultation;normal symmetry        Cardiac: regular rhythm, normal S1/S2, no S3 or S4, apical impulse not displaced, no murmurs, gallops or rubs                  GI:  abdomen soft;BS normoactive obese      Vascular: pulses full and equal                                      Extremities and Musculoskeletal:  no edema;normal muscle strength and tone        Neurological:  no gross motor deficits;affect appropriate              LABS/DATA:  I reviewed the following:  Exercise stress echocardiogram 5/7/2017  Interpretation Summary  This was a normal stress echocardiogram with no evidence of stress-induced  ischemia.  The patient exhibited no chest pain during exercise.  Contrast was used without apparent complications.      Component      Latest Ref Rng & Units 2/2/2022   Sodium      133 - 144 mmol/L 138   Potassium      3.4 - 5.3 mmol/L 4.6   Chloride      94 - 109 mmol/L 107   Carbon Dioxide      20 - 32 mmol/L 30   Anion Gap      3 - 14 mmol/L 1 (L)   Urea Nitrogen      7 - " 30 mg/dL 18   Creatinine      0.66 - 1.25 mg/dL 1.05   Calcium      8.5 - 10.1 mg/dL 9.0   Glucose      70 - 99 mg/dL 121 (H)   Alkaline Phosphatase      40 - 150 U/L 66   AST      0 - 45 U/L 21   ALT      0 - 70 U/L 31   Protein Total      6.8 - 8.8 g/dL 7.6   Albumin      3.4 - 5.0 g/dL 3.8   Bilirubin Total      0.2 - 1.3 mg/dL 0.3   GFR Estimate      >60 mL/min/1.73m2 77   Cholesterol      <200 mg/dL 167   Triglycerides      <150 mg/dL 285 (H)   HDL Cholesterol      >=40 mg/dL 37 (L)   LDL Cholesterol Calculated      <=100 mg/dL 73   Non HDL Cholesterol      <130 mg/dL 130 (H)   Patient Fasting > 8hrs?       Yes       ASSESSMENT/PLAN:  # HTN, blood pressures remain elevated and challenging to control likely due to several factors including alcohol dependence, untreated sleep apnea.  -  Previously had adverse reaction to lisinopril, however has been tolerating olmesartan 40 mg   - He has a right bundle branch block however heart rates are around 60 bpm, will continue carvedilol 12.5 mg BID  - Amlodipine 2.5 mg BID  - BP well controlled now on this regimen      # MONSE      # EtOH dependence  - Counseled to cut back      # Dyslipidemia, low HDL, elevated TGs  - Changed simvastatin to rosuvastatin  - Continued fenofibrate, Lovaza  - FLP/ALT in Feb 2023      # Obesity       # Anxiety and depression sxs.    - Referral to mental health was placed, though he declines now              Follow up:  2/24/23 Lipid/ALT  2/27/23 OV w/ Dr. Gregor Chawla PA-C      Thank you for allowing me to participate in the care of your patient.      Sincerely,     Nahomi Chawla PA-C     [facility]  cc:   Alcides Bundy MD  [address], [place] [address]  [place] [id number]

## 2022-12-22 NOTE — PROGRESS NOTES
CARDIOLOGY CLINIC PROGRESS NOTE    DOS: 2022      Emery Zayas  : 1955, 67 year old  MRN: [id number]      History:   I am seeing Emery Zayas today for follow up.  He is a patient of Dr. Alcides Bundy.     Emery Zayas is a pleasant 67-year-old male with a past medical history significant for obesity, hypertension, dyslipidemia, alcohol dependence (5-6 drinks daily), severe back pain, gout, mild sleep apnea (diagnosed 10/2020).    He is accompanied today by his wife.  Patient also endorses recent life changes, and feels that he has been drinking at least in part to cope with these changes.  Previously other family members have lived with them, however now they are alone.  In their spare time, they are heavily involved with autoracing, stock car racing.       When he saw Dr. Bundy, amlodipine 2.5 mg BID was added to olmesartan 40 mg and Coreg 12.5 mg BID.     He has had a rough couple weeks.   22 fell and hit his face and broke his tooth.  He was seen in urgent care and BP was 22.   Just this last week, he tore his left rotator cuff.   Mental health called but he declined to set up an appt.   BP today initially 146/78.  On repeat 118/68, 122/68.  He is cutting back one drink each day, so 4-5 drinks a day instead of 5-6 drinks.         ROS:  Skin:        Eyes:       ENT:       Respiratory:  Negative    Cardiovascular:  Negative    Gastroenterology:      Genitourinary:       Musculoskeletal:       Neurologic:       Psychiatric:       Heme/Lymph/Imm:       Endocrine:         PAST MEDICAL HISTORY:  Past Medical History:   Diagnosis Date     Acute idiopathic gout, unspecified site 2016     Alcoholism (H)     Treated , denies any recent problems with withdrawal when skips days drinking.     Anxiety 2014     Arthritis      Chronic low back pain      Dyspepsia and other specified disorders of function of stomach 2003     Elevated liver enzymes 2013      Gout     possible     Herniated intervertebral disk     thoracic     High blood triglycerides 2013     Hyperlipidemia LDL goal <130 04/15/2013     Hypertension      Hypertensive emergency 2017     Metabolic syndrome 2013     Obesity 04/15/2013     Other chest pain 2003     RBBB (right bundle branch block)     Present for several years       PAST SURGICAL HISTORY:  Past Surgical History:   Procedure Laterality Date     BACK SURGERY       Back surgery x 4       COLONOSCOPY N/A 2020    Procedure: COLONOSCOPY, WITH POLYPECTOMY AND BIOPSY; polypectomies  using jumbo bx forcep;  Surgeon: Sherice Mckeon MD;  Location:  GI     ESOPHAGOSCOPY, GASTROSCOPY, DUODENOSCOPY (EGD), COMBINED      normal     TONSILLECTOMY         SOCIAL HISTORY:  Social History     Socioeconomic History     Marital status:    Tobacco Use     Smoking status: Former     Packs/day: 0.00     Types: Cigarettes     Quit date: 7/15/1993     Years since quittin.4     Smokeless tobacco: Never     Tobacco comments:     Quit 20 years ago.   Vaping Use     Vaping Use: Never used   Substance and Sexual Activity     Alcohol use: Yes     Alcohol/week: 7.0 standard drinks     Types: 7 Shots of liquor per week     Comment: more than 12-15 rum and cokes or beer a week     Drug use: No     Sexual activity: Yes     Partners: Female   Other Topics Concern     Parent/sibling w/ CABG, MI or angioplasty before 65F 55M? 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       FAMILY HISTORY:  Family History   Problem Relation Age of Onset     Hypertension Father      Heart Disease Father         Angioplasty in late 60's or early 70's.     Unknown/Adopted Father      Hypertension Sister      Kidney Disease Sister      Unknown/Adopted Sister      Cancer Mother         lung     Unknown/Adopted Mother       Diabetes Mother      Thyroid Disease Sister      Unknown/Adopted Sister      Unknown/Adopted Maternal Grandmother      Substance Abuse Maternal Grandfather      Depression Paternal Grandmother      Unknown/Adopted Paternal Grandfather      Diabetes Sister      Colon Cancer No family hx of        MEDS: allopurinol (ZYLOPRIM) 100 MG tablet, Take 2 tablets (200 mg) by mouth 2 times daily (Patient taking differently: Take 200 mg by mouth daily)  amLODIPine (NORVASC) 2.5 MG tablet, Take 1 tablet (2.5 mg) by mouth 2 times daily  aspirin 81 MG tablet, Take 1 tablet by mouth daily.  calcipotriene (DOVONOX) 0.005 % external cream, Apply a pea sized amount to face bid x 2 weeks.  carvedilol (COREG) 12.5 MG tablet, Take 1 tablet (12.5 mg) by mouth 2 times daily  diclofenac (FLECTOR) 1.3 % patch, EXTERNALLY APPLY 1 PATCH TOPICALLY 2 TIMES DAILY  diclofenac (FLECTOR) 1.3 % patch, Externally apply 1 patch topically 2 times daily  diclofenac (FLECTOR) 1.3 % patch, Externally apply 1 patch topically 2 times daily  fenofibrate (TRICOR) 145 MG tablet, Take 1 tablet (145 mg) by mouth daily  fenofibrate (TRICOR) 145 MG tablet, Take 1 tablet (145 mg) by mouth daily  fluorouracil (EFUDEX) 5 % external cream, Apply a pea sized amount to face bid x 2 weeks.  LORazepam (ATIVAN) 1 MG tablet, Take 1 tablet (1 mg) by mouth every 6 hours as needed for anxiety  Multiple Vitamins-Minerals (MULTIVITAMIN ADULT) CHEW, Take 1 chew tab by mouth daily  olmesartan (BENICAR) 40 MG tablet, Take 1 tablet (40 mg) by mouth daily  omega-3 acid ethyl esters (LOVAZA) 1 g capsule, TAKE 2 CAP BY MOUTH DAILY  order for DME, Equipment being ordered: blood pressure cuff  rosuvastatin (CRESTOR) 20 MG tablet, Take 1 tablet (20 mg) by mouth At Bedtime  tiZANidine (ZANAFLEX) 2 MG tablet, Take 1 tablet (2 mg) by mouth nightly as needed for muscle spasms    dexamethasone (DECADRON) injection 4 mg  dexamethasone (DECADRON) injection 4 mg  lidocaine 1 % injection 1  "mL  lidocaine 1 % injection 1 mL        ALLERGIES:   Allergies   Allergen Reactions     No Known Drug Allergies        PHYSICAL EXAM:  Vitals: /68 (BP Location: Right arm, Patient Position: Sitting, Cuff Size: Adult Large)   Pulse 60   Ht 1.676 m (5' 6\")   Wt 91.7 kg (202 lb 1.6 oz)   SpO2 98%   BMI 32.62 kg/m    Constitutional:  cooperative, alert and oriented, well developed, well nourished, in no acute distress obese      Skin:  warm and dry to the touch, no apparent skin lesions or masses noted        Head:  normocephalic, no masses or lesions        Eyes:  pupils equal and round;conjunctivae and lids unremarkable        ENT:  no pallor or cyanosis        Neck:  not assessed this visit        Respiratory:  clear to auscultation;normal symmetry        Cardiac: regular rhythm, normal S1/S2, no S3 or S4, apical impulse not displaced, no murmurs, gallops or rubs                  GI:  abdomen soft;BS normoactive obese      Vascular: pulses full and equal                                      Extremities and Musculoskeletal:  no edema;normal muscle strength and tone        Neurological:  no gross motor deficits;affect appropriate              LABS/DATA:  I reviewed the following:  Exercise stress echocardiogram 5/7/2017  Interpretation Summary  This was a normal stress echocardiogram with no evidence of stress-induced  ischemia.  The patient exhibited no chest pain during exercise.  Contrast was used without apparent complications.      Component      Latest Ref Rng & Units 2/2/2022   Sodium      133 - 144 mmol/L 138   Potassium      3.4 - 5.3 mmol/L 4.6   Chloride      94 - 109 mmol/L 107   Carbon Dioxide      20 - 32 mmol/L 30   Anion Gap      3 - 14 mmol/L 1 (L)   Urea Nitrogen      7 - 30 mg/dL 18   Creatinine      0.66 - 1.25 mg/dL 1.05   Calcium      8.5 - 10.1 mg/dL 9.0   Glucose      70 - 99 mg/dL 121 (H)   Alkaline Phosphatase      40 - 150 U/L 66   AST      0 - 45 U/L 21   ALT      0 - 70 U/L 31 "   Protein Total      6.8 - 8.8 g/dL 7.6   Albumin      3.4 - 5.0 g/dL 3.8   Bilirubin Total      0.2 - 1.3 mg/dL 0.3   GFR Estimate      >60 mL/min/1.73m2 77   Cholesterol      <200 mg/dL 167   Triglycerides      <150 mg/dL 285 (H)   HDL Cholesterol      >=40 mg/dL 37 (L)   LDL Cholesterol Calculated      <=100 mg/dL 73   Non HDL Cholesterol      <130 mg/dL 130 (H)   Patient Fasting > 8hrs?       Yes       ASSESSMENT/PLAN:  # HTN, blood pressures remain elevated and challenging to control likely due to several factors including alcohol dependence, untreated sleep apnea.  -  Previously had adverse reaction to lisinopril, however has been tolerating olmesartan 40 mg   - He has a right bundle branch block however heart rates are around 60 bpm, will continue carvedilol 12.5 mg BID  - Amlodipine 2.5 mg BID  - BP well controlled now on this regimen      # MONSE      # EtOH dependence  - Counseled to cut back      # Dyslipidemia, low HDL, elevated TGs  - Changed simvastatin to rosuvastatin  - Continued fenofibrate, Lovaza  - FLP/ALT in Feb 2023      # Obesity       # Anxiety and depression sxs.    - Referral to mental health was placed, though he declines now              Follow up:  2/24/23 Lipid/ALT  2/27/23 [place]AW[place] w/ Dr. Gregor Chawla PA-C

## 2023-01-03 ENCOUNTER — TELEPHONE (OUTPATIENT)
Dept: CARDIOLOGY | Facility: CLINIC | Age: 68
End: 2023-01-03

## 2023-01-03 DIAGNOSIS — I10 ESSENTIAL HYPERTENSION, BENIGN: ICD-10-CM

## 2023-01-03 RX ORDER — CARVEDILOL 12.5 MG/1
12.5 TABLET ORAL 2 TIMES DAILY
Qty: 180 TABLET | Refills: 3 | Status: SHIPPED | OUTPATIENT
Start: 2023-01-03 | End: 2024-04-05

## 2023-01-03 NOTE — TELEPHONE ENCOUNTER
Received request from [facility] pharmacy for refill of Carvedilol 12.5mg twice daily.    Last OV 12\22\22 with Nahomi MELÉNDEZC    [facility] Cardiology Refill Guideline reviewed.  Medication meets criteria for refill.

## 2023-01-11 ENCOUNTER — TRANSFERRED RECORDS (OUTPATIENT)
Dept: HEALTH INFORMATION MANAGEMENT | Facility: CLINIC | Age: 68
End: 2023-01-11

## 2023-01-23 ENCOUNTER — TRANSFERRED RECORDS (OUTPATIENT)
Dept: HEALTH INFORMATION MANAGEMENT | Facility: CLINIC | Age: 68
End: 2023-01-23

## 2023-01-24 ENCOUNTER — TRANSFERRED RECORDS (OUTPATIENT)
Dept: HEALTH INFORMATION MANAGEMENT | Facility: CLINIC | Age: 68
End: 2023-01-24

## 2023-02-08 ENCOUNTER — OFFICE VISIT (OUTPATIENT)
Dept: FAMILY MEDICINE | Facility: CLINIC | Age: 68
End: 2023-02-08
Payer: MEDICARE

## 2023-02-08 VITALS
OXYGEN SATURATION: 95 % | RESPIRATION RATE: 16 BRPM | BODY MASS INDEX: 32.87 KG/M2 | DIASTOLIC BLOOD PRESSURE: 62 MMHG | TEMPERATURE: 97.8 F | HEIGHT: 66 IN | WEIGHT: 204.5 LBS | HEART RATE: 50 BPM | SYSTOLIC BLOOD PRESSURE: 120 MMHG

## 2023-02-08 DIAGNOSIS — I10 ESSENTIAL HYPERTENSION, BENIGN: ICD-10-CM

## 2023-02-08 DIAGNOSIS — M25.512 ACUTE PAIN OF LEFT SHOULDER: ICD-10-CM

## 2023-02-08 DIAGNOSIS — Z01.818 PREOP GENERAL PHYSICAL EXAM: Primary | ICD-10-CM

## 2023-02-08 DIAGNOSIS — F10.20 UNCOMPLICATED ALCOHOL DEPENDENCE (H): ICD-10-CM

## 2023-02-08 DIAGNOSIS — E78.1 HIGH BLOOD TRIGLYCERIDES: ICD-10-CM

## 2023-02-08 LAB
ALBUMIN SERPL BCG-MCNC: 4.4 G/DL (ref 3.5–5.2)
ALP SERPL-CCNC: 69 U/L (ref 40–129)
ALT SERPL W P-5'-P-CCNC: 26 U/L (ref 10–50)
ANION GAP SERPL CALCULATED.3IONS-SCNC: 12 MMOL/L (ref 7–15)
AST SERPL W P-5'-P-CCNC: 28 U/L (ref 10–50)
BILIRUB SERPL-MCNC: 0.3 MG/DL
BUN SERPL-MCNC: 15 MG/DL (ref 8–23)
CALCIUM SERPL-MCNC: 9.5 MG/DL (ref 8.8–10.2)
CHLORIDE SERPL-SCNC: 106 MMOL/L (ref 98–107)
CREAT SERPL-MCNC: 1.1 MG/DL (ref 0.67–1.17)
DEPRECATED HCO3 PLAS-SCNC: 24 MMOL/L (ref 22–29)
ERYTHROCYTE [DISTWIDTH] IN BLOOD BY AUTOMATED COUNT: 12.1 % (ref 10–15)
GFR SERPL CREATININE-BSD FRML MDRD: 73 ML/MIN/1.73M2
GLUCOSE SERPL-MCNC: 142 MG/DL (ref 70–99)
HCT VFR BLD AUTO: 42.5 % (ref 40–53)
HGB BLD-MCNC: 14.4 G/DL (ref 13.3–17.7)
MCH RBC QN AUTO: 32.2 PG (ref 26.5–33)
MCHC RBC AUTO-ENTMCNC: 33.9 G/DL (ref 31.5–36.5)
MCV RBC AUTO: 95 FL (ref 78–100)
PLATELET # BLD AUTO: 182 10E3/UL (ref 150–450)
POTASSIUM SERPL-SCNC: 4.5 MMOL/L (ref 3.4–5.3)
PROT SERPL-MCNC: 7.1 G/DL (ref 6.4–8.3)
RBC # BLD AUTO: 4.47 10E6/UL (ref 4.4–5.9)
SODIUM SERPL-SCNC: 142 MMOL/L (ref 136–145)
WBC # BLD AUTO: 4.9 10E3/UL (ref 4–11)

## 2023-02-08 PROCEDURE — 80053 COMPREHEN METABOLIC PANEL: CPT | Performed by: PHYSICIAN ASSISTANT

## 2023-02-08 PROCEDURE — 36415 COLL VENOUS BLD VENIPUNCTURE: CPT | Performed by: PHYSICIAN ASSISTANT

## 2023-02-08 PROCEDURE — 85027 COMPLETE CBC AUTOMATED: CPT | Performed by: PHYSICIAN ASSISTANT

## 2023-02-08 PROCEDURE — 99214 OFFICE O/P EST MOD 30 MIN: CPT | Performed by: PHYSICIAN ASSISTANT

## 2023-02-08 RX ORDER — FENOFIBRATE 145 MG/1
145 TABLET, COATED ORAL DAILY
Qty: 90 TABLET | Refills: 0 | Status: SHIPPED | OUTPATIENT
Start: 2023-02-08 | End: 2023-02-27

## 2023-02-08 ASSESSMENT — PAIN SCALES - GENERAL: PAINLEVEL: SEVERE PAIN (6)

## 2023-02-08 NOTE — PROGRESS NOTES
[facility]  [address] [address] [address]  Phone: [phone]  Primary Provider: Trish Edwards  Pre-op Performing Provider: SHAHEED LYONS      PREOPERATIVE EVALUATION:  Today's date: 2/8/2023    Emery Zayas is a 67 year old male who presents for a preoperative evaluation.    Surgical Information:  Surgery/Procedure: Left Shoulder Repair   Surgery Location: [facility]  Surgeon: Dr. Smith   Surgery Date: 2/16/2023  Time of Surgery: TBD  Where patient plans to recover: At home with family  Fax number for surgical facility: [phone]    Type of Anesthesia Anticipated: General    Assessment & Plan     The proposed surgical procedure is considered INTERMEDIATE risk.    Preop general physical exam  Ok for surgery  - CBC with platelets; Future  - Comprehensive metabolic panel (BMP + Alb, Alk Phos, ALT, AST, Total. Bili, TP); Future  - CBC with platelets  - Comprehensive metabolic panel (BMP + Alb, Alk Phos, ALT, AST, Total. Bili, TP)    Acute pain of left shoulder  Follow with Ortho    Essential hypertension, benign  Controlled today- continue with current medications.  Check labs.  - CBC with platelets; Future  - Comprehensive metabolic panel (BMP + Alb, Alk Phos, ALT, AST, Total. Bili, TP); Future  - CBC with platelets  - Comprehensive metabolic panel (BMP + Alb, Alk Phos, ALT, AST, Total. Bili, TP)    Uncomplicated alcohol dependence (H)  Continues to consume moderately high amounts of daily alcohol.    High blood triglycerides  Refilled today.  - fenofibrate (TRICOR) 145 MG tablet; Take 1 tablet (145 mg) by mouth daily      Possible Sleep Apnea: patient reports symptom- no CPAP {    Risks and Recommendations:  The patient has the following additional risks and recommendations for perioperative complications:  Social and Substance:    - Alcohol use with some risk of withdrawal    Medication Instructions:  Patient is to take all scheduled medications on the day  of surgery EXCEPT for modifications listed below:  Omega supplement, hold   - aspirin: Discontinue aspirin 7-10 days prior to procedure to reduce bleeding risk. It should be resumed postoperatively.     RECOMMENDATION:  APPROVAL GIVEN to proceed with proposed procedure, without further diagnostic evaluation.        Subjective     HPI related to upcoming procedure: Oziel is here for a pre op exam prior to shoulder surgery next week.  He is currently feeling well.    Preop Questions 2/8/2023   1. Have you ever had a heart attack or stroke? No   2. Have you ever had surgery on your heart or blood vessels, such as a stent placement, a coronary artery bypass, or surgery on an artery in your head, neck, heart, or legs? No   3. Do you have chest pain with activity? No   4. Do you have a history of  heart failure? No   5. Do you currently have a cold, bronchitis or symptoms of other infection? No   6. Do you have a cough, shortness of breath, or wheezing? No   7. Do you or anyone in your family have previous history of blood clots? No   8. Do you or does anyone in your family have a serious bleeding problem such as prolonged bleeding following surgeries or cuts? No   9. Have you ever had problems with anemia or been told to take iron pills? No   10. Have you had any abnormal blood loss such as black, tarry or bloody stools? No   11. Have you ever had a blood transfusion? No   12. Are you willing to have a blood transfusion if it is medically needed before, during, or after your surgery? Yes   13. Have you or any of your relatives ever had problems with anesthesia? YES - patient has had bladder problems post anesthesia in the past   14. Do you have sleep apnea, excessive snoring or daytime drowsiness? YES    14a. Do you have a CPAP machine? No   15. Do you have any artifical heart valves or other implanted medical devices like a pacemaker, defibrillator, or continuous glucose monitor? No   16. Do you have artificial joints?  No   17. Are you allergic to latex? No       Health Care Directive:  Patient does not have a Health Care Directive or Living Will: Discussed advance care planning with patient; however, patient declined at this time.    Preoperative Review of :   reviewed - controlled substances prescribed by other outside provider(s).      Status of Chronic Conditions:  HYPERLIPIDEMIA - Patient has a long history of significant Hyperlipidemia requiring medication for treatment with recent fair control. Patient reports no problems or side effects with the medication.     HYPERTENSION - Patient has longstanding history of HTN , currently denies any symptoms referable to elevated blood pressure. Specifically denies chest pain, palpitations, dyspnea, orthopnea, PND or peripheral edema. Blood pressure readings have been in normal range. Current medication regimen is as listed below. Patient denies any side effects of medication.       Review of Systems  Constitutional, neuro, ENT, endocrine, pulmonary, cardiac, gastrointestinal, genitourinary, musculoskeletal, integument and psychiatric systems are negative, except as otherwise noted.    Patient Active Problem List    Diagnosis Date Noted     DDD (degenerative disc disease), thoracic 03/25/2022     Priority: Medium     Lumbosacral spinal stenosis 03/25/2022     Priority: Medium     Diffuse idiopathic skeletal hyperostosis 02/08/2022     Priority: Medium     Facet arthropathy, lumbosacral 02/08/2022     Priority: Medium     DDD (degenerative disc disease), lumbosacral 02/08/2022     Priority: Medium     Chronic bilateral low back pain without sciatica 08/29/2016     Priority: Medium     Uncomplicated alcohol dependence (H) 08/23/2016     Priority: Medium     Essential hypertension, benign 08/02/2016     Priority: Medium     Anxiety 12/31/2014     Priority: Medium     Metabolic syndrome 12/30/2013     Priority: Medium     High blood triglycerides 12/30/2013     Priority: Medium      Elevated liver enzymes 12/30/2013     Priority: Medium     Hyperlipidemia LDL goal <130 04/15/2013     Priority: Medium     Obesity 04/15/2013     Priority: Medium     Chronic gout without tophus, unspecified cause, unspecified site 08/03/2012     Priority: Medium     Dyspepsia and other specified disorders of function of stomach 07/31/2003     Priority: Medium     Other chest pain 07/31/2003     Priority: Medium     Other and unspecified disc disorder of lumbar region 07/15/2003     Priority: Medium      Past Medical History:   Diagnosis Date     Acute idiopathic gout, unspecified site 08/23/2016     Alcoholism (H)     Treated 1980's, denies any recent problems with withdrawal when skips days drinking.     Anxiety 12/31/2014     Arthritis      Chronic low back pain      Dyspepsia and other specified disorders of function of stomach 07/31/2003     Elevated liver enzymes 12/30/2013     Gout     possible     Herniated intervertebral disk     thoracic     High blood triglycerides 12/30/2013     Hyperlipidemia LDL goal <130 04/15/2013     Hypertension      Hypertensive emergency 05/05/2017     Metabolic syndrome 12/30/2013     Obesity 04/15/2013     Other chest pain 07/31/2003     RBBB (right bundle branch block)     Present for several years     Past Surgical History:   Procedure Laterality Date     BACK SURGERY       Back surgery x 4       COLONOSCOPY N/A 1/23/2020    Procedure: COLONOSCOPY, WITH POLYPECTOMY AND BIOPSY; polypectomies  using jumbo bx forcep;  Surgeon: Sherice Mckeon MD;  Location:  GI     ESOPHAGOSCOPY, GASTROSCOPY, DUODENOSCOPY (EGD), COMBINED  2000    normal     TONSILLECTOMY       Current Outpatient Medications   Medication Sig Dispense Refill     allopurinol (ZYLOPRIM) 100 MG tablet Take 2 tablets (200 mg) by mouth 2 times daily (Patient taking differently: Take 200 mg by mouth daily) 360 tablet 3     amLODIPine (NORVASC) 2.5 MG tablet Take 1 tablet (2.5 mg) by mouth 2 times daily 180 tablet 3      aspirin 81 MG tablet Take 1 tablet by mouth daily.       carvedilol (COREG) 12.5 MG tablet Take 1 tablet (12.5 mg) by mouth 2 times daily 180 tablet 3     diclofenac (FLECTOR) 1.3 % patch Externally apply 1 patch topically 2 times daily 60 patch 5     fenofibrate (TRICOR) 145 MG tablet Take 1 tablet (145 mg) by mouth daily 90 tablet 3     LORazepam (ATIVAN) 1 MG tablet Take 1 tablet (1 mg) by mouth every 6 hours as needed for anxiety 10 tablet 0     Multiple Vitamins-Minerals (MULTIVITAMIN ADULT) CHEW Take 1 chew tab by mouth daily       olmesartan (BENICAR) 40 MG tablet Take 1 tablet (40 mg) by mouth daily 90 tablet 3     omega-3 acid ethyl esters (LOVAZA) 1 g capsule TAKE 2 CAP BY MOUTH DAILY 180 capsule 1     order for DME Equipment being ordered: blood pressure cuff 1 each 0     rosuvastatin (CRESTOR) 20 MG tablet Take 1 tablet (20 mg) by mouth At Bedtime 90 tablet 3     calcipotriene (DOVONOX) 0.005 % external cream Apply a pea sized amount to face bid x 2 weeks. (Patient not taking: Reported on 2/8/2023) 60 g 1     diclofenac (FLECTOR) 1.3 % patch EXTERNALLY APPLY 1 PATCH TOPICALLY 2 TIMES DAILY (Patient not taking: Reported on 2/8/2023) 60 patch 5     diclofenac (FLECTOR) 1.3 % patch Externally apply 1 patch topically 2 times daily (Patient not taking: Reported on 2/8/2023) 30 patch 5     fenofibrate (TRICOR) 145 MG tablet Take 1 tablet (145 mg) by mouth daily (Patient not taking: Reported on 2/8/2023) 90 tablet 3     fluorouracil (EFUDEX) 5 % external cream Apply a pea sized amount to face bid x 2 weeks. (Patient not taking: Reported on 2/8/2023) 40 g 1     tiZANidine (ZANAFLEX) 2 MG tablet Take 1 tablet (2 mg) by mouth nightly as needed for muscle spasms (Patient not taking: Reported on 2/8/2023) 90 tablet 1       Allergies   Allergen Reactions     No Known Drug Allergies         Social History     Tobacco Use     Smoking status: Former     Packs/day: 0.00     Types: Cigarettes     Quit date: 7/15/1993  "    Years since quittin.5     Passive exposure: Never     Smokeless tobacco: Never     Tobacco comments:     Quit 20 years ago.   Substance Use Topics     Alcohol use: Yes     Alcohol/week: 7.0 standard drinks     Types: 7 Shots of liquor per week     Comment: more than 12-15 rum and cokes or beer a week       History   Drug Use No         Objective     /62 (BP Location: Right arm, Patient Position: Sitting, Cuff Size: Adult Large)   Pulse 50   Temp 97.8  F (36.6  C) (Oral)   Resp 16   Ht 1.676 m (5' 6\")   Wt 92.8 kg (204 lb 8 oz)   SpO2 95%   BMI 33.01 kg/m      Physical Exam    GENERAL APPEARANCE: healthy, alert and no distress     EYES: EOMI,  PERRL     HENT: ear canals and TM's normal and nose and mouth without ulcers or lesions     NECK: no adenopathy, no asymmetry, masses, or scars and thyroid normal to palpation     RESP: lungs clear to auscultation - no rales, rhonchi or wheezes     CV: regular rates and rhythm, normal S1 S2, no S3 or S4 and no murmur, click or rub     MS: extremities normal- no gross deformities noted, no evidence of inflammation in joints, FROM in all extremities.     SKIN: no suspicious lesions or rashes     NEURO: Normal strength and tone, sensory exam grossly normal, mentation intact and speech normal     PSYCH: mentation appears normal. and affect normal/bright     LYMPHATICS: No cervical adenopathy    Recent Labs   Lab Test 22  0805   HGB 14.4         POTASSIUM 4.6   CR 1.05        Diagnostics:  Labs pending at this time.  Results will be reviewed when available.   No EKG this visit, completed in the last 90 days.    Revised Cardiac Risk Index (RCRI):  The patient has the following serious cardiovascular risks for perioperative complications:   - No serious cardiac risks = 0 points     RCRI Interpretation: 0 points: Class I (very low risk - 0.4% complication rate)           Signed Electronically by: Igor Vitale PA-C  Copy of this " evaluation report is provided to requesting physician.

## 2023-02-08 NOTE — PATIENT INSTRUCTIONS
For informational purposes only. Not to replace the advice of your health care provider. Copyright   2003,  [employer] [facility]. All rights reserved. Clinically reviewed by Nasima Graham MD. [employer] [id number] - REV .  Preparing for Your Surgery  Getting started  A nurse will call you to review your health history and instructions. They will give you an arrival time based on your scheduled surgery time. Please be ready to share:    Your doctor's clinic name and phone number    Your medical, surgical, and anesthesia history    A list of allergies and sensitivities    A list of medicines, including herbal treatments and over-the-counter drugs    Whether the patient has a legal guardian (ask how to send us the papers in advance)  Please tell us if you're pregnant--or if there's any chance you might be pregnant. Some surgeries may injure a fetus (unborn baby), so they require a pregnancy test. Surgeries that are safe for a fetus don't always need a test, and you can choose whether to have one.   If you have a child who's having surgery, please ask for a copy of Preparing for Your Child's Surgery.    Preparing for surgery    Within 10 to 30 days of surgery: Have a pre-op exam (sometimes called an H&P, or History and Physical). This can be done at a clinic or pre-operative center.  ? If you're having a , you may not need this exam. Talk to your care team.    At your pre-op exam, talk to your care team about all medicines you take. If you need to stop any medicines before surgery, ask when to start taking them again.  ? We do this for your safety. Many medicines can make you bleed too much during surgery. Some change how well surgery (anesthesia) drugs work.    Call your insurance company to let them know you're having surgery. (If you don't have insurance, call [phone].)    Call your clinic if there's any change in your health. This includes signs of a cold or flu (sore throat, runny nose,  cough, rash, fever). It also includes a scrape or scratch near the surgery site.    If you have questions on the day of surgery, call your hospital or surgery center.  Eating and drinking guidelines  For your safety: Unless your surgeon tells you otherwise, follow the guidelines below.    Eat and drink as usual until 8 hours before you arrive for surgery. After that, no food or milk.    Drink clear liquids until 2 hours before you arrive. These are liquids you can see through, like water, Gatorade, and Propel Water. They also include plain black coffee and tea (no cream or milk), candy, and breath mints. You can spit out gum when you arrive.    If you drink alcohol: Stop drinking it the night before surgery.    If your care team tells you to take medicine on the morning of surgery, it's okay to take it with a sip of water.  Preventing infection    Shower or bathe the night before and morning of your surgery. Follow the instructions your clinic gave you. (If no instructions, use regular soap.)    Don't shave or clip hair near your surgery site. We'll remove the hair if needed.    Don't smoke or vape the morning of surgery. You may chew nicotine gum up to 2 hours before surgery. A nicotine patch is okay.  ? Note: Some surgeries require you to completely quit smoking and nicotine. Check with your surgeon.    Your care team will make every effort to keep you safe from infection. We will:  ? Clean our hands often with soap and water (or an alcohol-based hand rub).  ? Clean the skin at your surgery site with a special soap that kills germs.  ? Give you a special gown to keep you warm. (Cold raises the risk of infection.)  ? Wear special hair covers, masks, gowns and gloves during surgery.  ? Give antibiotic medicine, if prescribed. Not all surgeries need antibiotics.  What to bring on the day of surgery    Photo ID and insurance card    Copy of your health care directive, if you have one    Glasses and hearing aids (bring  cases)  ? You can't wear contacts during surgery    Inhaler and eye drops, if you use them (tell us about these when you arrive)    CPAP machine or breathing device, if you use them    A few personal items, if spending the night    If you have . . .  ? A pacemaker, ICD (cardiac defibrillator) or other implant: Bring the ID card.  ? An implanted stimulator: Bring the remote control.  ? A legal guardian: Bring a copy of the certified (court-stamped) guardianship papers.  Please remove any jewelry, including body piercings. Leave jewelry and other valuables at home.  If you're going home the day of surgery    You must have a responsible adult drive you home. They should stay with you overnight as well.    If you don't have someone to stay with you, and you aren't safe to go home alone, we may keep you overnight. Insurance often won't pay for this.  After surgery  If it's hard to control your pain or you need more pain medicine, please call your surgeon's office.  Questions?   If you have any questions for your care team, list them here: _________________________________________________________________________________________________________________________________________________________________________ ____________________________________ ____________________________________ ____________________________________

## 2023-02-22 ENCOUNTER — LAB (OUTPATIENT)
Dept: LAB | Facility: CLINIC | Age: 68
End: 2023-02-22
Payer: MEDICARE

## 2023-02-22 DIAGNOSIS — E78.5 HYPERLIPIDEMIA LDL GOAL <130: ICD-10-CM

## 2023-02-22 LAB
ALT SERPL W P-5'-P-CCNC: 18 U/L (ref 10–50)
CHOLEST SERPL-MCNC: 136 MG/DL
HDLC SERPL-MCNC: 43 MG/DL
LDLC SERPL CALC-MCNC: 59 MG/DL
NONHDLC SERPL-MCNC: 93 MG/DL
TRIGL SERPL-MCNC: 169 MG/DL

## 2023-02-22 PROCEDURE — 80061 LIPID PANEL: CPT | Performed by: INTERNAL MEDICINE

## 2023-02-22 PROCEDURE — 36415 COLL VENOUS BLD VENIPUNCTURE: CPT | Performed by: INTERNAL MEDICINE

## 2023-02-22 PROCEDURE — 84460 ALANINE AMINO (ALT) (SGPT): CPT | Performed by: INTERNAL MEDICINE

## 2023-02-27 ENCOUNTER — OFFICE VISIT (OUTPATIENT)
Dept: CARDIOLOGY | Facility: CLINIC | Age: 68
End: 2023-02-27
Attending: INTERNAL MEDICINE
Payer: MEDICARE

## 2023-02-27 VITALS
HEIGHT: 66 IN | OXYGEN SATURATION: 97 % | BODY MASS INDEX: 33.22 KG/M2 | HEART RATE: 52 BPM | DIASTOLIC BLOOD PRESSURE: 66 MMHG | WEIGHT: 206.7 LBS | SYSTOLIC BLOOD PRESSURE: 132 MMHG

## 2023-02-27 DIAGNOSIS — I10 ESSENTIAL HYPERTENSION, BENIGN: ICD-10-CM

## 2023-02-27 DIAGNOSIS — E78.1 HIGH BLOOD TRIGLYCERIDES: ICD-10-CM

## 2023-02-27 DIAGNOSIS — E78.5 DYSLIPIDEMIA: Primary | ICD-10-CM

## 2023-02-27 PROCEDURE — 99213 OFFICE O/P EST LOW 20 MIN: CPT | Performed by: INTERNAL MEDICINE

## 2023-02-27 RX ORDER — FENOFIBRATE 145 MG/1
145 TABLET, COATED ORAL DAILY
Qty: 90 TABLET | Refills: 3 | Status: SHIPPED | OUTPATIENT
Start: 2023-02-27 | End: 2024-04-16

## 2023-02-27 RX ORDER — DOCUSATE SODIUM 50MG AND SENNOSIDES 8.6MG 8.6; 5 MG/1; MG/1
TABLET, FILM COATED ORAL DAILY PRN
COMMUNITY
Start: 2023-02-16 | End: 2023-08-31

## 2023-02-27 NOTE — PROGRESS NOTES
Cardiology Clinic Progress Note:    February 27, 2023   Patient Name: Emery Zayas  Patient MRN: [id number]     Consult indication: HTN    HPI:    I had the opportunity to see patient Emery Zayas in cardiology clinic for a follow up visit. Patient is followed by our colleague Trish Edwards MD with Primary Care.     As you know, patient is a pleasant 67-year-old male with a past medical history significant for obesity, hypertension, dyslipidemia, alcohol dependence, severe back pain, gout, mild sleep apnea, who presents for follow-up regarding challenging to control hypertension.     Patient was previously seen by my colleague Dr. Arce in cardiology clinic for hypertension.  Blood pressures on prior clinic visits have been persistently elevated.  Patient denies any chest pain, chest pressure, normal shortness of breath, though physical exertion/activity is limited by chronic back pain.  He does note some weakness in his legs related to his prior back surgeries.     Patient was previously assessed with a sleep study 10/20/2020 that demonstrated mild sleep apnea.  This was felt to be largely related to his alcohol use, he does consume about 5 or 6 alcoholic drinks (mixed) daily.    I had seen him in clinic on 11/15/2022, at that time we encouraged him to cut back on alcohol, we started amlodipine, and change simvastatin to rosuvastatin.  He was then seen by my colleague Nahomi MELÉNDEZ.  Overall patient reports that he has been feeling quite well from a cardiac standpoint, though recently had surgery on a rotator cuff for his left shoulder.  He denies any chest pain, chest pressure, abnormal shortness of breath, dizziness/lightheadedness.  He is walking daily with his neighbor for about a mile without issue.  Blood pressure today in clinic is 132/66 mmHg.    Assessment and Plan/Recommendations:    # HTN, BP well controlled, previously challenging to control likely due to several factors  including alcohol dependence, untreated sleep apnea.  # MONSE  # EtOH dependence  # HL  # Obesity     -Continue current regimen of carvedilol, amlodipine, olmesartan, rosuvastatin, fenofibrate, Lovaza  - Encouraged continued efforts to cut back on alcohol use  - Per patient preference, follow-up in 1 year with fasting lipids    Thank you for allowing our team to participate in the care of Emery Zayas.  Please do not hesitate to call or page me with any questions or concerns.    Sincerely,     Alcides Bundy MD, [facility]  Cardiology  Text Page   February 27, 2023    Voice recognition software utilized.     Total time spent on this encounter: 25 minutes, providing care in this encounter including, but not limited to, reviewing prior medical records, laboratory data, imaging studies, diagnostic studies, procedure notes, formulating an assessment and plan, recommendations, discussion and counseling with patient face to face, dictation.    Past Medical History:   The 10-year ASCVD risk score (Nicolette DK, et al., 2019) is: 18%    Values used to calculate the score:      Age: 67 years      Sex: Male      Is Non- : No      Diabetic: No      Tobacco smoker: No      Systolic Blood Pressure: 132 mmHg      Is BP treated: Yes      HDL Cholesterol: 43 mg/dL      Total Cholesterol: 136 mg/dL  Past Medical History:   Diagnosis Date     Acute idiopathic gout, unspecified site 08/23/2016     Alcoholism (H)     Treated 1980's, denies any recent problems with withdrawal when skips days drinking.     Anxiety 12/31/2014     Arthritis      Chronic low back pain      Dyspepsia and other specified disorders of function of stomach 07/31/2003     Elevated liver enzymes 12/30/2013     Gout     possible     Herniated intervertebral disk     thoracic     High blood triglycerides 12/30/2013     Hyperlipidemia LDL goal <130 04/15/2013     Hypertension      Hypertensive emergency 05/05/2017     Metabolic  syndrome 12/30/2013     Obesity 04/15/2013     Other chest pain 07/31/2003     RBBB (right bundle branch block)     Present for several years        Past Surgical History:   Past Surgical History:   Procedure Laterality Date     BACK SURGERY       Back surgery x 4       COLONOSCOPY N/A 1/23/2020    Procedure: COLONOSCOPY, WITH POLYPECTOMY AND BIOPSY; polypectomies  using jumbo bx forcep;  Surgeon: Sherice Mckeon MD;  Location:  GI     ESOPHAGOSCOPY, GASTROSCOPY, DUODENOSCOPY (EGD), COMBINED  2000    normal     TONSILLECTOMY         Medications (outpatient):  Current Outpatient Medications   Medication Sig Dispense Refill     allopurinol (ZYLOPRIM) 100 MG tablet Take 2 tablets (200 mg) by mouth 2 times daily (Patient taking differently: Take 100 mg by mouth daily) 360 tablet 3     amLODIPine (NORVASC) 2.5 MG tablet Take 1 tablet (2.5 mg) by mouth 2 times daily 180 tablet 3     aspirin 81 MG tablet Take 1 tablet by mouth daily.       carvedilol (COREG) 12.5 MG tablet Take 1 tablet (12.5 mg) by mouth 2 times daily 180 tablet 3     diclofenac (FLECTOR) 1.3 % patch Externally apply 1 patch topically 2 times daily 60 patch 5     fenofibrate (TRICOR) 145 MG tablet Take 1 tablet (145 mg) by mouth daily 90 tablet 0     LORazepam (ATIVAN) 1 MG tablet Take 1 tablet (1 mg) by mouth every 6 hours as needed for anxiety 10 tablet 0     Multiple Vitamins-Minerals (MULTIVITAMIN ADULT) CHEW Take 1 chew tab by mouth daily       olmesartan (BENICAR) 40 MG tablet Take 1 tablet (40 mg) by mouth daily 90 tablet 3     omega-3 acid ethyl esters (LOVAZA) 1 g capsule TAKE 2 CAP BY MOUTH DAILY 180 capsule 1     order for DME Equipment being ordered: blood pressure cuff 1 each 0     rosuvastatin (CRESTOR) 20 MG tablet Take 1 tablet (20 mg) by mouth At Bedtime 90 tablet 3     SENEXON-S 8.6-50 MG tablet daily as needed         Allergies:  Allergies   Allergen Reactions     No Known Drug Allergies        Social History:   History   Drug Use  "No      History   Smoking Status     Former     Packs/day: 0.00     Types: Cigarettes     Quit date: 7/15/1993   Smokeless Tobacco     Never     Social History    Substance and Sexual Activity      Alcohol use: Yes        Alcohol/week: 7.0 standard drinks        Types: 7 Shots of liquor per week        Comment: more than 12-15 rum and cokes or beer a week       Family History:  Family History   Problem Relation Age of Onset     Hypertension Father      Heart Disease Father         Angioplasty in late 60's or early 70's.     Unknown/Adopted Father      Hypertension Sister      Kidney Disease Sister      Unknown/Adopted Sister      Cancer Mother         lung     Unknown/Adopted Mother      Diabetes Mother      Thyroid Disease Sister      Unknown/Adopted Sister      Unknown/Adopted Maternal Grandmother      Substance Abuse Maternal Grandfather      Depression Paternal Grandmother      Unknown/Adopted Paternal Grandfather      Diabetes Sister      Colon Cancer No family hx of        Review of Systems:   A complete review of systems was negative except as mentioned in the History of Present Illness.     Objective & Physical Exam:  /66 (BP Location: Right arm, Patient Position: Sitting, Cuff Size: Adult Regular)   Pulse 52   Ht 1.676 m (5' 6\")   Wt 93.8 kg (206 lb 11.2 oz)   SpO2 97%   BMI 33.36 kg/m    Wt Readings from Last 2 Encounters:   02/27/23 93.8 kg (206 lb 11.2 oz)   02/08/23 92.8 kg (204 lb 8 oz)     Body mass index is 33.36 kg/m .   Body surface area is 2.09 meters squared.    Constitutional: appears stated age, in no apparent distress, appears to be well nourished  Head: normocephalic, atraumatic  Neck: supple, trachea midline   Pulmonary: clear to auscultation bilaterally  Cardiovascular: JVP normal, regular rate, regular rhythm, normal S1 and S2, no S3, S4, no murmur appreciated, no lower extremity edema  Gastrointestinal: no guarding, non-rigid   Neurologic: awake, alert, moves all extremities " (left arm in a sling)  Skin: no jaundice, warm on limited exam  Psychiatric: affect is normal, answers questions appropriately, oriented to self and place    Data reviewed:  Lab Results   Component Value Date    WBC 4.9 02/08/2023    WBC 6.3 02/04/2021    RBC 4.47 02/08/2023    RBC 4.20 (L) 02/04/2021    HGB 14.4 02/08/2023    HGB 13.8 02/04/2021    HCT 42.5 02/08/2023    HCT 40.9 02/04/2021    MCV 95 02/08/2023    MCV 97 02/04/2021    MCH 32.2 02/08/2023    MCH 32.9 02/04/2021    MCHC 33.9 02/08/2023    MCHC 33.7 02/04/2021    RDW 12.1 02/08/2023    RDW 12.2 02/04/2021     02/08/2023     02/04/2021     Sodium   Date Value Ref Range Status   02/08/2023 142 136 - 145 mmol/L Final   02/04/2021 137 133 - 144 mmol/L Final     Potassium   Date Value Ref Range Status   02/08/2023 4.5 3.4 - 5.3 mmol/L Final   02/02/2022 4.6 3.4 - 5.3 mmol/L Final   02/04/2021 4.4 3.4 - 5.3 mmol/L Final     Chloride   Date Value Ref Range Status   02/08/2023 106 98 - 107 mmol/L Final   02/02/2022 107 94 - 109 mmol/L Final   02/04/2021 107 94 - 109 mmol/L Final     Carbon Dioxide   Date Value Ref Range Status   02/04/2021 25 20 - 32 mmol/L Final     Carbon Dioxide (CO2)   Date Value Ref Range Status   02/08/2023 24 22 - 29 mmol/L Final   02/02/2022 30 20 - 32 mmol/L Final     Anion Gap   Date Value Ref Range Status   02/08/2023 12 7 - 15 mmol/L Final   02/02/2022 1 (L) 3 - 14 mmol/L Final   02/04/2021 5 3 - 14 mmol/L Final     Glucose   Date Value Ref Range Status   02/08/2023 142 (H) 70 - 99 mg/dL Final   02/02/2022 121 (H) 70 - 99 mg/dL Final   02/04/2021 110 (H) 70 - 99 mg/dL Final     Comment:     Fasting specimen     Urea Nitrogen   Date Value Ref Range Status   02/08/2023 15.0 8.0 - 23.0 mg/dL Final   02/02/2022 18 7 - 30 mg/dL Final   02/04/2021 13 7 - 30 mg/dL Final     Creatinine   Date Value Ref Range Status   02/08/2023 1.10 0.67 - 1.17 mg/dL Final   02/04/2021 1.00 0.66 - 1.25 mg/dL Final     GFR Estimate   Date  Value Ref Range Status   02/08/2023 73 >60 mL/min/1.73m2 Final     Comment:     eGFR calculated using 2021 CKD-EPI equation.   02/04/2021 78 >60 mL/min/[1.73_m2] Final     Comment:     Non  GFR Calc  Starting 12/18/2018, serum creatinine based estimated GFR (eGFR) will be   calculated using the Chronic Kidney Disease Epidemiology Collaboration   (CKD-EPI) equation.       Calcium   Date Value Ref Range Status   02/08/2023 9.5 8.8 - 10.2 mg/dL Final   02/04/2021 9.8 8.5 - 10.1 mg/dL Final     Bilirubin Total   Date Value Ref Range Status   02/08/2023 0.3 <=1.2 mg/dL Final   02/04/2021 0.7 0.2 - 1.3 mg/dL Final     Alkaline Phosphatase   Date Value Ref Range Status   02/08/2023 69 40 - 129 U/L Final   02/04/2021 94 40 - 150 U/L Final     ALT   Date Value Ref Range Status   02/22/2023 18 10 - 50 U/L Final   02/04/2021 34 0 - 70 U/L Final     AST   Date Value Ref Range Status   02/08/2023 28 10 - 50 U/L Final   02/04/2021 22 0 - 45 U/L Final     Recent Labs   Lab Test 02/22/23  0842 02/02/22  0805 08/02/16  0948 05/15/15  0833   CHOL 136 167   < > 183   HDL 43 37*   < > 35*   LDL 59 73   < > Cannot estimate LDL when triglyceride exceeds 400 mg/dL  76   TRIG 169* 285*   < > 523*   CHOLHDLRATIO  --   --   --  5.2*    < > = values in this interval not displayed.      Lab Results   Component Value Date    A1C 5.4 01/08/2019    A1C 5.5 05/05/2017    A1C 5.7 08/02/2016    A1C 5.5 12/30/2013    A1C 5.4 05/16/2013

## 2023-02-27 NOTE — PATIENT INSTRUCTIONS
February 27, 2023    Thank you for allowing our Cardiology team to participate in your care.     Please note the following changes to your heart treatment plan:     Medication changes:   - none    Tests to be done:  - FASTING cholesterol labs in 1 year    Follow up:  - Follow up in 1 year, or sooner as needed.      For scheduling, please call [phone].    Please contact our team at [phone] (Joan BARNHART) or [phone] for any questions or concerns.     If you are having a medical emergency, please call [phone].     Sincerely,    Alcides Bundy MD, [facility]  Cardiology    [facility] - [facility] [place]

## 2023-02-27 NOTE — LETTER
2/27/2023    Trish Edwards MD  [address]  [facility] [address]    RE: Emery GALVAN Chana       Dear Colleague,     I had the pleasure of seeing Emery Zayas in the [facility].      Cardiology Clinic Progress Note:    February 27, 2023   Patient Name: Emery Zayas  Patient MRN: [id number]     Consult indication: HTN    HPI:    I had the opportunity to see patient Emery Zayas in cardiology clinic for a follow up visit. Patient is followed by our colleague Trish Edwards MD with Primary Care.     As you know, patient is a pleasant 67-year-old male with a past medical history significant for obesity, hypertension, dyslipidemia, alcohol dependence, severe back pain, gout, mild sleep apnea, who presents for follow-up regarding challenging to control hypertension.     Patient was previously seen by my colleague Dr. Arce in cardiology clinic for hypertension.  Blood pressures on prior clinic visits have been persistently elevated.  Patient denies any chest pain, chest pressure, normal shortness of breath, though physical exertion/activity is limited by chronic back pain.  He does note some weakness in his legs related to his prior back surgeries.     Patient was previously assessed with a sleep study 10/20/2020 that demonstrated mild sleep apnea.  This was felt to be largely related to his alcohol use, he does consume about 5 or 6 alcoholic drinks (mixed) daily.    I had seen him in clinic on 11/15/2022, at that time we encouraged him to cut back on alcohol, we started amlodipine, and change simvastatin to rosuvastatin.  He was then seen by my colleague Nahomi MELÉNDEZ.  Overall patient reports that he has been feeling quite well from a cardiac standpoint, though recently had surgery on a rotator cuff for his left shoulder.  He denies any chest pain, chest pressure, abnormal shortness of breath, dizziness/lightheadedness.  He is walking daily with his neighbor for  about a mile without issue.  Blood pressure today in clinic is 132/66 mmHg.    Assessment and Plan/Recommendations:    # HTN, BP well controlled, previously challenging to control likely due to several factors including alcohol dependence, untreated sleep apnea.  # MONSE  # EtOH dependence  # HL  # Obesity     -Continue current regimen of carvedilol, amlodipine, olmesartan, rosuvastatin, fenofibrate, Lovaza  - Encouraged continued efforts to cut back on alcohol use  - Per patient preference, follow-up in 1 year with fasting lipids    Thank you for allowing our team to participate in the care of Emery Zayas.  Please do not hesitate to call or page me with any questions or concerns.    Sincerely,     Alcides Bundy MD, [facility]  Cardiology  Text Page   February 27, 2023    Voice recognition software utilized.     Total time spent on this encounter: 25 minutes, providing care in this encounter including, but not limited to, reviewing prior medical records, laboratory data, imaging studies, diagnostic studies, procedure notes, formulating an assessment and plan, recommendations, discussion and counseling with patient face to face, dictation.    Past Medical History:   The 10-year ASCVD risk score (Houston DK, et al., 2019) is: 18%    Values used to calculate the score:      Age: 67 years      Sex: Male      Is Non- : No      Diabetic: No      Tobacco smoker: No      Systolic Blood Pressure: 132 mmHg      Is BP treated: Yes      HDL Cholesterol: 43 mg/dL      Total Cholesterol: 136 mg/dL  Past Medical History:   Diagnosis Date     Acute idiopathic gout, unspecified site 08/23/2016     Alcoholism (H)     Treated 1980's, denies any recent problems with withdrawal when skips days drinking.     Anxiety 12/31/2014     Arthritis      Chronic low back pain      Dyspepsia and other specified disorders of function of stomach 07/31/2003     Elevated liver enzymes 12/30/2013     Gout      possible     Herniated intervertebral disk     thoracic     High blood triglycerides 12/30/2013     Hyperlipidemia LDL goal <130 04/15/2013     Hypertension      Hypertensive emergency 05/05/2017     Metabolic syndrome 12/30/2013     Obesity 04/15/2013     Other chest pain 07/31/2003     RBBB (right bundle branch block)     Present for several years        Past Surgical History:   Past Surgical History:   Procedure Laterality Date     BACK SURGERY       Back surgery x 4       COLONOSCOPY N/A 1/23/2020    Procedure: COLONOSCOPY, WITH POLYPECTOMY AND BIOPSY; polypectomies  using jumbo bx forcep;  Surgeon: Sherice Mckeon MD;  Location:  GI     ESOPHAGOSCOPY, GASTROSCOPY, DUODENOSCOPY (EGD), COMBINED  2000    normal     TONSILLECTOMY         Medications (outpatient):  Current Outpatient Medications   Medication Sig Dispense Refill     allopurinol (ZYLOPRIM) 100 MG tablet Take 2 tablets (200 mg) by mouth 2 times daily (Patient taking differently: Take 100 mg by mouth daily) 360 tablet 3     amLODIPine (NORVASC) 2.5 MG tablet Take 1 tablet (2.5 mg) by mouth 2 times daily 180 tablet 3     aspirin 81 MG tablet Take 1 tablet by mouth daily.       carvedilol (COREG) 12.5 MG tablet Take 1 tablet (12.5 mg) by mouth 2 times daily 180 tablet 3     diclofenac (FLECTOR) 1.3 % patch Externally apply 1 patch topically 2 times daily 60 patch 5     fenofibrate (TRICOR) 145 MG tablet Take 1 tablet (145 mg) by mouth daily 90 tablet 0     LORazepam (ATIVAN) 1 MG tablet Take 1 tablet (1 mg) by mouth every 6 hours as needed for anxiety 10 tablet 0     Multiple Vitamins-Minerals (MULTIVITAMIN ADULT) CHEW Take 1 chew tab by mouth daily       olmesartan (BENICAR) 40 MG tablet Take 1 tablet (40 mg) by mouth daily 90 tablet 3     omega-3 acid ethyl esters (LOVAZA) 1 g capsule TAKE 2 CAP BY MOUTH DAILY 180 capsule 1     order for DME Equipment being ordered: blood pressure cuff 1 each 0     rosuvastatin (CRESTOR) 20 MG tablet Take 1 tablet  "(20 mg) by mouth At Bedtime 90 tablet 3     SENEXON-S 8.6-50 MG tablet daily as needed         Allergies:  Allergies   Allergen Reactions     No Known Drug Allergies        Social History:   History   Drug Use No      History   Smoking Status     Former     Packs/day: 0.00     Types: Cigarettes     Quit date: 7/15/1993   Smokeless Tobacco     Never     Social History    Substance and Sexual Activity      Alcohol use: Yes        Alcohol/week: 7.0 standard drinks        Types: 7 Shots of liquor per week        Comment: more than 12-15 rum and cokes or beer a week       Family History:  Family History   Problem Relation Age of Onset     Hypertension Father      Heart Disease Father         Angioplasty in late 60's or early 70's.     Unknown/Adopted Father      Hypertension Sister      Kidney Disease Sister      Unknown/Adopted Sister      Cancer Mother         lung     Unknown/Adopted Mother      Diabetes Mother      Thyroid Disease Sister      Unknown/Adopted Sister      Unknown/Adopted Maternal Grandmother      Substance Abuse Maternal Grandfather      Depression Paternal Grandmother      Unknown/Adopted Paternal Grandfather      Diabetes Sister      Colon Cancer No family hx of        Review of Systems:   A complete review of systems was negative except as mentioned in the History of Present Illness.     Objective & Physical Exam:  /66 (BP Location: Right arm, Patient Position: Sitting, Cuff Size: Adult Regular)   Pulse 52   Ht 1.676 m (5' 6\")   Wt 93.8 kg (206 lb 11.2 oz)   SpO2 97%   BMI 33.36 kg/m    Wt Readings from Last 2 Encounters:   02/27/23 93.8 kg (206 lb 11.2 oz)   02/08/23 92.8 kg (204 lb 8 oz)     Body mass index is 33.36 kg/m .   Body surface area is 2.09 meters squared.    Constitutional: appears stated age, in no apparent distress, appears to be well nourished  Head: normocephalic, atraumatic  Neck: supple, trachea midline   Pulmonary: clear to auscultation bilaterally  Cardiovascular: JVP " normal, regular rate, regular rhythm, normal S1 and S2, no S3, S4, no murmur appreciated, no lower extremity edema  Gastrointestinal: no guarding, non-rigid   Neurologic: awake, alert, moves all extremities (left arm in a sling)  Skin: no jaundice, warm on limited exam  Psychiatric: affect is normal, answers questions appropriately, oriented to self and place    Data reviewed:  Lab Results   Component Value Date    WBC 4.9 02/08/2023    WBC 6.3 02/04/2021    RBC 4.47 02/08/2023    RBC 4.20 (L) 02/04/2021    HGB 14.4 02/08/2023    HGB 13.8 02/04/2021    HCT 42.5 02/08/2023    HCT 40.9 02/04/2021    MCV 95 02/08/2023    MCV 97 02/04/2021    MCH 32.2 02/08/2023    MCH 32.9 02/04/2021    MCHC 33.9 02/08/2023    MCHC 33.7 02/04/2021    RDW 12.1 02/08/2023    RDW 12.2 02/04/2021     02/08/2023     02/04/2021     Sodium   Date Value Ref Range Status   02/08/2023 142 136 - 145 mmol/L Final   02/04/2021 137 133 - 144 mmol/L Final     Potassium   Date Value Ref Range Status   02/08/2023 4.5 3.4 - 5.3 mmol/L Final   02/02/2022 4.6 3.4 - 5.3 mmol/L Final   02/04/2021 4.4 3.4 - 5.3 mmol/L Final     Chloride   Date Value Ref Range Status   02/08/2023 106 98 - 107 mmol/L Final   02/02/2022 107 94 - 109 mmol/L Final   02/04/2021 107 94 - 109 mmol/L Final     Carbon Dioxide   Date Value Ref Range Status   02/04/2021 25 20 - 32 mmol/L Final     Carbon Dioxide (CO2)   Date Value Ref Range Status   02/08/2023 24 22 - 29 mmol/L Final   02/02/2022 30 20 - 32 mmol/L Final     Anion Gap   Date Value Ref Range Status   02/08/2023 12 7 - 15 mmol/L Final   02/02/2022 1 (L) 3 - 14 mmol/L Final   02/04/2021 5 3 - 14 mmol/L Final     Glucose   Date Value Ref Range Status   02/08/2023 142 (H) 70 - 99 mg/dL Final   02/02/2022 121 (H) 70 - 99 mg/dL Final   02/04/2021 110 (H) 70 - 99 mg/dL Final     Comment:     Fasting specimen     Urea Nitrogen   Date Value Ref Range Status   02/08/2023 15.0 8.0 - 23.0 mg/dL Final   02/02/2022 18 7 -  30 mg/dL Final   02/04/2021 13 7 - 30 mg/dL Final     Creatinine   Date Value Ref Range Status   02/08/2023 1.10 0.67 - 1.17 mg/dL Final   02/04/2021 1.00 0.66 - 1.25 mg/dL Final     GFR Estimate   Date Value Ref Range Status   02/08/2023 73 >60 mL/min/1.73m2 Final     Comment:     eGFR calculated using 2021 CKD-EPI equation.   02/04/2021 78 >60 mL/min/[1.73_m2] Final     Comment:     Non  GFR Calc  Starting 12/18/2018, serum creatinine based estimated GFR (eGFR) will be   calculated using the Chronic Kidney Disease Epidemiology Collaboration   (CKD-EPI) equation.       Calcium   Date Value Ref Range Status   02/08/2023 9.5 8.8 - 10.2 mg/dL Final   02/04/2021 9.8 8.5 - 10.1 mg/dL Final     Bilirubin Total   Date Value Ref Range Status   02/08/2023 0.3 <=1.2 mg/dL Final   02/04/2021 0.7 0.2 - 1.3 mg/dL Final     Alkaline Phosphatase   Date Value Ref Range Status   02/08/2023 69 40 - 129 U/L Final   02/04/2021 94 40 - 150 U/L Final     ALT   Date Value Ref Range Status   02/22/2023 18 10 - 50 U/L Final   02/04/2021 34 0 - 70 U/L Final     AST   Date Value Ref Range Status   02/08/2023 28 10 - 50 U/L Final   02/04/2021 22 0 - 45 U/L Final     Recent Labs   Lab Test 02/22/23  0842 02/02/22  0805 08/02/16  0948 05/15/15  0833   CHOL 136 167   < > 183   HDL 43 37*   < > 35*   LDL 59 73   < > Cannot estimate LDL when triglyceride exceeds 400 mg/dL  76   TRIG 169* 285*   < > 523*   CHOLHDLRATIO  --   --   --  5.2*    < > = values in this interval not displayed.      Lab Results   Component Value Date    A1C 5.4 01/08/2019    A1C 5.5 05/05/2017    A1C 5.7 08/02/2016    A1C 5.5 12/30/2013    A1C 5.4 05/16/2013        Thank you for allowing me to participate in the care of your patient.      Sincerely,     Alcides Bundy MD   [facility]  cc:   Alcides Bundy MD  [address], [address]  [place] [address]

## 2023-03-03 ENCOUNTER — TRANSFERRED RECORDS (OUTPATIENT)
Dept: HEALTH INFORMATION MANAGEMENT | Facility: CLINIC | Age: 68
End: 2023-03-03

## 2023-03-06 ENCOUNTER — THERAPY VISIT (OUTPATIENT)
Dept: PHYSICAL THERAPY | Facility: CLINIC | Age: 68
End: 2023-03-06
Payer: MEDICARE

## 2023-03-06 DIAGNOSIS — Z47.89 AFTERCARE FOLLOWING SURGERY OF THE MUSCULOSKELETAL SYSTEM: ICD-10-CM

## 2023-03-06 DIAGNOSIS — M25.512 ACUTE PAIN OF LEFT SHOULDER: ICD-10-CM

## 2023-03-06 PROCEDURE — 97110 THERAPEUTIC EXERCISES: CPT | Mod: GP | Performed by: PHYSICAL THERAPIST

## 2023-03-06 PROCEDURE — 97161 PT EVAL LOW COMPLEX 20 MIN: CPT | Mod: GP | Performed by: PHYSICAL THERAPIST

## 2023-03-06 NOTE — PROGRESS NOTES
[facility]    OUTPATIENT Physical Therapy ORTHOPEDIC EVALUATION  PLAN OF TREATMENT FOR OUTPATIENT REHABILITATION  (COMPLETE FOR INITIAL CLAIMS ONLY)  Patient's Last Name, First Name, M.I.  YOB: 1955  Emery Zayas    Provider s Name:  [facility]   Medical Record No.  [id number]   Start of Care Date:  03/06/23   Onset Date:  02/16/23    Treatment Diagnosis:  S/p L RCR  Medical Diagnosis:     Acute pain of left shoulder  Aftercare following surgery of the musculoskeletal system       Goals:     03/06/23 0500   Body Part   Goals listed below are for s/p R RCR and biceps tenodesis   Goal #1   Goal #1 reaching   Previous Functional Level No restrictions   Current Functional Level Cannot reach ;to shoulder level;overhead;behind back   STG Target Performance Reach ;to shoulder level   Rationale for independent personal hygiene;for dressing;for bathing   Due date 04/17/23   LTG Target Performance Reach;overhead   Rationale for independent personal hygiene;for dressing;for bathing   Due date 05/29/23       Therapy Frequency:  2 x per week tapering to 1 x week  Predicted Duration of Therapy Intervention:  12 weeks    Alfredito Gomez, PT                 I CERTIFY THE NEED FOR THESE SERVICES FURNISHED UNDER        THIS PLAN OF TREATMENT AND WHILE UNDER MY CARE .             Physician Signature               Date    X_____________________________________________________                         Certification Date From:  03/06/23   Certification Date To:  05/29/23    Referring Provider:  Robyn Vitale    Initial Assessment        See Epic Evaluation SOC Date: 03/06/23

## 2023-03-06 NOTE — PROGRESS NOTES
Physical Therapy Initial Evaluation  Subjective:  The history is provided by the patient. No  was used.   Patient Health History  Emery Zayas being seen for left rotator cuff.     Problem began: 2/16/2023.   Problem occurred: gradually   Pain is reported as 1/10 on pain scale.  General health as reported by patient is good.  Pertinent medical history includes: none.     Medical allergies: none.   Surgeries include:  Orthopedic surgery and other. Other surgery history details: low back.    Current medications:  High blood pressure medication and other. Other medications details: high triglycerides.    Current occupation is retired.   Primary job tasks include:  Prolonged sitting and prolonged standing.                  Therapist Generated HPI Evaluation  Problem details: Pt. complains of left shoulder pain  that has been present since RCR with biceps tenodesis on 2/16/23.  No known trauma.  PT order dated 3/3/23.      .         Type of problem:  Left shoulder.    This is a new condition.  Condition occurred with:  Unknown cause.    Patient reports pain:  In the joint and upper arm.  Pain is described as aching and is intermittent.  Pain radiates to:  Shoulder. Pain is worse during the day.  Since onset symptoms are gradually improving.  Associated symptoms:  Loss of motion/stiffness and loss of strength. Symptoms are exacerbated by using arm at shoulder level, using arm overhead and using arm behind back  and relieved by ice and analgesics.      Barriers include:  None as reported by patient.                        Objective:  System                   Shoulder Evaluation:  ROM:    PROM:    Flexion:  Left:  85          Abduction:  Left:  45        Internal Rotation:  Left:  65      External Rotation:  Left:  25                                                                   General     ROS    Assessment/Plan:    Patient is a 67 year old male with left side shoulder complaints.    Patient  has the following significant findings with corresponding treatment plan.                Diagnosis 1:  S/p L RCR and biceps tenosedis  Pain -  hot/cold therapy  Decreased ROM/flexibility - manual therapy and therapeutic exercise  Decreased joint mobility - manual therapy and therapeutic exercise  Decreased strength - therapeutic exercise and therapeutic activities  Decreased proprioception - neuro re-education and therapeutic activities  Inflammation - cold therapy  Impaired muscle performance - neuro re-education  Decreased function - therapeutic activities    Therapy Evaluation Codes:   1) Clinical presentation characteristics are:   Stable/Uncomplicated.  2) Decision-Making    Low complexity using standardized patient assessment instrument and/or measureable assessment of functional outcome.  Cumulative Therapy Evaluation is: Low complexity.    Previous and current functional limitations:  (See Goal Flow Sheet for this information)    Short term and Long term goals: (See Goal Flow Sheet for this information)     Communication ability:  Patient appears to be able to clearly communicate and understand verbal and written communication and follow directions correctly.  Treatment Explanation - The following has been discussed with the patient:   RX ordered/plan of care  Anticipated outcomes  Possible risks and side effects  This patient would benefit from PT intervention to resume normal activities.   Rehab potential is good.    Frequency:  2 X week, once daily  Duration:  for 2 months tapering to 4 X a month over 1 months  Discharge Plan:  Achieve all LTG.  Independent in home treatment program.  Reach maximal therapeutic benefit.    Please refer to the daily flowsheet for treatment today, total treatment time and time spent performing 1:1 timed codes.

## 2023-03-07 ENCOUNTER — TRANSCRIBE ORDERS (OUTPATIENT)
Dept: OTHER | Age: 68
End: 2023-03-07

## 2023-03-13 ENCOUNTER — THERAPY VISIT (OUTPATIENT)
Dept: PHYSICAL THERAPY | Facility: CLINIC | Age: 68
End: 2023-03-13
Payer: MEDICARE

## 2023-03-13 DIAGNOSIS — M25.512 ACUTE PAIN OF LEFT SHOULDER: Primary | ICD-10-CM

## 2023-03-13 DIAGNOSIS — Z47.89 AFTERCARE FOLLOWING SURGERY OF THE MUSCULOSKELETAL SYSTEM: ICD-10-CM

## 2023-03-13 PROCEDURE — 97110 THERAPEUTIC EXERCISES: CPT | Mod: GP | Performed by: PHYSICAL THERAPIST

## 2023-03-13 PROCEDURE — 97112 NEUROMUSCULAR REEDUCATION: CPT | Mod: GP | Performed by: PHYSICAL THERAPIST

## 2023-03-17 DIAGNOSIS — M1A.9XX0 CHRONIC GOUT WITHOUT TOPHUS, UNSPECIFIED CAUSE, UNSPECIFIED SITE: ICD-10-CM

## 2023-03-17 NOTE — TELEPHONE ENCOUNTER
Routing refill request to provider for review/approval because:  Labs not current:  Uric acid    Yenni Colon RN

## 2023-03-20 ENCOUNTER — THERAPY VISIT (OUTPATIENT)
Dept: PHYSICAL THERAPY | Facility: CLINIC | Age: 68
End: 2023-03-20
Payer: MEDICARE

## 2023-03-20 DIAGNOSIS — Z47.89 AFTERCARE FOLLOWING SURGERY OF THE MUSCULOSKELETAL SYSTEM: ICD-10-CM

## 2023-03-20 DIAGNOSIS — M25.512 ACUTE PAIN OF LEFT SHOULDER: Primary | ICD-10-CM

## 2023-03-20 PROCEDURE — 97110 THERAPEUTIC EXERCISES: CPT | Mod: GP | Performed by: PHYSICAL THERAPIST

## 2023-03-20 PROCEDURE — 97112 NEUROMUSCULAR REEDUCATION: CPT | Mod: GP | Performed by: PHYSICAL THERAPIST

## 2023-03-20 RX ORDER — ALLOPURINOL 100 MG/1
TABLET ORAL
Qty: 360 TABLET | Refills: 3 | Status: SHIPPED | OUTPATIENT
Start: 2023-03-20 | End: 2023-08-31

## 2023-03-26 ENCOUNTER — HEALTH MAINTENANCE LETTER (OUTPATIENT)
Age: 68
End: 2023-03-26

## 2023-03-31 ENCOUNTER — THERAPY VISIT (OUTPATIENT)
Dept: PHYSICAL THERAPY | Facility: CLINIC | Age: 68
End: 2023-03-31
Payer: MEDICARE

## 2023-03-31 DIAGNOSIS — Z47.89 AFTERCARE FOLLOWING SURGERY OF THE MUSCULOSKELETAL SYSTEM: ICD-10-CM

## 2023-03-31 DIAGNOSIS — M25.512 ACUTE PAIN OF LEFT SHOULDER: Primary | ICD-10-CM

## 2023-03-31 PROCEDURE — 97112 NEUROMUSCULAR REEDUCATION: CPT | Mod: GP | Performed by: PHYSICAL THERAPIST

## 2023-03-31 PROCEDURE — 97110 THERAPEUTIC EXERCISES: CPT | Mod: GP | Performed by: PHYSICAL THERAPIST

## 2023-03-31 NOTE — PROGRESS NOTES
PROGRESS  REPORT    Progress reporting period is from eval to 3/31/23.       SUBJECTIVE  Subjective changes noted by patient:  .  Subjective: Much improved in past 10 days.  Functining at 40% of where he wants to be.    Current pain level is 3/10  .     Previous pain level was   Initial Pain level: 4/10.   Changes in function:  Yes (See Goal flowsheet attached for changes in current functional level)  Adverse reaction to treatment or activity: None    OBJECTIVE  Changes noted in objective findings:  Yes,   Objective: PROM: flexion 138,  Abd 141, ER 70, IR 71    ASSESSMENT/PLAN  Updated problem list and treatment plan: Diagnosis 1:  S/p L RCR with biceps tenodesis  Pain -  hot/cold therapy, self management, education, directional preference exercise and home program  Decreased ROM/flexibility - manual therapy and therapeutic exercise  Decreased strength - therapeutic exercise and therapeutic activities  Impaired muscle performance - neuro re-education  Decreased function - therapeutic activities  STG/LTGs have been met or progress has been made towards goals:  Yes (See Goal flow sheet completed today.)  Assessment of Progress: The patient's condition is improving.  The patient's condition has potential to improve.  Self Management Plans:  Patient has been instructed in a home treatment program.  Patient is independent in a home treatment program.  I have re-evaluated this patient and find that the nature, scope, duration and intensity of the therapy is appropriate for the medical condition of the patient.  Emery continues to require the following intervention to meet STG and LTG's:  PT    Recommendations:  This patient would benefit from continued therapy.     Frequency:  1 X week, once daily  Duration:  for 8 weeks        Please refer to the daily flowsheet for treatment today, total treatment time and time spent performing 1:1 timed codes.

## 2023-03-31 NOTE — LETTER
[facility]  [address] [address] [address]  [phone]    2023    Re: Emery Zayas   :   1955  MRN:  [id number]   REFERRING PHYSICIAN:   Robyn CAMARILLO [facility]  Date of Initial Evaluation:  3/6/2023  Visits:  Rxs Used: 4  Reason for Referral:   Acute pain of left shoulder  Aftercare following surgery of the musculoskeletal system    PROGRESS  REPORT  Progress reporting period is from eval to 3/31/23.       SUBJECTIVE  Subjective changes noted by patient:  .  Subjective: Much improved in past 10 days.  Functining at 40% of where he wants to be.    Current pain level is 3/10  .     Previous pain level was   Initial Pain level: 4/10.   Changes in function:  Yes (See Goal flowsheet attached for changes in current functional level)  Adverse reaction to treatment or activity: None    OBJECTIVE  Changes noted in objective findings:  Yes,   Objective: PROM: flexion 138,  Abd 141, ER 70, IR 71    ASSESSMENT/PLAN  Updated problem list and treatment plan: Diagnosis 1:  S/p L RCR with biceps tenodesis  Pain -  hot/cold therapy, self management, education, directional preference exercise and home program  Decreased ROM/flexibility - manual therapy and therapeutic exercise  Decreased strength - therapeutic exercise and therapeutic activities  Impaired muscle performance - neuro re-education  Decreased function - therapeutic activities  STG/LTGs have been met or progress has been made towards goals:  Yes (See Goal flow sheet completed today.)  Assessment of Progress: The patient's condition is improving.  The patient's condition has potential to improve.  Self Management Plans:  Patient has been instructed in a home treatment program.  Patient is independent in a home treatment program.  I have re-evaluated this patient and find that the nature, scope, duration and intensity of the therapy is  appropriate for the medical condition of the patient.  Emery continues to require the following intervention to meet STG and LTG's:  PT          Re: Emery Zayas   :   1955      Recommendations:  This patient would benefit from continued therapy.     Frequency:  1 X week, once daily  Duration:  for 8 weeks          Thank you for your referral.    INQUIRIES  Therapist:Gab Gomez PT  [address] [address]  Phone: [phone]  Fax: [phone]

## 2023-04-04 ENCOUNTER — TRANSFERRED RECORDS (OUTPATIENT)
Dept: FAMILY MEDICINE | Facility: CLINIC | Age: 68
End: 2023-04-04
Payer: MEDICARE

## 2023-04-07 ENCOUNTER — THERAPY VISIT (OUTPATIENT)
Dept: PHYSICAL THERAPY | Facility: CLINIC | Age: 68
End: 2023-04-07
Payer: MEDICARE

## 2023-04-07 ENCOUNTER — TRANSCRIBE ORDERS (OUTPATIENT)
Dept: OTHER | Age: 68
End: 2023-04-07

## 2023-04-07 DIAGNOSIS — M25.512 ACUTE PAIN OF LEFT SHOULDER: Primary | ICD-10-CM

## 2023-04-07 DIAGNOSIS — Z98.890 S/P ROTATOR CUFF REPAIR: ICD-10-CM

## 2023-04-07 DIAGNOSIS — Z98.890 S/P ARTHROSCOPY OF LEFT SHOULDER: Primary | ICD-10-CM

## 2023-04-07 DIAGNOSIS — Z47.89 AFTERCARE FOLLOWING SURGERY OF THE MUSCULOSKELETAL SYSTEM: ICD-10-CM

## 2023-04-07 PROCEDURE — 97112 NEUROMUSCULAR REEDUCATION: CPT | Mod: GP | Performed by: PHYSICAL THERAPIST

## 2023-04-07 PROCEDURE — 97110 THERAPEUTIC EXERCISES: CPT | Mod: GP | Performed by: PHYSICAL THERAPIST

## 2023-04-14 ENCOUNTER — THERAPY VISIT (OUTPATIENT)
Dept: PHYSICAL THERAPY | Facility: CLINIC | Age: 68
End: 2023-04-14
Payer: MEDICARE

## 2023-04-14 DIAGNOSIS — M25.512 ACUTE PAIN OF LEFT SHOULDER: Primary | ICD-10-CM

## 2023-04-14 DIAGNOSIS — Z47.89 AFTERCARE FOLLOWING SURGERY OF THE MUSCULOSKELETAL SYSTEM: ICD-10-CM

## 2023-04-14 PROCEDURE — 97112 NEUROMUSCULAR REEDUCATION: CPT | Mod: GP | Performed by: PHYSICAL THERAPIST

## 2023-04-14 PROCEDURE — 97110 THERAPEUTIC EXERCISES: CPT | Mod: GP | Performed by: PHYSICAL THERAPIST

## 2023-04-20 ENCOUNTER — OFFICE VISIT (OUTPATIENT)
Dept: FAMILY MEDICINE | Facility: CLINIC | Age: 68
End: 2023-04-20
Payer: MEDICARE

## 2023-04-20 ENCOUNTER — THERAPY VISIT (OUTPATIENT)
Dept: PHYSICAL THERAPY | Facility: CLINIC | Age: 68
End: 2023-04-20
Payer: MEDICARE

## 2023-04-20 VITALS
HEIGHT: 66 IN | SYSTOLIC BLOOD PRESSURE: 138 MMHG | DIASTOLIC BLOOD PRESSURE: 82 MMHG | WEIGHT: 207.2 LBS | BODY MASS INDEX: 33.3 KG/M2 | TEMPERATURE: 97.9 F | OXYGEN SATURATION: 97 % | RESPIRATION RATE: 15 BRPM | HEART RATE: 51 BPM

## 2023-04-20 DIAGNOSIS — I10 ESSENTIAL HYPERTENSION, BENIGN: ICD-10-CM

## 2023-04-20 DIAGNOSIS — Z12.11 SCREEN FOR COLON CANCER: ICD-10-CM

## 2023-04-20 DIAGNOSIS — E88.810 METABOLIC SYNDROME: ICD-10-CM

## 2023-04-20 DIAGNOSIS — R73.09 ELEVATED GLUCOSE: ICD-10-CM

## 2023-04-20 DIAGNOSIS — Z86.0100 PERSONAL HISTORY OF COLONIC POLYPS: ICD-10-CM

## 2023-04-20 DIAGNOSIS — Z00.00 ENCOUNTER FOR MEDICARE ANNUAL WELLNESS EXAM: Primary | ICD-10-CM

## 2023-04-20 DIAGNOSIS — Z47.89 AFTERCARE FOLLOWING SURGERY OF THE MUSCULOSKELETAL SYSTEM: ICD-10-CM

## 2023-04-20 DIAGNOSIS — M25.512 ACUTE PAIN OF LEFT SHOULDER: Primary | ICD-10-CM

## 2023-04-20 DIAGNOSIS — M1A.9XX0 CHRONIC GOUT WITHOUT TOPHUS, UNSPECIFIED CAUSE, UNSPECIFIED SITE: ICD-10-CM

## 2023-04-20 DIAGNOSIS — E78.1 HIGH BLOOD TRIGLYCERIDES: ICD-10-CM

## 2023-04-20 DIAGNOSIS — F10.20 UNCOMPLICATED ALCOHOL DEPENDENCE (H): ICD-10-CM

## 2023-04-20 DIAGNOSIS — Z12.5 SCREENING FOR PROSTATE CANCER: ICD-10-CM

## 2023-04-20 LAB
ALT SERPL W P-5'-P-CCNC: 21 U/L (ref 10–50)
CHOLEST SERPL-MCNC: 128 MG/DL
HBA1C MFR BLD: 5.5 % (ref 0–5.6)
HDLC SERPL-MCNC: 39 MG/DL
LDLC SERPL CALC-MCNC: 59 MG/DL
NONHDLC SERPL-MCNC: 89 MG/DL
PSA SERPL DL<=0.01 NG/ML-MCNC: 2.62 NG/ML (ref 0–4.5)
TRIGL SERPL-MCNC: 148 MG/DL
URATE SERPL-MCNC: 6.1 MG/DL (ref 3.4–7)

## 2023-04-20 PROCEDURE — 97112 NEUROMUSCULAR REEDUCATION: CPT | Mod: GP | Performed by: PHYSICAL THERAPIST

## 2023-04-20 PROCEDURE — 84460 ALANINE AMINO (ALT) (SGPT): CPT | Performed by: PHYSICIAN ASSISTANT

## 2023-04-20 PROCEDURE — G0103 PSA SCREENING: HCPCS | Performed by: PHYSICIAN ASSISTANT

## 2023-04-20 PROCEDURE — 97110 THERAPEUTIC EXERCISES: CPT | Mod: GP | Performed by: PHYSICAL THERAPIST

## 2023-04-20 PROCEDURE — 80061 LIPID PANEL: CPT | Performed by: PHYSICIAN ASSISTANT

## 2023-04-20 PROCEDURE — 99214 OFFICE O/P EST MOD 30 MIN: CPT | Mod: 25 | Performed by: PHYSICIAN ASSISTANT

## 2023-04-20 PROCEDURE — 83036 HEMOGLOBIN GLYCOSYLATED A1C: CPT | Performed by: PHYSICIAN ASSISTANT

## 2023-04-20 PROCEDURE — 36415 COLL VENOUS BLD VENIPUNCTURE: CPT | Performed by: PHYSICIAN ASSISTANT

## 2023-04-20 PROCEDURE — 84550 ASSAY OF BLOOD/URIC ACID: CPT | Performed by: PHYSICIAN ASSISTANT

## 2023-04-20 PROCEDURE — G0439 PPPS, SUBSEQ VISIT: HCPCS | Performed by: PHYSICIAN ASSISTANT

## 2023-04-20 RX ORDER — OMEGA-3-ACID ETHYL ESTERS 1 G/1
CAPSULE, LIQUID FILLED ORAL
Qty: 180 CAPSULE | Refills: 1 | Status: SHIPPED | OUTPATIENT
Start: 2023-04-20 | End: 2023-12-19

## 2023-04-20 ASSESSMENT — ENCOUNTER SYMPTOMS
EYES NEGATIVE: 1
NEUROLOGICAL NEGATIVE: 1
HEMATOLOGIC/LYMPHATIC NEGATIVE: 1
ENDOCRINE NEGATIVE: 1
GASTROINTESTINAL NEGATIVE: 1
PSYCHIATRIC NEGATIVE: 1
CONSTITUTIONAL NEGATIVE: 1
MUSCULOSKELETAL NEGATIVE: 1
CARDIOVASCULAR NEGATIVE: 1
ALLERGIC/IMMUNOLOGIC NEGATIVE: 1
RESPIRATORY NEGATIVE: 1

## 2023-04-20 ASSESSMENT — PAIN SCALES - GENERAL: PAINLEVEL: MILD PAIN (2)

## 2023-04-20 NOTE — PATIENT INSTRUCTIONS
Patient Education   Personalized Prevention Plan  You are due for the preventive services outlined below.  Your care team is available to assist you in scheduling these services.  If you have already completed any of these items, please share that information with your care team to update in your medical record.  Health Maintenance Due   Topic Date Due     Zoster (Shingles) Vaccine (2 of 2) 04/01/2021     Flu Vaccine (1) 09/01/2022     Colorectal Cancer Screening  01/23/2023     Annual Wellness Visit  02/02/2023     Prostate Test  02/02/2023     Uric Acid Levels  02/02/2023

## 2023-04-20 NOTE — PROGRESS NOTES
SUBJECTIVE:   Oziel is a 67 year old who presents for Preventive Visit.      2023     7:11 AM   Additional Questions   Roomed by Xochitl SHER LPN   Patient has been advised of split billing requirements and indicates understanding: Yes  Are you in the first 12 months of your Medicare coverage?  No    History of Present Illness       Reason for visit:  Yearly medicare wellness check    He eats 0-1 servings of fruits and vegetables daily.He consumes 3 sweetened beverage(s) daily.He exercises with enough effort to increase his heart rate 30 to 60 minutes per day.  He exercises with enough effort to increase his heart rate 7 days per week.   He is taking medications regularly.      Have you ever done Advance Care Planning? (For example, a Health Directive, POLST, or a discussion with a medical provider or your loved ones about your wishes): Yes, patient states has an Advance Care Planning document and will bring a copy to the clinic.       Fall risk:  Has fallen times 1 and did injure his face at that time.        Cognitive Screening   1) Repeat 3 items (Leader, Season, Table)    2) Clock draw: NORMAL  3) 3 item recall: Recalls 3 objects  Results: 3 items recalled: COGNITIVE IMPAIRMENT LESS LIKELY    Mini-CogTM Copyright S Molly. Licensed by the author for use in [facility]; reprinted with permission (sofrances@[facility]). All rights reserved.      Do you have sleep apnea, excessive snoring or daytime drowsiness?: no    Reviewed and updated as needed this visit by clinical staff   Tobacco  Allergies  Meds              Reviewed and updated as needed this visit by Provider                 Social History     Tobacco Use     Smoking status: Former     Packs/day: 0.00     Types: Cigarettes     Quit date: 7/15/1993     Years since quittin.7     Passive exposure: Never     Smokeless tobacco: Never     Tobacco comments:     Quit 20 years ago.   Vaping Use     Vaping status: Never Used     Passive vaping  exposure: Yes   Substance Use Topics     Alcohol use: Yes     Alcohol/week: 7.0 standard drinks of alcohol     Types: 7 Shots of liquor per week     Comment: more than 12-15 rum and cokes or beer a week             2/2/2022     6:50 AM   Alcohol Use   Prescreen: >3 drinks/day or >7 drinks/week? Yes   AUDIT SCORE  13     Do you have a current opioid prescription? No  Do you use any other controlled substances or medications that are not prescribed by a provider? None    Patient here for wellness.  He is doing well overall.  Recovering well from shoulder surgery.  Still doing physical therapy.      Hyperlipidemia Follow-Up      Are you regularly taking any medication or supplement to lower your cholesterol?   Yes-      Are you having muscle aches or other side effects that you think could be caused by your cholesterol lowering medication?  No    Hypertension Follow-up      Do you check your blood pressure regularly outside of the clinic? No     Are you following a low salt diet? No    Are your blood pressures ever more than 140 on the top number (systolic) OR more   than 90 on the bottom number (diastolic), for example 140/90? No    See's cardiology for HTN and lipids.  Was in just in Feb.    Hx of colon polyps so will need repeat colonoscopy this year.      Current providers sharing in care for this patient include:   Patient Care Team:  Trish Edwards MD as PCP - General (Family Practice)  Trish Edwards MD as Assigned PCP  Chuck Huynh PA-C as Assigned Musculoskeletal Provider  David Vargas MD as Assigned Neuroscience Provider  Tracy Ptae PA-C as Physician Assistant (Dermatology)  Alcides Bundy MD as MD (Cardiovascular Disease)  Milly Bland APRN CNP as Nurse Practitioner (Cardiovascular Disease)  Alcides Bundy MD as Assigned Heart and Vascular Provider    The following health maintenance items are reviewed in Epic and correct as of today:  Health Maintenance   Topic  "Date Due     ZOSTER IMMUNIZATION (2 of 2) 04/01/2021     INFLUENZA VACCINE (1) 09/01/2022     COLORECTAL CANCER SCREENING  01/23/2023     MEDICARE ANNUAL WELLNESS VISIT  02/02/2023     PSA  02/02/2023     URIC ACID  02/02/2023     CMP  02/08/2024     ANNUAL REVIEW OF HM ORDERS  02/08/2024     FALL RISK ASSESSMENT  02/08/2024     LIPID  02/22/2024     ADVANCE CARE PLANNING  02/02/2027     DTAP/TDAP/TD IMMUNIZATION (3 - Td or Tdap) 02/04/2031     HEPATITIS C SCREENING  Completed     PHQ-2 (once per calendar year)  Completed     AORTIC ANEURYSM SCREENING (SYSTEM ASSIGNED)  Completed     COVID-19 Vaccine  Completed     Pneumococcal Vaccine: 65+ Years  Addressed     IPV IMMUNIZATION  Aged Out     MENINGITIS IMMUNIZATION  Aged Out     CREATININE  Discontinued     Lab work is in process  Labs reviewed in EPIC        Review of Systems   Constitutional: Negative.    HENT: Negative.    Eyes: Negative.    Respiratory: Negative.    Cardiovascular: Negative.    Gastrointestinal: Negative.    Endocrine: Negative.    Genitourinary: Negative.    Musculoskeletal: Negative.    Skin: Negative.    Allergic/Immunologic: Negative.    Neurological: Negative.    Hematological: Negative.    Psychiatric/Behavioral: Negative.          OBJECTIVE:   /82   Pulse 51   Temp 97.9  F (36.6  C) (Oral)   Resp 15   Ht 1.67 m (5' 5.75\")   Wt 94 kg (207 lb 3.2 oz)   SpO2 97%   BMI 33.70 kg/m   Estimated body mass index is 33.7 kg/m  as calculated from the following:    Height as of this encounter: 1.67 m (5' 5.75\").    Weight as of this encounter: 94 kg (207 lb 3.2 oz).  Physical Exam  GENERAL: healthy, alert and no distress  EYES: Eyes grossly normal to inspection, PERRL and conjunctivae and sclerae normal  HENT: ear canals and TM's normal, nose and mouth without ulcers or lesions  NECK: no adenopathy, no asymmetry, masses, or scars and thyroid normal to palpation  RESP: lungs clear to auscultation - no rales, rhonchi or wheezes  CV: " regular rate and rhythm, normal S1 S2, no S3 or S4, no murmur, click or rub, no peripheral edema and peripheral pulses strong  ABDOMEN: soft, nontender, no hepatosplenomegaly, no masses and bowel sounds normal  MS: no gross musculoskeletal defects noted, no edema  SKIN: no suspicious lesions or rashes  NEURO: Normal strength and tone, mentation intact and speech normal  PSYCH: mentation appears normal, affect normal/bright    Diagnostic Test Results:  Labs reviewed in Epic    ASSESSMENT / PLAN:   (Z00.00) Encounter for Medicare annual wellness exam  (primary encounter diagnosis)  Comment:   Plan: completed.      (I10) Essential hypertension, benign  Comment:   Plan: controlled today.  On several meds- continue with these- cardiology or I can refill as needed.    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  Will check A1C    (R73.09) Elevated glucose  Comment:   Plan: Hemoglobin A1c            (F10.20) Uncomplicated alcohol dependence (H)  Comment:   Plan: discussed with patient today.  He knows he is consuming too much etoh, doesn't view it as a big problem right now.  Has done in patient and out patient treatment in the past.    (Z12.5) Screening for prostate cancer  Comment:   Plan: PROSTATE SPEC ANTIGEN SCREEN                COUNSELING:  Reviewed preventive health counseling, as reflected in patient instructions       Regular  exercise       Healthy diet/nutrition       Fall risk prevention       Immunizations    Declined: Zoster            Alcohol Use        Colon cancer screening       Prostate cancer screening        He reports that he quit smoking about 29 years ago. His smoking use included cigarettes. He has never been exposed to tobacco smoke. He has never used smokeless tobacco.      Appropriate preventive services were discussed with this patient, including applicable screening as appropriate for cardiovascular disease, diabetes, osteopenia/osteoporosis, and glaucoma.  As appropriate for age/gender, discussed screening for colorectal cancer, prostate cancer, breast cancer, and cervical cancer. Checklist reviewing preventive services available has been given to the patient.    Reviewed patients plan of care and provided an AVS. The Intermediate Care Plan ( asthma action plan, low back pain action plan, and migraine action plan) for Emery meets the Care Plan requirement. This Care Plan has been established and reviewed with the Patient.      Igor Vitale PA-C  [facility]    Identified Health Risks:    I have reviewed Opioid Use Disorder and Substance Use Disorder risk factors and made any needed referrals.

## 2023-04-21 NOTE — RESULT ENCOUNTER NOTE
Results reviewed, please let the patient know that overall findings are reassuring, triglycerides have improved, LDL is <70 which is excellent. Follow up as previously planned, thanks!

## 2023-04-27 ENCOUNTER — THERAPY VISIT (OUTPATIENT)
Dept: PHYSICAL THERAPY | Facility: CLINIC | Age: 68
End: 2023-04-27
Payer: MEDICARE

## 2023-04-27 DIAGNOSIS — M25.512 ACUTE PAIN OF LEFT SHOULDER: Primary | ICD-10-CM

## 2023-04-27 DIAGNOSIS — Z47.89 AFTERCARE FOLLOWING SURGERY OF THE MUSCULOSKELETAL SYSTEM: ICD-10-CM

## 2023-04-27 PROCEDURE — 97110 THERAPEUTIC EXERCISES: CPT | Mod: GP | Performed by: PHYSICAL THERAPIST

## 2023-04-27 PROCEDURE — 97112 NEUROMUSCULAR REEDUCATION: CPT | Mod: GP | Performed by: PHYSICAL THERAPIST

## 2023-05-01 ENCOUNTER — PATIENT OUTREACH (OUTPATIENT)
Dept: GASTROENTEROLOGY | Facility: CLINIC | Age: 68
End: 2023-05-01
Payer: MEDICARE

## 2023-05-01 DIAGNOSIS — Z12.11 SPECIAL SCREENING FOR MALIGNANT NEOPLASMS, COLON: Primary | ICD-10-CM

## 2023-05-01 NOTE — TELEPHONE ENCOUNTER
"Ordering/Referring Provider: Trish Edwards   BMI: Estimated body mass index is 33.7 kg/m  as calculated from the following:    Height as of 4/20/23: 1.67 m (5' 5.75\").    Weight as of 4/20/23: 94 kg (207 lb 3.2 oz).     Sedation:  Based on patients medical history patient is appropriate for   MAC/deep sedation.   BMI<= 45 45 < BMI <= 48 48 < BMI < = 50  BMI > 50   No Restrictions No [facility]  No [facility]  [facility] with exceptions Hospital Only OR Only       Location:  Does patient have an LVAD?  No    Does patient have a history of moderate to severe sleep apnea?  No    Does patient have a history of asthma, COPD or any other lung disease?  No    Does patient have a history of cardiac disease?  No    Is pataient awaiting a heart or lung transplant?   No    Has patient had a stroke or transient ischemic attack in the last 6 months?   No    Is the patient currently on dialysis?   No    Prep:  Previous prep (last colonoscopy):   Standard MiraLAX    Quality of previous prep:   Excellent    Is patient currently on dialysis, is ESRD, or CKD stage 4/5?   No (standard prep)    Does patient have a diagnosis of diabetes?  No    Does patient have a diagnosis of cystic fibrosis (CF)?  No    BMI > 40?  No    Final Referral Status:  meets the criteria for placement of colonoscopy screening  referral order.      Referral order placed with the following recommendations:  Sedation: MAC/Deep Sedation  Location Type: No Scheduling Restrictions  Prep: Standard MiraLAX     Arlin Marrero RN on 5/1/2023 at 2:12 PM          "

## 2023-05-04 ENCOUNTER — THERAPY VISIT (OUTPATIENT)
Dept: PHYSICAL THERAPY | Facility: CLINIC | Age: 68
End: 2023-05-04
Payer: MEDICARE

## 2023-05-04 DIAGNOSIS — M25.512 ACUTE PAIN OF LEFT SHOULDER: Primary | ICD-10-CM

## 2023-05-04 DIAGNOSIS — Z47.89 AFTERCARE FOLLOWING SURGERY OF THE MUSCULOSKELETAL SYSTEM: ICD-10-CM

## 2023-05-04 PROCEDURE — 97110 THERAPEUTIC EXERCISES: CPT | Mod: GP | Performed by: PHYSICAL THERAPIST

## 2023-05-04 NOTE — PROGRESS NOTES
PROGRESS  REPORT    Progress reporting period is from eval to 5/4/23.       SUBJECTIVE  Subjective changes noted by patient:  .  Subjective: PT reports continued improvement in ROM.    Current pain level is  Current Pain level: 2/10.     Previous pain level was   Initial Pain level: 4/10.   Changes in function:  Yes (See Goal flowsheet attached for changes in current functional level)  Adverse reaction to treatment or activity: None    OBJECTIVE  Changes noted in objective findings:  Yes,   Objective: PROM: flexion 155, Abd 160, ER 75 and Ir 75     ASSESSMENT/PLAN  Updated problem list and treatment plan: Diagnosis 1:  S/p L RCR  Decreased ROM/flexibility - manual therapy and therapeutic exercise  Decreased strength - therapeutic exercise and therapeutic activities  Impaired muscle performance - neuro re-education  Decreased function - therapeutic activities  STG/LTGs have been met or progress has been made towards goals:  Yes (See Goal flow sheet completed today.)  Assessment of Progress: The patient's condition is improving.  The patient's condition has potential to improve.  Self Management Plans:  Patient has been instructed in a home treatment program.  I have re-evaluated this patient and find that the nature, scope, duration and intensity of the therapy is appropriate for the medical condition of the patient.  Emery continues to require the following intervention to meet STG and LTG's:  PT    Recommendations:  This patient would benefit from continued therapy.     Frequency:  4 X a month, once daily  Duration:  for 2 months        Please refer to the daily flowsheet for treatment today, total treatment time and time spent performing 1:1 timed codes.

## 2023-05-04 NOTE — LETTER
[facility]  [address] [facility] [address]  [phone]    May 4, 2023    Re: Emery Zayas   :   1955  MRN:  [id number]   REFERRING PHYSICIAN:   Robyn CAMARILLO [facility]  Date of Initial Evaluation:    Visits:  Rxs Used: 9  Reason for Referral:     Acute pain of left shoulder  Aftercare following surgery of the musculoskeletal system      PROGRESS  REPORT  Progress reporting period is from eval to 23.       SUBJECTIVE  Subjective changes noted by patient:  .  Subjective: PT reports continued improvement in ROM.    Current pain level is  Current Pain level: 2/10.     Previous pain level was   Initial Pain level: 4/10.   Changes in function:  Yes (See Goal flowsheet attached for changes in current functional level)  Adverse reaction to treatment or activity: None    OBJECTIVE  Changes noted in objective findings:  Yes,   Objective: PROM: flexion 155, Abd 160, ER 75 and Ir 75     ASSESSMENT/PLAN  Updated problem list and treatment plan: Diagnosis 1:  S/p L RCR  Decreased ROM/flexibility - manual therapy and therapeutic exercise  Decreased strength - therapeutic exercise and therapeutic activities  Impaired muscle performance - neuro re-education  Decreased function - therapeutic activities  STG/LTGs have been met or progress has been made towards goals:  Yes (See Goal flow sheet completed today.)  Assessment of Progress: The patient's condition is improving.  The patient's condition has potential to improve.  Self Management Plans:  Patient has been instructed in a home treatment program.  I have re-evaluated this patient and find that the nature, scope, duration and intensity of the therapy is appropriate for the medical condition of the patient.  Emery continues to require the following intervention to meet STG and LTG's:  PT    Recommendations:  This patient would benefit from continued  therapy.     Frequency:  4 X a month, once daily  Duration:  for 2 months  Re: Emery Zayas   :   1955          Thank you for your referral.    INQUIRIES  Therapist: Gab Gomez [facility]  [address] [facility] [address]  Phone: [phone]  Fax: [phone]

## 2023-05-16 ENCOUNTER — TRANSFERRED RECORDS (OUTPATIENT)
Dept: HEALTH INFORMATION MANAGEMENT | Facility: CLINIC | Age: 68
End: 2023-05-16
Payer: MEDICARE

## 2023-05-18 ENCOUNTER — THERAPY VISIT (OUTPATIENT)
Dept: PHYSICAL THERAPY | Facility: CLINIC | Age: 68
End: 2023-05-18
Payer: MEDICARE

## 2023-05-18 DIAGNOSIS — Z47.89 AFTERCARE FOLLOWING SURGERY OF THE MUSCULOSKELETAL SYSTEM: ICD-10-CM

## 2023-05-18 DIAGNOSIS — M25.512 ACUTE PAIN OF LEFT SHOULDER: Primary | ICD-10-CM

## 2023-05-18 PROCEDURE — 97110 THERAPEUTIC EXERCISES: CPT | Mod: GP | Performed by: PHYSICAL THERAPIST

## 2023-05-18 PROCEDURE — 97530 THERAPEUTIC ACTIVITIES: CPT | Mod: GP | Performed by: PHYSICAL THERAPIST

## 2023-05-19 ENCOUNTER — TRANSCRIBE ORDERS (OUTPATIENT)
Dept: OTHER | Age: 68
End: 2023-05-19

## 2023-05-23 ENCOUNTER — TELEPHONE (OUTPATIENT)
Dept: CARDIOLOGY | Facility: CLINIC | Age: 68
End: 2023-05-23
Payer: MEDICARE

## 2023-05-23 NOTE — TELEPHONE ENCOUNTER
[facility] Call Center    Phone Message    May a detailed message be left on voicemail: yes     Reason for Call: Symptoms or Concerns     If patient has red-flag symptoms, warm transfer to triage line    Current symptom or concern: Light headed  Partial Fog  When he bends over to pick something up he gets a head rush and when lies down he gets vertigo for 5-10 seconds    Patient wants to discuss if this is BP related   142/64 right before the call  150/78 a few weeks ago when he first started having the issues      Symptoms have been present for:  3 week(s)        Action Taken: Other: Cardiology    Travel Screening: Not Applicable     Thank you!  [facility]

## 2023-05-23 NOTE — TELEPHONE ENCOUNTER
Thanks for the update, unfortunate that they are experiencing those symptoms. Agree the room spinning sensation seems less likely to be cardiac in nature. It is reassuring that his BPs do not seem to be too low.  It would be good to see if his HR is too low, possibly his BP machine at home is able to measure this. Recommend follow up with PCP and general precautions such as avoiding quick transitions in position changes, making sure he's well hydrated etc

## 2023-05-23 NOTE — TELEPHONE ENCOUNTER
Spoke to patient's wife (consent on file) and reviewed message from Dr Bundy. Advised monitoring HR at home along with BP and letting us know if its low. She verbalized understanding, will relay message to patient.

## 2023-05-23 NOTE — TELEPHONE ENCOUNTER
"Last OV note 2/27/23 w/ Dr Bundy-  \" #HTN, BP well controlled, previously challenging to control likely due to several factors including alcohol dependence, untreated sleep apnea.  # MONSE  # EtOH dependence  # HL  # Obesity   -Continue current regimen of carvedilol, amlodipine, olmesartan, rosuvastatin, fenofibrate, Lovaza  - Encouraged continued efforts to cut back on alcohol use  - Per patient preference, follow-up in 1 year with fasting lipids      Spoke to patient, says it has been happening for a little over 2 weeks now, some days worse than others. He describes it as feeling \"not all there\" and when he bends over he gets a head rush and has to stand and steady himself before moving again. It is also exacerbated when he lays down flat and has to shut his eyes and let the dizziness pass. He feels \"out of focus\" and lightheaded overall. BP today 142/64. He denies any visual changes, one-sided weakness, confusion, trouble speaking/understanding, etc. No other cardiac symptoms. Dr Bundy messaged.     "

## 2023-06-02 ENCOUNTER — THERAPY VISIT (OUTPATIENT)
Dept: PHYSICAL THERAPY | Facility: CLINIC | Age: 68
End: 2023-06-02
Payer: MEDICARE

## 2023-06-02 ENCOUNTER — TRANSFERRED RECORDS (OUTPATIENT)
Dept: HEALTH INFORMATION MANAGEMENT | Facility: CLINIC | Age: 68
End: 2023-06-02

## 2023-06-02 DIAGNOSIS — Z47.89 AFTERCARE FOLLOWING SURGERY OF THE MUSCULOSKELETAL SYSTEM: ICD-10-CM

## 2023-06-02 DIAGNOSIS — M25.512 ACUTE PAIN OF LEFT SHOULDER: Primary | ICD-10-CM

## 2023-06-02 PROCEDURE — 97110 THERAPEUTIC EXERCISES: CPT | Mod: GP | Performed by: PHYSICAL THERAPIST

## 2023-06-06 ENCOUNTER — THERAPY VISIT (OUTPATIENT)
Dept: PHYSICAL THERAPY | Facility: CLINIC | Age: 68
End: 2023-06-06
Payer: MEDICARE

## 2023-06-06 DIAGNOSIS — Z47.89 AFTERCARE FOLLOWING SURGERY OF THE MUSCULOSKELETAL SYSTEM: ICD-10-CM

## 2023-06-06 DIAGNOSIS — M25.512 ACUTE PAIN OF LEFT SHOULDER: Primary | ICD-10-CM

## 2023-06-06 PROCEDURE — 97110 THERAPEUTIC EXERCISES: CPT | Mod: GP | Performed by: PHYSICAL THERAPIST

## 2023-06-06 NOTE — PROGRESS NOTES
[facility]                                                                                   OUTPATIENT PHYSICAL THERAPY    PLAN OF TREATMENT FOR OUTPATIENT REHABILITATION   Patient's Last Name, First Name, Emery Carias YOB: 1955   Provider's Name   [facility]   Medical Record No.  [id number]     Onset Date: 02/16/23  Start of Care Date: 03/06/23     Medical Diagnosis:  s/p L RCR and biceps tenodesis      PT Treatment Diagnosis:  s/p L RCR and biceps tenodesis Plan of Treatment  Frequency/Duration: 2 x week/ 12    Certification date from 05/30/23 to 08/15/23         See note for plan of treatment details and functional goals     Alfredito Gomez, PT                         I CERTIFY THE NEED FOR THESE SERVICES FURNISHED UNDER        THIS PLAN OF TREATMENT AND WHILE UNDER MY CARE .             Physician Signature               Date    X_____________________________________________________                    Referring Provider:  Robyn Vitale      Initial Assessment  See Epic Evaluation- Start of Care Date: 03/06/23

## 2023-06-06 NOTE — PROGRESS NOTES
06/02/23 0500   Appointment Info   Signing clinician's name / credentials Gab Gomez PT   Total/Authorized Visits 32   Visits Used 11   Medical Diagnosis s/p L RCR and biceps tenodesis   PT Tx Diagnosis s/p L RCR and biceps tenodesis   Other pertinent information DOS 3/6/23   Quick Adds Certification   Progress Note/Certification   Start of Care Date 03/06/23   Onset of illness/injury or Date of Surgery 02/16/23   Therapy Frequency 2 x week   Predicted Duration 12   Certification date from 05/30/23   Certification date to 08/15/23   Progress Note Due Date 08/15/23  (Next MD appt)   GOALS   PT Goals 2   PT Goal 1   Goal Identifier stg   Goal Description Reach above head height with less than 4/10   Rationale to maximize safety and independence with performance of ADLs and functional tasks;to maximize safety and independence within the community;to maximize safety and independence within the home   Goal Progress AROM flexion 133   Target Date 06/27/23   PT Goal 2   Goal Identifier LTG   Goal Description reach above head height without pain   Rationale to maximize safety and independence with performance of ADLs and functional tasks;to maximize safety and independence within the home   Target Date 07/13/23   Subjective Report   Subjective Report Pt reports seeing MD and getting order for strengthening   Objective Measures   Objective Measures Objective Measure 1;Objective Measure 2   Objective Measure 1   Objective Measure L shoulder AROM: flexion 145   Objective Measure 2   Objective Measure L shoulder strength: flexion 3+/5, ER 3+/5, IR 3+/5, abd 3/5   Treatment Interventions (PT)   Interventions Therapeutic Procedure/Exercise;Therapeutic Activity   Therapeutic Procedure/Exercise   Therapeutic Procedures: strength, endurance, ROM, flexibillity minutes (94649) 40   PTRx Ther Proc 1 Shoulder External Rotation Sidelying   PTRx Ther Proc 1 - Details 20 reps AG   PTRx Ther Proc 2 Shoulder Extension in Standing   PTRx  Ther Proc 2 - Details 30 reps AG   PTRx Ther Proc 3 Shoulder Scaption Full Can   PTRx Ther Proc 3 - Details 25 reps AG   PTRx Ther Proc 4 Wand Shoulder Internal Rotation   PTRx Ther Proc 4 - Details 10 reps 10 seconds   PTRx Ther Proc 5 Wand Shoulder External Rotation at 90 degrees Abduction   PTRx Ther Proc 5 - Details 10 reps 10 seconds   PTRx Ther Proc 6 Wand Shoulder Flexion Supine   PTRx Ther Proc 6 - Details 10 reps   PTRx Ther Proc 7 Pendulum/Codmans   PTRx Ther Proc 7 - Details 2 min   PTRx Ther Proc 8 Pulley Shoulder Scaption   PTRx Ther Proc 8 - Details 2 min   PTRx Ther Proc 9 Counter Stretch   PTRx Ther Proc 9 - Details 10 rep   Therapeutic Activity   PTRx Ther Act 1 Shoulder Extension in Standing   PTRx Ther Act 1 - Details 30 reps AG   PTRx Ther Act 2 Shoulder Scaption Full Can   PTRx Ther Act 2 - Details 25 reps AG   Plan   Plan for next session Progress strengthening program   Total Session Time   Timed Code Treatment Minutes 40   Total Treatment Time (sum of timed and untimed services) 40         [facility]                                                                                   OUTPATIENT PHYSICAL THERAPY    PLAN OF TREATMENT FOR OUTPATIENT REHABILITATION   Patient's Last Name, First Name, Emery Carias YOB: 1955   Provider's Name   [facility]   Medical Record No.  [id number]     Onset Date: 02/16/23  Start of Care Date: 03/06/23     Medical Diagnosis:  s/p L RCR and biceps tenodesis      PT Treatment Diagnosis:  s/p L RCR and biceps tenodesis Plan of Treatment  Frequency/Duration: 2 x week/ 12    Certification date from 05/30/23 to 08/15/23         See note for plan of treatment details and functional goals     Alfredito Gomez, PT                         I CERTIFY THE NEED FOR THESE SERVICES FURNISHED UNDER        THIS PLAN OF TREATMENT AND WHILE UNDER MY CARE .             Physician Signature                Date    X_____________________________________________________                    Referring Provider:  Robyn Vitale      Initial Assessment  See Epic Evaluation- Start of Care Date: 03/06/23

## 2023-06-13 ENCOUNTER — OFFICE VISIT (OUTPATIENT)
Dept: FAMILY MEDICINE | Facility: CLINIC | Age: 68
End: 2023-06-13
Payer: MEDICARE

## 2023-06-13 VITALS
TEMPERATURE: 98.2 F | WEIGHT: 202.7 LBS | DIASTOLIC BLOOD PRESSURE: 71 MMHG | RESPIRATION RATE: 13 BRPM | SYSTOLIC BLOOD PRESSURE: 125 MMHG | BODY MASS INDEX: 31.81 KG/M2 | HEIGHT: 67 IN | OXYGEN SATURATION: 95 % | HEART RATE: 54 BPM

## 2023-06-13 DIAGNOSIS — H83.01 ACUTE LABYRINTHITIS, RIGHT: Primary | ICD-10-CM

## 2023-06-13 DIAGNOSIS — F40.243 FEAR OF FLYING: ICD-10-CM

## 2023-06-13 PROCEDURE — 99213 OFFICE O/P EST LOW 20 MIN: CPT | Performed by: PHYSICIAN ASSISTANT

## 2023-06-13 RX ORDER — FLUTICASONE PROPIONATE 50 MCG
1 SPRAY, SUSPENSION (ML) NASAL DAILY
Qty: 16 G | Refills: 2 | Status: SHIPPED | OUTPATIENT
Start: 2023-06-13 | End: 2023-08-08

## 2023-06-13 RX ORDER — LORAZEPAM 1 MG/1
1 TABLET ORAL EVERY 6 HOURS PRN
Qty: 10 TABLET | Refills: 0 | Status: SHIPPED | OUTPATIENT
Start: 2023-06-13

## 2023-06-13 ASSESSMENT — PAIN SCALES - GENERAL: PAINLEVEL: NO PAIN (0)

## 2023-06-13 NOTE — PROGRESS NOTES
"  Assessment & Plan     Acute labyrinthitis, right  Pt does have some ETD that may be contributing to disequilibrium.   Will closely monitor symptoms.   - fluticasone (FLONASE) 50 MCG/ACT nasal spray; Spray 1 spray into both nostrils daily    Fear of flying  Refilled   - LORazepam (ATIVAN) 1 MG tablet; Take 1 tablet (1 mg) by mouth every 6 hours as needed for anxiety    6}     BMI:   Estimated body mass index is 32.23 kg/m  as calculated from the following:    Height as of this encounter: 1.689 m (5' 6.5\").    Weight as of this encounter: 91.9 kg (202 lb 11.2 oz).         [facility] JOCELYN Ludwig is a 67 year old, presenting for the following health issues:  Dizziness        6/13/2023    11:11 AM   Additional Questions   Roomed by Cheryl Ernst     History of Present Illness       Reason for visit:  Dizziness  Symptom onset:  3-4 weeks ago  Symptoms include:  Light headed  Symptom intensity:  Moderate  Symptom progression:  Staying the same  Had these symptoms before:  No  What makes it worse:  Laying down, bending over  What makes it better:  No    He eats 0-1 servings of fruits and vegetables daily.He consumes 3 sweetened beverage(s) daily.He exercises with enough effort to increase his heart rate 30 to 60 minutes per day.  He exercises with enough effort to increase his heart rate 7 days per week.   He is taking medications regularly.       Dizziness  Onset/Duration: 1 month  Description:   Do you feel faint: No  Does it feel like the surroundings (bed, room) are moving: YES- only when laying supine  Unsteady/off balance: No  Have you passed out or fallen: No  Intensity: moderate  Progression of Symptoms: same  Accompanying Signs & Symptoms:  Heart palpitations or chest pain: No  Nausea, vomiting: No  Weakness or lack of coordination in arms or legs: No  Vision or speech changes: No  Numbness or tingling: No  Ringing in ears (Tinnitus): No  Hearing Loss: " "No  History:   Head trauma/concussion history: No  Previous similar symptoms: 10 yrs ago inner ear infection  Recent bleeding history: No  Any new medications (BP?): No  Precipitating factors:   Worse with activity: No  Worse with head movement: YES  Alleviating factors:   Does staying in a fixed position give relief: YES  Therapies tried and outcome: None        Review of Systems   Constitutional, HEENT, cardiovascular, pulmonary, gi and gu systems are negative, except as otherwise noted.      Objective    /71 (BP Location: Right arm, Patient Position: Sitting, Cuff Size: Adult Large)   Pulse 54   Temp 98.2  F (36.8  C) (Oral)   Resp 13   Ht 1.689 m (5' 6.5\")   Wt 91.9 kg (202 lb 11.2 oz)   SpO2 95%   BMI 32.23 kg/m    Body mass index is 32.23 kg/m .  Physical Exam   GENERAL APPEARANCE: healthy, alert and no distress  EYES: Eyes grossly normal to inspection, PERRL, conjunctivae and sclerae normal and nystagmus --none  HENT: nose and mouth without ulcers or lesions, TM fluid right, oral mucous membranes moist and oropharynx clear  RESP: lungs clear to auscultation - no rales, rhonchi or wheezes  CV: regular rates and rhythm, normal S1 S2, no S3 or S4 and no murmur, click or rub  NEURO: Normal strength and tone, mentation intact, speech normal and Romberg negative  PSYCH: mentation appears normal and affect normal/bright                    "

## 2023-06-15 ENCOUNTER — THERAPY VISIT (OUTPATIENT)
Dept: PHYSICAL THERAPY | Facility: CLINIC | Age: 68
End: 2023-06-15
Payer: MEDICARE

## 2023-06-15 DIAGNOSIS — Z47.89 AFTERCARE FOLLOWING SURGERY OF THE MUSCULOSKELETAL SYSTEM: ICD-10-CM

## 2023-06-15 DIAGNOSIS — M25.512 ACUTE PAIN OF LEFT SHOULDER: Primary | ICD-10-CM

## 2023-06-15 PROCEDURE — 97110 THERAPEUTIC EXERCISES: CPT | Mod: GP | Performed by: PHYSICAL THERAPIST

## 2023-06-29 ENCOUNTER — THERAPY VISIT (OUTPATIENT)
Dept: PHYSICAL THERAPY | Facility: CLINIC | Age: 68
End: 2023-06-29
Payer: MEDICARE

## 2023-06-29 DIAGNOSIS — Z47.89 AFTERCARE FOLLOWING SURGERY OF THE MUSCULOSKELETAL SYSTEM: ICD-10-CM

## 2023-06-29 DIAGNOSIS — M25.512 ACUTE PAIN OF LEFT SHOULDER: Primary | ICD-10-CM

## 2023-06-29 PROCEDURE — 97110 THERAPEUTIC EXERCISES: CPT | Mod: GP | Performed by: PHYSICAL THERAPIST

## 2023-06-29 NOTE — PROGRESS NOTES
06/29/23 0500   Appointment Info   Signing clinician's name / credentials Gab Bushews PT   Total/Authorized Visits 32   Visits Used 14   Medical Diagnosis s/p L RCR and biceps tenodesis   PT Tx Diagnosis s/p L RCR and biceps tenodesis   Other pertinent information DOS 3/6/23   Quick Adds Certification   Progress Note/Certification   Start of Care Date 03/06/23   Onset of illness/injury or Date of Surgery 02/16/23   Therapy Frequency 2 x week   Predicted Duration 12   Certification date from 05/30/23   Certification date to 08/15/23   Progress Note Due Date 08/15/23  (Next MD appt)   GOALS   PT Goals 2   PT Goal 1   Goal Identifier stg   Goal Description Reach above head height with less than 4/10   Rationale to maximize safety and independence with performance of ADLs and functional tasks;to maximize safety and independence within the community;to maximize safety and independence within the home   Goal Progress AROM flexion 133, pain 2/10   Target Date 06/08/23   Date Met 06/15/23   PT Goal 2   Goal Identifier LTG   Goal Description reach above head height without pain   Rationale to maximize safety and independence with performance of ADLs and functional tasks;to maximize safety and independence within the home   Goal Progress met   Target Date 08/15/23   Date Met 06/29/23   Subjective Report   Subjective Report Continued improvement in strength.  Feeling better.  Sore after integrating weights into strengthening.  Functioning at 85% of where he wants to be.  Tolerating sleeping on left sholder more past week.   Objective Measures   Objective Measures Objective Measure 1;Objective Measure 2   Objective Measure 1   Objective Measure L shoulder AROM: flexion 158,   Objective Measure 2   Objective Measure L shoulder strength: flexion 4/5, ER 3/5, IR 4/5, abd 3+/5.   Treatment Interventions (PT)   Interventions Therapeutic Procedure/Exercise;Therapeutic Activity   Therapeutic Procedure/Exercise   Therapeutic  Procedures: strength, endurance, ROM, flexibillity minutes (46835) 40   Ther Proc 1 UBE, 5 min, F and B   PTRx Ther Proc 1 Push-Up Plus At Counter   PTRx Ther Proc 1 - Details 30 reps   PTRx Ther Proc 2 Shoulder External Rotation Sidelying   PTRx Ther Proc 2 - Details 30 reps 2#   PTRx Ther Proc 3 Shoulder Extension in Standing   PTRx Ther Proc 3 - Details 30 reps 2#   PTRx Ther Proc 4 Shoulder Scaption Full Can   PTRx Ther Proc 4 - Details 25 reps 2#   PTRx Ther Proc 5 Wand Shoulder Internal Rotation   PTRx Ther Proc 5 - Details 10 reps 10 seconds   PTRx Ther Proc 6 Wand Shoulder External Rotation at 90 degrees Abduction   PTRx Ther Proc 6 - Details 10 reps 10 seconds   PTRx Ther Proc 7 Wand Shoulder Flexion Supine   PTRx Ther Proc 7 - Details 10 reps   PTRx Ther Proc 8 Pendulum/Codmans   PTRx Ther Proc 8 - Details 5 min   PTRx Ther Proc 9 Pulley Shoulder Scaption   PTRx Ther Proc 9 - Details 2 min   PTRx Ther Proc 10 Counter Stretch   PTRx Ther Proc 10 - Details 10 rep   Plan   Plan for next session Progress strengthening program   Total Session Time   Timed Code Treatment Minutes 40   Total Treatment Time (sum of timed and untimed services) 40       DISCHARGE  Reason for Discharge: Patient has met all goals.        Discharge Plan: Patient to continue home program.    Referring Provider:  Robyn Vitale

## 2023-07-12 ENCOUNTER — OFFICE VISIT (OUTPATIENT)
Dept: FAMILY MEDICINE | Facility: CLINIC | Age: 68
End: 2023-07-12
Payer: MEDICARE

## 2023-07-12 DIAGNOSIS — D22.9 MULTIPLE BENIGN NEVI: Primary | ICD-10-CM

## 2023-07-12 DIAGNOSIS — L82.1 SEBORRHEIC KERATOSES: ICD-10-CM

## 2023-07-12 DIAGNOSIS — D18.01 CHERRY ANGIOMA: ICD-10-CM

## 2023-07-12 DIAGNOSIS — L71.9 ACNE ROSACEA: ICD-10-CM

## 2023-07-12 DIAGNOSIS — Z12.83 ENCOUNTER FOR SCREENING FOR MALIGNANT NEOPLASM OF SKIN: ICD-10-CM

## 2023-07-12 DIAGNOSIS — L81.4 LENTIGINES: ICD-10-CM

## 2023-07-12 DIAGNOSIS — L57.0 AK (ACTINIC KERATOSIS): ICD-10-CM

## 2023-07-12 PROCEDURE — 17000 DESTRUCT PREMALG LESION: CPT | Performed by: NURSE PRACTITIONER

## 2023-07-12 PROCEDURE — 17003 DESTRUCT PREMALG LES 2-14: CPT | Performed by: NURSE PRACTITIONER

## 2023-07-12 PROCEDURE — 99214 OFFICE O/P EST MOD 30 MIN: CPT | Mod: 25 | Performed by: NURSE PRACTITIONER

## 2023-07-12 NOTE — PROGRESS NOTES
[facility] Dermatology Note  Encounter Date: Jul 12, 2023  Office Visit     Reviewed patients past medical history and pertinent chart review prior to patients visit today.     Dermatology Problem List:  Acne rosacea, given metronidazole 0.75% cream BID 7/12/2023   Actinic keratosis, face.  Has treated with Efudex in the past x2.  Cryo right temple 7/12/2023     Patient denies personal history of skin cancer or dysplastic nevi.    Patient denies family history of skin cancer or dysplastic nevi.    ____________________________________________    Assessment & Plan:     # Actinic keratosis, right cheek x2. Premalignant nature discussed with patient. Treatment options discussed with patient today including no treatment, topical treatment, and cryotherapy. Patient elects to treat visible lesions today with cryotherapy. After verbal consent and discussion of risks and benefits including but no limited to dyspigmentation/scar, blister, and pain. A total of 2 actinic keratoses were treated with 1-2mm freeze border for 2 cycle with liquid nitrogen. Post cryotherapy instructions were provided.     # Acne rosacea, Benign, no further treatment needed.  Patient is bothered by the acne outbreaks on the nose and would like treatment.    -Use a gentle cleanser BID prior to applying medication and moisturizer to face.   -Start metronidazile 0.75% cream BID to affected areas of the face.   -Patient may notice some dryness but advised to use a non-comedogenic moisturizer such as Cetaphil cream, Cera Ve Cream, or Vanicream for dryness after applying medication or throughout the day as needed.   -Start moisturizer with SPF 30+ to face and neck in the morning after applying medication.   -Avoid extended sun exposure and reapply sunscreen every 2-3 hours when sun exposed.   -Follow up in 3-4 months if not well controlled and would like more control.     # Benign skin findings including: seborrheic keratoses, cherry  angioma, lentigines and benign nevi.   - No further intervention required. Patient to report changes.   - Patient reassured of the benign nature of these lesions.    #Signs and Symptoms of non-melanoma skin cancer and ABCDEs of melanoma reviewed with patient. Patient encouraged to perform monthly self skin exams and educated on how to perform them. UV precautions reviewed with patient. Patient was asked about new or changing moles/lesions on body.     #Reviewed Sunscreen: Apply 20 minutes prior to going outdoors and reapply every two hours, when wet or sweating. We recommend using an SPF 30 or higher, and to use one that is water resistant.       Follow-up:  1-2 years for follow up full body skin exam, prn for new or changing lesions or new concerns    Dolly Burris, GERARDO  Dermatology     ____________________________________________    CC: Derm Problem (Black lesion to lower right back, efudex to face but no follow up, sun sensitive and wife states it may be due to meds)    HPI:  Mr. Emery Zayas is a(n) 67 year old male who presents today as a return patient for a full body skin cancer screening. Patient has concerns today about redness and pimple breakouts on the nose.  He has had this a lot of his life.  He has not been doing anything for treatment of it.  He also has been treated in the past for precancers on the face, specifically on the right temple.  His wife has noticed some rough scaly patches on his back    Patient is otherwise feeling well, without additional skin concerns.     Physical Exam:  Vitals: There were no vitals taken for this visit.  SKIN: Total skin excluding the genitalia areas was performed. The exam included the head/face, neck, both arms, chest, back, abdomen, both legs, digits, mons pubis, buttock and nails.   -There is/are 2 small 2 mm erythematous macules with overlying adherent scale on the right temple/lateral cheek.    -several 1-2mm red dome shaped symmetric papules scattered on  the trunk  -multiple tan/brown flat round macules and raised papules scattered throughout trunk, extremities and head. No worrisome features for malignancy noted on examination.  -scattered tan, homogenous macules scattered on sun exposed areas of trunk, extremities and face.   -scattered waxy, stuck on tan/brown papules and patches on the trunk     - No other lesions of concern on areas examined.     Medications:  Current Outpatient Medications   Medication     allopurinol (ZYLOPRIM) 100 MG tablet     amLODIPine (NORVASC) 2.5 MG tablet     aspirin 81 MG tablet     carvedilol (COREG) 12.5 MG tablet     diclofenac (FLECTOR) 1.3 % patch     fenofibrate (TRICOR) 145 MG tablet     fluticasone (FLONASE) 50 MCG/ACT nasal spray     LORazepam (ATIVAN) 1 MG tablet     Multiple Vitamins-Minerals (MULTIVITAMIN ADULT) CHEW     olmesartan (BENICAR) 40 MG tablet     omega-3 acid ethyl esters (LOVAZA) 1 g capsule     order for DME     rosuvastatin (CRESTOR) 20 MG tablet     SENEXON-S 8.6-50 MG tablet     Current Facility-Administered Medications   Medication     dexamethasone (DECADRON) injection 4 mg     dexamethasone (DECADRON) injection 4 mg     lidocaine 1 % injection 1 mL     lidocaine 1 % injection 1 mL      Past Medical History:   Patient Active Problem List   Diagnosis     Other and unspecified disc disorder of lumbar region     Dyspepsia and other specified disorders of function of stomach     Other chest pain     Chronic gout without tophus, unspecified cause, unspecified site     Hyperlipidemia LDL goal <130     Obesity     Metabolic syndrome     High blood triglycerides     Elevated liver enzymes     Anxiety     Essential hypertension, benign     Uncomplicated alcohol dependence (H)     Chronic bilateral low back pain without sciatica     Diffuse idiopathic skeletal hyperostosis     Facet arthropathy, lumbosacral     DDD (degenerative disc disease), lumbosacral     DDD (degenerative disc disease), thoracic      Lumbosacral spinal stenosis     Acute pain of left shoulder     Aftercare following surgery of the musculoskeletal system     Personal history of colonic polyps     Past Medical History:   Diagnosis Date     Acute idiopathic gout, unspecified site 08/23/2016     Alcoholism (H)     Treated 1980's, denies any recent problems with withdrawal when skips days drinking.     Anxiety 12/31/2014     Arthritis      Chronic low back pain      Dyspepsia and other specified disorders of function of stomach 07/31/2003     Elevated liver enzymes 12/30/2013     Gout     possible     Herniated intervertebral disk     thoracic     High blood triglycerides 12/30/2013     Hyperlipidemia LDL goal <130 04/15/2013     Hypertension      Hypertensive emergency 05/05/2017     Metabolic syndrome 12/30/2013     Obesity 04/15/2013     Other chest pain 07/31/2003     RBBB (right bundle branch block)     Present for several years       CC Referred Self, MD  No address on file on close of this encounter.

## 2023-07-12 NOTE — PATIENT INSTRUCTIONS
Patient Education       Proper skin care from [facility]:    -Eliminate harsh soaps as they strip the natural oils from the skin, often resulting in dry itchy skin ( i.e. Dial, Zest, [place])  -Use mild soaps such as Cetaphil or Dove Sensitive Skin in the shower. You do not need to use soap on arms, legs, and trunk every time you shower unless visibly soiled.   -Avoid hot or cold showers.  -After showering, lightly dry off and apply moisturizing within 2-3 minutes. This will help trap moisture in the skin.   -Aggressive use of a moisturizer at least 1-2 times a day to the entire body (including -Vanicream, Cetaphil, Aquaphor or Cerave) and moisturize hands after every washing.  -We recommend using moisturizers that come in a tub that needs to be scooped out, not a pump. This has more of an oil base. It will hold moisture in your skin much better than a water base moisturizer. The above recommended are non-pore clogging.      Wear a sunscreen with at least SPF 30 on your face, ears, neck and V of the chest daily. Wear sunscreen on other areas of the body if those areas are exposed to the sun throughout the day. Sunscreens can contain physical and/or chemical blockers. Physical blockers are less likely to clog pores, these include zinc oxide and titanium dioxide. Reapply every two hour and after swimming.     Sunscreen examples: https://www.ewg.org/sunscreen/    UV radiation  UVA radiation remains constant throughout the day and throughout the year. It is a longer wavelength than UVB and therefore penetrates deeper into the skin leading to immediate and delayed tanning, photoaging, and skin cancer. 70-80% of UVA and UVB radiation occurs between the hours of 10am-2pm.  UVB radiation  UVB radiation causes the most harmful effects and is more significant during the summer months. However, snow and ice can reflect UVB radiation leading to skin damage during the winter months as well. UVB radiation is  responsible for tanning, burning, inflammation, delayed erythema (pinkness), pigmentation (brown spots), and skin cancer.     I recommend self monthly full body exams and yearly full body exams with a dermatology provider. If you develop a new or changing lesion please follow up for examination. Most skin cancers are pink and scaly or pink and pearly. However, we do see blue/brown/black skin cancers.  Consider the ABCDEs of melanoma when giving yourself your monthly full body exam ( don't forget the groin, buttocks, feet, toes, etc). A-asymmetry, B-borders, C-color, D-diameter, E-elevation or evolving. If you see any of these changes please follow up in clinic. If you cannot see your back I recommend purchasing a hand held mirror to use with a larger wall mirror.       Checking for Skin Cancer  You can find cancer early by checking your skin each month. There are 3 kinds of skin cancer. They are melanoma, basal cell carcinoma, and squamous cell carcinoma. Doing monthly skin checks is the best way to find new marks or skin changes. Follow the instructions below for checking your skin.   The ABCDEs of checking moles for melanoma   Check your moles or growths for signs of melanoma using ABCDE:   Asymmetry: the sides of the mole or growth don t match  Border: the edges are ragged, notched, or blurred  Color: the color within the mole or growth varies  Diameter: the mole or growth is larger than 6 mm (size of a pencil eraser)  Evolving: the size, shape, or color of the mole or growth is changing (evolving is not shown in the images below)    Checking for other types of skin cancer  Basal cell carcinoma or squamous cell carcinoma have symptoms such as:     A spot or mole that looks different from all other marks on your skin  Changes in how an area feels, such as itching, tenderness, or pain  Changes in the skin's surface, such as oozing, bleeding, or scaliness  A sore that does not heal  New swelling or redness beyond  the border of a mole    Who s at risk?  Anyone can get skin cancer. But you are at greater risk if you have:   Fair skin, light-colored hair, or light-colored eyes  Many moles or abnormal moles on your skin  A history of sunburns from sunlight or tanning beds  A family history of skin cancer  A history of exposure to radiation or chemicals  A weakened immune system  If you have had skin cancer in the past, you are at risk for recurring skin cancer.   How to check your skin  Do your monthly skin checkups in front of a full-length mirror. Check all parts of your body, including your:   Head (ears, face, neck, and scalp)  Torso (front, back, and sides)  Arms (tops, undersides, upper, and lower armpits)  Hands (palms, backs, and fingers, including under the nails)  Buttocks and genitals  Legs (front, back, and sides)  Feet (tops, soles, toes, including under the nails, and between toes)  If you have a lot of moles, take digital photos of them each month. Make sure to take photos both up close and from a distance. These can help you see if any moles change over time.   Most skin changes are not cancer. But if you see any changes in your skin, call your doctor right away. Only he or she can diagnose a problem. If you have skin cancer, seeing your doctor can be the first step toward getting the treatment that could save your life.   OUYA last reviewed this educational content on 4/1/2019 2000-2020 [employer]. [address] [place] [address], [place] [address]. All rights reserved. This information is not intended as a substitute for professional medical care. Always follow your healthcare professional's instructions.       When should I call my doctor?  If you are worsening or not improving, please, contact us or seek urgent care as noted below.     Who should I call with questions (adults)?  [facility] (adult and pediatric): [phone]  [facility]  [place] (adult): [phone]  [facility] ([place], [place], [place] and [place]) [phone]  For urgent needs outside of business hours call the [facility] at [phone] and ask for the dermatology resident on call to be paged  If this is a medical emergency and you are unable to reach an ER, Call 911      If you need a prescription refill, please contact your pharmacy. Refills are approved or denied by our Physicians during normal business hours, Monday through Fridays  Per office policy, refills will not be granted if you have not been seen within the past year (or sooner depending on your child's condition)

## 2023-07-12 NOTE — LETTER
7/12/2023         RE: Emery Zayas  [address] [facility] [address]        Dear Colleague,    Thank you for referring your patient, Emery Zayas, to the [facility]. Please see a copy of my visit note below.    [facility] Dermatology Note  Encounter Date: Jul 12, 2023  Office Visit     Reviewed patients past medical history and pertinent chart review prior to patients visit today.     Dermatology Problem List:  Acne rosacea, given metronidazole 0.75% cream BID 7/12/2023   Actinic keratosis, face.  Has treated with Efudex in the past x2.  Cryo right temple 7/12/2023     Patient denies personal history of skin cancer or dysplastic nevi.    Patient denies family history of skin cancer or dysplastic nevi.    ____________________________________________    Assessment & Plan:     # Actinic keratosis, right cheek x2. Premalignant nature discussed with patient. Treatment options discussed with patient today including no treatment, topical treatment, and cryotherapy. Patient elects to treat visible lesions today with cryotherapy. After verbal consent and discussion of risks and benefits including but no limited to dyspigmentation/scar, blister, and pain. A total of 2 actinic keratoses were treated with 1-2mm freeze border for 2 cycle with liquid nitrogen. Post cryotherapy instructions were provided.     # Acne rosacea, Benign, no further treatment needed.  Patient is bothered by the acne outbreaks on the nose and would like treatment.    -Use a gentle cleanser BID prior to applying medication and moisturizer to face.   -Start metronidazile 0.75% cream BID to affected areas of the face.   -Patient may notice some dryness but advised to use a non-comedogenic moisturizer such as Cetaphil cream, Cera Ve Cream, or Vanicream for dryness after applying medication or throughout the day as needed.   -Start moisturizer with SPF 30+ to face and neck in the  morning after applying medication.   -Avoid extended sun exposure and reapply sunscreen every 2-3 hours when sun exposed.   -Follow up in 3-4 months if not well controlled and would like more control.     # Benign skin findings including: seborrheic keratoses, cherry angioma, lentigines and benign nevi.   - No further intervention required. Patient to report changes.   - Patient reassured of the benign nature of these lesions.    #Signs and Symptoms of non-melanoma skin cancer and ABCDEs of melanoma reviewed with patient. Patient encouraged to perform monthly self skin exams and educated on how to perform them. UV precautions reviewed with patient. Patient was asked about new or changing moles/lesions on body.     #Reviewed Sunscreen: Apply 20 minutes prior to going outdoors and reapply every two hours, when wet or sweating. We recommend using an SPF 30 or higher, and to use one that is water resistant.       Follow-up:  1-2 years for follow up full body skin exam, prn for new or changing lesions or new concerns    Dolly Burris, [facility]  Dermatology     ____________________________________________    CC: Derm Problem (Black lesion to lower right back, efudex to face but no follow up, sun sensitive and wife states it may be due to meds)    HPI:  Mr. Emery Zayas is a(n) 67 year old male who presents today as a return patient for a full body skin cancer screening. Patient has concerns today about redness and pimple breakouts on the nose.  He has had this a lot of his life.  He has not been doing anything for treatment of it.  He also has been treated in the past for precancers on the face, specifically on the right temple.  His wife has noticed some rough scaly patches on his back    Patient is otherwise feeling well, without additional skin concerns.     Physical Exam:  Vitals: There were no vitals taken for this visit.  SKIN: Total skin excluding the genitalia areas was performed. The exam included the head/face,  neck, both arms, chest, back, abdomen, both legs, digits, mons pubis, buttock and nails.   -There is/are 2 small 2 mm erythematous macules with overlying adherent scale on the right temple/lateral cheek.    -several 1-2mm red dome shaped symmetric papules scattered on the trunk  -multiple tan/brown flat round macules and raised papules scattered throughout trunk, extremities and head. No worrisome features for malignancy noted on examination.  -scattered tan, homogenous macules scattered on sun exposed areas of trunk, extremities and face.   -scattered waxy, stuck on tan/brown papules and patches on the trunk     - No other lesions of concern on areas examined.     Medications:  Current Outpatient Medications   Medication     allopurinol (ZYLOPRIM) 100 MG tablet     amLODIPine (NORVASC) 2.5 MG tablet     aspirin 81 MG tablet     carvedilol (COREG) 12.5 MG tablet     diclofenac (FLECTOR) 1.3 % patch     fenofibrate (TRICOR) 145 MG tablet     fluticasone (FLONASE) 50 MCG/ACT nasal spray     LORazepam (ATIVAN) 1 MG tablet     Multiple Vitamins-Minerals (MULTIVITAMIN ADULT) CHEW     olmesartan (BENICAR) 40 MG tablet     omega-3 acid ethyl esters (LOVAZA) 1 g capsule     order for DME     rosuvastatin (CRESTOR) 20 MG tablet     SENEXON-S 8.6-50 MG tablet     Current Facility-Administered Medications   Medication     dexamethasone (DECADRON) injection 4 mg     dexamethasone (DECADRON) injection 4 mg     lidocaine 1 % injection 1 mL     lidocaine 1 % injection 1 mL      Past Medical History:   Patient Active Problem List   Diagnosis     Other and unspecified disc disorder of lumbar region     Dyspepsia and other specified disorders of function of stomach     Other chest pain     Chronic gout without tophus, unspecified cause, unspecified site     Hyperlipidemia LDL goal <130     Obesity     Metabolic syndrome     High blood triglycerides     Elevated liver enzymes     Anxiety     Essential hypertension, benign      Uncomplicated alcohol dependence (H)     Chronic bilateral low back pain without sciatica     Diffuse idiopathic skeletal hyperostosis     Facet arthropathy, lumbosacral     DDD (degenerative disc disease), lumbosacral     DDD (degenerative disc disease), thoracic     Lumbosacral spinal stenosis     Acute pain of left shoulder     Aftercare following surgery of the musculoskeletal system     Personal history of colonic polyps     Past Medical History:   Diagnosis Date     Acute idiopathic gout, unspecified site 08/23/2016     Alcoholism (H)     Treated 1980's, denies any recent problems with withdrawal when skips days drinking.     Anxiety 12/31/2014     Arthritis      Chronic low back pain      Dyspepsia and other specified disorders of function of stomach 07/31/2003     Elevated liver enzymes 12/30/2013     Gout     possible     Herniated intervertebral disk     thoracic     High blood triglycerides 12/30/2013     Hyperlipidemia LDL goal <130 04/15/2013     Hypertension      Hypertensive emergency 05/05/2017     Metabolic syndrome 12/30/2013     Obesity 04/15/2013     Other chest pain 07/31/2003     RBBB (right bundle branch block)     Present for several years       CC Referred Self, MD  No address on file on close of this encounter.      Again, thank you for allowing me to participate in the care of your patient.        Sincerely,        Nicolasa Burris, SILVIANO CNP

## 2023-07-13 ENCOUNTER — TELEPHONE (OUTPATIENT)
Dept: GASTROENTEROLOGY | Facility: CLINIC | Age: 68
End: 2023-07-13
Payer: MEDICARE

## 2023-07-13 NOTE — TELEPHONE ENCOUNTER
"Endoscopy Scheduling Screen    Have you had a positive Covid test in the last 14 days?  No    Are you active on MyChart?   Yes    What insurance is in the chart?  Other:  MEDICARE    Ordering/Referring Provider: Trish Edwards MD   (If ordering provider performs procedure, schedule with ordering provider unless otherwise instructed. )    BMI: Estimated body mass index is 32.23 kg/m  as calculated from the following:    Height as of 6/13/23: 1.689 m (5' 6.5\").    Weight as of 6/13/23: 91.9 kg (202 lb 11.2 oz).     Sedation Ordered  MAC/deep sedation.   BMI<= 45 45 < BMI <= 48 48 < BMI < = 50  BMI > 50   No Restrictions No [facility]  No [facility]  [facility] with exceptions Hospital Only OR Only       Do you have a history of malignant hyperthermia or adverse reaction to anesthesia?  No    (Females) Are you currently pregnant?   No     Have you been diagnosed or told you have pulmonary hypertension?   No    Do you have an LVAD?  No    Have you been told you have moderate to severe sleep apnea?  No    Have you been told you have COPD, asthma, or any other lung disease?  No    Do you have any heart conditions?  No     Have you ever had or are you awaiting a heart or lung transplant?   No    Have you had a stroke or transient ischemic attack (TIA aka \"mini stroke\" in the last 6 months?   No    Have you been diagnosed with or been told you have cirrhosis of the liver?   No    Are you currently on dialysis?   No    Do you need assistance transferring?   No    BMI: Estimated body mass index is 32.23 kg/m  as calculated from the following:    Height as of 6/13/23: 1.689 m (5' 6.5\").    Weight as of 6/13/23: 91.9 kg (202 lb 11.2 oz).     Is patients BMI > 40 and scheduling location [facility]?  No    Do you take the medication Phentermine, Ozempic or Wegovy?  No    Do you take the medication Naltrexone?  No    Do you take blood thinners?  No      Prep   Are you currently on dialysis or do you have chronic kidney " disease?  No    Do you have a diagnosis of diabetes?  No    Do you have a diagnosis of cystic fibrosis (CF)?  No    On a regular basis do you go 3 -5 days between bowel movements?  No    BMI > 40?  No    Preferred Pharmacy:    [id number] - [place], [place] - [address]  [address]  [facility] [address]  Phone: [phone] Fax: [phone]          Final Scheduling Details   Colonoscopy prep sent?  MiraLAX (No Mag Citrate)    Procedure scheduled  Colonoscopy    Surgeon:  MARCI     Date of procedure:  1/12/24     Schedule PAC:   No    Location  [facility]    Sedation   MAC/Deep Sedation    Patient Reminders:    You will receive a call from a Nurse to review instructions and health history.  This assessment must be completed prior to your procedure.  Failure to complete the Nurse assessment may result in the procedure being cancelled.       On the day of your procedure, please designate an adult(s) who can drive you home stay with you for the next 24 hours. The medicines used in the exam will make you sleepy. You will not be able to drive.       You cannot take public transportation, ride share services, or non-medical taxi service without a responsible caregiver.  Medical transport services are allowed with the requirement that a responsible caregiver will receive you at your destination.  We require that drivers and caregivers are confirmed prior to your procedure.

## 2023-08-05 DIAGNOSIS — H83.01 ACUTE LABYRINTHITIS, RIGHT: ICD-10-CM

## 2023-08-08 RX ORDER — FLUTICASONE PROPIONATE 50 MCG
1 SPRAY, SUSPENSION (ML) NASAL DAILY
Qty: 16 ML | Refills: 0 | Status: SHIPPED | OUTPATIENT
Start: 2023-08-08 | End: 2023-08-16

## 2023-08-13 DIAGNOSIS — H83.01 ACUTE LABYRINTHITIS, RIGHT: ICD-10-CM

## 2023-08-16 RX ORDER — FLUTICASONE PROPIONATE 50 MCG
SPRAY, SUSPENSION (ML) NASAL
Qty: 48 ML | Refills: 1 | Status: SHIPPED | OUTPATIENT
Start: 2023-08-16

## 2023-08-16 NOTE — TELEPHONE ENCOUNTER
Routing refill request to provider for review/approval because:  Need Dx on problem list.  Caitlin Soto RN

## 2023-08-22 ENCOUNTER — TRANSFERRED RECORDS (OUTPATIENT)
Dept: HEALTH INFORMATION MANAGEMENT | Facility: CLINIC | Age: 68
End: 2023-08-22
Payer: MEDICARE

## 2023-08-31 ENCOUNTER — OFFICE VISIT (OUTPATIENT)
Dept: FAMILY MEDICINE | Facility: CLINIC | Age: 68
End: 2023-08-31
Payer: MEDICARE

## 2023-08-31 VITALS
SYSTOLIC BLOOD PRESSURE: 118 MMHG | HEART RATE: 52 BPM | BODY MASS INDEX: 31.86 KG/M2 | TEMPERATURE: 97.6 F | OXYGEN SATURATION: 99 % | WEIGHT: 203 LBS | RESPIRATION RATE: 16 BRPM | HEIGHT: 67 IN | DIASTOLIC BLOOD PRESSURE: 68 MMHG

## 2023-08-31 DIAGNOSIS — H81.11 BENIGN PAROXYSMAL POSITIONAL VERTIGO OF RIGHT EAR: Primary | ICD-10-CM

## 2023-08-31 DIAGNOSIS — H91.93 DECREASED HEARING OF BOTH EARS: ICD-10-CM

## 2023-08-31 PROCEDURE — 99213 OFFICE O/P EST LOW 20 MIN: CPT | Performed by: NURSE PRACTITIONER

## 2023-08-31 PROCEDURE — V5008 HEARING SCREENING: HCPCS | Performed by: NURSE PRACTITIONER

## 2023-08-31 RX ORDER — MECLIZINE HYDROCHLORIDE 25 MG/1
25 TABLET ORAL 3 TIMES DAILY PRN
Qty: 30 TABLET | Refills: 0 | Status: SHIPPED | OUTPATIENT
Start: 2023-08-31

## 2023-08-31 ASSESSMENT — ENCOUNTER SYMPTOMS: DIZZINESS: 1

## 2023-08-31 ASSESSMENT — PAIN SCALES - GENERAL: PAINLEVEL: NO PAIN (0)

## 2023-08-31 NOTE — NURSING NOTE
HEARING FREQUENCY    Right Ear:      1000 Hz RESPONSE- on Level: 40 db (Conditioning sound)   1000 Hz: RESPONSE- on Level: tone not heard   2000 Hz: RESPONSE- on Level:   20 db    4000 Hz: RESPONSE- on Level: tone not heard    Left Ear:      4000 Hz: RESPONSE- on Level: tone not heard   2000 Hz: RESPONSE- on Level:   20 db    1000 Hz: RESPONSE- on Level:   20 db     500 Hz: RESPONSE- on Level: tone not heard    Right Ear:    500 Hz: RESPONSE- on Level: tone not heard    Hearing Acuity: REFER    Hearing Assessment: abnormal--refer to audiology

## 2023-08-31 NOTE — PROGRESS NOTES
"  Assessment & Plan     Benign paroxysmal positional vertigo of right ear  Acute; positive jess halpike on the right side. Provided additional information via AVS. Encouraged to do epley maneuver at home, take meclizine as needed for dizziness, may also use oral antihistamine to help with excess fluid, PT for vestibular therapy if no improvement. Follow up if symptoms do not improve/worsen.     - Physical Therapy Referral; Future  - meclizine (ANTIVERT) 25 MG tablet; Take 1 tablet (25 mg) by mouth 3 times daily as needed for dizziness    Decreased hearing of both ears  Acute; patient had difficulty hearing on left side. Patient states he has been having on going concerns about his hearing. Referral provided. Discussed may be skewed d/t fluid build up.     - HEARING SCREENING  - Adult Audiology  Referral; Future       BMI:   Estimated body mass index is 32.27 kg/m  as calculated from the following:    Height as of this encounter: 1.689 m (5' 6.5\").    Weight as of this encounter: 92.1 kg (203 lb).   Weight management plan: Discussed healthy diet and exercise guidelines      SILVIANO [facility] HELLEN Ludwig is a 68 year old, presenting for the following health issues:  Dizziness (States in June developed dizziness and was in for a visit and treated with a nasal spray that did seem to help.   He was advised to use it until it ran out and then when the symptoms returned he started using it again but that was just a few days ago. )        8/31/2023     7:12 AM   Additional Questions   Roomed by Xochitl SHER LPN   Accompanied by spouse John       Sanjeev    History of Present Illness       Reason for visit:  Feeling dizzy all the time    He eats 0-1 servings of fruits and vegetables daily.He consumes 3 sweetened beverage(s) daily.He exercises with enough effort to increase his heart rate 30 to 60 minutes per day.  He exercises with enough effort to increase his heart " "rate 7 days per week.   He is taking medications regularly.       Dizziness  Onset/Duration: about 1 week   (symptoms seemed to be increasing and then on Monday morning he fell on his way to the bathroom)   Description:   Do you feel faint: YES  Does it feel like the surroundings (bed, room) are moving: No  Unsteady/off balance: YES- when he moves around   Have you passed out or fallen: YES- fallen but did not pass out   Intensity: moderate  Progression of Symptoms: worsening and waxing and waning  Accompanying Signs & Symptoms:  Heart palpitations or chest pain: No  Nausea, vomiting: No  Weakness or lack of coordination in arms or legs: YES- when he gets up in the morning his lower legs feel very stiff   Vision or speech changes: No  Numbness or tingling: No  Ringing in ears (Tinnitus): No  Hearing Loss: No  History:   Head trauma/concussion history: YES- about 2 years ago did have a fall and hit his head.   Previous similar symptoms: YES- in June  Recent bleeding history: No  Any new medications (BP?): No  Precipitating factors:   Worse with activity: YES- when he is going from resting to standing it seems to be worse.   Worse with head movement: No  Alleviating factors:   Does staying in a fixed position give relief: YES  Therapies tried and outcome: nasal spray in June and restarted 2 days ago.     Walking to the bathroom; then fell on the floor. Feeling woozy. Sitting down, getting up, bending over, laying in bed at night he gets a head rush. Was seen in the past for similar symptoms in which he was prescribed flonase. States it took a long time to feel improvement but recently worsened.     Review of Systems   Neurological:  Positive for dizziness.          Objective    /68   Pulse 52   Temp 97.6  F (36.4  C) (Oral)   Resp 16   Ht 1.689 m (5' 6.5\")   Wt 92.1 kg (203 lb)   SpO2 99%   BMI 32.27 kg/m    Body mass index is 32.27 kg/m .  Physical Exam   GENERAL: healthy, alert and no distress  HENT: " normal cephalic/atraumatic, both ears: clear effusion, nose and mouth without ulcers or lesions, oropharynx clear, and oral mucous membranes moist  NECK: no adenopathy, no asymmetry, masses, or scars and thyroid normal to palpation  RESP: lungs clear to auscultation - no rales, rhonchi or wheezes  CV: regular rate and rhythm, normal S1 S2, no S3 or S4, no murmur, click or rub, no peripheral edema and peripheral pulses strong  PSYCH: mentation appears normal, affect normal/bright    [place]-Hallpike positive on right side

## 2023-09-18 ENCOUNTER — THERAPY VISIT (OUTPATIENT)
Dept: PHYSICAL THERAPY | Facility: CLINIC | Age: 68
End: 2023-09-18
Attending: NURSE PRACTITIONER
Payer: MEDICARE

## 2023-09-18 DIAGNOSIS — H81.11 BENIGN PAROXYSMAL POSITIONAL VERTIGO OF RIGHT EAR: ICD-10-CM

## 2023-09-18 DIAGNOSIS — R42 DIZZINESS: Primary | ICD-10-CM

## 2023-09-18 PROCEDURE — 97162 PT EVAL MOD COMPLEX 30 MIN: CPT | Mod: GP

## 2023-09-18 PROCEDURE — 97112 NEUROMUSCULAR REEDUCATION: CPT | Mod: GP

## 2023-09-18 PROCEDURE — 95992 CANALITH REPOSITIONING PROC: CPT | Mod: GP

## 2023-09-18 NOTE — PROGRESS NOTES
PHYSICAL THERAPY EVALUATION  Type of Visit: Evaluation    See electronic medical record for Abuse and Falls Screening details.    Subjective       Presenting condition or subjective complaint: Vertigo  Date of onset: 09/12/23 (Order date.)    Relevant medical history: Dizziness; Hearing problems; High blood pressure; Overweight   Past Medical History:   Diagnosis Date    Acute idiopathic gout, unspecified site 08/23/2016    Alcoholism (H)     Treated 1980's, denies any recent problems with withdrawal when skips days drinking.    Anxiety 12/31/2014    Arthritis     Chronic low back pain     Dyspepsia and other specified disorders of function of stomach 07/31/2003    Elevated liver enzymes 12/30/2013    Gout     possible    Herniated intervertebral disk     thoracic    High blood triglycerides 12/30/2013    Hyperlipidemia LDL goal <130 04/15/2013    Hypertension     Hypertensive emergency 05/05/2017    Metabolic syndrome 12/30/2013    Obesity 04/15/2013    Other chest pain 07/31/2003    RBBB (right bundle branch block)     Present for several years     Dates & types of surgery: Numerous back surgeries, too long ago to list  Past Surgical History:   Procedure Laterality Date    BACK SURGERY      Back surgery x 4      COLONOSCOPY N/A 1/23/2020    Procedure: COLONOSCOPY, WITH POLYPECTOMY AND BIOPSY; polypectomies  using jumbo bx forcep;  Surgeon: Sherice Mckeon MD;  Location:  GI    ESOPHAGOSCOPY, GASTROSCOPY, DUODENOSCOPY (EGD), COMBINED  2000    normal    TONSILLECTOMY       Prior diagnostic imaging/testing results:     None  Prior therapy history for the same diagnosis, illness or injury: No      Prior Level of Function  Pt IND with all transfers, ambulation, and ADLs/IADLs at baseline.     Living Environment  Social support: With a significant other or spouse   Type of home: House   Stairs to enter the home: No       Ramp: No   Stairs inside the home: Yes 12 Is there a railing: Yes   Help at home:  None  Equipment owned:   None  Employment: Not Applicable    Hobbies/Interests: restoring old cars, hanging out with grandkids, bought a camper and going to see nature    Patient goals for therapy: Feel normal, not off a step    Pain assessment: Pain denied.     Objective   Cognitive Status Examination  PT with no cognitive concern.    OBSERVATION: Pt ambulating with slight hesitancy.  INTEGUMENTARY: Intact  POSTURE: WNL  PALPATION: N/A  RANGE OF MOTION:  Self-limiting cervical AROM 2' fear of dizziness. However, full PROM - appropriate for positional testing.  STRENGTH: LE Strength WNL  UE Strength WNL    BED MOBILITY: Independent, Dizziness.    TRANSFERS: Independent    WHEELCHAIR MOBILITY: N/A    GAIT:   Level of Gabriels: Independent  Assistive Device(s): None  Gait Deviations: Base of support increased  Yasemin decreased  Gait Distance: >50ft within the session.  Stairs: Pt reporting no HR.    BALANCE: Standing Balance (static):Fair  Standing Balance (dynamic):Fair  Plan to assess further at upcoming sessions.    SENSATION: UE Sensation WNL, LE Sensation WNL    COORDINATION: Deferred today.       VESTIBULAR EVALUATION  ADDITIONAL HISTORY:  Description of symptoms: Constant dizziness; Off balance; Likely to fall; Light-headedness  Dizzy attacks:   Start: About 2 months ago give or take a week or so   Last attack: Not really an attack, more constant, worse in the morning and gets somewhat better through the day   Frequency of occurrences: every day   Length of attack: majority of the day  Difficulty hearing: Both ears  Noise in ears? Yes not always, when it does it's a low buzzing sound  Alleviates symptoms: sitting down  Worsens symptoms: bending over  Activities that bring on symptoms: Bending over; Reaching up; Unstable surfaces; Other  trying to lay down to go to bed I get a very intense head rush for about 5 seconds, I have to close my eyes when it happens  Pertinent visual history: Pt wears reading  glasses, only.  Pertinent history of current vestibular problem: Hearing loss  DHI: Total Score: 36    Cervicogenic Screen    Neck ROM Normal   Vertebral Artery Test Normal     Oculomotor Screen    Ocular ROM Normal   Smooth Pursuit Normal   Saccades Normal   VOR Normal   VOR Cancellation Normal   Convergence Testing Normal        Infrared Goggle Exam Vestibular Suppressant in Last 24 Hours? No  Exam Completed With: Infrared goggles   Spontaneous Nystagmus Negative   Gaze Evoked Nystagmus Horizontal R with up-youssef gaze.   Head Shake Horizontal Nystagmus Horizontal R; demonstrating 2-3 beats.   Positional Testing See below.    Left Right   [place]-Hallpike Negative Upbeating R torsional; duration of 10-15s - moderate dizziness.   [facility] Supine Roll Test Negative Negative   Pt with evident R PC BPPV; however, also demonstrates signs of L UVH.     BPPV Canal(s): R Posterior  BPPV Type: Canalithasis      Assessment & Plan   CLINICAL IMPRESSIONS  Medical Diagnosis:      Treatment Diagnosis: dizziness with head movement/change of body position; BPPV; L UVH   Impression/Assessment: Patient is a 68 year old male with dizziness and imbalance complaints.  The following significant findings have been identified: Impaired balance, Impaired gait, Decreased activity tolerance, Instability, Dizziness, and Disequilibrium . These impairments interfere with their ability to perform household mobility and community mobility as compared to previous level of function.     Clinical Decision Making (Complexity):  Clinical Presentation: Evolving/Changing  Clinical Presentation Rationale: based on medical and personal factors listed in PT evaluation  Clinical Decision Making (Complexity): Moderate complexity    PLAN OF CARE  Treatment Interventions:  Interventions: Gait Training, Neuromuscular Re-education, Therapeutic Activity, Therapeutic Exercise, Self-Care/Home Management, Canalith Repositioning    Long Term Goals     PT Goal 1  Goal  Identifier: HEP  Goal Description: Pt will demonstrate IND with BPPV repositioning techniques to promote reduction of vestibular symptoms and improve safety with functional mobility, if symptoms return.  Target Date: 11/16/23  PT Goal 2  Goal Identifier: DHI  Goal Description: Pt will report reduction of subjective dizziness to a 0-2/10, with all daily activities and/or completion of HEP.  Target Date: 11/16/23  PT Goal 3  Goal Identifier: Positional Testing  Goal Description: Pt will complete BPPV positional testing without observed nystagmus and/or increase of dizziness.  Target Date: 11/16/23      Frequency of Treatment: 1x/week  Duration of Treatment: 60 days    Education Assessment:   Learner/Method: Patient;Listening;Pictures/Video;Demonstration    Risks and benefits of evaluation/treatment have been explained.   Patient/Family/caregiver agrees with Plan of Care.     Evaluation Time:     PT Eval, Moderate Complexity Minutes (81235): 15     Signing Clinician: Jerri Kirkpatrick PT, DPT, NCS        [facility]                                                                                   OUTPATIENT PHYSICAL THERAPY      PLAN OF TREATMENT FOR OUTPATIENT REHABILITATION   Patient's Last Name, First Name, Emery Carias YOB: 1955   Provider's Name   [facility]   Medical Record No.  [id number]     Onset Date: 09/12/23 (Order date.)  Start of Care Date: 09/18/23     Medical Diagnosis:         PT Treatment Diagnosis:  dizziness with head movement/change of body position; BPPV; L UVH Plan of Treatment  Frequency/Duration: 1x/week/ 60 days    Certification date from 09/18/23 to 11/16/23         See note for plan of treatment details and functional goals     Jerri Kirkpatrick PT, DPT, NCS                         I CERTIFY THE NEED FOR THESE SERVICES FURNISHED UNDER        THIS PLAN OF TREATMENT AND WHILE UNDER MY CARE     (Physician  attestation of this document indicates review and certification of the therapy plan).                Referring Provider:  SILVIANO Chao CNP      Initial Assessment  See Epic Evaluation- Start of Care Date: 09/18/23

## 2023-09-20 ENCOUNTER — MYC MEDICAL ADVICE (OUTPATIENT)
Dept: PEDIATRICS | Facility: CLINIC | Age: 68
End: 2023-09-20
Payer: MEDICARE

## 2023-09-20 ENCOUNTER — VIRTUAL VISIT (OUTPATIENT)
Dept: URGENT CARE | Facility: CLINIC | Age: 68
End: 2023-09-20
Payer: MEDICARE

## 2023-09-20 ENCOUNTER — NURSE TRIAGE (OUTPATIENT)
Dept: FAMILY MEDICINE | Facility: CLINIC | Age: 68
End: 2023-09-20
Payer: MEDICARE

## 2023-09-20 DIAGNOSIS — U07.1 COVID-19: Primary | ICD-10-CM

## 2023-09-20 PROCEDURE — 99443 PR PHYSICIAN TELEPHONE EVALUATION 21-30 MIN: CPT | Mod: 95 | Performed by: NURSE PRACTITIONER

## 2023-09-20 NOTE — PROGRESS NOTES
Oziel is a 68 year old who is being evaluated via a billable telephone visit.      What phone number would you like to be contacted at? [phone]  How would you like to obtain your AVS? Tim    Distant Location (provider location):  Off-site    Assessment & Plan     (U07.1) COVID-19  (primary encounter diagnosis)    Plan: nirmatrelvir and ritonavir (PAXLOVID) 300         mg/100 mg therapy pack  Normal GFR  Hold statin    SILVIANO [facility]UAL [facility]    Subjective   Oziel is a 68 year old, presenting for the following health issues:  COVID    HPI     Home covid positive today  Wife has covid  Sx 3 days and consist of headache cough sore throat chills aches fatigue  No sob wheezing chest pain  Hydrated  Taking excedrin for headache which is helping        Objective           Vitals:  No vitals were obtained today due to virtual visit.    Physical Exam   healthy, alert, and no distress  PSYCH: Alert and oriented times 3; coherent speech, normal   rate and volume, able to articulate logical thoughts, able   to abstract reason, no tangential thoughts, no hallucinations   or delusions  His affect is normal  RESP: No cough, no audible wheezing, able to talk in full sentences  Remainder of exam unable to be completed due to telephone visits        Phone call duration: 25 minutes

## 2023-09-20 NOTE — TELEPHONE ENCOUNTER
Nurse Triage SBAR    Is this a 2nd Level Triage? NO    Situation: Pt's wife, John, calling to report Oziel's covid exposure and symptoms.    Background: Pt reports that she tested positive for covid at the [facility] ED on Monday. She reports Oziel's symptoms started Monday night 9/18 and have worsened since. She states that her and Oziel have been together consistently since her positive test. Pt's wife reports hx HTN. John reports Oziel had the Pfizer vaccine, last booster 10/22. Denies having pulse oximeter at home.      Assessment: John reports Oziel's symptoms as: cough, fever (no thermometer), sore throat, body aches, fatigue, nausea, headache, and frequent productive cough with clear sputum. John denies wheezing, SOB, loss of taste/smell, and difficulty breathing.    Protocol Recommended Disposition:   No disposition on file.    Recommendation: Recommended submitting e-visit to PCP for covid test, then can inquire about paxlovid after positive test. Pt's wife verbalized understanding to seek care if difficulty breathing occurs. Bawte message sent with e-visit link.    Additional Information   Negative: SEVERE difficulty breathing (e.g., struggling for each breath, speaks in single words)   Negative: Difficult to awaken or acting confused (e.g., disoriented, slurred speech)   Negative: Bluish (or gray) lips or face now   Negative: Shock suspected (e.g., cold/pale/clammy skin, too weak to stand, low BP, rapid pulse)   Negative: Sounds like a life-threatening emergency to the triager   Negative: SEVERE or constant chest pain or pressure  (Exception: Mild central chest pain, present only when coughing.)   Negative: MODERATE difficulty breathing (e.g., speaks in phrases, SOB even at rest, pulse 100-120)   Negative: Headache and stiff neck (can't touch chin to chest)   Negative: Oxygen level (e.g., pulse oximetry) 90% or lower   Negative: Chest pain or pressure  (Exception: MILD central chest pain, present only when  coughing.)   Negative: Drinking very little and dehydration suspected (e.g., no urine > 12 hours, very dry mouth, very lightheaded)   Negative: Patient sounds very sick or weak to the triager    Protocols used: Coronavirus (COVID-19) Diagnosed or [id number] Mackey RN on 9/20/2023 at 9:53 AM

## 2023-09-20 NOTE — TELEPHONE ENCOUNTER
Received call back from patient. Symptoms started Sunday night. Patient tested positive for Covid today with home test. Denies difficulty breathing. No chest pain or chest pressure. Patient experiencing cough, mild headache, body aches, chills. RN unable to prescribed through RN protocol, scheduled for VV today 9/20/23 w/ provider.     RN COVID TREATMENT VISIT  09/20/23      The patient has been triaged and does not require a higher level of care.    Emery Zayas  68 year old  Current weight? 200lbs    Has the patient been seen by a primary care provider at an [facility] or [facility] within the past two years? Yes.   Have you been in close proximity to/do you have a known exposure to a person with a confirmed case of influenza? No.     General treatment eligibility:  Date of positive COVID test (PCR or at home)?  09/20/23    Are you or have you been hospitalized for this COVID-19 infection? No.   Have you received monoclonal antibodies or antiviral treatment for COVID-19 since this positive test? No.   Do you have any of the following conditions that place you at risk of being very sick from COVID-19?   - Age 50 years or older  - Heart conditions such as cardiomyopathies, congenital heart defects, coronary artery disease, heart arrhythmias, heart failure, hypertension, valve disorders   - Overweight or Obesity (BMI >85th percentile or BMI 25 or higher)  - Current or former smoker  Yes, patient has at least one high risk condition as noted above.     Current COVID symptoms:   - fever or chills  - cough  - muscle or body aches  - headache  - sore throat  Yes. Patient has at least one symptom as selected.     How many days since symptoms started? 5 days or less. Established patient, 12 years or older weighing at least 88.2 lbs, who has symptoms that started in the past 5 days, has not been hospitalized nor received treatment already, and is at risk for being very sick from COVID-19.      Treatment eligibility by RN:  Are you currently pregnant or nursing? No  Do you have a clinically significant hypersensitivity to nirmatrelvir or ritonavir, or toxic epidermal necrolysis (TEN) or Davidson-Craig Syndrome? No  Do you have a history of hepatitis, any hepatic impairment on the Problem List (such as Child-Riggs Class C, cirrhosis, fatty liver disease, alcoholic liver disease), or was the last liver lab (hepatic panel, ALT, AST, ALK Phos, bilirubin) elevated in the past 6 months? No  Do you have any history of severe renal impairment (eGFR < 30mL/min)? No    Is patient eligible to continue? Yes, patient meets all eligibility requirements for the RN COVID treatment (as denoted by all no responses above).     Current Outpatient Medications   Medication Sig Dispense Refill    amLODIPine (NORVASC) 2.5 MG tablet Take 1 tablet (2.5 mg) by mouth 2 times daily 180 tablet 3    aspirin 81 MG tablet Take 1 tablet by mouth daily.      carvedilol (COREG) 12.5 MG tablet Take 1 tablet (12.5 mg) by mouth 2 times daily 180 tablet 3    diclofenac (FLECTOR) 1.3 % patch Externally apply 1 patch topically 2 times daily (Patient taking differently: Externally apply 1 patch topically 2 times daily as needed) 60 patch 5    fenofibrate (TRICOR) 145 MG tablet Take 1 tablet (145 mg) by mouth daily 90 tablet 3    fluticasone (FLONASE) 50 MCG/ACT nasal spray SPRAY 1 SPRAY INTO EACH NOSTRIL DAILY 48 mL 1    LORazepam (ATIVAN) 1 MG tablet Take 1 tablet (1 mg) by mouth every 6 hours as needed for anxiety 10 tablet 0    meclizine (ANTIVERT) 25 MG tablet Take 1 tablet (25 mg) by mouth 3 times daily as needed for dizziness 30 tablet 0    metroNIDAZOLE (METROCREAM) 0.75 % external cream Apply topically 2 times daily To nose for rosacea ongoing 45 g 11    Multiple Vitamins-Minerals (MULTIVITAMIN ADULT) CHEW Take 1 chew tab by mouth daily      olmesartan (BENICAR) 40 MG tablet Take 1 tablet (40 mg) by mouth daily 90 tablet 3    omega-3  acid ethyl esters (LOVAZA) 1 g capsule TAKE 2 CAP BY MOUTH DAILY 180 capsule 1    order for DME Equipment being ordered: blood pressure cuff 1 each 0    rosuvastatin (CRESTOR) 20 MG tablet Take 1 tablet (20 mg) by mouth At Bedtime 90 tablet 3       Medications from List 1 of the standing order (on medications that exclude the use of Paxlovid) that patient is taking: NONE. Is patient taking Twila's Wort? No  Is patient taking Twila's Wort or any meds from List 1? No.   Medications from List 2 of the standing order (on meds that provider needs to adjust) that patient is taking: amlodipine (Norvasc), explained a provider visit is necessary to discuss medication adjustments while taking Paxlovid. Is patient on any of the meds from List 2? Yes. Patient will be scheduled or transferred to a  at the end of this call.   Lam Felipe RN

## 2023-09-20 NOTE — TELEPHONE ENCOUNTER
Patient calling.  States wife is + and he feels he now has COVID.  Discussed home test and calling if + for message to be routed to nurses that do the COVID treatment protocols.  Discussed going on [facility] for order but does not feel he can make it to [place] for testing.  Feels he will go with home testing option.  Yenni Colon RN

## 2023-10-10 ENCOUNTER — THERAPY VISIT (OUTPATIENT)
Dept: PHYSICAL THERAPY | Facility: CLINIC | Age: 68
End: 2023-10-10
Payer: MEDICARE

## 2023-10-10 DIAGNOSIS — R42 DIZZINESS: ICD-10-CM

## 2023-10-10 DIAGNOSIS — H81.11 BENIGN PAROXYSMAL POSITIONAL VERTIGO OF RIGHT EAR: Primary | ICD-10-CM

## 2023-10-10 PROCEDURE — 97112 NEUROMUSCULAR REEDUCATION: CPT | Mod: GP

## 2023-10-10 PROCEDURE — 95992 CANALITH REPOSITIONING PROC: CPT | Mod: GP

## 2023-11-01 ENCOUNTER — THERAPY VISIT (OUTPATIENT)
Dept: PHYSICAL THERAPY | Facility: CLINIC | Age: 68
End: 2023-11-01
Payer: MEDICARE

## 2023-11-01 DIAGNOSIS — H81.11 BENIGN PAROXYSMAL POSITIONAL VERTIGO OF RIGHT EAR: Primary | ICD-10-CM

## 2023-11-01 DIAGNOSIS — R42 DIZZINESS: ICD-10-CM

## 2023-11-01 PROCEDURE — 97112 NEUROMUSCULAR REEDUCATION: CPT | Mod: GP

## 2023-11-01 PROCEDURE — 95992 CANALITH REPOSITIONING PROC: CPT | Mod: GP

## 2023-11-06 DIAGNOSIS — E78.5 HYPERLIPIDEMIA LDL GOAL <130: ICD-10-CM

## 2023-11-06 DIAGNOSIS — I10 ESSENTIAL HYPERTENSION, BENIGN: ICD-10-CM

## 2023-11-06 RX ORDER — ROSUVASTATIN CALCIUM 20 MG/1
20 TABLET, COATED ORAL AT BEDTIME
Qty: 90 TABLET | Refills: 0 | Status: SHIPPED | OUTPATIENT
Start: 2023-11-06 | End: 2024-02-06

## 2023-11-06 RX ORDER — AMLODIPINE BESYLATE 2.5 MG/1
2.5 TABLET ORAL 2 TIMES DAILY
Qty: 180 TABLET | Refills: 0 | Status: SHIPPED | OUTPATIENT
Start: 2023-11-06 | End: 2024-02-06

## 2023-11-08 ENCOUNTER — THERAPY VISIT (OUTPATIENT)
Dept: PHYSICAL THERAPY | Facility: CLINIC | Age: 68
End: 2023-11-08
Payer: MEDICARE

## 2023-11-08 DIAGNOSIS — R42 DIZZINESS: ICD-10-CM

## 2023-11-08 DIAGNOSIS — H81.11 BENIGN PAROXYSMAL POSITIONAL VERTIGO OF RIGHT EAR: Primary | ICD-10-CM

## 2023-11-08 PROCEDURE — 95992 CANALITH REPOSITIONING PROC: CPT | Mod: GP

## 2023-11-08 PROCEDURE — 97112 NEUROMUSCULAR REEDUCATION: CPT | Mod: GP

## 2023-11-22 ENCOUNTER — THERAPY VISIT (OUTPATIENT)
Dept: PHYSICAL THERAPY | Facility: CLINIC | Age: 68
End: 2023-11-22
Payer: MEDICARE

## 2023-11-22 DIAGNOSIS — R42 DIZZINESS: ICD-10-CM

## 2023-11-22 DIAGNOSIS — H81.11 BENIGN PAROXYSMAL POSITIONAL VERTIGO OF RIGHT EAR: Primary | ICD-10-CM

## 2023-11-22 PROCEDURE — 97112 NEUROMUSCULAR REEDUCATION: CPT | Mod: GP

## 2023-11-22 PROCEDURE — 95992 CANALITH REPOSITIONING PROC: CPT | Mod: GP

## 2023-11-29 ENCOUNTER — THERAPY VISIT (OUTPATIENT)
Dept: PHYSICAL THERAPY | Facility: CLINIC | Age: 68
End: 2023-11-29
Payer: MEDICARE

## 2023-11-29 DIAGNOSIS — R42 DIZZINESS: ICD-10-CM

## 2023-11-29 DIAGNOSIS — H81.11 BENIGN PAROXYSMAL POSITIONAL VERTIGO OF RIGHT EAR: Primary | ICD-10-CM

## 2023-11-29 PROCEDURE — 97112 NEUROMUSCULAR REEDUCATION: CPT | Mod: GP

## 2023-11-29 NOTE — PROGRESS NOTES
11/29/23 0500   Appointment Info   Signing clinician's name / credentials Jerri Kirkpatrick, PT, DPT, NCS   Visits Used 6   PT Tx Diagnosis dizziness with head movement/change of body position; BPPV; L UVH   Quick Adds Certification   Progress Note/Certification   Start of Care Date 09/18/23   Onset of illness/injury or Date of Surgery 09/12/23  (Order date.)   Therapy Frequency 1x/week   Predicted Duration 60 days   Certification date from 09/18/23   Certification date to 11/16/23   Progress Note Due Date   (at 10th)   Progress Note Completed Date 09/18/23   GOALS   PT Goals 2;3   PT Goal 1   Goal Identifier HEP   Goal Description Pt will demonstrate IND with BPPV repositioning techniques to promote reduction of vestibular symptoms and improve safety with functional mobility, if symptoms return.   Goal Progress 11/29/23 Pt independent with HEP, instructed to discontinue maneuvers unless symptoms return.   Target Date 11/16/23   PT Goal 2   Goal Identifier DHI   Goal Description Pt will report reduction of subjective dizziness to a 0-2/10, with all daily activities and/or completion of HEP.   Goal Progress 11/29/23 Pt reported he has not experienced dizziness in the last week.   Target Date 11/16/23   PT Goal 3   Goal Identifier Positional Testing   Goal Description Pt will complete BPPV positional testing without observed nystagmus and/or increase of dizziness.   Goal Progress 11/29/23 Pt completed BPPV testing without dizziness and reduced nystagmus.   Target Date 11/16/23   Subjective Report   Subjective Report Has not been doing HEP because he had no dizziness. He reports there was no dizziness in the last week, only lightheadedness ocassionally.   Treatment Interventions (PT)   Interventions Neuromuscular Re-education;Infrared Goggle Exam or Frenzel Lense Exam   Neuromuscular Re-education   Skilled Intervention Completed positional testing for BPPV. See pt's goggle assessment below. Provided pt edu related to  the likelihood of BPPV occurring again. Pt edu for stopping HEP for now. If his symptoms return in the future, he can try the Gufoni maneuver again and schedule an appointment. Pt in support.   Infrared Goggle Exam or Frenzel Lense Exam   Vestibular Suppressant in Last 24 Hours? No   Exam completed with Infrared Goggles   Spontaneous Nystagmus Negative   [place]-Hallpike (Right) Upbeating R torsional  (Simultaneous filing. User may not have seen previous data.)   [place]-Hallpike (Right) Comments 1-2 beats only of torsion with onset latency of 2 seconds. No dizziness.   [place]-Hallpike (Left) Negative   [facility] Supine Roll Test (Right) Negative  (Simultaneous filing. User may not have seen previous data.)   [facility] Supine Roll Test (Right) Comments   (Simultaneous filing. User may not have seen previous data.)   [facility] Supine Roll Test (Left) Negative   Other Infrared Goggle Exam or Frenzel Lense Exam Comments Suspecting pt's BPPV has cleared. Remaining nystagmus likely present at baseline.   Education   Learner/Method Patient;Listening;Pictures/Video;Demonstration   Plan   Home program HEP   Plan for next session Pt discharged.         DISCHARGE  Reason for Discharge: Patient has met all goals.    Equipment Issued: N/A    Discharge Plan: Patient to continue home program if dizziness returns.    Referring Provider:  SILVIANO Chao CNP      COSIGN: Jerri Kirkpatrick, PT, DPT, NCS    Direct supervision provided by the co-signing licensed physical therapist. Therapy services provided with the co-signing licensed therapist guiding and directing the services, and providing the skilled judgement and assessment throughout the session.

## 2023-12-08 ENCOUNTER — PATIENT OUTREACH (OUTPATIENT)
Dept: GASTROENTEROLOGY | Facility: CLINIC | Age: 68
End: 2023-12-08
Payer: MEDICARE

## 2023-12-19 DIAGNOSIS — E78.1 HIGH BLOOD TRIGLYCERIDES: ICD-10-CM

## 2023-12-20 RX ORDER — OMEGA-3-ACID ETHYL ESTERS 1 G/1
CAPSULE, LIQUID FILLED ORAL
Qty: 180 CAPSULE | Refills: 1 | Status: SHIPPED | OUTPATIENT
Start: 2023-12-20 | End: 2024-04-16

## 2023-12-21 ENCOUNTER — TELEPHONE (OUTPATIENT)
Dept: FAMILY MEDICINE | Facility: CLINIC | Age: 68
End: 2023-12-21

## 2023-12-21 NOTE — TELEPHONE ENCOUNTER
Prior Authorization Retail Medication Request    Medication/Dose: omega-3 acid ethyl esters (LOVAZA) 1 g capsule    Insurance   Primary: MEDICARE  Insurance ID: [id number]    Secondary: MEDICA [employer]  Insurance ID: [id number]

## 2023-12-27 DIAGNOSIS — I10 ESSENTIAL HYPERTENSION, BENIGN: ICD-10-CM

## 2023-12-27 RX ORDER — OLMESARTAN MEDOXOMIL 40 MG/1
40 TABLET ORAL DAILY
Qty: 90 TABLET | Refills: 0 | Status: SHIPPED | OUTPATIENT
Start: 2023-12-27 | End: 2024-03-28

## 2023-12-27 NOTE — TELEPHONE ENCOUNTER
Prior Authorization Approval    Authorization Effective Date: 12/21/2023  Authorization Expiration Date:  Until further notice.   Medication: omega-3 acid ethyl esters (LOVAZA) 1 g capsule  Approved Dose/Quantity:    Reference #:     Insurance Company: [employer] - Phone [phone] Fax [phone]  Expected CoPay:       CoPay Card Available:      Foundation Assistance Needed:    Which Pharmacy is filling the prescription (Not needed for infusion/clinic administered): [id number] - [place], [place] - [address]  [facility]  Pharmacy Notified:  Yes  Patient Notified:  **Instructed pharmacy to notify patient when script is ready to /ship.**

## 2023-12-27 NOTE — TELEPHONE ENCOUNTER
Central Prior Authorization Team   Phone: [phone]    PA Initiation    Medication: omega-3 acid ethyl esters (LOVAZA) 1 g capsule  Insurance Company: [employer] - Phone [phone] Fax [phone]  Pharmacy Filling the Rx: [id number] [place], [place] - [address]  Filling Pharmacy Phone: [phone]  Filling Pharmacy Fax:    Start Date: 12/27/2023

## 2024-01-01 ASSESSMENT — ANXIETY QUESTIONNAIRES
GAD7 TOTAL SCORE: 4
1. FEELING NERVOUS, ANXIOUS, OR ON EDGE: NOT AT ALL
2. NOT BEING ABLE TO STOP OR CONTROL WORRYING: NOT AT ALL
8. IF YOU CHECKED OFF ANY PROBLEMS, HOW DIFFICULT HAVE THESE MADE IT FOR YOU TO DO YOUR WORK, TAKE CARE OF THINGS AT HOME, OR GET ALONG WITH OTHER PEOPLE?: NOT DIFFICULT AT ALL
5. BEING SO RESTLESS THAT IT IS HARD TO SIT STILL: MORE THAN HALF THE DAYS
4. TROUBLE RELAXING: MORE THAN HALF THE DAYS
3. WORRYING TOO MUCH ABOUT DIFFERENT THINGS: NOT AT ALL
6. BECOMING EASILY ANNOYED OR IRRITABLE: NOT AT ALL
IF YOU CHECKED OFF ANY PROBLEMS ON THIS QUESTIONNAIRE, HOW DIFFICULT HAVE THESE PROBLEMS MADE IT FOR YOU TO DO YOUR WORK, TAKE CARE OF THINGS AT HOME, OR GET ALONG WITH OTHER PEOPLE: NOT DIFFICULT AT ALL
7. FEELING AFRAID AS IF SOMETHING AWFUL MIGHT HAPPEN: NOT AT ALL
GAD7 TOTAL SCORE: 4
7. FEELING AFRAID AS IF SOMETHING AWFUL MIGHT HAPPEN: NOT AT ALL

## 2024-01-01 ASSESSMENT — PAIN SCALES - PAIN ENJOYMENT GENERAL ACTIVITY SCALE (PEG)
AVG_PAIN_PASTWEEK: 4
INTERFERED_GENERAL_ACTIVITY: 5
INTERFERED_GENERAL_ACTIVITY: 5
INTERFERED_ENJOYMENT_LIFE: 5
PEG_TOTALSCORE: 4.67
INTERFERED_ENJOYMENT_LIFE: 5
PEG_TOTALSCORE: 4.67
AVG_PAIN_PASTWEEK: 4

## 2024-01-03 ENCOUNTER — OFFICE VISIT (OUTPATIENT)
Dept: PALLIATIVE MEDICINE | Facility: CLINIC | Age: 69
End: 2024-01-03
Attending: FAMILY MEDICINE
Payer: MEDICARE

## 2024-01-03 VITALS — HEART RATE: 59 BPM | OXYGEN SATURATION: 96 % | SYSTOLIC BLOOD PRESSURE: 152 MMHG | DIASTOLIC BLOOD PRESSURE: 76 MMHG

## 2024-01-03 DIAGNOSIS — G89.29 CHRONIC MIDLINE THORACIC BACK PAIN: ICD-10-CM

## 2024-01-03 DIAGNOSIS — M54.50 CHRONIC RIGHT-SIDED LOW BACK PAIN WITHOUT SCIATICA: ICD-10-CM

## 2024-01-03 DIAGNOSIS — M54.50 CHRONIC MIDLINE LOW BACK PAIN WITHOUT SCIATICA: Primary | ICD-10-CM

## 2024-01-03 DIAGNOSIS — G89.29 CHRONIC MIDLINE LOW BACK PAIN WITHOUT SCIATICA: Primary | ICD-10-CM

## 2024-01-03 DIAGNOSIS — M54.6 CHRONIC MIDLINE THORACIC BACK PAIN: ICD-10-CM

## 2024-01-03 DIAGNOSIS — M48.10 DIFFUSE IDIOPATHIC SKELETAL HYPEROSTOSIS: ICD-10-CM

## 2024-01-03 DIAGNOSIS — G89.29 CHRONIC RIGHT-SIDED LOW BACK PAIN WITHOUT SCIATICA: ICD-10-CM

## 2024-01-03 PROCEDURE — 99417 PROLNG OP E/M EACH 15 MIN: CPT | Performed by: NURSE PRACTITIONER

## 2024-01-03 PROCEDURE — 99205 OFFICE O/P NEW HI 60 MIN: CPT | Performed by: NURSE PRACTITIONER

## 2024-01-03 RX ORDER — METHYLPREDNISOLONE 4 MG
TABLET, DOSE PACK ORAL
Qty: 21 TABLET | Refills: 0 | Status: SHIPPED | OUTPATIENT
Start: 2024-01-03 | End: 2024-04-30

## 2024-01-03 ASSESSMENT — PAIN SCALES - GENERAL: PAINLEVEL: MODERATE PAIN (4)

## 2024-01-03 NOTE — PATIENT INSTRUCTIONS
Continue to do your daily walking and activities to help prevent flares of back pain. Consider silver sneakers program or warm water swimming as good winter activities.  During flares of back pain:  Use flector during flares of pain and as needed.  Add oral anti-inflammatory (excedrin, aspirin, ibuprofen, naproxen) and take regularly with food.  Add muscle relaxant (cyclobenzaprine) 5-10 mg up to three times per day.  Use heating pad and/or TENS unit.  Consider steroid burst and taper if above is not working.  Could consider a trigger point injection.  3. If flares become more persistent, can reassess for additional options.    WHAT ARE TRIGGER POINTS?     Trigger points are localized muscle spasms that can occur when muscles have become tight (contracted) for a long period.     WHAT IS MYOFASCIAL PAIN?     Myofascial pain is caused by muscle itself and/or the fascia that attaches to the muscle. This can sometimes feel like muscle tension or more severe muscle pain. Trigger points are a type of myofascial pain that persist for long periods of time.     WHAT IS A TRIGGER POINT INJECTION?     A trigger point injection is an injection of anesthetic(numbing medication) and sometimes steroid (anti-inflammatory medication) that is placed into the area of pain with a fine needle. The medication is injected to see if this can temporarily lessen your pain and allow you to move better.  After the injection, you may feel immediate pain relief and numbness for up ot 6 hours. Your pain may return or possibly be a little worse for a day or two. This is due to needle irritation or the fluid injected. Steroids can take several days to a week to provide relief.     HOW LONG DO THEY LAST?     This can depend on the size or area of pain and on how much inflammation is present. Sometimes an injection can provide several months of relief and may need to be repeated. If there are no underlying disc or joint problems, an injection may  bring long term relief. If your pain is caused by injury to more than one area, only some of your symptoms may be helped by the injection.     _____________________________________________________  Scheduling/Clinic telephone number for ALL locations:  [phone]    After Hours On-Call Service for Emergencies:  [phone]  Call with any questions about your care and for scheduling assistance.   Calls are returned Monday through Friday between 8 AM and 4:00 PM. We usually get back to you within 2-3 business days depending on the issue/request. I am not in the clinic on Fridays.   If we are prescribing your medications:  For medication refills, call the clinic or send a [employer] message 7 days in advance.  Please include the name of the requested medication and your preferred pharmacy. Please allow 3-4 days to be processed.   Per [place] State Law, all controlled substance prescriptions must be filled within 30 days of being written. For those controlled substances allowing refills, pickup must occur within 30 days of last fill.    We believe regular attendance is key to your success in our program!    If you are unable to keep your appointment we ask that you call us at least 24 hours in advance to cancel.This will allow us to offer the appointment time to another patient. Multiple missed appointments may lead to dismissal from the clinic.

## 2024-01-03 NOTE — PROGRESS NOTES
"[facility] Pain Management     Date of visit: Arpit 3, 2024      Consultation: Emery Zayas is a 68 year old male who has a past medical history of Acute idiopathic gout, unspecified site (08/23/2016), Alcoholism (H), Anxiety (12/31/2014), Arthritis, Chronic low back pain, Dyspepsia and other specified disorders of function of stomach (07/31/2003), Elevated liver enzymes (12/30/2013), Gout, Herniated intervertebral disk, High blood triglycerides (12/30/2013), Hyperlipidemia LDL goal <130 (04/15/2013), Hypertension, Hypertensive emergency (05/05/2017), Metabolic syndrome (12/30/2013), Obesity (04/15/2013), Other chest pain (07/31/2003), and RBBB (right bundle branch block). Emery is being seen today at the request of Trish Edwards for evaluation of his pain issues and recommendations for management.     Referral information:   Referred By    Trish Edwards MD   [id number] [facility] [address]   Phone: [phone]   Fax: [phone]    Diagnoses: Chronic midline low back pain without sciatica   Chronic midline thoracic back pain   Chronic right-sided low back pain without sciatica   Order: Pain Management  Referral      Referring provider comments: none.     Relevant records/History:  I have reviewed available medical information in the patient's electronic medical record including relevant provider notes, laboratory work, and imaging.   Patient requested visit via [facility] for chronic back pain. Saw Dr. Vargas in early 2022 for diffuse idiopathic skeletal hyperostosis, thoracic degenerative disc disease, lumbosacral facet arthropathy, lumbosacral degenerative disc disease.  ____________________________________________________________    Emery \"Bill\" reports:  - His back pain started in early 80's with a work related injury. Care was delayed, and subsequently had 4 back surgeries. Later, he slipped and fell and back pain was further aggravated. For the most part, he " "feels that his pain is tolerable and that he can cope with his persistent back pain.  When his back pain flares, he can barely walk, bend over, etc. Episodes are becoming more frequent. These severe flares typically occur 3-4 times per year and without significant precipitating activity or event. When he is in a flare, his pain is \"more than a 10.\" With these episodes he cannot find a position of comfort. His last flare was 1 month ago and lasted about 2 weeks. Flector patches have been the most effective treatment.   -Pain is across is lower back, right side is worse than the left. Pain can radiate up to his mid-back. Pt states he has had back pain for >40 years but it just recently getting to the point it is affecting daily activities more. Still bowls with his grandchildren, but has to modify his stance and decrease the amount of games he plays. Avoids activities like going up on the roof to make repairs. Has 7 grandchildren who range in age from 12 to 24 and he enjoys spending time with them. Walks nightly with a neighbor for exercise. Has home exercise program from last PT course, but does not really do the exercises anymore as he was feeling better.   - Goals/Objectives: \"I guess try to figure some kind of scenario where I can learn to live with this a little better. I take enough medications now I do not want to take anymore\"  Oziel  has not been seen at a pain clinic in the past. Really feels like he is doing the best he can, but his wife encouraged him to make an appointment.     Current pain medications:  Flector patch prn  Excedrin prn    Review of [place] Prescription Monitoring Program ():   No concern for abuse or misuse of controlled medications based on this report. Last viewed on 1/3/2024. No chronic controlled medications are being prescribed.    SUBJECTIVE PAIN HISTORY  Subjective    Pain description:  Location:     Quality: sharp, dull, aching, throbbing   Duration: Constant "   Severity/Intensity (0 = No pain to 10 = Worst pain imaginable)   Now: 4, Average: 8, Best: 2, Worst: 4  Aggravating factors include: standing, sitting, coughing / sneezing  Relieving factors include: lying down, walking, medication    PAIN MANAGEMENT TREATMENT HISTORY  1. MEDICATIONS:  Opioids: Codeine, Oxycodone  NSAIDs: Diclofenac, Ibuprofen, SE: GI upset  Muscle relaxants: cyclobenzaprine in the past, does not recall effect  Migraine Medications: Aspirin-butalbital-caffeine (Fiorinal)  Pain Antidepressants/ Anxiolytics: Has not tried  Neuroleptics: not tried  Topical: Diclofenac patches, work for anywhere between 4-8 hours  Adjuvant medications: Acetaminophen  2. PHYSICAL THERAPY:   2023 for shoulder rehab  2022 for back pain  3. PAIN PSYCHOLOGY:   Has not tried  4. SURGERY:   Left shoulder surgery 2/16/23 at [facility]  4 Lower back surgeries in the early 1980's, laminectomies and a lumbar fusion   5. INJECTIONS:   Pt states he had numerous injections years ago and none of them provided benefit.  ?RFA procedure - states he had a nerve ablated to treat left leg radicular symptoms.   6. COMPLEMENTARY THERAPY:  Chiropractic: Tried, not helpful  Acupuncture/acupressure: Has not tried  TENS unit: Has not tried  heat/heat packs: Pain relief with heating pad at night  home exercise program from physical therapy -helpful, but no longer doing.      Past Medical History   He has a past medical history of Acute idiopathic gout, unspecified site (08/23/2016), Alcoholism (H), Anxiety (12/31/2014), Arthritis, Chronic low back pain, Dyspepsia and other specified disorders of function of stomach (07/31/2003), Elevated liver enzymes (12/30/2013), Gout, Herniated intervertebral disk, High blood triglycerides (12/30/2013), Hyperlipidemia LDL goal <130 (04/15/2013), Hypertension, Hypertensive emergency (05/05/2017), Metabolic syndrome (12/30/2013), Obesity (04/15/2013), Other chest pain (07/31/2003), and RBBB (right bundle branch  block).   Past Surgical History   He has a past surgical history that includes Back surgery x 4; Esophagoscopy, gastroscopy, duodenoscopy (EGD), combined (01/01/2000); tonsillectomy; back surgery; Colonoscopy (N/A, 01/23/2020); and arthroscopy, shoulder, surgi (Left, 02/16/2023).   Social History   He reports that he quit smoking about 30 years ago. His smoking use included cigarettes. He has never been exposed to tobacco smoke. He has never used smokeless tobacco. He reports current alcohol use of about 7.0 standard drinks of alcohol per week. He reports that he does not use drugs.    Social History     Social History Narrative    On disability for back pain.  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   to John.  They have grandchildren stay with them frequently.      Medications and Allergies reviewed.    Objective   BP (!) 152/76 (BP Location: Right arm, Cuff Size: Adult Regular)   Pulse 59   SpO2 96%   Constitutional: Well developed, well nourished, appears stated age. No acute distress.  Gait is normal and steady  HEENT: Head atraumatic, normocephalic. Eyes without conjunctival injection or jaundice. Neck supple.   Skin: No obvious rash, lesions, or petechiae of exposed skin.   Extremities: Peripheral pulses intact. No clubbing, cyanosis, or edema. Moves all extremities.  Psychiatric/mental status: Alert, without lethargy or stupor. Speech fluent. Appropriate affect. Mood normal. Able to follow commands without difficulty.   Musculoskeletal exam: Normal bulk and tone. Unremarkable spinal curvature. Lumbar spine ROM is mildly restricted.     Imaging:  MRI THORACIC SPINE 03/16/2022  IMPRESSION:  Chronic-appearing anterior wedging of the T11, T12, and L1 vertebral bodies.  Small right central disc protrusion at T7-T8, shallow left central protrusion at T8-T9, and small central disc protrusion at T9-T10. No significant spinal canal stenosis is identified. No  significant/high-grade neural foraminal narrowing seen in the thoracic spine.    XR THORACIC SPINE 02/08/2022  IMPRESSION: Alignment is normal. Vertebral body heights normal. No  fractures. Flowing right anterolateral syndesmophytes are noted  consistent with the patient's history of DISH.     XR, LUMBAR SPINE COMPLETE 02/08/2022  IMPRESSION: Five lumbar type vertebrae. Normal alignment. Vertebral body heights are normal. No fractures. Flowing anterior syndesmophytes from L2 down to L4. Facet arthropathy at L4-5 and L5-S1. Loss of disc space height and degenerative endplate spurring at L5-S1.      MRI, LUMBAR SPINE 02/116/2022  IMPRESSION:  1.  Multilevel lumbar spondylosis with mild progression compared to the previous exam from 2009. This is superimposed upon diffuse developmental narrowing of the spinal canal.  2.  At L3-L4, severe trefoil type spinal canal stenosis and mild bilateral neural foraminal stenosis. Impingement upon traversing cauda equina nerve roots.  3.  At L5-S1, solid osseous fusion which has progressed since the previous exam. The left neural foramen is surgically decompressed with mild residual left neural foraminal stenosi    Labs:  Creatinine   Date Value Ref Range Status   02/08/2023 1.10 0.67 - 1.17 mg/dL Final     AST   Date Value Ref Range Status   02/08/2023 28 10 - 50 U/L Final     ALT   Date Value Ref Range Status   04/20/2023 21 10 - 50 U/L Final         Assessment & Plan   Chronic midline low back pain without sciatica  Chronic midline thoracic back pain  Chronic right-sided low back pain without sciatica  Diffuse idiopathic skeletal hyperostosis     Discussed that his current practice of managing his chronic lower back pain sounds to be effective. He is utilizing medications (NSAID) as needed, exercising daily and applying multimodal techniques to manage symptoms. Developed potential plan to implement if he is having flares of pain. He is welcome to follow-up at any time if pain is  worsening, if he decides to have TPI or if he finds the flare plan to be ineffective.     May consider interventional procedures, pain psychology, pain PT in the future.    Plan: (provided on AVS)  Continue to do your daily walking and activities to help prevent flares of back pain. Consider silver [employer]akers program or warm water swimming as good winter activities.  During flares of back pain:  Use flector during flares of pain and as needed.  Add oral anti-inflammatory (excedrin, aspirin, ibuprofen, naproxen) and take regularly with food.  Add muscle relaxant (cyclobenzaprine) 5-10 mg up to three times per day.  Use heating pad and/or TENS unit.  Consider steroid burst and taper if above is not working.  Could consider a trigger point injection.  3. If flares become more persistent, can reassess for additional options.    Vale Christensen, CNP-BC, PMGT-BC, AP-PMN  [facility]      BILLING TIME DOCUMENTATION: The total TIME spent on this patient on the date of the encounter/appointment was 77 minutes.   TOTAL TIME INCLUDED  Time spent preparing to see the patient by reviewing available medical information in the patient's medical record, including relevant provider notes, laboratory work, and imaging 13 minutes  Time spent face to face (or over the phone) with the patient 55 minutes   Time spent documenting clinical information in Epic 9 minutes

## 2024-01-12 ENCOUNTER — HOSPITAL ENCOUNTER (OUTPATIENT)
Facility: CLINIC | Age: 69
Discharge: HOME | End: 2024-01-12
Attending: INTERNAL MEDICINE | Admitting: INTERNAL MEDICINE
Payer: MEDICARE

## 2024-01-12 VITALS
HEIGHT: 66 IN | WEIGHT: 195 LBS | OXYGEN SATURATION: 97 % | HEART RATE: 55 BPM | BODY MASS INDEX: 31.34 KG/M2 | DIASTOLIC BLOOD PRESSURE: 83 MMHG | SYSTOLIC BLOOD PRESSURE: 133 MMHG | RESPIRATION RATE: 16 BRPM | TEMPERATURE: 96.6 F

## 2024-01-12 LAB — COLONOSCOPY: NORMAL

## 2024-01-12 PROCEDURE — 45381 COLONOSCOPY SUBMUCOUS NJX: CPT | Performed by: INTERNAL MEDICINE

## 2024-01-12 PROCEDURE — 45385 COLONOSCOPY W/LESION REMOVAL: CPT | Mod: PT | Performed by: INTERNAL MEDICINE

## 2024-01-12 PROCEDURE — 88305 TISSUE EXAM BY PATHOLOGIST: CPT | Mod: TC | Performed by: INTERNAL MEDICINE

## 2024-01-12 PROCEDURE — G0500 MOD SEDAT ENDO SERVICE >5YRS: HCPCS | Performed by: INTERNAL MEDICINE

## 2024-01-12 PROCEDURE — 250N000011 HC RX IP 250 OP 636: Performed by: INTERNAL MEDICINE

## 2024-01-12 PROCEDURE — 999N000099 HC STATISTIC MODERATE SEDATION < 10 MIN: Performed by: INTERNAL MEDICINE

## 2024-01-12 PROCEDURE — 88305 TISSUE EXAM BY PATHOLOGIST: CPT | Mod: 26 | Performed by: PATHOLOGY

## 2024-01-12 RX ORDER — ONDANSETRON 2 MG/ML
4 INJECTION INTRAMUSCULAR; INTRAVENOUS EVERY 6 HOURS PRN
Status: DISCONTINUED | OUTPATIENT
Start: 2024-01-12 | End: 2024-01-12 | Stop reason: HOSPADM

## 2024-01-12 RX ORDER — FLUMAZENIL 0.1 MG/ML
0.2 INJECTION, SOLUTION INTRAVENOUS
Status: DISCONTINUED | OUTPATIENT
Start: 2024-01-12 | End: 2024-01-12 | Stop reason: HOSPADM

## 2024-01-12 RX ORDER — NALOXONE HYDROCHLORIDE 0.4 MG/ML
0.2 INJECTION, SOLUTION INTRAMUSCULAR; INTRAVENOUS; SUBCUTANEOUS
Status: DISCONTINUED | OUTPATIENT
Start: 2024-01-12 | End: 2024-01-12 | Stop reason: HOSPADM

## 2024-01-12 RX ORDER — FENTANYL CITRATE 50 UG/ML
50-100 INJECTION, SOLUTION INTRAMUSCULAR; INTRAVENOUS EVERY 5 MIN PRN
Status: DISCONTINUED | OUTPATIENT
Start: 2024-01-12 | End: 2024-01-12 | Stop reason: HOSPADM

## 2024-01-12 RX ORDER — SIMETHICONE 40MG/0.6ML
133 SUSPENSION, DROPS(FINAL DOSAGE FORM)(ML) ORAL
Status: DISCONTINUED | OUTPATIENT
Start: 2024-01-12 | End: 2024-01-12 | Stop reason: HOSPADM

## 2024-01-12 RX ORDER — ATROPINE SULFATE 0.1 MG/ML
1 INJECTION INTRAVENOUS
Status: DISCONTINUED | OUTPATIENT
Start: 2024-01-12 | End: 2024-01-12 | Stop reason: HOSPADM

## 2024-01-12 RX ORDER — LIDOCAINE 40 MG/G
CREAM TOPICAL
Status: DISCONTINUED | OUTPATIENT
Start: 2024-01-12 | End: 2024-01-12 | Stop reason: HOSPADM

## 2024-01-12 RX ORDER — DIPHENHYDRAMINE HYDROCHLORIDE 50 MG/ML
25-50 INJECTION INTRAMUSCULAR; INTRAVENOUS
Status: DISCONTINUED | OUTPATIENT
Start: 2024-01-12 | End: 2024-01-12 | Stop reason: HOSPADM

## 2024-01-12 RX ORDER — PROCHLORPERAZINE MALEATE 5 MG
5 TABLET ORAL EVERY 6 HOURS PRN
Status: DISCONTINUED | OUTPATIENT
Start: 2024-01-12 | End: 2024-01-12 | Stop reason: HOSPADM

## 2024-01-12 RX ORDER — ONDANSETRON 2 MG/ML
4 INJECTION INTRAMUSCULAR; INTRAVENOUS
Status: DISCONTINUED | OUTPATIENT
Start: 2024-01-12 | End: 2024-01-12 | Stop reason: HOSPADM

## 2024-01-12 RX ORDER — ONDANSETRON 4 MG/1
4 TABLET, ORALLY DISINTEGRATING ORAL EVERY 6 HOURS PRN
Status: DISCONTINUED | OUTPATIENT
Start: 2024-01-12 | End: 2024-01-12 | Stop reason: HOSPADM

## 2024-01-12 RX ORDER — NALOXONE HYDROCHLORIDE 0.4 MG/ML
0.4 INJECTION, SOLUTION INTRAMUSCULAR; INTRAVENOUS; SUBCUTANEOUS
Status: DISCONTINUED | OUTPATIENT
Start: 2024-01-12 | End: 2024-01-12 | Stop reason: HOSPADM

## 2024-01-12 RX ORDER — EPINEPHRINE 1 MG/ML
0.1 INJECTION, SOLUTION INTRAMUSCULAR; SUBCUTANEOUS
Status: DISCONTINUED | OUTPATIENT
Start: 2024-01-12 | End: 2024-01-12 | Stop reason: HOSPADM

## 2024-01-12 RX ADMIN — FENTANYL CITRATE 50 MCG: 0.05 INJECTION, SOLUTION INTRAMUSCULAR; INTRAVENOUS at 09:15

## 2024-01-12 RX ADMIN — MIDAZOLAM 1 MG: 1 INJECTION INTRAMUSCULAR; INTRAVENOUS at 09:15

## 2024-01-12 ASSESSMENT — ACTIVITIES OF DAILY LIVING (ADL): ADLS_ACUITY_SCORE: 35

## 2024-01-12 NOTE — H&P
Pre-Endoscopy History and Physical     Emery Zayas MRN# [id number]   YOB: 1955 Age: 68 year old     Date of Procedure: 1/12/2024  Primary care provider: Trish Edwards  Type of Endoscopy: Colonoscopy with possible biopsy, possible polypectomy  Reason for Procedure: polyps  Type of Anesthesia Anticipated: Conscious Sedation    HPI:    Emery is a 68 year old male who will be undergoing the above procedure.      A history and physical has been performed. The patient's medications and allergies have been reviewed. The risks and benefits of the procedure and the sedation options and risks were discussed with the patient.  All questions were answered and informed consent was obtained.      He denies a personal or family history of anesthesia complications or bleeding disorders.     Patient Active Problem List   Diagnosis    Other and unspecified disc disorder of lumbar region    Dyspepsia and other specified disorders of function of stomach    Other chest pain    Chronic gout without tophus, unspecified cause, unspecified site    Hyperlipidemia LDL goal <130    Obesity    Metabolic syndrome    High blood triglycerides    Elevated liver enzymes    Anxiety    Essential hypertension, benign    Uncomplicated alcohol dependence (H)    Chronic bilateral low back pain without sciatica    Diffuse idiopathic skeletal hyperostosis    Facet arthropathy, lumbosacral    DDD (degenerative disc disease), lumbosacral    DDD (degenerative disc disease), thoracic    Lumbosacral spinal stenosis    Acute pain of left shoulder    Aftercare following surgery of the musculoskeletal system    Personal history of colonic polyps        Past Medical History:   Diagnosis Date    Acute idiopathic gout, unspecified site 08/23/2016    Alcoholism (H)     Treated 1980's, denies any recent problems with withdrawal when skips days drinking.    Anxiety 12/31/2014    Arthritis     Chronic low back pain     Dyspepsia and other  specified disorders of function of stomach 2003    Elevated liver enzymes 2013    Gout     possible    Herniated intervertebral disk     thoracic    High blood triglycerides 2013    Hyperlipidemia LDL goal <130 04/15/2013    Hypertension     Hypertensive emergency 2017    Metabolic syndrome 2013    Obesity 04/15/2013    Other chest pain 2003    RBBB (right bundle branch block)     Present for several years        Past Surgical History:   Procedure Laterality Date    ARTHROSCOPY, SHOULDER, SURGI Left 2023    [facility]    BACK SURGERY      Back surgery x 4      COLONOSCOPY N/A 2020    Procedure: COLONOSCOPY, WITH POLYPECTOMY AND BIOPSY; polypectomies  using jumbo bx forcep;  Surgeon: Sherice Mckeon MD;  Location:  GI    ESOPHAGOSCOPY, GASTROSCOPY, DUODENOSCOPY (EGD), COMBINED  2000    normal    TONSILLECTOMY         Social History     Tobacco Use    Smoking status: Former     Packs/day: 0     Types: Cigarettes     Quit date: 7/15/1993     Years since quittin.5     Passive exposure: Never    Smokeless tobacco: Never    Tobacco comments:     Quit 20 years ago.   Substance Use Topics    Alcohol use: Yes     Alcohol/week: 7.0 standard drinks of alcohol     Types: 7 Shots of liquor per week     Comment: more than 12-15 rum and cokes or beer a week       Family History   Problem Relation Age of Onset    Hypertension Father     Heart Disease Father         Angioplasty in late 60's or early 70's.    Unknown/Adopted Father     Hypertension Sister     Kidney Disease Sister     Unknown/Adopted Sister     Cancer Mother         lung    Unknown/Adopted Mother     Diabetes Mother     Thyroid Disease Sister     Unknown/Adopted Sister     Unknown/Adopted Maternal Grandmother     Substance Abuse Maternal Grandfather     Depression Paternal Grandmother     Unknown/Adopted Paternal Grandfather     Diabetes Sister     Colon Cancer No family hx of        Prior to Admission  medications    Medication Sig Start Date End Date Taking? Authorizing Provider   amLODIPine (NORVASC) 2.5 MG tablet Take 1 tablet (2.5 mg) by mouth 2 times daily 11/6/23  Yes Alcides Bundy MD   aspirin 81 MG tablet Take 1 tablet by mouth daily.   Yes Reported, Patient   diclofenac (FLECTOR) 1.3 % patch Externally apply 1 patch topically 2 times daily  Patient taking differently: Externally apply 1 patch topically 2 times daily as needed 8/5/21  Yes Trish Edwards MD   fenofibrate (TRICOR) 145 MG tablet Take 1 tablet (145 mg) by mouth daily 2/27/23  Yes Alcides Bundy MD   fluticasone (FLONASE) 50 MCG/ACT nasal spray SPRAY 1 SPRAY INTO EACH NOSTRIL DAILY 8/16/23  Yes Kathy Parra PA-C   LORazepam (ATIVAN) 1 MG tablet Take 1 tablet (1 mg) by mouth every 6 hours as needed for anxiety 6/13/23  Yes Kathy Parra PA-C   meclizine (ANTIVERT) 25 MG tablet Take 1 tablet (25 mg) by mouth 3 times daily as needed for dizziness 8/31/23  Yes Myriam Castano APRN CNP   methylPREDNISolone (MEDROL DOSEPAK) 4 MG tablet therapy pack Follow Package Directions 1/3/24  Yes Vale Christensen, NP   Multiple Vitamins-Minerals (MULTIVITAMIN ADULT) CHEW Take 1 chew tab by mouth daily   Yes Unknown, Entered By History   olmesartan (BENICAR) 40 MG tablet Take 1 tablet (40 mg) by mouth daily 12/27/23  Yes Alcides Bundy MD   omega-3 acid ethyl esters (LOVAZA) 1 g capsule TAKE 2 CAP BY MOUTH DAILY 12/20/23  Yes Trish Edwards MD   rosuvastatin (CRESTOR) 20 MG tablet Take 1 tablet (20 mg) by mouth at bedtime 11/6/23  Yes Alcides Bundy MD   carvedilol (COREG) 12.5 MG tablet Take 1 tablet (12.5 mg) by mouth 2 times daily 1/3/23 1/3/24  Alcides Bundy MD   metroNIDAZOLE (METROCREAM) 0.75 % external cream Apply topically 2 times daily To nose for rosacea ongoing  Patient not taking: Reported on 1/3/2024 7/12/23   Nicolasa Burris, SILVIANO CNP   order for DME Equipment being ordered: blood pressure cuff 1/2/20   Igor Vitale  "AIXA Mehta       Allergies   Allergen Reactions    No Known Drug Allergy         REVIEW OF SYSTEMS:   5 point ROS negative except as noted above in HPI, including Gen., Resp., CV, GI &  system review.    PHYSICAL EXAM:   Ht 1.676 m (5' 6\")   Wt 88.5 kg (195 lb)   BMI 31.47 kg/m   Estimated body mass index is 31.47 kg/m  as calculated from the following:    Height as of this encounter: 1.676 m (5' 6\").    Weight as of this encounter: 88.5 kg (195 lb).   GENERAL APPEARANCE: alert, and oriented  MENTAL STATUS: alert  AIRWAY EXAM: Mallampatti Class I (visualization of the soft palate, fauces, uvula, anterior and posterior pillars)  RESP: lungs clear to auscultation - no rales, rhonchi or wheezes  CV: regular rates and rhythm  DIAGNOSTICS:    Not indicated    IMPRESSION   ASA Class 2 - Mild systemic disease    PLAN:   Plan for Colonoscopy with possible biopsy, possible polypectomy. We discussed the risks, benefits and alternatives and the patient wished to proceed.    The above has been forwarded to the consulting provider.      Signed Electronically by: Fidel Perez MD  January 12, 2024          "

## 2024-01-15 LAB
PATH REPORT.COMMENTS IMP SPEC: NORMAL
PATH REPORT.COMMENTS IMP SPEC: NORMAL
PATH REPORT.FINAL DX SPEC: NORMAL
PATH REPORT.GROSS SPEC: NORMAL
PATH REPORT.MICROSCOPIC SPEC OTHER STN: NORMAL
PATH REPORT.RELEVANT HX SPEC: NORMAL
PHOTO IMAGE: NORMAL

## 2024-01-22 NOTE — PROGRESS NOTES
"Emery Zayas is a 65 year old male who is being evaluated via a billable telephone visit.      The patient has been notified of following:     \"This telephone visit will be conducted via a call between you and your physician/provider. We have found that certain health care needs can be provided without the need for a physical exam.  This service lets us provide the care you need with a short phone conversation.  If a prescription is necessary we can send it directly to your pharmacy.  If lab work is needed we can place an order for that and you can then stop by our lab to have the test done at a later time.    Telephone visits are billed at different rates depending on your insurance coverage. During this emergency period, for some insurers they may be billed the same as an in-person visit.  Please reach out to your insurance provider with any questions.    If during the course of the call the physician/provider feels a telephone visit is not appropriate, you will not be charged for this service.\"    Patient has given verbal consent for Telephone visit?  Yes    What phone number would you like to be contacted at? [phone]    How would you like to obtain your AVS? MyChart    Phone call duration: 25 minutes    JAVON WETZEL MD   [facility]   Outpatient Sleep Medicine Consultation  November 11, 2020    Name: Emery Zayas MRN# [id number]   Age: 65 year old YOB: 1955     Date of Consultation: November 11, 2020  Consultation is requested by: No referring provider defined for this encounter.  Primary care provider: Trish Edwards           Assessment and Plan:     Summary Sleep Diagnoses:       Mild obstructive sleep apnea with hypoxemia-uniquely in supine position-patient has chosen to initiate sleep positioning device    Severe psychophysiological insomnia with contributing sleep habits including television watching before bedtime and during the night and clock " PAP Supply order faxed to [facility] company     "watching    Hypertension    Obesity     Summary Recommendations:       Review materials from today's after visit summary including options for purchasing a lateral sleep positioning device and additional sleep apnea treatments as well as cognitive behavioral therapy for insomnia    Avoid alcohol within 3 hours of bedtime as this may cause marked worsening of sleep apnea    Have clock nonvisible during the night    If you are awake for more than 15 minutes move out of the bedroom and read in low light    Schedule a return visit if you would like to discuss alternative therapies for your sleep apnea or referral for our insomnia management program              History of Present Illness:      Emery Zayas is a 64 year old male has been gradually gaining weight to his current peak lifetime weight with a BMI of 34 and increasing difficulty initiating maintaining sleep now currently having insomnia on a nightly basis with difficulty initiating sleep and prolonged awakenings up to 3 hours.  He has had increase in severity of snoring with snort arousals that are particularly worse when he consumes alcohol in the afternoon and occurs in the setting of hypertension on multiple medications.     SLEEP-WAKE SCHEDULE:   Tendency for delayed sleep phase earlier in life with night shift work with bedtime typically 10 PM and awakening 9 AM currently with somewhat nonrestorative sleep but no inappropriate napping.  Due to insomnia has poor sleep habits using television through most of the night with intermittent wakefulness and sleep in an estimated wakeful time up to 3 hours.    SLEEP STUDY: October 20, 2020  Indications for Polysomnography: The patient is a 65 year old male who is 5' 6\" and weighs 201.0 lbs. His BMI is 32.7, [place] sleepiness scale 4 and neck circumference is 48 cm. Relevant medical history includes insomnia and hypertension. A diagnostic polysomnogram was performed to evaluate for clinical signs and " symptoms of obstructive sleep apnea.   Polysomnogram Data: A full night polysomnogram recorded the standard physiologic parameters including EEG, EOG, EMG, ECG, nasal and oral airflow. Respiratory parameters of chest and abdominal movements were recorded with respiratory inductance plethysmography. Oxygen saturation was recorded by pulse oximetry. Hypopnea scoring rule used: 1B 4%.   Sleep Architecture: All sleep stages present with sleep disruption attributable to respiratory events; normal sleep duration.   The total recording time of the polysomnogram was 560.8 minutes. The total sleep time was 496.5 minutes. Sleep latency was normal at 24.9 minutes with the use of zolpidem. REM latency was 97.5 minutes. Arousal index was mildly increased for age at 33.4 arousals per hour. Sleep efficiency was normal at 88.5%. Wake after sleep onset was 39.0 minutes. The patient spent 11.1% of total sleep time in Stage N1, 40.5% in Stage N2, 20.9% in Stage N3, and 27.5% in REM. Time in REM supine was 19.0 minutes.   Respiration: Mild obstructive sleep apnea with predominantly when supine associated with mild hypoxemia.       Events ? The polysomnogram revealed a presence of 16 obstructive, 2 central, and 2 mixed apneas resulting in an apnea index of 2.4 events per hour. There were 80 obstructive hypopneas and - central hypopneas resulting in an obstructive hypopnea index of 9.7 and central hypopnea index of - events per hour. The combined apnea/hypopnea index was 12.1 events per hour (central apnea/hypopnea index was 0.2 events per hour). The REM AHI was 13.2 events per hour. The supine AHI was 22.4 events per hour and non-supine 1/hour. The RERA index was 9.2 events per hour. The RDI was 21.3 events per hour.       Snoring - was reported as loud/intermittent.       Respiratory rate and pattern - was notable for normal respiratory rate and pattern.       Sustained Sleep Associated Hypoventilation - Transcutaneous carbon dioxide  monitoring was not used, however significant hypoventilation was not suggested by oximetry.       Sleep Associated Hypoxemia - (Greater than 5 minutes O2 sat at or below 88%) was present. Baseline oxygen saturation was 94.5%. Lowest oxygen saturation was 76.6%. Time spent less than or equal to 88% was 7.5 minutes. Time spent less than or equal to 89% was 16.4 minutes.     Movement Activity: No abnormal sleep movements.       Periodic Limb Activity - There were 22 PLMs during the entire study. The PLM index was 2.7 movements per hour. The PLM Arousal Index was 0.1 per hour.       REM EMG Activity - Excessive transient/sustained muscle activity was not present.       Nocturnal Behavior - Abnormal sleep related behaviors were not noted.       Bruxism - None apparent.     Cardiac Summary: Sinus rhythm.   The average pulse rate was 56.1 bpm. The minimum pulse rate was 35.8 bpm while the maximum pulse rate was 82.5 bpm. Arrhythmias were not noted.   Assessment:       All sleep stages present with sleep disruption attributable to respiratory events; normal sleep duration.       Mild obstructive sleep apnea with predominantly when supine associated with mild hypoxemia.       No abnormal sleep movements.       Sinus rhythm.     Recommendations:       The decision to treat mild sleep apnea with positional worsening is based on clinical symptoms and risks and may include a lateral positioning device, mandibular advancement device or CPAP or surgical management based on patient preference and the clinical setting.       Suggest optimizing sleep schedule and avoiding sleep deprivation.       Weight management (if BMI > 30).       Pharmacologic therapy should be used for management of restless legs syndrome only if present and clinically indicated and not based on the presence of periodic limb movements alone.     Diagnostic Codes:   Obstructive Sleep Apnea G47.33   Sleep Hypoxemia/Hypoventilation G47.36   10/20/2020 [place]  Diagnostic Sleep Study (201.0 lbs) - AHI 12.1, RDI 21.3, Supine AHI 22.4, REM AHI 13.2, Low O2 76.6%, Time Spent ?88% 7.5 minutes / Time Spent ?89% 16.4 minutes. _____________________________________    ELECTRONICALLY SIGNED BY: JAVON WETZEL 19:08 10/25/2020                 Medications:     Current Outpatient Medications   Medication Sig     allopurinol (ZYLOPRIM) 100 MG tablet TAKE 2 TABLETS (200 MG) BY MOUTH 2 TIMES DAILY     aspirin 81 MG tablet Take 1 tablet by mouth daily.     carvedilol (COREG) 12.5 MG tablet Take 1 tablet (12.5 mg) by mouth 2 times daily     fenofibrate (LOFIBRA) 54 MG tablet Take 1 tablet (54 mg) by mouth daily     Multiple Vitamins-Minerals (MULTIVITAMIN ADULT) CHEW Take 1 chew tab by mouth daily     olmesartan (BENICAR) 40 MG tablet TAKE 1 TABLET BY MOUTH EVERY DAY     omega-3 acid ethyl esters (LOVAZA) 1 g capsule TAKE 2 CAP BY MOUTH DAILY     simvastatin (ZOCOR) 40 MG tablet Take 1 tablet (40 mg) by mouth At Bedtime     zolpidem (AMBIEN) 5 MG tablet Take tablet by mouth 15 minutes prior to sleep, for Sleep Study     order for DME Equipment being ordered: blood pressure cuff (Patient not taking: Reported on 1/13/2020)     No current facility-administered medications for this visit.         Allergies   Allergen Reactions     No Known Drug Allergies             Past Medical History:     Does not need 02 supplement at night   Past Medical History:   Diagnosis Date     Alcoholism (H)     Treated 1980's, denies any recent problems with withdrawal when skips days drinking.     Anxiety 12/31/2014     Arthritis      Chronic low back pain      Dyspepsia and other specified disorders of function of stomach 7/31/2003     Elevated liver enzymes 12/30/2013     Gout     possible     Herniated intervertebral disk     thoracic     High blood triglycerides 12/30/2013     Hyperlipidemia LDL goal <130 4/15/2013     Hypertension      Metabolic syndrome 12/30/2013     Obesity 4/15/2013     Other chest pain  7/31/2003     RBBB (right bundle branch block)     Present for several years             Past Surgical History:    No h/o  upper airway surgery  Past Surgical History:   Procedure Laterality Date     BACK SURGERY       Back surgery x 4       COLONOSCOPY N/A 1/23/2020    Procedure: COLONOSCOPY, WITH POLYPECTOMY AND BIOPSY; polypectomies  using jumbo bx forcep;  Surgeon: Sherice Mckeon MD;  Location:  GI     ESOPHAGOSCOPY, GASTROSCOPY, DUODENOSCOPY (EGD), COMBINED  2000    normal     TONSILLECTOMY                        Physical Examination:     Objective   Reported vitals:  There were no vitals taken for this visit.   healthy, alert and no distress  Psych: Alert and oriented times 3; coherent speech, normal   rate and volume, able to articulate logical thoughts, able   to abstract reason, no tangential thoughts, no hallucinations   or delusions  His affect is normal.         Copy to: Trish Edwards MD 11/11/2020     [facility]   Floor 1, [address]   [address] [place] [address]   Appointments: [phone]    [facility] [place]  3rd Floor  [address], [place] [address]     Total time spent with patient: 25 min >50% counseling

## 2024-02-06 ENCOUNTER — TELEPHONE (OUTPATIENT)
Dept: CARDIOLOGY | Facility: CLINIC | Age: 69
End: 2024-02-06
Payer: MEDICARE

## 2024-02-06 DIAGNOSIS — I10 ESSENTIAL HYPERTENSION, BENIGN: ICD-10-CM

## 2024-02-06 DIAGNOSIS — E78.5 HYPERLIPIDEMIA LDL GOAL <130: ICD-10-CM

## 2024-02-06 RX ORDER — AMLODIPINE BESYLATE 2.5 MG/1
2.5 TABLET ORAL 2 TIMES DAILY
Qty: 180 TABLET | Refills: 0 | Status: SHIPPED | OUTPATIENT
Start: 2024-02-06 | End: 2024-04-16

## 2024-02-06 RX ORDER — ROSUVASTATIN CALCIUM 20 MG/1
20 TABLET, COATED ORAL AT BEDTIME
Qty: 90 TABLET | Refills: 0 | Status: SHIPPED | OUTPATIENT
Start: 2024-02-06 | End: 2024-04-16

## 2024-03-28 DIAGNOSIS — I10 ESSENTIAL HYPERTENSION, BENIGN: ICD-10-CM

## 2024-03-28 RX ORDER — OLMESARTAN MEDOXOMIL 40 MG/1
40 TABLET ORAL DAILY
Qty: 90 TABLET | Refills: 0 | Status: SHIPPED | OUTPATIENT
Start: 2024-03-28 | End: 2024-04-16

## 2024-04-01 ENCOUNTER — TELEPHONE (OUTPATIENT)
Dept: CARDIOLOGY | Facility: CLINIC | Age: 69
End: 2024-04-01
Payer: MEDICARE

## 2024-04-01 DIAGNOSIS — E78.5 HYPERLIPIDEMIA LDL GOAL <130: Primary | ICD-10-CM

## 2024-04-01 NOTE — TELEPHONE ENCOUNTER
----- Message from Geovanna Woodall, CMA sent at 4/1/2024  8:47 AM CDT -----  Regarding: Lipids?  Pt last saw Dr. Bundy 2/27/23 and he said to follow up in one year with fasting labs. Those labs were resulted early on 4/20/23 by a different clinic. Should they be repeated prior to his follow up with Nahomi on 4/16?

## 2024-04-01 NOTE — TELEPHONE ENCOUNTER
Orders placed.   Scheduling messaged to call and arrange prior to upcoming HIEN OV.     Last FLPALT.  4-20-23     Currently on Rosuvastatin 20 mg daily.     Next HIEN OV 4-16-24.     Last Dr. Bundy OV 2-27-23   - Per patient preference, follow-up in 1 year with fasting lipids

## 2024-04-02 ENCOUNTER — LAB (OUTPATIENT)
Dept: LAB | Facility: CLINIC | Age: 69
End: 2024-04-02
Payer: MEDICARE

## 2024-04-02 DIAGNOSIS — E78.5 HYPERLIPIDEMIA LDL GOAL <130: ICD-10-CM

## 2024-04-02 LAB
ALT SERPL W P-5'-P-CCNC: 16 U/L (ref 0–70)
CHOLEST SERPL-MCNC: 132 MG/DL
FASTING STATUS PATIENT QL REPORTED: YES
HDLC SERPL-MCNC: 35 MG/DL
LDLC SERPL CALC-MCNC: 62 MG/DL
NONHDLC SERPL-MCNC: 97 MG/DL
TRIGL SERPL-MCNC: 175 MG/DL

## 2024-04-02 PROCEDURE — 36415 COLL VENOUS BLD VENIPUNCTURE: CPT | Performed by: INTERNAL MEDICINE

## 2024-04-02 PROCEDURE — 80061 LIPID PANEL: CPT | Performed by: INTERNAL MEDICINE

## 2024-04-02 PROCEDURE — 84460 ALANINE AMINO (ALT) (SGPT): CPT | Performed by: INTERNAL MEDICINE

## 2024-04-05 ENCOUNTER — TELEPHONE (OUTPATIENT)
Dept: CARDIOLOGY | Facility: CLINIC | Age: 69
End: 2024-04-05
Payer: MEDICARE

## 2024-04-05 DIAGNOSIS — I10 ESSENTIAL HYPERTENSION, BENIGN: ICD-10-CM

## 2024-04-05 RX ORDER — CARVEDILOL 12.5 MG/1
12.5 TABLET ORAL 2 TIMES DAILY
Qty: 180 TABLET | Refills: 0 | Status: SHIPPED | OUTPATIENT
Start: 2024-04-05 | End: 2024-04-16

## 2024-04-05 NOTE — TELEPHONE ENCOUNTER
[facility] Call Center    Phone Message    May a detailed message be left on voicemail: yes     Reason for Call: Medication Refill Request    Has the patient contacted the pharmacy for the refill? Yes   Name of medication being requested:   carvedilol (COREG) 12.5 MG tablet   Provider who prescribed the medication: Gregro  Pharmacy:    [facility]ARMACY #[id number] - [place], [place] - [address]  Date medication is needed: 04/05/24    **Emery is completely out**    Action Taken: Other: Cardiology    Travel Screening: Not Applicable    Thank you!  [facility]

## 2024-04-16 ENCOUNTER — OFFICE VISIT (OUTPATIENT)
Dept: CARDIOLOGY | Facility: CLINIC | Age: 69
End: 2024-04-16
Payer: MEDICARE

## 2024-04-16 VITALS
HEIGHT: 67 IN | WEIGHT: 204.9 LBS | SYSTOLIC BLOOD PRESSURE: 130 MMHG | BODY MASS INDEX: 32.16 KG/M2 | OXYGEN SATURATION: 96 % | HEART RATE: 54 BPM | DIASTOLIC BLOOD PRESSURE: 64 MMHG

## 2024-04-16 DIAGNOSIS — E78.1 HIGH BLOOD TRIGLYCERIDES: ICD-10-CM

## 2024-04-16 DIAGNOSIS — I10 ESSENTIAL HYPERTENSION, BENIGN: ICD-10-CM

## 2024-04-16 DIAGNOSIS — E78.5 HYPERLIPIDEMIA LDL GOAL <130: ICD-10-CM

## 2024-04-16 PROCEDURE — 99214 OFFICE O/P EST MOD 30 MIN: CPT | Performed by: PHYSICIAN ASSISTANT

## 2024-04-16 PROCEDURE — G2211 COMPLEX E/M VISIT ADD ON: HCPCS | Performed by: PHYSICIAN ASSISTANT

## 2024-04-16 RX ORDER — OMEGA-3-ACID ETHYL ESTERS 1 G/1
CAPSULE, LIQUID FILLED ORAL
Qty: 180 CAPSULE | Refills: 3 | Status: SHIPPED | OUTPATIENT
Start: 2024-04-16

## 2024-04-16 RX ORDER — CARVEDILOL 12.5 MG/1
12.5 TABLET ORAL 2 TIMES DAILY
Qty: 180 TABLET | Refills: 3 | Status: SHIPPED | OUTPATIENT
Start: 2024-04-16

## 2024-04-16 RX ORDER — AMLODIPINE BESYLATE 2.5 MG/1
2.5 TABLET ORAL 2 TIMES DAILY
Qty: 180 TABLET | Refills: 3 | Status: SHIPPED | OUTPATIENT
Start: 2024-04-16

## 2024-04-16 RX ORDER — FENOFIBRATE 145 MG/1
145 TABLET, COATED ORAL DAILY
Qty: 90 TABLET | Refills: 3 | Status: SHIPPED | OUTPATIENT
Start: 2024-04-16

## 2024-04-16 RX ORDER — OLMESARTAN MEDOXOMIL 40 MG/1
40 TABLET ORAL DAILY
Qty: 90 TABLET | Refills: 3 | Status: SHIPPED | OUTPATIENT
Start: 2024-04-16

## 2024-04-16 RX ORDER — ROSUVASTATIN CALCIUM 20 MG/1
20 TABLET, COATED ORAL AT BEDTIME
Qty: 90 TABLET | Refills: 3 | Status: SHIPPED | OUTPATIENT
Start: 2024-04-16

## 2024-04-16 NOTE — LETTER
2024    Trish Edwards [place]  [address] [address]    RE: Emery Zayas       Dear Colleague,     I had the pleasure of seeing Emery Zayas in the [facility].      CARDIOLOGY CLINIC PROGRESS NOTE    DOS: 2024      Emery Zayas  : 1955, 68 year old  MRN: [id number]      History:   I am seeing Emery Zayas today for follow up.  He is a patient of Dr. Alcides Bundy.     Emery Zayas is a pleasant 68-year-old male with a past medical history significant for obesity, hypertension, dyslipidemia, alcohol dependence (5-6 drinks daily), severe back pain, gout, mild sleep apnea (diagnosed 10/2020) felt related to his alcohol use.      He had issues with vertigo this past year.  He had therapy. It got better.  But the past couple weeks it has saige recurring but very mild. This is associated with looking up and turning his head.   He is taking his meds.   BP elevated today in clinic 140/70.  Repeat 130/64.   He is still drinking daily, about 3 a day in the afternoon, but then none after dinner. He drinks hard alcohol.   He is walking a mile every night with the neighbor and dog.    He does drink pop as well.         ROS:  Skin:        Eyes:       ENT:       Respiratory:  Negative    Cardiovascular:    lightheadedness;Positive for;fatigue  Gastroenterology:      Genitourinary:       Musculoskeletal:       Neurologic:       Psychiatric:       Heme/Lymph/Imm:       Endocrine:         PAST MEDICAL HISTORY:  Past Medical History:   Diagnosis Date    Acute idiopathic gout, unspecified site 2016    Alcoholism (H)     Treated , denies any recent problems with withdrawal when skips days drinking.    Anxiety 2014    Arthritis     Chronic low back pain     Dyspepsia and other specified disorders of function of stomach 2003    Elevated liver enzymes 2013    Gout     possible    Herniated intervertebral disk     thoracic    High  blood triglycerides 2013    Hyperlipidemia LDL goal <130 04/15/2013    Hypertension     Hypertensive emergency 2017    Metabolic syndrome 2013    Obesity 04/15/2013    Other chest pain 2003    RBBB (right bundle branch block)     Present for several years       PAST SURGICAL HISTORY:  Past Surgical History:   Procedure Laterality Date    ARTHROSCOPY, SHOULDER, SURGI Left 2023    [facility]    BACK SURGERY      Back surgery x 4      COLONOSCOPY N/A 2020    Procedure: COLONOSCOPY, WITH POLYPECTOMY AND BIOPSY; polypectomies  using jumbo bx forcep;  Surgeon: Sherice Mckeon MD;  Location:  GI    COLONOSCOPY N/A 2024    Procedure: Colonoscopy with polypectomy using hot snare and tattooing using scleroneedle;  Surgeon: Fidel Perez MD;  Location:  GI    ESOPHAGOSCOPY, GASTROSCOPY, DUODENOSCOPY (EGD), COMBINED  2000    normal    TONSILLECTOMY         SOCIAL HISTORY:  Social History     Socioeconomic History    Marital status:    Tobacco Use    Smoking status: Former     Packs/day: 0.00     Types: Cigarettes     Quit date: 7/15/1993     Years since quittin.4    Smokeless tobacco: Never    Tobacco comments:     Quit 20 years ago.   Vaping Use    Vaping Use: Never used   Substance and Sexual Activity    Alcohol use: Yes     Alcohol/week: 7.0 standard drinks     Types: 7 Shots of liquor per week     Comment: more than 12-15 rum and cokes or beer a week    Drug use: No    Sexual activity: Yes     Partners: Female   Other Topics Concern    Parent/sibling w/ CABG, MI or angioplasty before 65F 55M? 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       FAMILY HISTORY:  Family History   Problem Relation Age of Onset    Hypertension Father     Heart Disease Father         Angioplasty in late 60's or early 70's.    Unknown/Adopted Father      Hypertension Sister     Kidney Disease Sister     Unknown/Adopted Sister     Cancer Mother         lung    Unknown/Adopted Mother     Diabetes Mother     Thyroid Disease Sister     Unknown/Adopted Sister     Unknown/Adopted Maternal Grandmother     Substance Abuse Maternal Grandfather     Depression Paternal Grandmother     Unknown/Adopted Paternal Grandfather     Diabetes Sister     Colon Cancer No family hx of        MEDS:   Current Outpatient Medications   Medication Sig Dispense Refill    amLODIPine (NORVASC) 2.5 MG tablet Take 1 tablet (2.5 mg) by mouth 2 times daily Appointment required for further refills 180 tablet 3    aspirin 81 MG tablet Take 1 tablet by mouth daily.      carvedilol (COREG) 12.5 MG tablet Take 1 tablet (12.5 mg) by mouth 2 times daily 180 tablet 3    diclofenac (FLECTOR) 1.3 % patch Externally apply 1 patch topically 2 times daily (Patient taking differently: Externally apply 1 patch topically 2 times daily as needed) 60 patch 5    fenofibrate (TRICOR) 145 MG tablet Take 1 tablet (145 mg) by mouth daily 90 tablet 3    fluticasone (FLONASE) 50 MCG/ACT nasal spray SPRAY 1 SPRAY INTO EACH NOSTRIL DAILY 48 mL 1    LORazepam (ATIVAN) 1 MG tablet Take 1 tablet (1 mg) by mouth every 6 hours as needed for anxiety 10 tablet 0    meclizine (ANTIVERT) 25 MG tablet Take 1 tablet (25 mg) by mouth 3 times daily as needed for dizziness 30 tablet 0    Multiple Vitamins-Minerals (MULTIVITAMIN ADULT) CHEW Take 1 chew tab by mouth daily      olmesartan (BENICAR) 40 MG tablet Take 1 tablet (40 mg) by mouth daily 90 tablet 3    omega-3 acid ethyl esters (LOVAZA) 1 g capsule TAKE 2 CAP BY MOUTH DAILY 180 capsule 3    rosuvastatin (CRESTOR) 20 MG tablet Take 1 tablet (20 mg) by mouth at bedtime Appointment required for further refills 90 tablet 3    methylPREDNISolone (MEDROL DOSEPAK) 4 MG tablet therapy pack Follow Package Directions (Patient not taking: Reported on 4/16/2024) 21 tablet 0    metroNIDAZOLE  "(METROCREAM) 0.75 % external cream Apply topically 2 times daily To nose for rosacea ongoing (Patient not taking: Reported on 1/3/2024) 45 g 11    order for DME Equipment being ordered: blood pressure cuff 1 each 0     Current Facility-Administered Medications   Medication Dose Route Frequency Provider Last Rate Last Admin    dexamethasone (DECADRON) injection 4 mg  4 mg   Chuck Huynh PA-COLE   4 mg at 02/03/22 1720    dexamethasone (DECADRON) injection 4 mg  4 mg   Chuck Huynh PA-C   4 mg at 02/03/22 1720    lidocaine 1 % injection 1 mL  1 mL   Chuck Huynh, PA-C   1 mL at 02/03/22 1720    lidocaine 1 % injection 1 mL  1 mL   Chuck Huynh PA-C   1 mL at 02/03/22 1720       ALLERGIES:   Allergies   Allergen Reactions    No Known Drug Allergy        PHYSICAL EXAM:  Vitals: /64 (BP Location: Right arm, Patient Position: Sitting, Cuff Size: Adult Regular)   Pulse 54   Ht 1.689 m (5' 6.5\")   Wt 92.9 kg (204 lb 14.4 oz)   SpO2 96%   BMI 32.58 kg/m    Constitutional:  cooperative, alert and oriented, well developed, well nourished, in no acute distress obese      Skin:  warm and dry to the touch, no apparent skin lesions or masses noted        Head:  normocephalic, no masses or lesions        Eyes:  pupils equal and round;conjunctivae and lids unremarkable        ENT:  no pallor or cyanosis        Neck:  JVP normal;no carotid bruit        Respiratory:  clear to auscultation;normal symmetry        Cardiac: regular rhythm, normal S1/S2, no S3 or S4, apical impulse not displaced, no murmurs, gallops or rubs                  GI:  abdomen soft;BS normoactive obese      Vascular: pulses full and equal                                      Extremities and Musculoskeletal:  no edema;normal muscle strength and tone        Neurological:  no gross motor deficits;affect appropriate              LABS/DATA:  I reviewed the following:  Exercise stress echocardiogram " 5/7/2017  Interpretation Summary  This was a normal stress echocardiogram with no evidence of stress-induced  ischemia.  The patient exhibited no chest pain during exercise.  Contrast was used without apparent complications.      Component      Latest Ref Rng 4/20/2023  7:54 AM 4/2/2024  8:34 AM   Cholesterol      <200 mg/dL 128  132    Triglycerides      <150 mg/dL 148  175 (H)    HDL Cholesterol      >=40 mg/dL 39 (L)  35 (L)    LDL Cholesterol Calculated      <=100 mg/dL 59  62    Non HDL Cholesterol      <130 mg/dL 89  97    Patient Fasting?  Yes    ALT      0 - 70 U/L 21  16             ASSESSMENT/PLAN:  # HTN, blood pressures previously elevated and challenging to control likely due to several factors including alcohol dependence, untreated sleep apnea.  - Previously had adverse reaction to lisinopril, however has been tolerating olmesartan 40 mg   - He has a right bundle branch block however heart rates are around 60 bpm, will continue carvedilol 12.5 mg BID  - Amlodipine 2.5 mg BID  - BP now controlled, continue current meds      # MONSE  - He does not wear CPAP  - Cutting back alcohol would help      # EtOH dependence  - Counseled to cut back      # Dyslipidemia, low HDL, elevated TGs  - Rosuvastatin 20 mg, fenofibrate, Lovaza  - 4/2/24: LDL 62, HDL 35,   - Discussed cutting back alcohol      # Obesity   - Congratulated him on daily walks  - Discussed lifestyle changes      # Anxiety and depression sxs.      Meds refilled      Follow up:  1 year with Dr. Bundy and labs      The longitudinal plan of care for the diagnosis(es)/condition(s) as documented were addressed during this visit. Due to the added complexity in care, I will continue to support Bill in the subsequent management and with ongoing continuity of care.    Nahomi Chawla PA-C  Thank you for allowing me to participate in the care of your patient.      Sincerely,   Nahomi Chawla PA-C   [facility]  [facility]  cc:   Alcides Bundy MD  [address], [place] [address]  [place] [id number]

## 2024-04-16 NOTE — PROGRESS NOTES
CARDIOLOGY CLINIC PROGRESS NOTE    DOS: 2024      Emery Zayas  : 1955, 68 year old  MRN: [id number]      History:   I am seeing Emery Zayas today for follow up.  He is a patient of Dr. Alcides Bundy.     Emery Zayas is a pleasant 68-year-old male with a past medical history significant for obesity, hypertension, dyslipidemia, alcohol dependence (5-6 drinks daily), severe back pain, gout, mild sleep apnea (diagnosed 10/2020) felt related to his alcohol use.      He had issues with vertigo this past year.  He had therapy. It got better.  But the past couple weeks it has saige recurring but very mild. This is associated with looking up and turning his head.   He is taking his meds.   BP elevated today in clinic 140/70.  Repeat 130/64.   He is still drinking daily, about 3 a day in the afternoon, but then none after dinner. He drinks hard alcohol.   He is walking a mile every night with the neighbor and dog.    He does drink pop as well.         ROS:  Skin:        Eyes:       ENT:       Respiratory:  Negative    Cardiovascular:    lightheadedness;Positive for;fatigue  Gastroenterology:      Genitourinary:       Musculoskeletal:       Neurologic:       Psychiatric:       Heme/Lymph/Imm:       Endocrine:         PAST MEDICAL HISTORY:  Past Medical History:   Diagnosis Date    Acute idiopathic gout, unspecified site 2016    Alcoholism (H)     Treated , denies any recent problems with withdrawal when skips days drinking.    Anxiety 2014    Arthritis     Chronic low back pain     Dyspepsia and other specified disorders of function of stomach 2003    Elevated liver enzymes 2013    Gout     possible    Herniated intervertebral disk     thoracic    High blood triglycerides 2013    Hyperlipidemia LDL goal <130 04/15/2013    Hypertension     Hypertensive emergency 2017    Metabolic syndrome 2013    Obesity 04/15/2013    Other chest pain 2003     RBBB (right bundle branch block)     Present for several years       PAST SURGICAL HISTORY:  Past Surgical History:   Procedure Laterality Date    ARTHROSCOPY, SHOULDER, SURGI Left 2023    [facility]    BACK SURGERY      Back surgery x 4      COLONOSCOPY N/A 2020    Procedure: COLONOSCOPY, WITH POLYPECTOMY AND BIOPSY; polypectomies  using jumbo bx forcep;  Surgeon: Sherice Mckeon MD;  Location:  GI    COLONOSCOPY N/A 2024    Procedure: Colonoscopy with polypectomy using hot snare and tattooing using scleroneedle;  Surgeon: Fidel Perez MD;  Location:  GI    ESOPHAGOSCOPY, GASTROSCOPY, DUODENOSCOPY (EGD), COMBINED  2000    normal    TONSILLECTOMY         SOCIAL HISTORY:  Social History     Socioeconomic History    Marital status:    Tobacco Use    Smoking status: Former     Packs/day: 0.00     Types: Cigarettes     Quit date: 7/15/1993     Years since quittin.4    Smokeless tobacco: Never    Tobacco comments:     Quit 20 years ago.   Vaping Use    Vaping Use: Never used   Substance and Sexual Activity    Alcohol use: Yes     Alcohol/week: 7.0 standard drinks     Types: 7 Shots of liquor per week     Comment: more than 12-15 rum and cokes or beer a week    Drug use: No    Sexual activity: Yes     Partners: Female   Other Topics Concern    Parent/sibling w/ CABG, MI or angioplasty before 65F 55M? 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       FAMILY HISTORY:  Family History   Problem Relation Age of Onset    Hypertension Father     Heart Disease Father         Angioplasty in late 60's or early 70's.    Unknown/Adopted Father     Hypertension Sister     Kidney Disease Sister     Unknown/Adopted Sister     Cancer Mother         lung    Unknown/Adopted Mother     Diabetes Mother     Thyroid Disease Sister     Unknown/Adopted Sister      Unknown/Adopted Maternal Grandmother     Substance Abuse Maternal Grandfather     Depression Paternal Grandmother     Unknown/Adopted Paternal Grandfather     Diabetes Sister     Colon Cancer No family hx of        MEDS:   Current Outpatient Medications   Medication Sig Dispense Refill    amLODIPine (NORVASC) 2.5 MG tablet Take 1 tablet (2.5 mg) by mouth 2 times daily Appointment required for further refills 180 tablet 3    aspirin 81 MG tablet Take 1 tablet by mouth daily.      carvedilol (COREG) 12.5 MG tablet Take 1 tablet (12.5 mg) by mouth 2 times daily 180 tablet 3    diclofenac (FLECTOR) 1.3 % patch Externally apply 1 patch topically 2 times daily (Patient taking differently: Externally apply 1 patch topically 2 times daily as needed) 60 patch 5    fenofibrate (TRICOR) 145 MG tablet Take 1 tablet (145 mg) by mouth daily 90 tablet 3    fluticasone (FLONASE) 50 MCG/ACT nasal spray SPRAY 1 SPRAY INTO EACH NOSTRIL DAILY 48 mL 1    LORazepam (ATIVAN) 1 MG tablet Take 1 tablet (1 mg) by mouth every 6 hours as needed for anxiety 10 tablet 0    meclizine (ANTIVERT) 25 MG tablet Take 1 tablet (25 mg) by mouth 3 times daily as needed for dizziness 30 tablet 0    Multiple Vitamins-Minerals (MULTIVITAMIN ADULT) CHEW Take 1 chew tab by mouth daily      olmesartan (BENICAR) 40 MG tablet Take 1 tablet (40 mg) by mouth daily 90 tablet 3    omega-3 acid ethyl esters (LOVAZA) 1 g capsule TAKE 2 CAP BY MOUTH DAILY 180 capsule 3    rosuvastatin (CRESTOR) 20 MG tablet Take 1 tablet (20 mg) by mouth at bedtime Appointment required for further refills 90 tablet 3    methylPREDNISolone (MEDROL DOSEPAK) 4 MG tablet therapy pack Follow Package Directions (Patient not taking: Reported on 4/16/2024) 21 tablet 0    metroNIDAZOLE (METROCREAM) 0.75 % external cream Apply topically 2 times daily To nose for rosacea ongoing (Patient not taking: Reported on 1/3/2024) 45 g 11    order for DME Equipment being ordered: blood pressure cuff 1 each  "0     Current Facility-Administered Medications   Medication Dose Route Frequency Provider Last Rate Last Admin    dexamethasone (DECADRON) injection 4 mg  4 mg   Chuck Huynh PA-C   4 mg at 02/03/22 1720    dexamethasone (DECADRON) injection 4 mg  4 mg   Chuck Huynh PA-C   4 mg at 02/03/22 1720    lidocaine 1 % injection 1 mL  1 mL   Chuck Huynh PA-C   1 mL at 02/03/22 1720    lidocaine 1 % injection 1 mL  1 mL   Chuck Huynh PA-C   1 mL at 02/03/22 1720       ALLERGIES:   Allergies   Allergen Reactions    No Known Drug Allergy        PHYSICAL EXAM:  Vitals: /64 (BP Location: Right arm, Patient Position: Sitting, Cuff Size: Adult Regular)   Pulse 54   Ht 1.689 m (5' 6.5\")   Wt 92.9 kg (204 lb 14.4 oz)   SpO2 96%   BMI 32.58 kg/m    Constitutional:  cooperative, alert and oriented, well developed, well nourished, in no acute distress obese      Skin:  warm and dry to the touch, no apparent skin lesions or masses noted        Head:  normocephalic, no masses or lesions        Eyes:  pupils equal and round;conjunctivae and lids unremarkable        ENT:  no pallor or cyanosis        Neck:  JVP normal;no carotid bruit        Respiratory:  clear to auscultation;normal symmetry        Cardiac: regular rhythm, normal S1/S2, no S3 or S4, apical impulse not displaced, no murmurs, gallops or rubs                  GI:  abdomen soft;BS normoactive obese      Vascular: pulses full and equal                                      Extremities and Musculoskeletal:  no edema;normal muscle strength and tone        Neurological:  no gross motor deficits;affect appropriate              LABS/DATA:  I reviewed the following:  Exercise stress echocardiogram 5/7/2017  Interpretation Summary  This was a normal stress echocardiogram with no evidence of stress-induced  ischemia.  The patient exhibited no chest pain during exercise.  Contrast was used without apparent " complications.      Component      Latest Ref Rng 4/20/2023  7:54 AM 4/2/2024  8:34 AM   Cholesterol      <200 mg/dL 128  132    Triglycerides      <150 mg/dL 148  175 (H)    HDL Cholesterol      >=40 mg/dL 39 (L)  35 (L)    LDL Cholesterol Calculated      <=100 mg/dL 59  62    Non HDL Cholesterol      <130 mg/dL 89  97    Patient Fasting?  Yes    ALT      0 - 70 U/L 21  16             ASSESSMENT/PLAN:  # HTN, blood pressures previously elevated and challenging to control likely due to several factors including alcohol dependence, untreated sleep apnea.  - Previously had adverse reaction to lisinopril, however has been tolerating olmesartan 40 mg   - He has a right bundle branch block however heart rates are around 60 bpm, will continue carvedilol 12.5 mg BID  - Amlodipine 2.5 mg BID  - BP now controlled, continue current meds      # MONSE  - He does not wear CPAP  - Cutting back alcohol would help      # EtOH dependence  - Counseled to cut back      # Dyslipidemia, low HDL, elevated TGs  - Rosuvastatin 20 mg, fenofibrate, Lovaza  - 4/2/24: LDL 62, HDL 35,   - Discussed cutting back alcohol      # Obesity   - Congratulated him on daily walks  - Discussed lifestyle changes      # Anxiety and depression sxs.           Meds refilled      Follow up:  1 year with Dr. Bundy and labs      The longitudinal plan of care for the diagnosis(es)/condition(s) as documented were addressed during this visit. Due to the added complexity in care, I will continue to support Bill in the subsequent management and with ongoing continuity of care.    Nahomi Chawla PA-C

## 2024-04-22 ENCOUNTER — OFFICE VISIT (OUTPATIENT)
Dept: FAMILY MEDICINE | Facility: CLINIC | Age: 69
End: 2024-04-22
Payer: MEDICARE

## 2024-04-22 VITALS
OXYGEN SATURATION: 98 % | HEIGHT: 67 IN | DIASTOLIC BLOOD PRESSURE: 80 MMHG | WEIGHT: 204 LBS | HEART RATE: 52 BPM | BODY MASS INDEX: 32.02 KG/M2 | TEMPERATURE: 97.9 F | RESPIRATION RATE: 12 BRPM | SYSTOLIC BLOOD PRESSURE: 142 MMHG

## 2024-04-22 DIAGNOSIS — H81.10 BENIGN PAROXYSMAL POSITIONAL VERTIGO, UNSPECIFIED LATERALITY: Primary | ICD-10-CM

## 2024-04-22 DIAGNOSIS — I10 ESSENTIAL HYPERTENSION, BENIGN: ICD-10-CM

## 2024-04-22 PROCEDURE — 80048 BASIC METABOLIC PNL TOTAL CA: CPT | Performed by: PHYSICIAN ASSISTANT

## 2024-04-22 PROCEDURE — 99214 OFFICE O/P EST MOD 30 MIN: CPT | Performed by: PHYSICIAN ASSISTANT

## 2024-04-22 PROCEDURE — 36415 COLL VENOUS BLD VENIPUNCTURE: CPT | Performed by: PHYSICIAN ASSISTANT

## 2024-04-22 RX ORDER — RESPIRATORY SYNCYTIAL VIRUS VACCINE 120MCG/0.5
0.5 KIT INTRAMUSCULAR ONCE
Qty: 1 EACH | Refills: 0 | Status: CANCELLED | OUTPATIENT
Start: 2024-04-22 | End: 2024-04-22

## 2024-04-22 ASSESSMENT — PAIN SCALES - GENERAL: PAINLEVEL: NO PAIN (0)

## 2024-04-22 NOTE — PROGRESS NOTES
Assessment & Plan     Benign paroxysmal positional vertigo, unspecified laterality  Episodes of room spinning dizziness brought on by head movements over past few weeks. Dizziness brought on by [place]-Hallpike maneuver in clinic today. Blood pressure mildly elevated, heart rate normal. Neuro exam is normal today and patient denies any red flag symptoms. Discussed that symptoms are consistent with BPPV, advised vestibular therapy, epley maneuver. Can try meclizine if needed. Current symptoms are the same as his prior episode of BPPV in 8/2023 which resolved with vestibular therapy. Advised evaluation in ER if any red flag symptoms we discussed. Risks and benefits of treatment plan discussed. Patient acknowledges and agrees with plan of care, all questions answered.   - Physical Therapy  Referral; Future    Essential hypertension, benign  On olmesartan; has not had BMP since 2/2023 so is due for monitoring.  - Basic metabolic panel  (Ca, Cl, CO2, Creat, Gluc, K, Na, BUN); Future  - Basic metabolic panel  (Ca, Cl, CO2, Creat, Gluc, K, Na, BUN)      Carlos Ludwig is a 68 year old, presenting for the following health issues:  Dizziness        4/22/2024    12:45 PM   Additional Questions   Roomed by Maddie HARE     History of Present Illness       Reason for visit:  Feeling dizzy and light headed  Symptom onset:  1-2 weeks ago  Symptoms include:  Dizzy, light-headed  Symptom intensity:  Moderate  Symptom progression:  Worsening  Had these symptoms before:  Yes  Has tried/received treatment for these symptoms:  Yes  Previous treatment was successful:  Yes  Prior treatment description:  Therapy at [place]. Treated for crystals in ear  What makes it worse:  Bending down, looking up, trying to lay down  What makes it better:  N/a    He eats 0-1 servings of fruits and vegetables daily.He consumes 3 sweetened beverage(s) daily.He exercises with enough effort to increase his heart rate 30 to 60 minutes per day.  He  exercises with enough effort to increase his heart rate 7 days per week.   He is taking medications regularly.     Dizziness  Onset/Duration: 2 weeks  Description:   Do you feel faint: No  Does it feel like the surroundings (bed, room) are moving: YES  Unsteady/off balance: No  Have you passed out or fallen: No  Intensity: moderate  Progression of Symptoms: same  Accompanying Signs & Symptoms:  Heart palpitations or chest pain: No  Nausea, vomiting: No  Weakness or lack of coordination in arms or legs: No  Vision or speech changes: No  Numbness or tingling: No  Ringing in ears (Tinnitus): No  Hearing Loss: No  History:   Head trauma/concussion history: No  Previous similar symptoms: BPPV   Recent bleeding history: No  Any new medications (BP?): No  Precipitating factors:   Worse with activity: No  Worse with head movement: YES   Alleviating factors:   Does staying in a fixed position give relief: YES  Therapies tried and outcome: None    Over past 2 weeks, noticing episodes of room spinning dizziness, mild but bothersome. Worse with looking up, bending over, looking down. No associated chest pain, shortness of breath, diaphoresis, palpitations, headache, vision changes, numbness/tingling, focal weakness, speech changes, confusion. No head trauma. No syncope or presyncope. No recent illness, congestion, fever, ear pain. No ringing in ears, hearing changes. Has been eating and drinking normally.    He had similar vertigo in 8/2023 and was treated with vestibular therapy which helped significantly. Was prescribed meclizine but has not tried it.    HTN, follows with cardiology, recent visit 4/16/24  Stable from cardiology standpoint  BP elevated when at cardiology - 140/70; repeat 130/64.   He is still drinking daily, about 3 a day in the afternoon, but then none after dinner. He drinks hard alcohol.   He is walking a mile every night with the neighbor and dog.    He does drink pop as well.     Review of  "Systems  Constitutional, HEENT, cardiovascular, pulmonary, gi and gu systems are negative, except as otherwise noted.      Objective    BP (!) 142/80   Pulse 52   Temp 97.9  F (36.6  C) (Oral)   Resp 12   Ht 1.689 m (5' 6.5\")   Wt 92.5 kg (204 lb)   SpO2 98%   BMI 32.43 kg/m    Body mass index is 32.43 kg/m .  Physical Exam   GENERAL: alert and no distress  EYES: Eyes grossly normal to inspection, PERRL and conjunctivae and sclerae normal  HENT: ear canals and TM's normal, nose and mouth without ulcers or lesions  NECK: no adenopathy, no asymmetry, masses, or scars  RESP: lungs clear to auscultation - no rales, rhonchi or wheezes  CV: regular rate and rhythm, normal S1 S2, no S3 or S4, no murmur, click or rub, no peripheral edema  NEURO: Normal strength and tone, sensory exam grossly normal, mentation intact, oriented times 3, speech normal, cranial nerves 2-12 intact, patellar DTR's normal and symmetric, and Romberg normal, normal gait, [place]-Hallpike positive (R>L)  PSYCH: mentation appears normal, affect normal/bright          Signed Electronically by: Margie Kebede PA-C    "

## 2024-04-23 LAB
ANION GAP SERPL CALCULATED.3IONS-SCNC: 12 MMOL/L (ref 7–15)
BUN SERPL-MCNC: 16.8 MG/DL (ref 8–23)
CALCIUM SERPL-MCNC: 9.7 MG/DL (ref 8.8–10.2)
CHLORIDE SERPL-SCNC: 103 MMOL/L (ref 98–107)
CREAT SERPL-MCNC: 1.16 MG/DL (ref 0.67–1.17)
DEPRECATED HCO3 PLAS-SCNC: 24 MMOL/L (ref 22–29)
EGFRCR SERPLBLD CKD-EPI 2021: 68 ML/MIN/1.73M2
GLUCOSE SERPL-MCNC: 106 MG/DL (ref 70–99)
POTASSIUM SERPL-SCNC: 4.9 MMOL/L (ref 3.4–5.3)
SODIUM SERPL-SCNC: 139 MMOL/L (ref 135–145)

## 2024-04-30 ENCOUNTER — PATIENT OUTREACH (OUTPATIENT)
Dept: GASTROENTEROLOGY | Facility: CLINIC | Age: 69
End: 2024-04-30

## 2024-04-30 ENCOUNTER — ANCILLARY PROCEDURE (OUTPATIENT)
Dept: GENERAL RADIOLOGY | Facility: CLINIC | Age: 69
End: 2024-04-30
Payer: MEDICARE

## 2024-04-30 ENCOUNTER — OFFICE VISIT (OUTPATIENT)
Dept: INTERNAL MEDICINE | Facility: CLINIC | Age: 69
End: 2024-04-30
Payer: MEDICARE

## 2024-04-30 VITALS
TEMPERATURE: 97.4 F | HEIGHT: 67 IN | RESPIRATION RATE: 24 BRPM | DIASTOLIC BLOOD PRESSURE: 78 MMHG | BODY MASS INDEX: 31.55 KG/M2 | SYSTOLIC BLOOD PRESSURE: 139 MMHG | OXYGEN SATURATION: 97 % | WEIGHT: 201 LBS

## 2024-04-30 DIAGNOSIS — J06.9 UPPER RESPIRATORY TRACT INFECTION, UNSPECIFIED TYPE: ICD-10-CM

## 2024-04-30 DIAGNOSIS — J06.9 UPPER RESPIRATORY TRACT INFECTION, UNSPECIFIED TYPE: Primary | ICD-10-CM

## 2024-04-30 LAB
BASOPHILS # BLD AUTO: 0 10E3/UL (ref 0–0.2)
BASOPHILS NFR BLD AUTO: 0 %
C PNEUM DNA SPEC QL NAA+PROBE: NOT DETECTED
EOSINOPHIL # BLD AUTO: 0.1 10E3/UL (ref 0–0.7)
EOSINOPHIL NFR BLD AUTO: 2 %
ERYTHROCYTE [DISTWIDTH] IN BLOOD BY AUTOMATED COUNT: 12.9 % (ref 10–15)
FLUAV H1 2009 PAND RNA SPEC QL NAA+PROBE: NOT DETECTED
FLUAV H1 RNA SPEC QL NAA+PROBE: NOT DETECTED
FLUAV H3 RNA SPEC QL NAA+PROBE: NOT DETECTED
FLUAV RNA SPEC QL NAA+PROBE: NOT DETECTED
FLUBV RNA SPEC QL NAA+PROBE: NOT DETECTED
HADV DNA SPEC QL NAA+PROBE: NOT DETECTED
HCOV PNL SPEC NAA+PROBE: NOT DETECTED
HCT VFR BLD AUTO: 44 % (ref 40–53)
HGB BLD-MCNC: 15.2 G/DL (ref 13.3–17.7)
HMPV RNA SPEC QL NAA+PROBE: DETECTED
HPIV1 RNA SPEC QL NAA+PROBE: NOT DETECTED
HPIV2 RNA SPEC QL NAA+PROBE: NOT DETECTED
HPIV3 RNA SPEC QL NAA+PROBE: NOT DETECTED
HPIV4 RNA SPEC QL NAA+PROBE: NOT DETECTED
IMM GRANULOCYTES # BLD: 0 10E3/UL
IMM GRANULOCYTES NFR BLD: 0 %
LYMPHOCYTES # BLD AUTO: 1 10E3/UL (ref 0.8–5.3)
LYMPHOCYTES NFR BLD AUTO: 14 %
M PNEUMO DNA SPEC QL NAA+PROBE: NOT DETECTED
MCH RBC QN AUTO: 31.8 PG (ref 26.5–33)
MCHC RBC AUTO-ENTMCNC: 34.5 G/DL (ref 31.5–36.5)
MCV RBC AUTO: 92 FL (ref 78–100)
MONOCYTES # BLD AUTO: 1.1 10E3/UL (ref 0–1.3)
MONOCYTES NFR BLD AUTO: 15 %
NEUTROPHILS # BLD AUTO: 5.1 10E3/UL (ref 1.6–8.3)
NEUTROPHILS NFR BLD AUTO: 69 %
PLATELET # BLD AUTO: 177 10E3/UL (ref 150–450)
RBC # BLD AUTO: 4.78 10E6/UL (ref 4.4–5.9)
RSV RNA SPEC QL NAA+PROBE: NOT DETECTED
RSV RNA SPEC QL NAA+PROBE: NOT DETECTED
RV+EV RNA SPEC QL NAA+PROBE: NOT DETECTED
WBC # BLD AUTO: 7.4 10E3/UL (ref 4–11)

## 2024-04-30 PROCEDURE — 87581 M.PNEUMON DNA AMP PROBE: CPT

## 2024-04-30 PROCEDURE — 99214 OFFICE O/P EST MOD 30 MIN: CPT

## 2024-04-30 PROCEDURE — 36415 COLL VENOUS BLD VENIPUNCTURE: CPT

## 2024-04-30 PROCEDURE — 85025 COMPLETE CBC W/AUTO DIFF WBC: CPT

## 2024-04-30 PROCEDURE — 87486 CHLMYD PNEUM DNA AMP PROBE: CPT

## 2024-04-30 PROCEDURE — 71046 X-RAY EXAM CHEST 2 VIEWS: CPT | Mod: TC | Performed by: STUDENT IN AN ORGANIZED HEALTH CARE EDUCATION/TRAINING PROGRAM

## 2024-04-30 PROCEDURE — 87633 RESP VIRUS 12-25 TARGETS: CPT | Mod: GZ

## 2024-04-30 RX ORDER — RESPIRATORY SYNCYTIAL VIRUS VACCINE 120MCG/0.5
0.5 KIT INTRAMUSCULAR ONCE
Qty: 1 EACH | Refills: 0 | Status: CANCELLED | OUTPATIENT
Start: 2024-04-30 | End: 2024-04-30

## 2024-04-30 RX ORDER — ALBUTEROL SULFATE 90 UG/1
2 AEROSOL, METERED RESPIRATORY (INHALATION) EVERY 6 HOURS PRN
Qty: 6.7 G | Refills: 0 | Status: SHIPPED | OUTPATIENT
Start: 2024-04-30 | End: 2024-04-30

## 2024-04-30 RX ORDER — PREDNISONE 20 MG/1
40 TABLET ORAL DAILY
Qty: 10 TABLET | Refills: 0 | Status: SHIPPED | OUTPATIENT
Start: 2024-04-30 | End: 2024-05-05

## 2024-04-30 ASSESSMENT — ENCOUNTER SYMPTOMS: COUGH: 1

## 2024-04-30 NOTE — PROGRESS NOTES
"  Assessment & Plan     (J06.9) Upper respiratory tract infection, unspecified type  (primary encounter diagnosis)  Comment: pt exhibits a productive persistent cough that keeps him up at night with HA  Plan: predniSONE (DELTASONE) 20 MG tablet,         Respiratory Panel PCR, CBC with platelets and         differential, XR Chest 2 Views, DISCONTINUED:         albuterol (PROAIR HFA/PROVENTIL HFA/VENTOLIN         HFA) 108 (90 Base) MCG/ACT inhaler        Sent, results pending      35 minutes spent by me on the date of the encounter doing chart review, review of outside records, review of test results, interpretation of tests, patient visit, documentation, and pt education about prednisone and URI        BMI  Estimated body mass index is 31.96 kg/m  as calculated from the following:    Height as of this encounter: 1.689 m (5' 6.5\").    Weight as of this encounter: 91.2 kg (201 lb).             Carlos Ludwig is a 68 year old, presenting for the following health issues: Pt states that his coughing started on 4/25. Pt states that initially pt suffered from an unproductive cough which just became productive. Pt states that he has HA throbbing and aching pain that started on 4/26 in the frontal aspect bilaterally that is rated as a 7/10 and worse when he is coughing. Pt has nasal congestion that has clear drainage. Pt denies any fevers but states that he has had the chills. Pt stated that his throat started to hurt and describes it as scratchy. Pt c/o earache pain when his throat started to hurt. Pt states that he has itchy watery eyes.    Cough x 6 days. Negative home Covid test 2 days ago.    Cough      4/30/2024     1:37 PM   Additional Questions   Roomed by RIZWAN Mary LPN   Accompanied by self         4/30/2024     1:37 PM   Patient Reported Additional Medications   Patient reports taking the following new medications none     Cough    History of Present Illness       Reason for visit:  Feeling dizzy and light " "headed    He eats 0-1 servings of fruits and vegetables daily.He consumes 3 sweetened beverage(s) daily.He exercises with enough effort to increase his heart rate 30 to 60 minutes per day.  He exercises with enough effort to increase his heart rate 7 days per week.   He is taking medications regularly.                 Review of Systems  Constitutional, HEENT, cardiovascular, pulmonary, gi and gu systems are negative, except as otherwise noted.      Objective    BP (!) 160/79   Temp 97.4  F (36.3  C) (Oral)   Resp 24   Ht 1.689 m (5' 6.5\")   Wt 91.2 kg (201 lb)   SpO2 97%   BMI 31.96 kg/m    Body mass index is 31.96 kg/m .  Physical Exam   GENERAL: alert and no distress  HENT: ear canals and TM's normal, nose and mouth without ulcers or lesions  NECK: no adenopathy, no asymmetry, masses, or scars  RESP: lungs clear to auscultation, but diminished at the based - no rales, rhonchi or wheezes  CV: regular rate and rhythm, normal S1 S2, no S3 or S4, no murmur, click or rub, no peripheral edema  ABDOMEN: soft, nontender, no hepatosplenomegaly, no masses and bowel sounds normal  MS: no gross musculoskeletal defects noted, no edema  NEURO: Normal strength and tone, mentation intact and speech normal  PSYCH: mentation appears normal, affect normal/bright            Signed Electronically by: SIVLIANO Jolley CNP    "

## 2024-05-07 ENCOUNTER — THERAPY VISIT (OUTPATIENT)
Dept: PHYSICAL THERAPY | Facility: CLINIC | Age: 69
End: 2024-05-07
Attending: PHYSICIAN ASSISTANT
Payer: MEDICARE

## 2024-05-07 DIAGNOSIS — H81.10 BENIGN PAROXYSMAL POSITIONAL VERTIGO, UNSPECIFIED LATERALITY: Primary | ICD-10-CM

## 2024-05-07 PROCEDURE — 97112 NEUROMUSCULAR REEDUCATION: CPT | Mod: GP | Performed by: PHYSICAL THERAPIST

## 2024-05-07 PROCEDURE — 95992 CANALITH REPOSITIONING PROC: CPT | Mod: GP | Performed by: PHYSICAL THERAPIST

## 2024-05-07 PROCEDURE — 97162 PT EVAL MOD COMPLEX 30 MIN: CPT | Mod: GP | Performed by: PHYSICAL THERAPIST

## 2024-05-07 NOTE — PROGRESS NOTES
"PHYSICAL THERAPY EVALUATION  Type of Visit: Evaluation    See electronic medical record for Abuse and Falls Screening details.    Subjective       Presenting condition or subjective complaint: Arriving today with reports of, \"dizziness.\"  See below vestibular evaluation for additional subjective information.  Date of onset: 04/22/24 (date of order)    Relevant medical history: High blood pressure; Severe dizziness   Past Medical History:   Diagnosis Date    Acute idiopathic gout, unspecified site 08/23/2016    Alcoholism (H)     Treated 1980's, denies any recent problems with withdrawal when skips days drinking.    Anxiety 12/31/2014    Arthritis     Chronic low back pain     Dyspepsia and other specified disorders of function of stomach 07/31/2003    Elevated liver enzymes 12/30/2013    Gout     possible    Herniated intervertebral disk     thoracic    High blood triglycerides 12/30/2013    Hyperlipidemia LDL goal <130 04/15/2013    Hypertension     Hypertensive emergency 05/05/2017    Metabolic syndrome 12/30/2013    Obesity 04/15/2013    Other chest pain 07/31/2003    RBBB (right bundle branch block)     Present for several years   Dates & types of surgery:    Past Surgical History:   Procedure Laterality Date    ARTHROSCOPY, SHOULDER, SURGI Left 02/16/2023    [facility]    BACK SURGERY      Back surgery x 4      COLONOSCOPY N/A 01/23/2020    Procedure: COLONOSCOPY, WITH POLYPECTOMY AND BIOPSY; polypectomies  using jumbo bx forcep;  Surgeon: Sherice Mckeon MD;  Location:  GI    COLONOSCOPY N/A 1/12/2024    Procedure: Colonoscopy with polypectomy using hot snare and tattooing using scleroneedle;  Surgeon: Fidel Perez MD;  Location:  GI    ESOPHAGOSCOPY, GASTROSCOPY, DUODENOSCOPY (EGD), COMBINED  01/01/2000    normal    TONSILLECTOMY       Prior therapy history for the same diagnosis, illness or injury: Yes, Fall 2023 - good response/outcome    Prior Level of Function  Pt IND with all transfers, ambulation, " "and ADLs/IADLs at baseline.     Living Environment  Social support: With a significant other or spouse   Type of home: 2-story   Stairs to enter the home: No       Ramp: No   Stairs inside the home: Yes 13 Is there a railing: Yes   Help at home: Self Cares (home health aide/personal care attendant, family, etc); Home management tasks (cooking, cleaning); Medication and/or finances; Home and Yard maintenance tasks; Assist for driving and community activities    Employment: Not Applicable    Hobbies/Interests: restoring old cars    Patient goals for therapy: look up, look down, lay down without feeling dizzy    Pain assessment: None reported.     Objective      Cognitive Status Examination  Orientation: Oriented to person, place and time   Level of Consciousness: Alert  Follows Commands and Answers Questions: 100% of the time  Personal Safety and Judgement: Intact  Memory:  No deficits observed    OBSERVATION: Pleasant, nasal congestion and cold symptoms observed  RANGE OF MOTION: Bilateral upper and lower extremities WFL for tasks performed  STRENGTH: Bilateral upper and lower extremities WFL for tasks performed    BED MOBILITY: Reports independence, does experience symptoms.    TRANSFERS: Modified independence    GAIT:   Level of [place]: Independent  Assistive Device(s): None  Gait Deviations:  No significant deviations observed    BALANCE: No significant instability observed with functional mobility tasks; will continue to monitor and assess as needed.    SENSATION:  No reported sensory deficitis       VESTIBULAR EVALUATION  ADDITIONAL HISTORY:  Arriving today reporting more intermittent symptoms the last week with feeling of \"lightheaded\" and \"dizzy\" vs previous experience of true vertigo (room spinning).  Patient reporting symptoms are not as severe as when it occurred the first time Fall 2023.  Patient also describing symptoms as, \"in a cloud.\"  Patient has had a head cold for the past week or " "so.  Positive right jess-hallpike at MD visit.  Description of symptoms: Attacks of dizziness; Light-headedness  Dizzy attacks:   Start: approx 2 weeks ago   Last attack: right now   Frequency of occurrences: all day   Length of attack: don't really stop  Difficulty hearing:  Yes, bilateral  Noise in ears? Yes very low hum or buzzing noise  Alleviates symptoms: sitting or laying down  Worsens symptoms: bending over, looking up, trying to lay down  Activities that bring on symptoms: Bending over; Reaching up; Turning while walking; Walking up or down stairs     Pertinent visual history: reports having a hard time to \"focus\" while reading and when watching tv seems it takes a few minutes for the image to come into clarity; most recent eye exam < 6 months ago  Pertinent history of current vestibular problem: Hearing loss  DHI: Total Score: 42    Cervicogenic Screen    Neck ROM WFL for vestibular testing; no change in symptoms   Vertebral Artery Test Normal     Oculomotor Screen    Ocular ROM Normal   Smooth Pursuit Normal; \"felt off\"   Saccades Normal; \"felt off\"   VOR Normal; guarded with head movement   Head Impulse Test Difficulty assessing due to guarded with head movement, increase in symptoms     Infrared Goggle Exam Vestibular Suppressant in Last 24 Hours? No  Exam Completed With: Infrared goggles   Spontaneous Nystagmus Negative   Gaze Evoked Nystagmus Normal; with right gaze slight drift back towards left , patient reporting seeing \"blush flashes/sparkling things\" when looking up   Head Shake Horizontal Nystagmus Negative   Positional Testing Patient guarded with positional changes - slow, therefore, allowed for more time in test positions   Supine Head-Hanging Test A few seconds delayed onset of symptoms and nystagmus; upbeat with right torsion lasting < 10 seconds    Left Right   [place]-Hallpike Negative; felt \"off\" when coming up A few seconds delayed onset of symptoms and nystagmus; a few upbeat with right " torsion followed by transition to a few down beats.  Symptoms with returning to upright position and slight left torsion upon sitting up < 5 seconds   [facility] Supine Roll Test To assess at future visit To assess at future visit   BPPV Canal(s): R Posterior  BPPV Type: Canalithasis    Assessment & Plan   CLINICAL IMPRESSIONS  Medical Diagnosis: Benign paroxysmal positional vertigo, unspecified laterality    Treatment Diagnosis: dizziness with head movement/change of body position; decreased activity tolerance   Impression/Assessment: Patient is a 68 year old male with complaints of dizziness symptoms with positional changes; signs and symptoms consistent with right posterior canalithasis, question resolving vestibular hypofunction.  The following significant findings have been identified: Decreased ROM/flexibility, Impaired balance, Decreased activity tolerance, Instability, Dizziness, and Disequilibrium . These impairments interfere with their ability to perform self care tasks, recreational activities, household chores, and community mobility as compared to previous level of function.     Clinical Decision Making (Complexity):  Clinical Presentation: Evolving/Changing  Clinical Presentation Rationale: based on medical and personal factors listed in PT evaluation  Clinical Decision Making (Complexity): Moderate complexity    PLAN OF CARE  Treatment Interventions:  Interventions: Gait Training, Manual Therapy, Neuromuscular Re-education, Therapeutic Activity, Therapeutic Exercise, Self-Care/Home Management, Canalith Repositioning    Long Term Goals     PT Goal 1  Goal Identifier: DHI  Goal Description: Pt will report 18pt decrease on DHI (MCID), for subjective reduction of dizziness symptoms with completion of daily activities.  Goal Progress:  (Eval: 42/100)  Target Date: 07/05/24  PT Goal 2  Goal Identifier: Positional Testing  Goal Description: Pt will complete BPPV positional testing without observed nystagmus  and/or increase of dizziness.  Goal Progress: Postive for R posterior canalithasis  Target Date: 07/05/24  PT Goal 3  Goal Identifier: HEP  Goal Description: Pt will demonstrate IND with BPPV repositioning techniques to promote reduction of vestibular symptoms and improve safety with functional mobility, if symptoms return.  Goal Progress: Initiated  Target Date: 07/05/24      Frequency of Treatment: 1x/week  Duration of Treatment: 60 days    Education Assessment:   Learner/Method: Patient;Listening;Demonstration;Pictures/Video;No Barriers to Learning;Reading    Risks and benefits of evaluation/treatment have been explained.   Patient/Family/caregiver agrees with Plan of Care.     Evaluation Time:     PT Eval, Moderate Complexity Minutes (18706): 17     Signing Clinician: Margie Zimmer PT, DPT      [facility]                                                                                   OUTPATIENT PHYSICAL THERAPY      PLAN OF TREATMENT FOR OUTPATIENT REHABILITATION   Patient's Last Name, First Name, RABIADEE DEE  ChanaEmery YOB: 1955   Provider's Name   [facility]   Medical Record No.  [id number]     Onset Date: 04/22/24 (date of order)  Start of Care Date: 05/07/24     Medical Diagnosis:  Benign paroxysmal positional vertigo, unspecified laterality      PT Treatment Diagnosis:  dizziness with head movement/change of body position; decreased activity tolerance Plan of Treatment  Frequency/Duration: 1x/week/ 60 days    Certification date from 05/07/24 to 07/05/24         See note for plan of treatment details and functional goals     Margie Zimmer PT, DPT                         I CERTIFY THE NEED FOR THESE SERVICES FURNISHED UNDER        THIS PLAN OF TREATMENT AND WHILE UNDER MY CARE     (Physician attestation of this document indicates review and certification of the therapy plan).              Referring Provider:  Margie HAMM  AIXA Kebede    Initial Assessment  See Epic Evaluation- Start of Care Date: 05/07/24

## 2024-05-14 ENCOUNTER — THERAPY VISIT (OUTPATIENT)
Dept: PHYSICAL THERAPY | Facility: CLINIC | Age: 69
End: 2024-05-14
Payer: MEDICARE

## 2024-05-14 DIAGNOSIS — H81.10 BENIGN PAROXYSMAL POSITIONAL VERTIGO, UNSPECIFIED LATERALITY: Primary | ICD-10-CM

## 2024-05-14 PROCEDURE — 95992 CANALITH REPOSITIONING PROC: CPT | Mod: GP | Performed by: PHYSICAL THERAPIST

## 2024-05-14 PROCEDURE — 97112 NEUROMUSCULAR REEDUCATION: CPT | Mod: GP | Performed by: PHYSICAL THERAPIST

## 2024-05-21 ENCOUNTER — THERAPY VISIT (OUTPATIENT)
Dept: PHYSICAL THERAPY | Facility: CLINIC | Age: 69
End: 2024-05-21
Payer: MEDICARE

## 2024-05-21 DIAGNOSIS — H81.10 BENIGN PAROXYSMAL POSITIONAL VERTIGO, UNSPECIFIED LATERALITY: ICD-10-CM

## 2024-05-21 DIAGNOSIS — R42 DIZZINESS: Primary | ICD-10-CM

## 2024-05-21 PROCEDURE — 97112 NEUROMUSCULAR REEDUCATION: CPT | Mod: GP | Performed by: PHYSICAL THERAPIST

## 2024-05-21 PROCEDURE — 97110 THERAPEUTIC EXERCISES: CPT | Mod: GP | Performed by: PHYSICAL THERAPIST

## 2024-05-22 ENCOUNTER — TELEPHONE (OUTPATIENT)
Dept: FAMILY MEDICINE | Facility: CLINIC | Age: 69
End: 2024-05-22

## 2024-05-22 ENCOUNTER — OFFICE VISIT (OUTPATIENT)
Dept: FAMILY MEDICINE | Facility: CLINIC | Age: 69
End: 2024-05-22
Payer: MEDICARE

## 2024-05-22 VITALS
TEMPERATURE: 98.4 F | WEIGHT: 200 LBS | RESPIRATION RATE: 16 BRPM | DIASTOLIC BLOOD PRESSURE: 67 MMHG | HEART RATE: 67 BPM | HEIGHT: 67 IN | SYSTOLIC BLOOD PRESSURE: 122 MMHG | BODY MASS INDEX: 31.39 KG/M2 | OXYGEN SATURATION: 95 %

## 2024-05-22 DIAGNOSIS — J06.9 UPPER RESPIRATORY TRACT INFECTION, UNSPECIFIED TYPE: Primary | ICD-10-CM

## 2024-05-22 DIAGNOSIS — R05.9 COUGH, UNSPECIFIED TYPE: ICD-10-CM

## 2024-05-22 DIAGNOSIS — R07.89 CHEST TIGHTNESS: ICD-10-CM

## 2024-05-22 DIAGNOSIS — R06.02 SOB (SHORTNESS OF BREATH): ICD-10-CM

## 2024-05-22 PROCEDURE — 99214 OFFICE O/P EST MOD 30 MIN: CPT | Performed by: PHYSICIAN ASSISTANT

## 2024-05-22 RX ORDER — RESPIRATORY SYNCYTIAL VIRUS VACCINE 120MCG/0.5
0.5 KIT INTRAMUSCULAR ONCE
Qty: 1 EACH | Refills: 0 | Status: CANCELLED | OUTPATIENT
Start: 2024-05-22 | End: 2024-05-22

## 2024-05-22 ASSESSMENT — PAIN SCALES - GENERAL: PAINLEVEL: NO PAIN (0)

## 2024-05-22 NOTE — TELEPHONE ENCOUNTER
Called patient to discuss symptoms further. Pt reports ongoing chest congestion since he was seen on 4/30/24 for URI. Patient reports non-productive, congested cough. Denies hemoptysis. Denies chest pain. Denies shortness of breath currently. Reports during coughing fits, it is hard to catch his breath at times. No wheezing.     Okay to keep appointment as scheduled?     Danitza Younger RN on 5/22/2024 at 11:28 AM

## 2024-05-22 NOTE — PROGRESS NOTES
Assessment & Plan     Upper respiratory tract infection, unspecified type  Cough, unspecified type  Significantly improved. No evidence of bacterial infection, lower respiratory tract involvement, or respiratory distress at this time. VSS, lungs clear. Recommend trying flonase nasal spray as there may be seasonal allergies contributing to nasal congestion, PND, cough. Continue to monitor and follow up if symptoms worsen or do not continue to improve.      Chest tightness  SOB (shortness of breath)  One episode 2 weeks ago when he was feeling overall sick with URI. He was bowling with his grand kids and he was having bad cough. He developed shortness of breath and chest tightness along with feeling sweaty and had to sit down and rest. Since then, he has not had any similar episodes of chest tightness/pain, shortness of breath, sweating. He continues to be active and goes for walks every night with his neighbor. They walk 1-1.5 miles in 30 minutes and he denies chest pain, shortness of breath, palpitations, sweating, dizziness, lightheadedness, nausea, etc. He had a normal stress echo 5/2017. Episode was likely related to URI and I have low suspicion that this was a worrisome cardiac event. I did recommend EKG today to look for any changes from prior EKG that could indicate recent cardiac injury but he declines further evaluation. He will go to ER or call 911 if red flag symptoms.        30 minutes spent on the date of the encounter doing chart review, history and exam, documentation and further activities per the note     Subjective   Bill is a 68 year old, presenting for the following health issues:  Follow Up    History of Present Illness       Reason for visit:  Shortness of breath, chest pressure    He eats 0-1 servings of fruits and vegetables daily.He consumes 3 sweetened beverage(s) daily.He exercises with enough effort to increase his heart rate 30 to 60 minutes per day.  He exercises with enough effort to  "increase his heart rate 7 days per week.   He is taking medications regularly.       Follow-up on cough, URI  He was seen about 3 weeks ago in urgent care (on 4/30/24) for 6 days of cough, headache, nasal congestion, scratchy throat. CBC was normal. CXR was normal. Respiratory panel positive for human metapneumovirus, otherwise negative. He was treated with prednisone and recommended to take flonase nasal spray. He completed 5 day course of prednisone and took flonase for 5 days as well and then stopped. He thinks the prednisone did help. Tried robitussin but didn't help.    Overall he is feeling much better. Cough has significantly improved but he \"can't seem to shake it\". Now more of a dry cough in morning and evenings. Cough is not keeping him up at night. No fever. Energy is improving - at about 75% of his baseline. Eating and drinking normally. No headache, wheezing, chest pain, shortness of breath.     He does have seasonal allergies and wonders if some of the nasal congestion/cough may be related to allergies.    Today's visit was previously scheduled for dizziness but then he was able to get in earlier and this visit was never canceled. Since he had visit already scheduled, he figured he would come in to recheck cough/URI but he is feeling a lot better and wouldn't have come in for visit today if it wasn't previously scheduled.    He did have episode a little over 2 weeks ago when symptoms were overall worse in which he was bowling with his grand kids and he was having bad cough. He developed shortness of breath and chest tightness along with feeling sweaty and had to sit down and rest. When this happened, he was overall feeling sick from the URI. Since then, he has not had any similar episodes of chest tightness/pain, shortness of breath, sweating.    He continues to be active and goes for walks every night with his neighbor. They walk 1-1.5 miles in 30 minutes and he denies chest pain, shortness of breath, " "palpitations, sweating, dizziness, lightheadedness, nausea, etc. He had a normal stress echo 5/2017    He was recently seen for vertigo and has been doing vestibular therapy which has been helping a lot with dizziness.          Objective    /67   Pulse 67   Temp 98.4  F (36.9  C) (Tympanic)   Resp 16   Ht 1.689 m (5' 6.5\")   Wt 90.7 kg (200 lb)   SpO2 95%   BMI 31.80 kg/m    Body mass index is 31.8 kg/m .  Physical Exam   GENERAL: alert and no distress  EYES: Eyes grossly normal to inspection, PERRL and conjunctivae and sclerae normal  HENT: ear canals and TM's normal, nose and mouth without ulcers or lesions  NECK: no adenopathy, no asymmetry, masses, or scars  RESP: lungs clear to auscultation - no rales, rhonchi or wheezes  CV: regular rate and rhythm, normal S1 S2, no S3 or S4, no murmur, click or rub, no peripheral edema  ABDOMEN: soft, nontender  NEURO: Normal strength and tone, mentation intact and speech normal  PSYCH: mentation appears normal, affect normal/bright          Signed Electronically by: Margie Kebede PA-C    "

## 2024-05-22 NOTE — TELEPHONE ENCOUNTER
Called to triage patient prior to appointment today due to appointment comments:    Follow up for vertigo & F/U URI from 4/30- per pt sob & chest pressure not better (pt did decline triage option)     Must be triaged. CHELSEA Younger RN on 5/22/2024 at 8:56 AM

## 2024-05-22 NOTE — PATIENT INSTRUCTIONS
I suspect you have a post viral cough and/or allergies  Since symptoms are improving, your lungs are normal on exam, and vital signs are normal, I do not suspect that you have a bacterial infection like pneumonia and I do not think that you need an antibiotic at this time. Closely monitor and follow-up if symptoms worsen or if you develop fever, shortness of breath, chest tightness, worsening fatigue, or other concerns.    Recommend rest, fluids. Use flonase nasal spray daily to see if that helps with allergies/cough.

## 2024-05-26 ENCOUNTER — HEALTH MAINTENANCE LETTER (OUTPATIENT)
Age: 69
End: 2024-05-26

## 2024-05-29 ENCOUNTER — THERAPY VISIT (OUTPATIENT)
Dept: PHYSICAL THERAPY | Facility: CLINIC | Age: 69
End: 2024-05-29
Payer: MEDICARE

## 2024-05-29 DIAGNOSIS — H81.10 BENIGN PAROXYSMAL POSITIONAL VERTIGO, UNSPECIFIED LATERALITY: ICD-10-CM

## 2024-05-29 DIAGNOSIS — R42 DIZZINESS: Primary | ICD-10-CM

## 2024-05-29 PROCEDURE — 97112 NEUROMUSCULAR REEDUCATION: CPT | Mod: GP | Performed by: PHYSICAL THERAPIST

## 2024-05-29 NOTE — PROGRESS NOTES
05/29/24 0500   Appointment Info   Signing clinician's name / credentials Margie Zimmer PT, DPT   Visits Used Visit 4   Medical Diagnosis Benign paroxysmal positional vertigo, unspecified laterality   PT Tx Diagnosis dizziness with head movement/change of body position; decreased activity tolerance   Other pertinent information Hx of back pain s/p multiple surgeries, hx of left shoulder arthroscopy   Progress Note/Certification   Start of Care Date 05/07/24   Onset of illness/injury or Date of Surgery 04/22/24  (date of order)   Therapy Frequency 1x/week   Predicted Duration 60 days   Certification date from 05/07/24   Certification date to 07/05/24   Progress Note Due Date   (10th visit)   Progress Note Completed Date 05/07/24   GOALS   PT Goals 2;3   PT Goal 1   Goal Identifier MET: DHI   Goal Description Pt will report 18pt decrease on DHI (MCID), for subjective reduction of dizziness symptoms with completion of daily activities.   Goal Progress 0/100  (Eval: 42/100)   Target Date 07/05/24   Date Met 05/29/24   PT Goal 2   Goal Identifier MET: Positional Testing   Goal Description Pt will complete BPPV positional testing without observed nystagmus and/or increase of dizziness.   Goal Progress Previous session, negative findings for BPPV; didn't reassess today as patient has been asymptomatic since previous session   Target Date 07/05/24   Date Met 05/29/24   PT Goal 3   Goal Identifier MET: HEP   Goal Description Pt will demonstrate IND with BPPV repositioning techniques to promote reduction of vestibular symptoms and improve safety with functional mobility, if symptoms return.   Goal Progress Has demonstrated and verbalized understanding of self-treatment.  Continue with neck stretches and ROM.  Resume CRM if symptoms return.   Target Date 07/05/24   Date Met 05/29/24   Subjective Report   Subjective Report Arriving today and states that the neck exercises have been beneficial.   Treatment Interventions  (PT)   Interventions Neuromuscular Re-education   Neuromuscular Re-education   Neuromuscular re-ed of mvmt, balance, coord, kinesthetic sense, posture, proprioception minutes (07797) 12   Skilled Intervention patient education   Patient Response/Progress Education regarding that based on subjective report and negative BPPV assessment previous session, BPPV has resolved.  Given the duration of time between this episode of care and previous episode of care, patient may have reoccurrence of BPPV.  Instructed patient to trial self CRM at home if symptoms return and if unable to self-treat, return for evaluation with updated order.  Patient verbalized understanding and is ready for discharge.   Infrared Goggle Exam or Frenzel Lense Exam   Other Infrared Goggle Exam or Frenzel Lense Exam Comments Has demonstrated bilateral BPPV at previous session; cleared as of previous session.   Education   Learner/Method Patient;Listening;No Barriers to Learning   Plan   Home program cervical ROM and stretches   Updates to plan of care All goals met.   Plan for next session N/A   Total Session Time   Timed Code Treatment Minutes 12   Total Treatment Time (sum of timed and untimed services) 12     DISCHARGE  Reason for Discharge: Patient has met all goals.    Equipment Issued: N/A    Discharge Plan: Continue with cervical exercises.  Trial self CRM at home if symptoms return and if unable to self-treat, return for evaluation with updated order.     Referring Provider:  Margie Kebede PA-C

## 2024-07-17 ENCOUNTER — OFFICE VISIT (OUTPATIENT)
Dept: DERMATOLOGY | Facility: CLINIC | Age: 69
End: 2024-07-17
Payer: MEDICARE

## 2024-07-17 DIAGNOSIS — L57.0 AK (ACTINIC KERATOSIS): ICD-10-CM

## 2024-07-17 DIAGNOSIS — L71.9 ACNE ROSACEA: ICD-10-CM

## 2024-07-17 DIAGNOSIS — Z12.83 ENCOUNTER FOR SCREENING FOR MALIGNANT NEOPLASM OF SKIN: ICD-10-CM

## 2024-07-17 DIAGNOSIS — L81.4 LENTIGINES: ICD-10-CM

## 2024-07-17 DIAGNOSIS — L82.1 SEBORRHEIC KERATOSES: ICD-10-CM

## 2024-07-17 DIAGNOSIS — D22.9 MULTIPLE BENIGN NEVI: Primary | ICD-10-CM

## 2024-07-17 DIAGNOSIS — D18.01 CHERRY ANGIOMA: ICD-10-CM

## 2024-07-17 PROCEDURE — 17003 DESTRUCT PREMALG LES 2-14: CPT | Performed by: NURSE PRACTITIONER

## 2024-07-17 PROCEDURE — 99214 OFFICE O/P EST MOD 30 MIN: CPT | Mod: 25 | Performed by: NURSE PRACTITIONER

## 2024-07-17 PROCEDURE — 17000 DESTRUCT PREMALG LESION: CPT | Performed by: NURSE PRACTITIONER

## 2024-07-17 RX ORDER — AZELAIC ACID 0.15 G/G
GEL TOPICAL
Qty: 50 G | Refills: 11 | Status: SHIPPED | OUTPATIENT
Start: 2024-07-17

## 2024-07-17 NOTE — PROGRESS NOTES
[facility] Dermatology Note  Encounter Date: Jul 17, 2024  Office Visit     Reviewed patients past medical history and pertinent chart review prior to patients visit today.     Dermatology Problem List:  Acne rosacea, failed metronidazole 0.75% cream, given azelaic acid 15% gel 7/17/2024   Actinic keratosis, face.  Has treated with Efudex in the past x2.  Cryo     Patient denies personal history of skin cancer or dysplastic nevi.    Patient denies family history of skin cancer or dysplastic nevi.    ____________________________________________    Assessment & Plan:     # Actinic keratosis, right temple and forehead x3. Premalignant nature discussed with patient. Treatment options discussed with patient today including no treatment, topical treatment, and cryotherapy. Patient elects to treat visible lesions today with cryotherapy. After verbal consent and discussion of risks and benefits including but no limited to dyspigmentation/scar, blister, and pain. A total of 4 actinic keratoses were treated with 1-2mm freeze border for 2 cycle with liquid nitrogen. Post cryotherapy instructions were provided.     # Acne rosacea, Benign, no further treatment needed.  Patient is bothered by the acne outbreaks on the nose and would like treatment.    -Use a gentle cleanser BID prior to applying medication and moisturizer to face.   -Start azelaic acid 15% gel BID to affected areas of the face. Findings of recent studies showing azelaic acid actually out performing metronidazole, permetherin and ivermectin topicals. Patient would like to start with this and will reevaluate after 3-4 months of use.   -Patient may notice some dryness but advised to use a non-comedogenic moisturizer such as Cetaphil cream, Cera Ve Cream, or Vanicream for dryness after applying medication or throughout the day as needed.   -Start moisturizer with SPF 30+ to face and neck in the morning after applying medication.   -Avoid extended  sun exposure and reapply sunscreen every 2-3 hours when sun exposed.   -Avoid retinoids, acids, or other irritation/drying chemicals, washes, or creams as these may exacerbate rosacea flares.      # Benign skin findings including: seborrheic keratoses, cherry angioma, lentigines and benign nevi.   - No further intervention required. Patient to report changes.   - Patient reassured of the benign nature of these lesions.    #Signs and Symptoms of non-melanoma skin cancer and ABCDEs of melanoma reviewed with patient. Patient encouraged to perform monthly self skin exams and educated on how to perform them. UV precautions reviewed with patient. Patient was asked about new or changing moles/lesions on body.     #Reviewed Sunscreen: Apply 20 minutes prior to going outdoors and reapply every two hours, when wet or sweating. We recommend using an SPF 30 or higher, and to use one that is water resistant.       Follow-up:  1-2 years for follow up full body skin exam, prn for new or changing lesions or new concerns    Dolly Burris, [facility]  Dermatology     ____________________________________________    CC: Skin Check (FBSC/Concerns /Right side of face)    HPI:  Mr. Emery Zayas is a(n) 68 year old male who presents today as a return patient for a full body skin cancer screening. He tried metronidazole 0.75% cream for acne rosacea but didn't find it very helpful. He wonders if he should try something else as the redness and pimples are still bothersome. He also has some rough dry skin on the forehead and right temple again.     Patient is otherwise feeling well, without additional skin concerns.     Physical Exam:  Vitals: There were no vitals taken for this visit.  SKIN: Total skin excluding the genitalia areas was performed. The exam included the head/face, neck, both arms, chest, back, abdomen, both legs, digits, mons pubis, buttock and nails.   -There is/are 4 small 2 mm erythematous macules with overlying adherent scale on  the right temple and forehead x3  -several 1-2mm red dome shaped symmetric papules scattered on the trunk  -multiple tan/brown flat round macules and raised papules scattered throughout trunk, extremities and head. No worrisome features for malignancy noted on examination.  -scattered tan, homogenous macules scattered on sun exposed areas of trunk, extremities and face.   -scattered waxy, stuck on tan/brown papules and patches on the trunk   -generalized erythema of the cheeks and nose with a few small inflammatory papules    - No other lesions of concern on areas examined.     Medications:  Current Outpatient Medications   Medication Sig Dispense Refill    amLODIPine (NORVASC) 2.5 MG tablet Take 1 tablet (2.5 mg) by mouth 2 times daily Appointment required for further refills 180 tablet 3    aspirin 81 MG tablet Take 1 tablet by mouth daily.      carvedilol (COREG) 12.5 MG tablet Take 1 tablet (12.5 mg) by mouth 2 times daily 180 tablet 3    fenofibrate (TRICOR) 145 MG tablet Take 1 tablet (145 mg) by mouth daily 90 tablet 3    fluticasone (FLONASE) 50 MCG/ACT nasal spray SPRAY 1 SPRAY INTO EACH NOSTRIL DAILY 48 mL 1    LORazepam (ATIVAN) 1 MG tablet Take 1 tablet (1 mg) by mouth every 6 hours as needed for anxiety 10 tablet 0    meclizine (ANTIVERT) 25 MG tablet Take 1 tablet (25 mg) by mouth 3 times daily as needed for dizziness 30 tablet 0    Multiple Vitamins-Minerals (MULTIVITAMIN ADULT) CHEW Take 1 chew tab by mouth daily      olmesartan (BENICAR) 40 MG tablet Take 1 tablet (40 mg) by mouth daily 90 tablet 3    omega-3 acid ethyl esters (LOVAZA) 1 g capsule TAKE 2 CAP BY MOUTH DAILY 180 capsule 3    order for DME Equipment being ordered: blood pressure cuff 1 each 0    rosuvastatin (CRESTOR) 20 MG tablet Take 1 tablet (20 mg) by mouth at bedtime Appointment required for further refills 90 tablet 3     Current Facility-Administered Medications   Medication Dose Route Frequency Provider Last Rate Last Admin     dexamethasone (DECADRON) injection 4 mg  4 mg   Chuck Huynh PA-C   4 mg at 02/03/22 1720    dexamethasone (DECADRON) injection 4 mg  4 mg   Chuck Huynh PA-C   4 mg at 02/03/22 1720    lidocaine 1 % injection 1 mL  1 mL   Chuck Huynh PA-C   1 mL at 02/03/22 1720    lidocaine 1 % injection 1 mL  1 mL   Chuck Huynh PA-C   1 mL at 02/03/22 1720      Past Medical History:   Patient Active Problem List   Diagnosis    Other and unspecified disc disorder of lumbar region    Dyspepsia and other specified disorders of function of stomach    Other chest pain    Chronic gout without tophus, unspecified cause, unspecified site    Hyperlipidemia LDL goal <130    Obesity    Metabolic syndrome    High blood triglycerides    Elevated liver enzymes    Anxiety    Essential hypertension, benign    Uncomplicated alcohol dependence (H)    Chronic bilateral low back pain without sciatica    Diffuse idiopathic skeletal hyperostosis    Facet arthropathy, lumbosacral    DDD (degenerative disc disease), lumbosacral    DDD (degenerative disc disease), thoracic    Lumbosacral spinal stenosis    Acute pain of left shoulder    Aftercare following surgery of the musculoskeletal system    Personal history of colonic polyps     Past Medical History:   Diagnosis Date    Acute idiopathic gout, unspecified site 08/23/2016    Alcoholism (H)     Treated 1980's, denies any recent problems with withdrawal when skips days drinking.    Anxiety 12/31/2014    Arthritis     Chronic low back pain     Dyspepsia and other specified disorders of function of stomach 07/31/2003    Elevated liver enzymes 12/30/2013    Gout     possible    Herniated intervertebral disk     thoracic    High blood triglycerides 12/30/2013    Hyperlipidemia LDL goal <130 04/15/2013    Hypertension     Hypertensive emergency 05/05/2017    Metabolic syndrome 12/30/2013    Obesity 04/15/2013    Other chest pain 07/31/2003    RBBB (right bundle  branch block)     Present for several years       CC Referred Self, MD  No address on file on close of this encounter.

## 2024-07-17 NOTE — PATIENT INSTRUCTIONS
Patient Education       Proper skin care from [facility]:    -Eliminate harsh soaps as they strip the natural oils from the skin, often resulting in dry itchy skin ( i.e. Dial, Zest, Bengali Spring)  -Use mild soaps such as Cetaphil or Dove Sensitive Skin in the shower. You do not need to use soap on arms, legs, and trunk every time you shower unless visibly soiled.   -Avoid hot or cold showers.  -After showering, lightly dry off and apply moisturizing within 2-3 minutes. This will help trap moisture in the skin.   -Aggressive use of a moisturizer at least 1-2 times a day to the entire body (including -Vanicream, Cetaphil, Aquaphor or Cerave) and moisturize hands after every washing.  -We recommend using moisturizers that come in a tub that needs to be scooped out, not a pump. This has more of an oil base. It will hold moisture in your skin much better than a water base moisturizer. The above recommended are non-pore clogging.      Wear a sunscreen with at least SPF 30 on your face, ears, neck and V of the chest daily. Wear sunscreen on other areas of the body if those areas are exposed to the sun throughout the day. Sunscreens can contain physical and/or chemical blockers. Physical blockers are less likely to clog pores, these include zinc oxide and titanium dioxide. Reapply every two hour and after swimming.     Sunscreen examples: https://www.ewg.org/sunscreen/    UV radiation  UVA radiation remains constant throughout the day and throughout the year. It is a longer wavelength than UVB and therefore penetrates deeper into the skin leading to immediate and delayed tanning, photoaging, and skin cancer. 70-80% of UVA and UVB radiation occurs between the hours of 10am-2pm.  UVB radiation  UVB radiation causes the most harmful effects and is more significant during the summer months. However, snow and ice can reflect UVB radiation leading to skin damage during the winter months as well. UVB radiation is  responsible for tanning, burning, inflammation, delayed erythema (pinkness), pigmentation (brown spots), and skin cancer.     I recommend self monthly full body exams and yearly full body exams with a dermatology provider. If you develop a new or changing lesion please follow up for examination. Most skin cancers are pink and scaly or pink and pearly. However, we do see blue/brown/black skin cancers.  Consider the ABCDEs of melanoma when giving yourself your monthly full body exam ( don't forget the groin, buttocks, feet, toes, etc). A-asymmetry, B-borders, C-color, D-diameter, E-elevation or evolving. If you see any of these changes please follow up in clinic. If you cannot see your back I recommend purchasing a hand held mirror to use with a larger wall mirror.       Checking for Skin Cancer  You can find cancer early by checking your skin each month. There are 3 kinds of skin cancer. They are melanoma, basal cell carcinoma, and squamous cell carcinoma. Doing monthly skin checks is the best way to find new marks or skin changes. Follow the instructions below for checking your skin.   The ABCDEs of checking moles for melanoma   Check your moles or growths for signs of melanoma using ABCDE:   Asymmetry: the sides of the mole or growth don t match  Border: the edges are ragged, notched, or blurred  Color: the color within the mole or growth varies  Diameter: the mole or growth is larger than 6 mm (size of a pencil eraser)  Evolving: the size, shape, or color of the mole or growth is changing (evolving is not shown in the images below)    Checking for other types of skin cancer  Basal cell carcinoma or squamous cell carcinoma have symptoms such as:     A spot or mole that looks different from all other marks on your skin  Changes in how an area feels, such as itching, tenderness, or pain  Changes in the skin's surface, such as oozing, bleeding, or scaliness  A sore that does not heal  New swelling or redness beyond  the border of a mole    Who s at risk?  Anyone can get skin cancer. But you are at greater risk if you have:   Fair skin, light-colored hair, or light-colored eyes  Many moles or abnormal moles on your skin  A history of sunburns from sunlight or tanning beds  A family history of skin cancer  A history of exposure to radiation or chemicals  A weakened immune system  If you have had skin cancer in the past, you are at risk for recurring skin cancer.   How to check your skin  Do your monthly skin checkups in front of a full-length mirror. Check all parts of your body, including your:   Head (ears, face, neck, and scalp)  Torso (front, back, and sides)  Arms (tops, undersides, upper, and lower armpits)  Hands (palms, backs, and fingers, including under the nails)  Buttocks and genitals  Legs (front, back, and sides)  Feet (tops, soles, toes, including under the nails, and between toes)  If you have a lot of moles, take digital photos of them each month. Make sure to take photos both up close and from a distance. These can help you see if any moles change over time.   Most skin changes are not cancer. But if you see any changes in your skin, call your doctor right away. Only he or she can diagnose a problem. If you have skin cancer, seeing your doctor can be the first step toward getting the treatment that could save your life.   [employer] last reviewed this educational content on 4/1/2019 2000-2020 [employer]. [address] [place] [address], [place] [address]. All rights reserved. This information is not intended as a substitute for professional medical care. Always follow your healthcare professional's instructions.       When should I call my doctor?  If you are worsening or not improving, please, contact us or seek urgent care as noted below.     Who should I call with questions (adults)?  [facility] (adult and pediatric): [phone]  [facility]  [place] (adult): [phone]  [facility] [place] ([place], [place], [place] and [place]) [phone]  For urgent needs outside of business hours call the [facility] at [phone] and ask for the dermatology resident on call to be paged  If this is a medical emergency and you are unable to reach an ER, Call 911      If you need a prescription refill, please contact your pharmacy. Refills are approved or denied by our Physicians during normal business hours, Monday through Fridays  Per office policy, refills will not be granted if you have not been seen within the past year (or sooner depending on your child's condition)

## 2024-07-17 NOTE — LETTER
7/17/2024      Emery Zayas  [address] [facility] [address]      Dear Colleague,    Thank you for referring your patient, Emery Zayas, to the [facility]. Please see a copy of my visit note below.    [facility] Dermatology Note  Encounter Date: Jul 17, 2024  Office Visit     Reviewed patients past medical history and pertinent chart review prior to patients visit today.     Dermatology Problem List:  Acne rosacea, failed metronidazole 0.75% cream, given azelaic acid 15% gel 7/17/2024   Actinic keratosis, face.  Has treated with Efudex in the past x2.  Cryo     Patient denies personal history of skin cancer or dysplastic nevi.    Patient denies family history of skin cancer or dysplastic nevi.    ____________________________________________    Assessment & Plan:     # Actinic keratosis, right temple and forehead x3. Premalignant nature discussed with patient. Treatment options discussed with patient today including no treatment, topical treatment, and cryotherapy. Patient elects to treat visible lesions today with cryotherapy. After verbal consent and discussion of risks and benefits including but no limited to dyspigmentation/scar, blister, and pain. A total of 4 actinic keratoses were treated with 1-2mm freeze border for 2 cycle with liquid nitrogen. Post cryotherapy instructions were provided.     # Acne rosacea, Benign, no further treatment needed.  Patient is bothered by the acne outbreaks on the nose and would like treatment.    -Use a gentle cleanser BID prior to applying medication and moisturizer to face.   -Start azelaic acid 15% gel BID to affected areas of the face. Findings of recent studies showing azelaic acid actually out performing metronidazole, permetherin and ivermectin topicals. Patient would like to start with this and will reevaluate after 3-4 months of use.   -Patient may notice some dryness but advised to use a  non-comedogenic moisturizer such as Cetaphil cream, Cera Ve Cream, or Vanicream for dryness after applying medication or throughout the day as needed.   -Start moisturizer with SPF 30+ to face and neck in the morning after applying medication.   -Avoid extended sun exposure and reapply sunscreen every 2-3 hours when sun exposed.   -Avoid retinoids, acids, or other irritation/drying chemicals, washes, or creams as these may exacerbate rosacea flares.      # Benign skin findings including: seborrheic keratoses, cherry angioma, lentigines and benign nevi.   - No further intervention required. Patient to report changes.   - Patient reassured of the benign nature of these lesions.    #Signs and Symptoms of non-melanoma skin cancer and ABCDEs of melanoma reviewed with patient. Patient encouraged to perform monthly self skin exams and educated on how to perform them. UV precautions reviewed with patient. Patient was asked about new or changing moles/lesions on body.     #Reviewed Sunscreen: Apply 20 minutes prior to going outdoors and reapply every two hours, when wet or sweating. We recommend using an SPF 30 or higher, and to use one that is water resistant.       Follow-up:  1-2 years for follow up full body skin exam, prn for new or changing lesions or new concerns    Dolly Burris CNP  Dermatology     ____________________________________________    CC: Skin Check (FBSC/Concerns /Right side of face)    HPI:  Mr. Emery Zayas is a(n) 68 year old male who presents today as a return patient for a full body skin cancer screening. He tried metronidazole 0.75% cream for acne rosacea but didn't find it very helpful. He wonders if he should try something else as the redness and pimples are still bothersome. He also has some rough dry skin on the forehead and right temple again.     Patient is otherwise feeling well, without additional skin concerns.     Physical Exam:  Vitals: There were no vitals taken for this  visit.  SKIN: Total skin excluding the genitalia areas was performed. The exam included the head/face, neck, both arms, chest, back, abdomen, both legs, digits, mons pubis, buttock and nails.   -There is/are 4 small 2 mm erythematous macules with overlying adherent scale on the right temple and forehead x3  -several 1-2mm red dome shaped symmetric papules scattered on the trunk  -multiple tan/brown flat round macules and raised papules scattered throughout trunk, extremities and head. No worrisome features for malignancy noted on examination.  -scattered tan, homogenous macules scattered on sun exposed areas of trunk, extremities and face.   -scattered waxy, stuck on tan/brown papules and patches on the trunk   -generalized erythema of the cheeks and nose with a few small inflammatory papules    - No other lesions of concern on areas examined.     Medications:  Current Outpatient Medications   Medication Sig Dispense Refill     amLODIPine (NORVASC) 2.5 MG tablet Take 1 tablet (2.5 mg) by mouth 2 times daily Appointment required for further refills 180 tablet 3     aspirin 81 MG tablet Take 1 tablet by mouth daily.       carvedilol (COREG) 12.5 MG tablet Take 1 tablet (12.5 mg) by mouth 2 times daily 180 tablet 3     fenofibrate (TRICOR) 145 MG tablet Take 1 tablet (145 mg) by mouth daily 90 tablet 3     fluticasone (FLONASE) 50 MCG/ACT nasal spray SPRAY 1 SPRAY INTO EACH NOSTRIL DAILY 48 mL 1     LORazepam (ATIVAN) 1 MG tablet Take 1 tablet (1 mg) by mouth every 6 hours as needed for anxiety 10 tablet 0     meclizine (ANTIVERT) 25 MG tablet Take 1 tablet (25 mg) by mouth 3 times daily as needed for dizziness 30 tablet 0     Multiple Vitamins-Minerals (MULTIVITAMIN ADULT) CHEW Take 1 chew tab by mouth daily       olmesartan (BENICAR) 40 MG tablet Take 1 tablet (40 mg) by mouth daily 90 tablet 3     omega-3 acid ethyl esters (LOVAZA) 1 g capsule TAKE 2 CAP BY MOUTH DAILY 180 capsule 3     order for DME Equipment being  ordered: blood pressure cuff 1 each 0     rosuvastatin (CRESTOR) 20 MG tablet Take 1 tablet (20 mg) by mouth at bedtime Appointment required for further refills 90 tablet 3     Current Facility-Administered Medications   Medication Dose Route Frequency Provider Last Rate Last Admin     dexamethasone (DECADRON) injection 4 mg  4 mg   Chuck Huynh PA-C   4 mg at 02/03/22 1720     dexamethasone (DECADRON) injection 4 mg  4 mg   Chuck Huynh PA-C   4 mg at 02/03/22 1720     lidocaine 1 % injection 1 mL  1 mL   Chuck Huynh PA-C   1 mL at 02/03/22 1720     lidocaine 1 % injection 1 mL  1 mL   Chuck Huynh PA-C   1 mL at 02/03/22 1720      Past Medical History:   Patient Active Problem List   Diagnosis     Other and unspecified disc disorder of lumbar region     Dyspepsia and other specified disorders of function of stomach     Other chest pain     Chronic gout without tophus, unspecified cause, unspecified site     Hyperlipidemia LDL goal <130     Obesity     Metabolic syndrome     High blood triglycerides     Elevated liver enzymes     Anxiety     Essential hypertension, benign     Uncomplicated alcohol dependence (H)     Chronic bilateral low back pain without sciatica     Diffuse idiopathic skeletal hyperostosis     Facet arthropathy, lumbosacral     DDD (degenerative disc disease), lumbosacral     DDD (degenerative disc disease), thoracic     Lumbosacral spinal stenosis     Acute pain of left shoulder     Aftercare following surgery of the musculoskeletal system     Personal history of colonic polyps     Past Medical History:   Diagnosis Date     Acute idiopathic gout, unspecified site 08/23/2016     Alcoholism (H)     Treated 1980's, denies any recent problems with withdrawal when skips days drinking.     Anxiety 12/31/2014     Arthritis      Chronic low back pain      Dyspepsia and other specified disorders of function of stomach 07/31/2003     Elevated liver enzymes  12/30/2013     Gout     possible     Herniated intervertebral disk     thoracic     High blood triglycerides 12/30/2013     Hyperlipidemia LDL goal <130 04/15/2013     Hypertension      Hypertensive emergency 05/05/2017     Metabolic syndrome 12/30/2013     Obesity 04/15/2013     Other chest pain 07/31/2003     RBBB (right bundle branch block)     Present for several years       CC Referred Self, MD  No address on file on close of this encounter.      Again, thank you for allowing me to participate in the care of your patient.        Sincerely,        SILVIANO Bailey CNP

## 2024-07-30 ENCOUNTER — OFFICE VISIT (OUTPATIENT)
Dept: FAMILY MEDICINE | Facility: CLINIC | Age: 69
End: 2024-07-30
Payer: MEDICARE

## 2024-07-30 VITALS
HEART RATE: 54 BPM | WEIGHT: 200.3 LBS | SYSTOLIC BLOOD PRESSURE: 115 MMHG | TEMPERATURE: 97.8 F | HEIGHT: 66 IN | OXYGEN SATURATION: 95 % | BODY MASS INDEX: 32.19 KG/M2 | RESPIRATION RATE: 16 BRPM | DIASTOLIC BLOOD PRESSURE: 68 MMHG

## 2024-07-30 DIAGNOSIS — Z12.5 SCREENING FOR PROSTATE CANCER: ICD-10-CM

## 2024-07-30 DIAGNOSIS — R25.1 TREMOR OF BOTH HANDS: ICD-10-CM

## 2024-07-30 DIAGNOSIS — M1A.9XX0 CHRONIC GOUT WITHOUT TOPHUS, UNSPECIFIED CAUSE, UNSPECIFIED SITE: ICD-10-CM

## 2024-07-30 DIAGNOSIS — I10 ESSENTIAL HYPERTENSION, BENIGN: Primary | ICD-10-CM

## 2024-07-30 DIAGNOSIS — R13.10 DYSPHAGIA, UNSPECIFIED TYPE: ICD-10-CM

## 2024-07-30 DIAGNOSIS — Z00.00 ENCOUNTER FOR MEDICARE ANNUAL WELLNESS EXAM: ICD-10-CM

## 2024-07-30 DIAGNOSIS — F10.20 UNCOMPLICATED ALCOHOL DEPENDENCE (H): ICD-10-CM

## 2024-07-30 LAB
PSA SERPL DL<=0.01 NG/ML-MCNC: 4.55 NG/ML (ref 0–6.5)
TSH SERPL DL<=0.005 MIU/L-ACNC: 1.35 UIU/ML (ref 0.3–4.2)
URATE SERPL-MCNC: 7.1 MG/DL (ref 3.4–7)

## 2024-07-30 PROCEDURE — 99214 OFFICE O/P EST MOD 30 MIN: CPT | Mod: 25 | Performed by: NURSE PRACTITIONER

## 2024-07-30 PROCEDURE — 84550 ASSAY OF BLOOD/URIC ACID: CPT | Performed by: NURSE PRACTITIONER

## 2024-07-30 PROCEDURE — 36415 COLL VENOUS BLD VENIPUNCTURE: CPT | Performed by: NURSE PRACTITIONER

## 2024-07-30 PROCEDURE — G0103 PSA SCREENING: HCPCS | Performed by: NURSE PRACTITIONER

## 2024-07-30 PROCEDURE — 84443 ASSAY THYROID STIM HORMONE: CPT | Performed by: NURSE PRACTITIONER

## 2024-07-30 PROCEDURE — G0439 PPPS, SUBSEQ VISIT: HCPCS | Performed by: NURSE PRACTITIONER

## 2024-07-30 SDOH — HEALTH STABILITY: PHYSICAL HEALTH: ON AVERAGE, HOW MANY MINUTES DO YOU ENGAGE IN EXERCISE AT THIS LEVEL?: 30 MIN

## 2024-07-30 SDOH — HEALTH STABILITY: PHYSICAL HEALTH: ON AVERAGE, HOW MANY DAYS PER WEEK DO YOU ENGAGE IN MODERATE TO STRENUOUS EXERCISE (LIKE A BRISK WALK)?: 7 DAYS

## 2024-07-30 ASSESSMENT — PAIN SCALES - GENERAL: PAINLEVEL: MODERATE PAIN (4)

## 2024-07-30 ASSESSMENT — SOCIAL DETERMINANTS OF HEALTH (SDOH): HOW OFTEN DO YOU GET TOGETHER WITH FRIENDS OR RELATIVES?: ONCE A WEEK

## 2024-07-30 NOTE — PROGRESS NOTES
"Preventive Care Visit  [facility]  SILVIANO Talbot CNP, Family Medicine  Jul 30, 2024      Assessment & Plan       Essential hypertension, benign  Chronic, controlled.  Managed by cardiology.     Screening for prostate cancer  screen  - PROSTATE SPEC ANTIGEN SCREEN; Future  - PROSTATE SPEC ANTIGEN SCREEN    Chronic gout without tophus, unspecified cause, unspecified site  He elected to stop allopurinol to reduce pill burden. Not having flares.    - Uric acid; Future  - Uric acid    Tremor of both hands  New. No tremor noted during exam today.  He notices tremor with action, mild.  Will check thyroid today.  Does drink 3 cans of soda daily; cut back on caffeine.  Monitor.   - TSH with free T4 reflex; Future  - TSH with free T4 reflex    Dysphagia, unspecified type  refer  - Adult ENT  Referral; Future    Encounter for Medicare annual wellness exam  Reveiwed age appropriate screenings and immunizations    Uncomplicated alcohol dependence (H)  Discussed cutting back.  Has done treatment in the past.              BMI  Estimated body mass index is 32.33 kg/m  as calculated from the following:    Height as of this encounter: 1.676 m (5' 6\").    Weight as of this encounter: 90.9 kg (200 lb 4.8 oz).   Weight management plan: Discussed healthy diet and exercise guidelines    Counseling  Appropriate preventive services were addressed with this patient via screening, questionnaire, or discussion as appropriate for fall prevention, nutrition, physical activity, Tobacco-use cessation, weight loss and cognition.  Checklist reviewing preventive services available has been given to the patient.  Reviewed patient's diet, addressing concerns and/or questions.   The patient was instructed to see the dentist every 6 months.   Discussed possible causes of fatigue. The patient reports drinking more than 3 alcoholic drinks per day and/or more than 7 drhnks per week. The patient was counseled and " "given information about possible harmful effects of excessive alcohol intake.    Follow-up 1 year     Subjective   Oziel is a 68 year old, presenting for the following:  Physical and Gastrointestinal Problem (Pt reports he has developed a gag reflex x 6-8 months. Says that he has no appetite. Also has noticed a tremor.)        7/30/2024     8:45 AM   Additional Questions   Roomed by Summer         7/30/2024     8:45 AM   Patient Reported Additional Medications   Patient reports taking the following new medications None         Health Care Directive  Patient does not have a Health Care Directive or Living Will: Patient states has Advance Directive and will bring in a copy to clinic.    HPI    Lots of \"rumbling\" in his stomach.  Gag reflex that is getting worse.  Swallowing pills, tooth brushing, hair in the mouth, tshirt rubbing on his neck can trigger this reflex which can lead to retching. Can feel bloated.  No weight loss.  No heart burn or regurgitation.  Seems to be worse in the morning and occurs 2-4x a week. Trouble swallowing.  No issues with soft foods like yogurt, pudding, but foods with substance to them can feel like they get stuck.     Tremor: he and spouse have both noticed this.  First noticed it while holding the newspaper.  Occurs while he is holding something.  When resting hands in lap doesn't notice the tremor.  Occurs in the hands, both sides R>L.  R hand dominant.  No pain, numbness, tingling.      Hx of gout; not having flares. Quit taking allopurinol a year ago (because wasn't having a flare in several months and doesn't like taking so many pills).     Alcohol use: daily, 3 drinks in the afternoon/early evening.  Has done inpatient and outpatient treatment in the past and had been sober for 8 years ('88-'96).  Can go several days without drinking.          Annual Wellness Visit     Patient has been advised of split billing requirements and indicates understanding: Yes       Health Care " Directive  Patient does not have a Health Care Directive or Living Will: Patient states has Advance Directive and will bring in a copy to clinic.      7/30/2024   General Health   How would you rate your overall physical health? Good   Feel stress (tense, anxious, or unable to sleep) Not at all             7/30/2024   Nutrition   Diet: Regular (no restrictions)            7/30/2024   Exercise   Days per week of moderate/strenous exercise 7 days   Average minutes spent exercising at this level 30 min              7/30/2024   Social Factors   Frequency of gathering with friends or relatives Once a week   Worry food won't last until get money to buy more No   Food not last or not have enough money for food? No   Do you have housing? (Housing is defined as stable permanent housing and does not include staying ouside in a car, in a tent, in an abandoned building, in an overnight shelter, or couch-surfing.) Yes   Are you worried about losing your housing? No   Lack of transportation? No   Unable to get utilities (heat,electricity)? No              7/30/2024   Fall Risk   Fallen 2 or more times in the past year? No   Trouble with walking or balance? No             7/30/2024   Activities of Daily Living- Home Safety   Needs help with the following daily activites None of the above   Safety concerns in the home None of the above            7/30/2024   Dental   Dentist two times every year? (!) NO            7/30/2024   Hearing Screening   Hearing concerns? None of the above            7/30/2024   Driving Risk Screening   Patient/family members have concerns about driving No            7/30/2024   General Alertness/Fatigue Screening   Have you been more tired than usual lately? (!) YES            7/30/2024   Urinary Incontinence Screening   Bothered by leaking urine in past 6 months No            7/30/2024   TB Screening   Were you born outside of the [place]? No          Social History     Tobacco Use    Smoking status: Former      Current packs/day: 0.00     Types: Cigarettes     Quit date: 7/15/1993     Years since quittin.0     Passive exposure: Never    Smokeless tobacco: Never    Tobacco comments:     Quit 20 years ago.   Vaping Use    Vaping status: Never Used   Substance Use Topics    Alcohol use: Yes     Alcohol/week: 7.0 standard drinks of alcohol     Types: 7 Shots of liquor per week     Comment: more than 12-15 rum and cokes or beer a week    Drug use: No         Today's PHQ-2 Score:       2024     7:45 AM   PHQ-2 (  [employer])   Q1: Little interest or pleasure in doing things 0   Q2: Feeling down, depressed or hopeless 0   PHQ-2 Score 0   Q1: Little interest or pleasure in doing things Not at all   Q2: Feeling down, depressed or hopeless Not at all   PHQ-2 Score 0       ASCVD Risk   The 10-year ASCVD risk score (Nicolette PLUMMER, et al., 2019) is: 16.7%    Values used to calculate the score:      Age: 68 years      Sex: Male      Is Non- : No      Diabetic: No      Tobacco smoker: No      Systolic Blood Pressure: 115 mmHg      Is BP treated: Yes      HDL Cholesterol: 35 mg/dL      Total Cholesterol: 132 mg/dL              Reviewed and updated as needed this visit by Provider                    Past Medical History:   Diagnosis Date    Acute idiopathic gout, unspecified site 2016    Alcoholism (H)     Treated , denies any recent problems with withdrawal when skips days drinking.    Anxiety 2014    Arthritis     Chronic low back pain     Dyspepsia and other specified disorders of function of stomach 2003    Elevated liver enzymes 2013    Gout     possible    Herniated intervertebral disk     thoracic    High blood triglycerides 2013    Hyperlipidemia LDL goal <130 04/15/2013    Hypertension     Hypertensive emergency 2017    Metabolic syndrome 2013    Obesity 04/15/2013    Other chest pain 2003    RBBB (right bundle branch block)     Present  for several years     Past Surgical History:   Procedure Laterality Date    ARTHROSCOPY, SHOULDER, SURGI Left 02/16/2023    [facility]    BACK SURGERY      Back surgery x 4      COLONOSCOPY N/A 01/23/2020    Procedure: COLONOSCOPY, WITH POLYPECTOMY AND BIOPSY; polypectomies  using jumbo bx forcep;  Surgeon: Sherice Mckeon MD;  Location:  GI    COLONOSCOPY N/A 1/12/2024    Procedure: Colonoscopy with polypectomy using hot snare and tattooing using scleroneedle;  Surgeon: Fidel Perez MD;  Location:  GI    ESOPHAGOSCOPY, GASTROSCOPY, DUODENOSCOPY (EGD), COMBINED  01/01/2000    normal    TONSILLECTOMY       Labs reviewed in EPIC  BP Readings from Last 3 Encounters:   07/30/24 115/68   05/22/24 122/67   04/30/24 139/78    Wt Readings from Last 3 Encounters:   07/30/24 90.9 kg (200 lb 4.8 oz)   05/22/24 90.7 kg (200 lb)   04/30/24 91.2 kg (201 lb)                  Current Outpatient Medications   Medication Sig Dispense Refill    amLODIPine (NORVASC) 2.5 MG tablet Take 1 tablet (2.5 mg) by mouth 2 times daily Appointment required for further refills 180 tablet 3    aspirin 81 MG tablet Take 1 tablet by mouth daily.      azelaic acid (FINACIA) 15 % external gel Apply to face for rosacea BID 50 g 11    carvedilol (COREG) 12.5 MG tablet Take 1 tablet (12.5 mg) by mouth 2 times daily 180 tablet 3    fenofibrate (TRICOR) 145 MG tablet Take 1 tablet (145 mg) by mouth daily 90 tablet 3    LORazepam (ATIVAN) 1 MG tablet Take 1 tablet (1 mg) by mouth every 6 hours as needed for anxiety 10 tablet 0    Multiple Vitamins-Minerals (MULTIVITAMIN ADULT) CHEW Take 1 chew tab by mouth daily      olmesartan (BENICAR) 40 MG tablet Take 1 tablet (40 mg) by mouth daily 90 tablet 3    omega-3 acid ethyl esters (LOVAZA) 1 g capsule TAKE 2 CAP BY MOUTH DAILY 180 capsule 3    order for DME Equipment being ordered: blood pressure cuff 1 each 0    rosuvastatin (CRESTOR) 20 MG tablet Take 1 tablet (20 mg) by mouth at bedtime Appointment  required for further refills 90 tablet 3    fluticasone (FLONASE) 50 MCG/ACT nasal spray SPRAY 1 SPRAY INTO EACH NOSTRIL DAILY (Patient not taking: Reported on 7/30/2024) 48 mL 1    meclizine (ANTIVERT) 25 MG tablet Take 1 tablet (25 mg) by mouth 3 times daily as needed for dizziness (Patient not taking: Reported on 7/30/2024) 30 tablet 0     Allergies   Allergen Reactions    No Known Drug Allergy        Current providers sharing in care for this patient include:  Patient Care Team:  Trish Edwards MD as PCP - General (Family Practice)  Tracy Pate PA-C as Physician Assistant (Dermatology)  Alcides Bundy MD as MD (Cardiovascular Disease)  Milly Bland APRN CNP as Nurse Practitioner (Cardiovascular Disease)  Igor Vitale PA-C as Assigned PCP  Nicolasa Burris APRN CNP as Nurse Practitioner (Dermatology)  Cheryl Ernst as Personal Advocate & Liaison (PAL) (Family Medicine)  Nicolasa Burris APRN CNP as Assigned Surgical Provider  Vale Christensen NP as Nurse Practitioner (Nurse Practitioner)  Vale Christensen NP as Nurse Practitioner (Nurse Practitioner)  Nahomi Chawla PA-C as Physician Assistant (Cardiovascular Disease)  Nahomi Chawla PA-C as Assigned Heart and Vascular Provider    The following health maintenance items are reviewed in Epic and correct as of today:  Health Maintenance   Topic Date Due    RSV VACCINE (Pregnancy & 60+) (1 - 1-dose 60+ series) Never done    ZOSTER IMMUNIZATION (2 of 2) 04/01/2021    COVID-19 Vaccine (6 - 2023-24 season) 09/01/2023    CMP  02/08/2024    ANNUAL REVIEW OF HM ORDERS  02/08/2024    MEDICARE ANNUAL WELLNESS VISIT  04/20/2024    PSA  04/20/2024    URIC ACID  04/20/2024    INFLUENZA VACCINE (1) 09/01/2024    LIPID  04/02/2025    FALL RISK ASSESSMENT  07/30/2025    COLORECTAL CANCER SCREENING  01/12/2027    GLUCOSE  04/22/2027    ADVANCE CARE PLANNING  04/20/2028    DTAP/TDAP/TD IMMUNIZATION (3 - Td or Tdap)  02/04/2031    HEPATITIS C SCREENING  Completed    PHQ-2 (once per calendar year)  Completed    AORTIC ANEURYSM SCREENING (SYSTEM ASSIGNED)  Completed    Pneumococcal Vaccine: 65+ Years  Addressed    IPV IMMUNIZATION  Aged Out    HPV IMMUNIZATION  Aged Out    MENINGITIS IMMUNIZATION  Aged Out    RSV MONOCLONAL ANTIBODY  Aged Out    CREATININE  Discontinued       Appropriate preventive services were discussed with this patient, including applicable screening as appropriate for fall prevention, nutrition, physical activity, Tobacco-use cessation, weight loss and cognition.  Checklist reviewing preventive services available has been given to the patient.          7/30/2024   Mini Cog   Clock Draw Score 2 Normal    2 Normal   3 Item Recall 3 objects recalled    3 objects recalled   Mini Cog Total Score 5    5       Multiple values from one day are sorted in reverse-chronological order                  7/30/2024   General Health   How would you rate your overall physical health? Good   Feel stress (tense, anxious, or unable to sleep) Not at all            7/30/2024   Nutrition   Diet: Regular (no restrictions)            7/30/2024   Exercise   Days per week of moderate/strenous exercise 7 days   Average minutes spent exercising at this level 30 min            7/30/2024   Social Factors   Frequency of gathering with friends or relatives Once a week   Worry food won't last until get money to buy more No   Food not last or not have enough money for food? No   Do you have housing? (Housing is defined as stable permanent housing and does not include staying ouside in a car, in a tent, in an abandoned building, in an overnight shelter, or couch-surfing.) Yes   Are you worried about losing your housing? No   Lack of transportation? No   Unable to get utilities (heat,electricity)? No            7/30/2024   Fall Risk   Fallen 2 or more times in the past year? No   Trouble with walking or balance? No             7/30/2024    Activities of Daily Living- Home Safety   Needs help with the following daily activites None of the above   Safety concerns in the home None of the above            7/30/2024   Dental   Dentist two times every year? (!) NO            7/30/2024   Hearing Screening   Hearing concerns? None of the above            7/30/2024   Driving Risk Screening   Patient/family members have concerns about driving No            7/30/2024   General Alertness/Fatigue Screening   Have you been more tired than usual lately? (!) YES            7/30/2024   Urinary Incontinence Screening   Bothered by leaking urine in past 6 months No            7/30/2024   TB Screening   Were you born outside of the [place]? No            Today's PHQ-2 Score:       7/30/2024     7:45 AM   PHQ-2 ( 1999 [employer])   Q1: Little interest or pleasure in doing things 0   Q2: Feeling down, depressed or hopeless 0   PHQ-2 Score 0   Q1: Little interest or pleasure in doing things Not at all   Q2: Feeling down, depressed or hopeless Not at all   PHQ-2 Score 0           7/30/2024   Substance Use   Alcohol more than 3/day or more than 7/wk Yes   How often do you have a drink containing alcohol 4 or more times a week   How many alcohol drinks on typical day 3 or 4   How often do you have 5+ drinks at one occasion Less than monthly   Audit 2/3 Score 2   How often not able to stop drinking once started Less than monthly   How often failed to do what normally expected Never   How often needed first drink in am after a heavy drinking session Never   How often feeling of guilt or remorse after drinking Never   How often unable to remember what happened the night before Never   Have you or someone else been injured because of your drinking No   Has anyone been concerned or suggested you cut down on drinking Yes, but not in the last year   TOTAL SCORE - AUDIT 9   Do you have a current opioid prescription? No   How severe/bad is pain from 1 to 10? 5/10   Do you use any other  substances recreationally? No        Social History     Tobacco Use    Smoking status: Former     Current packs/day: 0.00     Types: Cigarettes     Quit date: 7/15/1993     Years since quittin.0     Passive exposure: Never    Smokeless tobacco: Never    Tobacco comments:     Quit 20 years ago.   Vaping Use    Vaping status: Never Used   Substance Use Topics    Alcohol use: Yes     Alcohol/week: 7.0 standard drinks of alcohol     Types: 7 Shots of liquor per week     Comment: more than 12-15 rum and cokes or beer a week    Drug use: No       ASCVD Risk   The 10-year ASCVD risk score (Nicolette PLUMMER, et al., 2019) is: 16.7%    Values used to calculate the score:      Age: 68 years      Sex: Male      Is Non- : No      Diabetic: No      Tobacco smoker: No      Systolic Blood Pressure: 115 mmHg      Is BP treated: Yes      HDL Cholesterol: 35 mg/dL      Total Cholesterol: 132 mg/dL            Reviewed and updated as needed this visit by Provider                    Past Medical History:   Diagnosis Date    Acute idiopathic gout, unspecified site 2016    Alcoholism (H)     Treated , denies any recent problems with withdrawal when skips days drinking.    Anxiety 2014    Arthritis     Chronic low back pain     Dyspepsia and other specified disorders of function of stomach 2003    Elevated liver enzymes 2013    Gout     possible    Herniated intervertebral disk     thoracic    High blood triglycerides 2013    Hyperlipidemia LDL goal <130 04/15/2013    Hypertension     Hypertensive emergency 2017    Metabolic syndrome 2013    Obesity 04/15/2013    Other chest pain 2003    RBBB (right bundle branch block)     Present for several years     Past Surgical History:   Procedure Laterality Date    ARTHROSCOPY, SHOULDER, SURGI Left 2023    [facility]    BACK SURGERY      Back surgery x 4      COLONOSCOPY N/A 2020    Procedure: COLONOSCOPY,  WITH POLYPECTOMY AND BIOPSY; polypectomies  using jumbo bx forcep;  Surgeon: Sherice Mckeon MD;  Location:  GI    COLONOSCOPY N/A 1/12/2024    Procedure: Colonoscopy with polypectomy using hot snare and tattooing using scleroneedle;  Surgeon: Fidel Perez MD;  Location:  GI    ESOPHAGOSCOPY, GASTROSCOPY, DUODENOSCOPY (EGD), COMBINED  01/01/2000    normal    TONSILLECTOMY       Labs reviewed in EPIC  BP Readings from Last 3 Encounters:   07/30/24 115/68   05/22/24 122/67   04/30/24 139/78    Wt Readings from Last 3 Encounters:   07/30/24 90.9 kg (200 lb 4.8 oz)   05/22/24 90.7 kg (200 lb)   04/30/24 91.2 kg (201 lb)                  Current Outpatient Medications   Medication Sig Dispense Refill    amLODIPine (NORVASC) 2.5 MG tablet Take 1 tablet (2.5 mg) by mouth 2 times daily Appointment required for further refills 180 tablet 3    aspirin 81 MG tablet Take 1 tablet by mouth daily.      azelaic acid (FINACIA) 15 % external gel Apply to face for rosacea BID 50 g 11    carvedilol (COREG) 12.5 MG tablet Take 1 tablet (12.5 mg) by mouth 2 times daily 180 tablet 3    fenofibrate (TRICOR) 145 MG tablet Take 1 tablet (145 mg) by mouth daily 90 tablet 3    LORazepam (ATIVAN) 1 MG tablet Take 1 tablet (1 mg) by mouth every 6 hours as needed for anxiety 10 tablet 0    Multiple Vitamins-Minerals (MULTIVITAMIN ADULT) CHEW Take 1 chew tab by mouth daily      olmesartan (BENICAR) 40 MG tablet Take 1 tablet (40 mg) by mouth daily 90 tablet 3    omega-3 acid ethyl esters (LOVAZA) 1 g capsule TAKE 2 CAP BY MOUTH DAILY 180 capsule 3    order for DME Equipment being ordered: blood pressure cuff 1 each 0    rosuvastatin (CRESTOR) 20 MG tablet Take 1 tablet (20 mg) by mouth at bedtime Appointment required for further refills 90 tablet 3    fluticasone (FLONASE) 50 MCG/ACT nasal spray SPRAY 1 SPRAY INTO EACH NOSTRIL DAILY (Patient not taking: Reported on 7/30/2024) 48 mL 1    meclizine (ANTIVERT) 25 MG tablet Take 1 tablet  (25 mg) by mouth 3 times daily as needed for dizziness (Patient not taking: Reported on 7/30/2024) 30 tablet 0     Allergies   Allergen Reactions    No Known Drug Allergy      Current providers sharing in care for this patient include:  Patient Care Team:  Trish Edwards MD as PCP - General (Family Practice)  Tracy Pate PA-C as Physician Assistant (Dermatology)  Alcides Bundy MD as MD (Cardiovascular Disease)  Milly Bland APRN CNP as Nurse Practitioner (Cardiovascular Disease)  Igor Vitale PA-C as Assigned PCP  Nicolasa Burris APRN CNP as Nurse Practitioner (Dermatology)  Cheryl Ernst as Personal Advocate & Liaison (PAL) (Family Medicine)  Nicolasa Burris APRN CNP as Assigned Surgical Provider  Vale Christensen NP as Nurse Practitioner (Nurse Practitioner)  Vale Christensen NP as Nurse Practitioner (Nurse Practitioner)  Nahomi Chawla PA-C as Physician Assistant (Cardiovascular Disease)  Nahomi Chawla PA-C as Assigned Heart and Vascular Provider    The following health maintenance items are reviewed in Epic and correct as of today:  Health Maintenance   Topic Date Due    RSV VACCINE (Pregnancy & 60+) (1 - 1-dose 60+ series) Never done    ZOSTER IMMUNIZATION (2 of 2) 04/01/2021    COVID-19 Vaccine (6 - 2023-24 season) 09/01/2023    CMP  02/08/2024    ANNUAL REVIEW OF HM ORDERS  02/08/2024    MEDICARE ANNUAL WELLNESS VISIT  04/20/2024    PSA  04/20/2024    URIC ACID  04/20/2024    INFLUENZA VACCINE (1) 09/01/2024    LIPID  04/02/2025    FALL RISK ASSESSMENT  07/30/2025    COLORECTAL CANCER SCREENING  01/12/2027    GLUCOSE  04/22/2027    ADVANCE CARE PLANNING  04/20/2028    DTAP/TDAP/TD IMMUNIZATION (3 - Td or Tdap) 02/04/2031    HEPATITIS C SCREENING  Completed    PHQ-2 (once per calendar year)  Completed    AORTIC ANEURYSM SCREENING (SYSTEM ASSIGNED)  Completed    Pneumococcal Vaccine: 65+ Years  Addressed    IPV IMMUNIZATION  Aged Out    HPV  "IMMUNIZATION  Aged Out    MENINGITIS IMMUNIZATION  Aged Out    RSV MONOCLONAL ANTIBODY  Aged Out    CREATININE  Discontinued            Objective    Exam  /68 (BP Location: Right arm, Patient Position: Sitting, Cuff Size: Adult Regular)   Pulse 54   Temp 97.8  F (36.6  C) (Oral)   Resp 16   Ht 1.676 m (5' 6\")   Wt 90.9 kg (200 lb 4.8 oz)   SpO2 95%   BMI 32.33 kg/m     Estimated body mass index is 32.33 kg/m  as calculated from the following:    Height as of this encounter: 1.676 m (5' 6\").    Weight as of this encounter: 90.9 kg (200 lb 4.8 oz).    Physical Exam  GENERAL: alert and no distress  EYES: Eyes grossly normal to inspection, PERRL and conjunctivae and sclerae normal  HENT: ear canals and TM's normal, nose and mouth without ulcers or lesions  NECK: no adenopathy, no asymmetry, masses, or scars  RESP: lungs clear to auscultation - no rales, rhonchi or wheezes  CV: regular rate and rhythm, normal S1 S2, no S3 or S4, no murmur, click or rub, no peripheral edema  ABDOMEN: soft, nontender, no hepatosplenomegaly, no masses and bowel sounds normal  MS: no gross musculoskeletal defects noted, no edema  SKIN: no suspicious lesions or rashes  NEURO: Normal strength and tone, mentation intact and speech normal  NEURO: Normal strength and tone, mentation intact, speech normal, and gait normal, no hand tremor noted at rest or with outstretched hands , no head or voice tremor  PSYCH: mentation appears normal, affect normal/bright        7/30/2024   Mini Cog   Clock Draw Score 2 Normal    2 Normal   3 Item Recall 3 objects recalled    3 objects recalled   Mini Cog Total Score 5    5       Multiple values from one day are sorted in reverse-chronological order              Signed Electronically by: SILVIANO Talbot CNP    "

## 2024-07-30 NOTE — PATIENT INSTRUCTIONS
Patient Education   Preventive Care Advice   This is general advice given by our system to help you stay healthy. However, your care team may have specific advice just for you. Please talk to your care team about your preventive care needs.  Nutrition  Eat 5 or more servings of fruits and vegetables each day.  Try wheat bread, brown rice and whole grain pasta (instead of white bread, rice, and pasta).  Get enough calcium and vitamin D. Check the label on foods and aim for 100% of the RDA (recommended daily allowance).  Lifestyle  Exercise at least 150 minutes each week  (30 minutes a day, 5 days a week).  Do muscle strengthening activities 2 days a week. These help control your weight and prevent disease.  No smoking.  Wear sunscreen to prevent skin cancer.  Have a dental exam and cleaning every 6 months.  Yearly exams  See your health care team every year to talk about:  Any changes in your health.  Any medicines your care team has prescribed.  Preventive care, family planning, and ways to prevent chronic diseases.  Shots (vaccines)   HPV shots (up to age 26), if you've never had them before.  Hepatitis B shots (up to age 59), if you've never had them before.  COVID-19 shot: Get this shot when it's due.  Flu shot: Get a flu shot every year.  Tetanus shot: Get a tetanus shot every 10 years.  Pneumococcal, hepatitis A, and RSV shots: Ask your care team if you need these based on your risk.  Shingles shot (for age 50 and up)  General health tests  Diabetes screening:  Starting at age 35, Get screened for diabetes at least every 3 years.  If you are younger than age 35, ask your care team if you should be screened for diabetes.  Cholesterol test: At age 39, start having a cholesterol test every 5 years, or more often if advised.  Bone density scan (DEXA): At age 50, ask your care team if you should have this scan for osteoporosis (brittle bones).  Hepatitis C: Get tested at least once in your life.  STIs (sexually  transmitted infections)  Before age 24: Ask your care team if you should be screened for STIs.  After age 24: Get screened for STIs if you're at risk. You are at risk for STIs (including HIV) if:  You are sexually active with more than one person.  You don't use condoms every time.  You or a partner was diagnosed with a sexually transmitted infection.  If you are at risk for HIV, ask about PrEP medicine to prevent HIV.  Get tested for HIV at least once in your life, whether you are at risk for HIV or not.  Cancer screening tests  Cervical cancer screening: If you have a cervix, begin getting regular cervical cancer screening tests starting at age 21.  Breast cancer scan (mammogram): If you've ever had breasts, begin having regular mammograms starting at age 40. This is a scan to check for breast cancer.  Colon cancer screening: It is important to start screening for colon cancer at age 45.  Have a colonoscopy test every 10 years (or more often if you're at risk) Or, ask your provider about stool tests like a FIT test every year or Cologuard test every 3 years.  To learn more about your testing options, visit:   .  For help making a decision, visit:   https://bit.ly/sb35302.  Prostate cancer screening test: If you have a prostate, ask your care team if a prostate cancer screening test (PSA) at age 55 is right for you.  Lung cancer screening: If you are a current or former smoker ages 50 to 80, ask your care team if ongoing lung cancer screenings are right for you.  For informational purposes only. Not to replace the advice of your health care provider. Copyright   2023 [facility]. All rights reserved. Clinically reviewed by the [facility]. XMPie 800899 - REV 01/24.  Learning About Sleeping Well  What does sleeping well mean?     Sleeping well means getting enough sleep to feel good and stay healthy. How much sleep is enough varies among people.  The number of hours you  sleep and how you feel when you wake up are both important. If you do not feel refreshed, you probably need more sleep. Another sign of not getting enough sleep is feeling tired during the day.  Experts recommend that adults get at least 7 or more hours of sleep per day. Children and older adults need more sleep.  Why is getting enough sleep important?  Getting enough quality sleep is a basic part of good health. When your sleep suffers, your physical health, mood, and your thoughts can suffer too. You may find yourself feeling more grumpy or stressed. Not getting enough sleep also can lead to serious problems, including injury, accidents, anxiety, and depression.  What might cause poor sleeping?  Many things can cause sleep problems, including:  Changes to your sleep schedule.  Stress. Stress can be caused by fear about a single event, such as giving a speech. Or you may have ongoing stress, such as worry about work or school.  Depression, anxiety, and other mental or emotional conditions.  Changes in your sleep habits or surroundings. This includes changes that happen where you sleep, such as noise, light, or sleeping in a different bed. It also includes changes in your sleep pattern, such as having jet lag or working a late shift.  Health problems, such as pain, breathing problems, and restless legs syndrome.  Lack of regular exercise.  Using alcohol, nicotine, or caffeine before bed.  How can you help yourself?  Here are some tips that may help you sleep more soundly and wake up feeling more refreshed.  Your sleeping area   Use your bedroom only for sleeping and sex. A bit of light reading may help you fall asleep. But if it doesn't, do your reading elsewhere in the house. Try not to use your TV, computer, smartphone, or tablet while you are in bed.  Be sure your bed is big enough to stretch out comfortably, especially if you have a sleep partner.  Keep your bedroom quiet, dark, and cool. Use curtains, blinds,  "or a sleep mask to block out light. To block out noise, use earplugs, soothing music, or a \"white noise\" machine.  Your evening and bedtime routine   Create a relaxing bedtime routine. You might want to take a warm shower or bath, or listen to soothing music.  Go to bed at the same time every night. And get up at the same time every morning, even if you feel tired.  What to avoid   Limit caffeine (coffee, tea, caffeinated sodas) during the day, and don't have any for at least 6 hours before bedtime.  Avoid drinking alcohol before bedtime. Alcohol can cause you to wake up more often during the night.  Try not to smoke or use tobacco, especially in the evening. Nicotine can keep you awake.  Limit naps during the day, especially close to bedtime.  Avoid lying in bed awake for too long. If you can't fall asleep or if you wake up in the middle of the night and can't get back to sleep within about 20 minutes, get out of bed and go to another room until you feel sleepy.  Avoid taking medicine right before bed that may keep you awake or make you feel hyper or energized. Your doctor can tell you if your medicine may do this and if you can take it earlier in the day.  If you can't sleep   Imagine yourself in a peaceful, pleasant scene. Focus on the details and feelings of being in a place that is relaxing.  Get up and do a quiet or boring activity until you feel sleepy.  Avoid drinking any liquids before going to bed to help prevent waking up often to use the bathroom.  Where can you learn more?  Go to https://www.[employer].net/patiented  Enter J942 in the search box to learn more about \"Learning About Sleeping Well.\"  Current as of: July 10, 2023               Content Version: 14.0    3571-2315 [employer].   Care instructions adapted under license by your healthcare professional. If you have questions about a medical condition or this instruction, always ask your healthcare professional. [employer] " "[employer]corpor[employer] disclaims any warranty or liability for your use of this information.      9 Ways to Cut Back on Drinking  Maybe you've found yourself drinking more alcohol than you'd prefer. If you want to cut back, here are some ideas to try.    Think before you drink.  Do you really want a drink, or is it just a habit? If you're used to having a drink at a certain time, try doing something else then.     Look for substitutes.  Find some no-alcohol drinks that you enjoy, like flavored seltzer water, tea with honey, or tonic with a slice of lime. Or try alcohol-free beer or \"virgin\" cocktails (without the alcohol).     Drink more water.  Use water to quench your thirst. Drink a glass of water before you have any alcohol. Have another glass along with every drink or between drinks.     Shrink your drink.  For example, have a bottle of beer instead of a pint. Use a smaller glass for wine. Choose drinks with lower alcohol content (ABV%). Or use less liquor and more mixer in cocktails.     Slow down.  It's easy to drink quickly and without thinking about it. Pay attention, and make each drink last longer.     Do the math.  Total up how much you spend on alcohol each month. How much is that a year? If you cut back, what could you do with the money you save?     Take a break.  Choose a day or two each week when you won't drink at all. Notice how you feel on those days, physically and emotionally. How did you sleep? Do you feel better? Over time, add more break days.     Count calories.  Would you like to lose some weight? For some people that's a good motivator for cutting back. Figure out how many calories are in each drink. How many does that add up to in a day? In a week? In a month?     Practice saying no.  Be ready when someone offers you a drink. Try: \"Thanks, I've had enough.\" Or \"Thanks, but I'm cutting back.\" Or \"No, thanks. I feel better when I drink less.\"   Current as of: November 15, 2023               Content " Version: 14.0    5094-7236 [email or web address].   Care instructions adapted under license by your healthcare professional. If you have questions about a medical condition or this instruction, always ask your healthcare professional. [email or web address] disclaims any warranty or liability for your use of this information.

## 2024-08-13 ENCOUNTER — TRANSFERRED RECORDS (OUTPATIENT)
Dept: HEALTH INFORMATION MANAGEMENT | Facility: CLINIC | Age: 69
End: 2024-08-13
Payer: MEDICARE

## 2024-09-16 ENCOUNTER — LAB (OUTPATIENT)
Dept: LAB | Facility: CLINIC | Age: 69
End: 2024-09-16
Payer: MEDICARE

## 2024-09-16 DIAGNOSIS — E78.1 HIGH BLOOD TRIGLYCERIDES: ICD-10-CM

## 2024-09-16 DIAGNOSIS — E78.5 HYPERLIPIDEMIA LDL GOAL <130: ICD-10-CM

## 2024-09-16 DIAGNOSIS — I10 ESSENTIAL HYPERTENSION, BENIGN: ICD-10-CM

## 2024-09-16 LAB
CHOLEST SERPL-MCNC: 119 MG/DL
FASTING STATUS PATIENT QL REPORTED: YES
HDLC SERPL-MCNC: 30 MG/DL
LDLC SERPL CALC-MCNC: 49 MG/DL
NONHDLC SERPL-MCNC: 89 MG/DL
TRIGL SERPL-MCNC: 199 MG/DL

## 2024-09-16 PROCEDURE — 36415 COLL VENOUS BLD VENIPUNCTURE: CPT | Performed by: PHYSICIAN ASSISTANT

## 2024-09-16 PROCEDURE — 80061 LIPID PANEL: CPT | Performed by: PHYSICIAN ASSISTANT

## 2024-09-20 NOTE — RESULT ENCOUNTER NOTE
Results and recommendations routed Via My Chart with call back information if needed.   Results reviewed.   I think these were pulled early - were to be drawn at 4/2024 appt with Dr. Bundy.   Nonetheless, LDL looks good but TGs elevated. He needs to work on dietary changes.   Thanks,

## 2024-11-04 ENCOUNTER — TRANSFERRED RECORDS (OUTPATIENT)
Dept: HEALTH INFORMATION MANAGEMENT | Facility: CLINIC | Age: 69
End: 2024-11-04
Payer: MEDICARE

## 2024-12-09 ENCOUNTER — OFFICE VISIT (OUTPATIENT)
Dept: DERMATOLOGY | Facility: CLINIC | Age: 69
End: 2024-12-09
Payer: MEDICARE

## 2024-12-09 DIAGNOSIS — L57.0 ACTINIC KERATOSIS: ICD-10-CM

## 2024-12-09 DIAGNOSIS — L71.9 ACNE ROSACEA: Primary | ICD-10-CM

## 2024-12-09 RX ORDER — FLUOROURACIL 50 MG/G
CREAM TOPICAL
Qty: 40 G | Refills: 0 | Status: SHIPPED | OUTPATIENT
Start: 2024-12-09

## 2024-12-09 NOTE — PROGRESS NOTES
[facility] Dermatology Note  Encounter Date: Dec 9, 2024  Office Visit      Dermatology Problem List:  FBSE 7/17/24    Acne rosacea  - previous: metronidazole 0.75% cream  - current: azelaic acid 15% gel   2. Actinic keratosis - face.  - Has treated with Efudex in the past x2.  Repeat efudex to temples/forehead/hairline initiated 12/9/24  - s/p Cryo   ____________________________________________    Assessment & Plan:    # Actinic damage - forehead/frontal hairline/temples  - Start Efudex to apply along his hairline near his forehead and temples. Counseled that patient should expect erythema and scale, but should discontinue this medication if significant oozing or pain greater than 5/10 is to occur. Recommend the patient wash hands after use or use gloves to apply. Keep medication away from pets. Handout provided with administration instructions.    # Acne Rosacea   - Will hold azelaic acid while doing treatment above  - can resume once he has healed from the treatment    Procedures Performed:   None     Follow-up: 4mo in-person, or earlier for new or changing lesions    Staff and scribe:     Scribe Disclosure:   I, ZURI REILLY, am serving as a scribe; to document services personally performed by Gia Chan PA-C -based on data collection and the provider's statements to me.     Provider Disclosure:  I agree with above History, Review of Systems, Physical exam and Plan.  I have reviewed the content of the documentation and have edited it as needed. I have personally performed the services documented here and the documentation accurately represents those services and the decisions I have made.      Electronically signed by:    All risks, benefits and alternatives were discussed with patient.  Patient is in agreement and understands the assessment and plan.  All questions were answered.    Gia Chan PA-C, MPAS  [facility]  [facility]: Phone: [phone], Fax: [phone]  [facility]: Phone: [phone],  Fax: [phone]  [facility]: Phone: [phone], Fax: [phone]  ____________________________________________    CC: Derm Problem (Ongoing concerns with dry patches on temples and forehead, also some ongoing acne on nose)      Reviewed patients past medical history and pertinent chart review prior to patient's visit today.     HPI:  Mr. Emery Zayas is a 69 year old male who presents today as a return patient for derm problem. Today patient reported that he has been having on going concerns with dry patches on temples and forehead. Previously seen Nicolasa Burris, where a few Ak's were treated with cryo. At that time he also had his annual FBSE    He also had concerns on acne on his nose. Has been using azelaic acid.     Patient is otherwise feeling well, without additional concerns.    Labs:  N/A    Physical Exam:  Vitals: There were no vitals taken for this visit.  SKIN: Focused examination of areas noted below was performed.   - There is actinic damage along the patients hair line and on temples and generally on the forehead.   - There are erythematous papules and scattered telangectasias on the bilateral cheeks and nose. .   - No other lesions of concern on areas examined.     Medications:  Current Outpatient Medications   Medication Sig Dispense Refill    amLODIPine (NORVASC) 2.5 MG tablet Take 1 tablet (2.5 mg) by mouth 2 times daily Appointment required for further refills 180 tablet 3    aspirin 81 MG tablet Take 1 tablet by mouth daily.      azelaic acid (FINACIA) 15 % external gel Apply to face for rosacea BID 50 g 11    carvedilol (COREG) 12.5 MG tablet Take 1 tablet (12.5 mg) by mouth 2 times daily 180 tablet 3    fenofibrate (TRICOR) 145 MG tablet Take 1 tablet (145 mg) by mouth daily 90 tablet 3    fluticasone (FLONASE) 50 MCG/ACT nasal spray SPRAY 1 SPRAY INTO EACH  NOSTRIL DAILY 48 mL 1    LORazepam (ATIVAN) 1 MG tablet Take 1 tablet (1 mg) by mouth every 6 hours as needed for anxiety 10 tablet 0    meclizine (ANTIVERT) 25 MG tablet Take 1 tablet (25 mg) by mouth 3 times daily as needed for dizziness 30 tablet 0    Multiple Vitamins-Minerals (MULTIVITAMIN ADULT) CHEW Take 1 chew tab by mouth daily      olmesartan (BENICAR) 40 MG tablet Take 1 tablet (40 mg) by mouth daily 90 tablet 3    omega-3 acid ethyl esters (LOVAZA) 1 g capsule TAKE 2 CAP BY MOUTH DAILY 180 capsule 3    order for DME Equipment being ordered: blood pressure cuff 1 each 0    rosuvastatin (CRESTOR) 20 MG tablet Take 1 tablet (20 mg) by mouth at bedtime Appointment required for further refills 90 tablet 3     Current Facility-Administered Medications   Medication Dose Route Frequency Provider Last Rate Last Admin    dexamethasone (DECADRON) injection 4 mg  4 mg   Chuck Huynh PA-C   4 mg at 02/03/22 1720    dexamethasone (DECADRON) injection 4 mg  4 mg   Chuck Huynh PA-C   4 mg at 02/03/22 1720    lidocaine 1 % injection 1 mL  1 mL   Chuck Huynh PA-C   1 mL at 02/03/22 1720    lidocaine 1 % injection 1 mL  1 mL   Chuck Huynh PA-C   1 mL at 02/03/22 1720      Past Medical/Surgical History:   Patient Active Problem List   Diagnosis    Other and unspecified disc disorder of lumbar region    Dyspepsia and other specified disorders of function of stomach    Other chest pain    Chronic gout without tophus, unspecified cause, unspecified site    Hyperlipidemia LDL goal <130    Obesity    Metabolic syndrome    High blood triglycerides    Elevated liver enzymes    Anxiety    Essential hypertension, benign    Uncomplicated alcohol dependence (H)    Chronic bilateral low back pain without sciatica    Diffuse idiopathic skeletal hyperostosis    Facet arthropathy, lumbosacral    DDD (degenerative disc disease), lumbosacral    DDD (degenerative disc disease), thoracic     Lumbosacral spinal stenosis    Acute pain of left shoulder    Aftercare following surgery of the musculoskeletal system    Personal history of colon polyps, unspecified     Past Medical History:   Diagnosis Date    Acute idiopathic gout, unspecified site 08/23/2016    Alcoholism (H)     Treated 1980's, denies any recent problems with withdrawal when skips days drinking.    Anxiety 12/31/2014    Arthritis     Chronic low back pain     Dyspepsia and other specified disorders of function of stomach 07/31/2003    Elevated liver enzymes 12/30/2013    Gout     possible    Herniated intervertebral disk     thoracic    High blood triglycerides 12/30/2013    Hyperlipidemia LDL goal <130 04/15/2013    Hypertension     Hypertensive emergency 05/05/2017    Metabolic syndrome 12/30/2013    Obesity 04/15/2013    Other chest pain 07/31/2003    RBBB (right bundle branch block)     Present for several years

## 2024-12-09 NOTE — LETTER
12/9/2024      Emery Zayas  [address] [facility] [address]      Dear Colleague,    Thank you for referring your patient, Emery Zayas, to the [facility]. Please see a copy of my visit note below.    [facility] Dermatology Note  Encounter Date: Dec 9, 2024  Office Visit      Dermatology Problem List:  FBSE 7/17/24    Acne rosacea  - previous: metronidazole 0.75% cream  - current: azelaic acid 15% gel   2. Actinic keratosis - face.  - Has treated with Efudex in the past x2.  Repeat efudex to temples/forehead/hairline initiated 12/9/24  - s/p Cryo   ____________________________________________    Assessment & Plan:    # Actinic damage - forehead/frontal hairline/temples  - Start Efudex to apply along his hairline near his forehead and temples. Counseled that patient should expect erythema and scale, but should discontinue this medication if significant oozing or pain greater than 5/10 is to occur. Recommend the patient wash hands after use or use gloves to apply. Keep medication away from pets. Handout provided with administration instructions.    # Acne Rosacea   - Will hold azelaic acid while doing treatment above  - can resume once he has healed from the treatment    Procedures Performed:   None     Follow-up: 4mo in-person, or earlier for new or changing lesions    Staff and scribe:     Scribe Disclosure:   I, ZURI REILLY, am serving as a scribe; to document services personally performed by Gia Chan PA-C -based on data collection and the provider's statements to me.     Provider Disclosure:  I agree with above History, Review of Systems, Physical exam and Plan.  I have reviewed the content of the documentation and have edited it as needed. I have personally performed the services documented here and the documentation accurately represents those services and the decisions I have made.      Electronically signed by:    All risks,  benefits and alternatives were discussed with patient.  Patient is in agreement and understands the assessment and plan.  All questions were answered.    Gia Chan PA-C, MPAS  [facility] [place]: Phone: [phone], Fax: [phone]  [facility]: Phone: [phone],  Fax: [phone]  [facility]: Phone: [phone], Fax: [phone]  ____________________________________________    CC: Derm Problem (Ongoing concerns with dry patches on temples and forehead, also some ongoing acne on nose)      Reviewed patients past medical history and pertinent chart review prior to patient's visit today.     HPI:  Mr. Emery Zayas is a 69 year old male who presents today as a return patient for derm problem. Today patient reported that he has been having on going concerns with dry patches on temples and forehead. Previously seen Nicolasa Burris, where a few Ak's were treated with cryo. At that time he also had his annual FBSE    He also had concerns on acne on his nose. Has been using azelaic acid.     Patient is otherwise feeling well, without additional concerns.    Labs:  N/A    Physical Exam:  Vitals: There were no vitals taken for this visit.  SKIN: Focused examination of areas noted below was performed.   - There is actinic damage along the patients hair line and on temples and generally on the forehead.   - There are erythematous papules and scattered telangectasias on the bilateral cheeks and nose. .   - No other lesions of concern on areas examined.     Medications:  Current Outpatient Medications   Medication Sig Dispense Refill     amLODIPine (NORVASC) 2.5 MG tablet Take 1 tablet (2.5 mg) by mouth 2 times daily Appointment required for further refills 180 tablet 3     aspirin 81 MG tablet Take 1 tablet by mouth daily.       azelaic acid (FINACIA) 15 % external gel Apply to face for rosacea BID 50 g 11      carvedilol (COREG) 12.5 MG tablet Take 1 tablet (12.5 mg) by mouth 2 times daily 180 tablet 3     fenofibrate (TRICOR) 145 MG tablet Take 1 tablet (145 mg) by mouth daily 90 tablet 3     fluticasone (FLONASE) 50 MCG/ACT nasal spray SPRAY 1 SPRAY INTO EACH NOSTRIL DAILY 48 mL 1     LORazepam (ATIVAN) 1 MG tablet Take 1 tablet (1 mg) by mouth every 6 hours as needed for anxiety 10 tablet 0     meclizine (ANTIVERT) 25 MG tablet Take 1 tablet (25 mg) by mouth 3 times daily as needed for dizziness 30 tablet 0     Multiple Vitamins-Minerals (MULTIVITAMIN ADULT) CHEW Take 1 chew tab by mouth daily       olmesartan (BENICAR) 40 MG tablet Take 1 tablet (40 mg) by mouth daily 90 tablet 3     omega-3 acid ethyl esters (LOVAZA) 1 g capsule TAKE 2 CAP BY MOUTH DAILY 180 capsule 3     order for DME Equipment being ordered: blood pressure cuff 1 each 0     rosuvastatin (CRESTOR) 20 MG tablet Take 1 tablet (20 mg) by mouth at bedtime Appointment required for further refills 90 tablet 3     Current Facility-Administered Medications   Medication Dose Route Frequency Provider Last Rate Last Admin     dexamethasone (DECADRON) injection 4 mg  4 mg   Chuck Huynh PA-C   4 mg at 02/03/22 1720     dexamethasone (DECADRON) injection 4 mg  4 mg   Chuck Huynh PA-C   4 mg at 02/03/22 1720     lidocaine 1 % injection 1 mL  1 mL   Chuck Huynh PA-C   1 mL at 02/03/22 1720     lidocaine 1 % injection 1 mL  1 mL   Chuck Huynh PA-C   1 mL at 02/03/22 1720      Past Medical/Surgical History:   Patient Active Problem List   Diagnosis     Other and unspecified disc disorder of lumbar region     Dyspepsia and other specified disorders of function of stomach     Other chest pain     Chronic gout without tophus, unspecified cause, unspecified site     Hyperlipidemia LDL goal <130     Obesity     Metabolic syndrome     High blood triglycerides     Elevated liver enzymes     Anxiety     Essential hypertension,  benign     Uncomplicated alcohol dependence (H)     Chronic bilateral low back pain without sciatica     Diffuse idiopathic skeletal hyperostosis     Facet arthropathy, lumbosacral     DDD (degenerative disc disease), lumbosacral     DDD (degenerative disc disease), thoracic     Lumbosacral spinal stenosis     Acute pain of left shoulder     Aftercare following surgery of the musculoskeletal system     Personal history of colon polyps, unspecified     Past Medical History:   Diagnosis Date     Acute idiopathic gout, unspecified site 08/23/2016     Alcoholism (H)     Treated 1980's, denies any recent problems with withdrawal when skips days drinking.     Anxiety 12/31/2014     Arthritis      Chronic low back pain      Dyspepsia and other specified disorders of function of stomach 07/31/2003     Elevated liver enzymes 12/30/2013     Gout     possible     Herniated intervertebral disk     thoracic     High blood triglycerides 12/30/2013     Hyperlipidemia LDL goal <130 04/15/2013     Hypertension      Hypertensive emergency 05/05/2017     Metabolic syndrome 12/30/2013     Obesity 04/15/2013     Other chest pain 07/31/2003     RBBB (right bundle branch block)     Present for several years                        Again, thank you for allowing me to participate in the care of your patient.        Sincerely,        Gia Chan PA-C

## 2024-12-09 NOTE — PATIENT INSTRUCTIONS
Proper skin care from [facility]:    -Eliminate harsh soaps as they strip the natural oils from the skin, often resulting in dry itchy skin ( i.e. Dial, Zest, [place] Spring)  -Use mild soaps such as Cetaphil or Dove Sensitive Skin in the shower. You do not need to use soap on arms, legs, and trunk every time you shower unless visibly soiled.   -Avoid hot or cold showers.  -After showering, lightly dry off and apply moisturizing within 2-3 minutes. This will help trap moisture in the skin.   -Aggressive use of a moisturizer at least 1-2 times a day to the entire body (including -Vanicream, Cetaphil, Aquaphor or Cerave) and moisturize hands after every washing.  -We recommend using moisturizers that come in a tub that needs to be scooped out, not a pump. This has more of an oil base. It will hold moisture in your skin much better than a water base moisturizer. The above recommended are non-pore clogging.      Wear a sunscreen with at least SPF 30 on your face, ears, neck and V of the chest daily. Wear sunscreen on other areas of the body if those areas are exposed to the sun throughout the day. Sunscreens can contain physical and/or chemical blockers. Physical blockers are less likely to clog pores, these include zinc oxide and titanium dioxide. Reapply every two hour and after swimming.     Sunscreen examples: https://www.ewg.org/sunscreen/    UV radiation  UVA radiation remains constant throughout the day and throughout the year. It is a longer wavelength than UVB and therefore penetrates deeper into the skin leading to immediate and delayed tanning, photoaging, and skin cancer. 70-80% of UVA and UVB radiation occurs between the hours of 10am-2pm.  UVB radiation  UVB radiation causes the most harmful effects and is more significant during the summer months. However, snow and ice can reflect UVB radiation leading to skin damage during the winter months as well. UVB radiation is responsible for tanning,  burning, inflammation, delayed erythema (pinkness), pigmentation (brown spots), and skin cancer.     I recommend self monthly full body exams and yearly full body exams with a dermatology provider. If you develop a new or changing lesion please follow up for examination. Most skin cancers are pink and scaly or pink and pearly. However, we do see blue/brown/black skin cancers.  Consider the ABCDEs of melanoma when giving yourself your monthly full body exam ( don't forget the groin, buttocks, feet, toes, etc). A-asymmetry, B-borders, C-color, D-diameter, E-elevation or evolving. If you see any of these changes please follow up in clinic. If you cannot see your back I recommend purchasing a hand held mirror to use with a larger wall mirror.       Checking for Skin Cancer  You can find cancer early by checking your skin each month. There are 3 kinds of skin cancer. They are melanoma, basal cell carcinoma, and squamous cell carcinoma. Doing monthly skin checks is the best way to find new marks or skin changes. Follow the instructions below for checking your skin.   The ABCDEs of checking moles for melanoma   Check your moles or growths for signs of melanoma using ABCDE:   Asymmetry: the sides of the mole or growth don t match  Border: the edges are ragged, notched, or blurred  Color: the color within the mole or growth varies  Diameter: the mole or growth is larger than 6 mm (size of a pencil eraser)  Evolving: the size, shape, or color of the mole or growth is changing (evolving is not shown in the images below)    Checking for other types of skin cancer  Basal cell carcinoma or squamous cell carcinoma have symptoms such as:     A spot or mole that looks different from all other marks on your skin  Changes in how an area feels, such as itching, tenderness, or pain  Changes in the skin's surface, such as oozing, bleeding, or scaliness  A sore that does not heal  New swelling or redness beyond the border of a  mole    Who s at risk?  Anyone can get skin cancer. But you are at greater risk if you have:   Fair skin, light-colored hair, or light-colored eyes  Many moles or abnormal moles on your skin  A history of sunburns from sunlight or tanning beds  A family history of skin cancer  A history of exposure to radiation or chemicals  A weakened immune system  If you have had skin cancer in the past, you are at risk for recurring skin cancer.   How to check your skin  Do your monthly skin checkups in front of a full-length mirror. Check all parts of your body, including your:   Head (ears, face, neck, and scalp)  Torso (front, back, and sides)  Arms (tops, undersides, upper, and lower armpits)  Hands (palms, backs, and fingers, including under the nails)  Buttocks and genitals  Legs (front, back, and sides)  Feet (tops, soles, toes, including under the nails, and between toes)  If you have a lot of moles, take digital photos of them each month. Make sure to take photos both up close and from a distance. These can help you see if any moles change over time.   Most skin changes are not cancer. But if you see any changes in your skin, call your doctor right away. Only he or she can diagnose a problem. If you have skin cancer, seeing your doctor can be the first step toward getting the treatment that could save your life.   [employer] last reviewed this educational content on 4/1/2019 2000-2020 [employer]. [address] [place] [address], [place] [address]. All rights reserved. This information is not intended as a substitute for professional medical care. Always follow your healthcare professional's instructions.       When should I call my doctor?  If you are worsening or not improving, please, contact us or seek urgent care as noted below.     Who should I call with questions (adults)?    [facility] [phone]  For urgent needs outside of business hours call the [facility] at  [phone] and ask for the dermatology resident on call to be paged  If this is a medical emergency and you are unable to reach an ER, Call 911      If you need a prescription refill, please contact your pharmacy. Refills are approved or denied by our Physicians during normal business hours, Monday through Fridays  Per office policy, refills will not be granted if you have not been seen within the past year (or sooner depending on your child's condition)

## 2025-01-20 ENCOUNTER — TRANSFERRED RECORDS (OUTPATIENT)
Dept: HEALTH INFORMATION MANAGEMENT | Facility: CLINIC | Age: 70
End: 2025-01-20
Payer: MEDICARE

## 2025-01-24 ENCOUNTER — TRANSFERRED RECORDS (OUTPATIENT)
Dept: HEALTH INFORMATION MANAGEMENT | Facility: CLINIC | Age: 70
End: 2025-01-24
Payer: MEDICARE

## 2025-03-03 DIAGNOSIS — E78.1 HIGH BLOOD TRIGLYCERIDES: ICD-10-CM

## 2025-03-03 RX ORDER — FENOFIBRATE 145 MG/1
145 TABLET, COATED ORAL DAILY
Qty: 90 TABLET | Refills: 0 | Status: SHIPPED | OUTPATIENT
Start: 2025-03-03

## 2025-04-01 DIAGNOSIS — E78.5 HYPERLIPIDEMIA LDL GOAL <130: ICD-10-CM

## 2025-04-01 RX ORDER — ROSUVASTATIN CALCIUM 20 MG/1
20 TABLET, COATED ORAL AT BEDTIME
Qty: 90 TABLET | Refills: 1 | Status: SHIPPED | OUTPATIENT
Start: 2025-04-01

## 2025-04-08 ENCOUNTER — LAB (OUTPATIENT)
Dept: LAB | Facility: CLINIC | Age: 70
End: 2025-04-08
Payer: COMMERCIAL

## 2025-04-08 DIAGNOSIS — E78.1 HIGH BLOOD TRIGLYCERIDES: ICD-10-CM

## 2025-04-08 DIAGNOSIS — E78.5 HYPERLIPIDEMIA LDL GOAL <130: ICD-10-CM

## 2025-04-08 DIAGNOSIS — I10 ESSENTIAL HYPERTENSION, BENIGN: ICD-10-CM

## 2025-04-08 LAB
ALT SERPL W P-5'-P-CCNC: 26 U/L (ref 0–70)
ANION GAP SERPL CALCULATED.3IONS-SCNC: 10 MMOL/L (ref 7–15)
BUN SERPL-MCNC: 10.3 MG/DL (ref 8–23)
CALCIUM SERPL-MCNC: 9.3 MG/DL (ref 8.8–10.4)
CHLORIDE SERPL-SCNC: 104 MMOL/L (ref 98–107)
CREAT SERPL-MCNC: 1.09 MG/DL (ref 0.67–1.17)
EGFRCR SERPLBLD CKD-EPI 2021: 73 ML/MIN/1.73M2
GLUCOSE SERPL-MCNC: 131 MG/DL (ref 70–99)
HCO3 SERPL-SCNC: 26 MMOL/L (ref 22–29)
POTASSIUM SERPL-SCNC: 4.4 MMOL/L (ref 3.4–5.3)
SODIUM SERPL-SCNC: 140 MMOL/L (ref 135–145)

## 2025-04-15 ENCOUNTER — OFFICE VISIT (OUTPATIENT)
Dept: FAMILY MEDICINE | Facility: CLINIC | Age: 70
End: 2025-04-15
Payer: COMMERCIAL

## 2025-04-15 VITALS
HEART RATE: 51 BPM | TEMPERATURE: 97.2 F | RESPIRATION RATE: 16 BRPM | OXYGEN SATURATION: 95 % | WEIGHT: 211.6 LBS | HEIGHT: 66 IN | BODY MASS INDEX: 34.01 KG/M2 | SYSTOLIC BLOOD PRESSURE: 137 MMHG | DIASTOLIC BLOOD PRESSURE: 88 MMHG

## 2025-04-15 DIAGNOSIS — R73.09 ELEVATED GLUCOSE: ICD-10-CM

## 2025-04-15 DIAGNOSIS — E88.810 METABOLIC SYNDROME: ICD-10-CM

## 2025-04-15 DIAGNOSIS — F10.20 UNCOMPLICATED ALCOHOL DEPENDENCE (H): ICD-10-CM

## 2025-04-15 DIAGNOSIS — M48.07 LUMBOSACRAL SPINAL STENOSIS: ICD-10-CM

## 2025-04-15 DIAGNOSIS — M51.34 DDD (DEGENERATIVE DISC DISEASE), THORACIC: ICD-10-CM

## 2025-04-15 DIAGNOSIS — R14.0 ABDOMINAL DISTENSION: ICD-10-CM

## 2025-04-15 DIAGNOSIS — R10.9 FLANK PAIN: ICD-10-CM

## 2025-04-15 DIAGNOSIS — R10.11 RUQ ABDOMINAL PAIN: Primary | ICD-10-CM

## 2025-04-15 LAB
ALBUMIN UR-MCNC: NEGATIVE MG/DL
APPEARANCE UR: CLEAR
BILIRUB UR QL STRIP: NEGATIVE
COLOR UR AUTO: YELLOW
ERYTHROCYTE [DISTWIDTH] IN BLOOD BY AUTOMATED COUNT: 12.4 % (ref 10–15)
EST. AVERAGE GLUCOSE BLD GHB EST-MCNC: 111 MG/DL
GLUCOSE UR STRIP-MCNC: NEGATIVE MG/DL
HBA1C MFR BLD: 5.5 % (ref 0–5.6)
HCT VFR BLD AUTO: 42.1 % (ref 40–53)
HGB BLD-MCNC: 14.5 G/DL (ref 13.3–17.7)
HGB UR QL STRIP: NEGATIVE
KETONES UR STRIP-MCNC: NEGATIVE MG/DL
LEUKOCYTE ESTERASE UR QL STRIP: NEGATIVE
MCH RBC QN AUTO: 32.1 PG (ref 26.5–33)
MCHC RBC AUTO-ENTMCNC: 34.4 G/DL (ref 31.5–36.5)
MCV RBC AUTO: 93 FL (ref 78–100)
NITRATE UR QL: NEGATIVE
PH UR STRIP: 5 [PH] (ref 5–7)
PLATELET # BLD AUTO: 199 10E3/UL (ref 150–450)
RBC # BLD AUTO: 4.52 10E6/UL (ref 4.4–5.9)
SP GR UR STRIP: 1.02 (ref 1–1.03)
UROBILINOGEN UR STRIP-ACNC: 1 E.U./DL
WBC # BLD AUTO: 5.1 10E3/UL (ref 4–11)

## 2025-04-15 PROCEDURE — 85027 COMPLETE CBC AUTOMATED: CPT

## 2025-04-15 PROCEDURE — 99214 OFFICE O/P EST MOD 30 MIN: CPT

## 2025-04-15 PROCEDURE — 80076 HEPATIC FUNCTION PANEL: CPT

## 2025-04-15 PROCEDURE — G2211 COMPLEX E/M VISIT ADD ON: HCPCS

## 2025-04-15 PROCEDURE — 36415 COLL VENOUS BLD VENIPUNCTURE: CPT

## 2025-04-15 PROCEDURE — 3075F SYST BP GE 130 - 139MM HG: CPT

## 2025-04-15 PROCEDURE — 81003 URINALYSIS AUTO W/O SCOPE: CPT

## 2025-04-15 PROCEDURE — 3079F DIAST BP 80-89 MM HG: CPT

## 2025-04-15 PROCEDURE — 83036 HEMOGLOBIN GLYCOSYLATED A1C: CPT

## 2025-04-15 ASSESSMENT — ENCOUNTER SYMPTOMS: BACK PAIN: 1

## 2025-04-15 NOTE — PROGRESS NOTES
Assessment & Plan     (R10.11) RUQ abdominal pain  (primary encounter diagnosis)  (R14.0) Abdominal distension  (F10.20) Uncomplicated alcohol dependence (H)  (E88.810) Metabolic syndrome  Comment: Patient has pertinent history of uncomplicated alcohol dependence.  Patient endorses drinking anywhere from 2-6 drinks daily.  Typically drinks to help cope with his back discomfort.  Patient has been noting worsening abdominal distention over the last 2 months.  Last abdominal ultrasound noted in 2008 which indicated that patient had fatty liver disease.  He does have a history of elevated liver enzymes however recently had ALT done which was normal.  Given distention would like to get updated LFT, will also get abdominal ultrasound.  Patient belly quite distended today.  Like to rule out any concerns for abdominal fluid buildup.  Patient denies any lower extremity swelling.  Patient does follow cardiology for blood pressure and cholesterol management, scheduled for follow-up in July.  Will follow-up on results and whether further direction is necessary. Patient fully understands and is agreeable with plan of care, at this point patient will follow up as needed unless acute concerns arise in the meantime.  plan: US Abdomen Complete, CBC with platelets,         Hepatic panel (Albumin, ALT, AST, Bili, Alk         Phos, TP)    (R73.09) Elevated glucose  Comment: BMP noting blood sugar 131 fasting.  Will update A1c today.  Will follow-up with results with further direction necessary.  Plan: Hemoglobin A1c    (R10.9) Flank pain  (M51.34) DDD (degenerative disc disease), thoracic  (M48.07) Lumbosacral spinal stenosis  Comment: UA to rule in/out any concern for infection or proteinuria involvement.  Quite possible that patient's pain could be musculoskeletal as has pertinent history of degenerative disc disease and spinal stenosis with surgical correction in the past.  Will follow-up on results with further direction  "necessary.   Plan: UA Macroscopic with reflex to Microscopic and         Culture - Lab Collect    30 minutes spent by me on the date of the encounter doing chart review, history and exam, documentation and further activities per the note    BMI  Estimated body mass index is 34.15 kg/m  as calculated from the following:    Height as of this encounter: 1.676 m (5' 6\").    Weight as of this encounter: 96 kg (211 lb 9.6 oz).     Carlos Ludwig is a 69 year old, presenting for the following health issues:  Abdominal Pain (bloating), Back Pain (Suddenly gotten worse), and Dizziness (Sitting helps, not all the time. Bending over makes it worse)        4/15/2025     1:44 PM   Additional Questions   Roomed by jean carpio   Accompanied by wife gene     History of Present Illness       Reason for visit:  Stomach pain and bloating, back pain and light headed  Symptom onset:  More than a month  Symptoms include:  Continual pain that changes thruout the day  Symptom intensity:  Moderate  Symptom progression:  Worsening  Had these symptoms before:  No  What makes it worse:  Eating some foods  What makes it better:  No       Pain History:  When did you first notice your pain? 2 months ago   Have you seen anyone else for your pain? No  How has your pain affected your ability to work? Not applicable  Where in your body do you have pain? Abdominal/Flank Pain  Onset/Duration: 2 months  Description: back pain seams to be worse with the ab pain  Character: steady ache, bloating especially after eating  Location: right upper quadrant right lower quadrant  Radiation:  can start lower then move upward toward the right side  Intensity: moderate  Progression of Symptoms:  worsening  Accompanying Signs & Symptoms:  Fever/Chills: YES  Gas/Bloating: YES  Nausea: No  Vomitting: No  Diarrhea: YES  Constipation: YES  Dysuria or Hematuria: No  History:   Trauma: No  Previous similar pain: No  Previous tests done: none  Precipitating factors:   Does " "the pain change with:     Food: YES    Bowel Movement: No    Urination: No   Other factors:  No  Therapies tried and outcome: None      Review of Systems  Constitutional, HEENT, cardiovascular, pulmonary, gi and gu systems are negative, except as otherwise noted.      Objective    /88 (BP Location: Left arm, Patient Position: Sitting, Cuff Size: Adult Large)   Pulse 51   Temp 97.2  F (36.2  C) (Temporal)   Resp 16   Ht 1.676 m (5' 6\")   Wt 96 kg (211 lb 9.6 oz)   SpO2 95%   BMI 34.15 kg/m    Body mass index is 34.15 kg/m .  Physical Exam  Vitals and nursing note reviewed.   Constitutional:       General: He is not in acute distress.     Appearance: Normal appearance.   Cardiovascular:      Rate and Rhythm: Normal rate and regular rhythm.      Heart sounds: No murmur heard.     No friction rub. No gallop.   Pulmonary:      Effort: Pulmonary effort is normal. No respiratory distress.      Breath sounds: No wheezing, rhonchi or rales.   Abdominal:      General: Bowel sounds are normal. There is distension.      Palpations: Abdomen is soft.      Tenderness: There is abdominal tenderness in the right upper quadrant. There is right CVA tenderness. There is no left CVA tenderness, guarding or rebound. Negative signs include Sosa's sign and McBurney's sign.   Musculoskeletal:      Cervical back: Normal.      Thoracic back: Normal.        Back:       Comments: Pain to palpation along highlighted area.    Skin:     General: Skin is warm and dry.   Neurological:      Mental Status: He is alert.   Psychiatric:         Mood and Affect: Mood normal.         Behavior: Behavior normal. Behavior is cooperative.         Thought Content: Thought content normal.         Judgment: Judgment normal.        Signed Electronically by: SILVIANO Martínez CNP    "

## 2025-04-16 ENCOUNTER — MYC MEDICAL ADVICE (OUTPATIENT)
Dept: FAMILY MEDICINE | Facility: CLINIC | Age: 70
End: 2025-04-16
Payer: COMMERCIAL

## 2025-04-16 LAB
ALBUMIN SERPL BCG-MCNC: 4.4 G/DL (ref 3.5–5.2)
ALP SERPL-CCNC: 62 U/L (ref 40–150)
ALT SERPL W P-5'-P-CCNC: 28 U/L (ref 0–70)
AST SERPL W P-5'-P-CCNC: 36 U/L (ref 0–45)
BILIRUB DIRECT SERPL-MCNC: 0.23 MG/DL (ref 0–0.3)
BILIRUB SERPL-MCNC: 0.5 MG/DL
PROT SERPL-MCNC: 7.4 G/DL (ref 6.4–8.3)

## 2025-04-16 NOTE — TELEPHONE ENCOUNTER
See my chart message    Replied via Tim Padilla RN BSN  Clinic Nurse  [facility]

## 2025-05-14 ENCOUNTER — HOSPITAL ENCOUNTER (OUTPATIENT)
Dept: ULTRASOUND IMAGING | Facility: CLINIC | Age: 70
Discharge: HOME | End: 2025-05-14
Payer: COMMERCIAL

## 2025-05-14 DIAGNOSIS — R10.11 RUQ ABDOMINAL PAIN: ICD-10-CM

## 2025-05-14 DIAGNOSIS — R14.0 ABDOMINAL DISTENSION: ICD-10-CM

## 2025-05-14 PROCEDURE — 76700 US EXAM ABDOM COMPLETE: CPT

## 2025-05-15 ENCOUNTER — RESULTS FOLLOW-UP (OUTPATIENT)
Dept: FAMILY MEDICINE | Facility: CLINIC | Age: 70
End: 2025-05-15

## 2025-05-28 DIAGNOSIS — E78.1 HIGH BLOOD TRIGLYCERIDES: ICD-10-CM

## 2025-05-28 DIAGNOSIS — I10 ESSENTIAL HYPERTENSION, BENIGN: ICD-10-CM

## 2025-05-28 RX ORDER — OLMESARTAN MEDOXOMIL 40 MG/1
40 TABLET ORAL DAILY
Qty: 90 TABLET | Refills: 0 | Status: SHIPPED | OUTPATIENT
Start: 2025-05-28

## 2025-05-28 RX ORDER — CARVEDILOL 12.5 MG/1
12.5 TABLET ORAL 2 TIMES DAILY
Qty: 180 TABLET | Refills: 0 | Status: SHIPPED | OUTPATIENT
Start: 2025-05-28

## 2025-05-28 RX ORDER — OMEGA-3-ACID ETHYL ESTERS 1 G/1
CAPSULE, LIQUID FILLED ORAL
Qty: 180 CAPSULE | Refills: 0 | Status: SHIPPED | OUTPATIENT
Start: 2025-05-28

## 2025-05-29 ASSESSMENT — ANXIETY QUESTIONNAIRES
4. TROUBLE RELAXING: NOT AT ALL
6. BECOMING EASILY ANNOYED OR IRRITABLE: NOT AT ALL
GAD7 TOTAL SCORE: 0
2. NOT BEING ABLE TO STOP OR CONTROL WORRYING: NOT AT ALL
3. WORRYING TOO MUCH ABOUT DIFFERENT THINGS: NOT AT ALL
7. FEELING AFRAID AS IF SOMETHING AWFUL MIGHT HAPPEN: NOT AT ALL
8. IF YOU CHECKED OFF ANY PROBLEMS, HOW DIFFICULT HAVE THESE MADE IT FOR YOU TO DO YOUR WORK, TAKE CARE OF THINGS AT HOME, OR GET ALONG WITH OTHER PEOPLE?: NOT DIFFICULT AT ALL
GAD7 TOTAL SCORE: 0
7. FEELING AFRAID AS IF SOMETHING AWFUL MIGHT HAPPEN: NOT AT ALL
GAD7 TOTAL SCORE: 0
1. FEELING NERVOUS, ANXIOUS, OR ON EDGE: NOT AT ALL
5. BEING SO RESTLESS THAT IT IS HARD TO SIT STILL: NOT AT ALL
IF YOU CHECKED OFF ANY PROBLEMS ON THIS QUESTIONNAIRE, HOW DIFFICULT HAVE THESE PROBLEMS MADE IT FOR YOU TO DO YOUR WORK, TAKE CARE OF THINGS AT HOME, OR GET ALONG WITH OTHER PEOPLE: NOT DIFFICULT AT ALL

## 2025-05-29 ASSESSMENT — PATIENT HEALTH QUESTIONNAIRE - PHQ9
10. IF YOU CHECKED OFF ANY PROBLEMS, HOW DIFFICULT HAVE THESE PROBLEMS MADE IT FOR YOU TO DO YOUR WORK, TAKE CARE OF THINGS AT HOME, OR GET ALONG WITH OTHER PEOPLE: NOT DIFFICULT AT ALL
SUM OF ALL RESPONSES TO PHQ QUESTIONS 1-9: 3
SUM OF ALL RESPONSES TO PHQ QUESTIONS 1-9: 3

## 2025-06-02 ENCOUNTER — OFFICE VISIT (OUTPATIENT)
Dept: FAMILY MEDICINE | Facility: CLINIC | Age: 70
End: 2025-06-02
Payer: COMMERCIAL

## 2025-06-02 VITALS
BODY MASS INDEX: 33.8 KG/M2 | HEART RATE: 55 BPM | SYSTOLIC BLOOD PRESSURE: 146 MMHG | OXYGEN SATURATION: 97 % | RESPIRATION RATE: 14 BRPM | WEIGHT: 210.3 LBS | DIASTOLIC BLOOD PRESSURE: 70 MMHG | HEIGHT: 66 IN | TEMPERATURE: 97.9 F

## 2025-06-02 DIAGNOSIS — R42 DIZZINESS: ICD-10-CM

## 2025-06-02 DIAGNOSIS — R14.0 ABDOMINAL DISTENSION: ICD-10-CM

## 2025-06-02 DIAGNOSIS — I10 ESSENTIAL HYPERTENSION, BENIGN: ICD-10-CM

## 2025-06-02 DIAGNOSIS — R10.11 RUQ ABDOMINAL PAIN: Primary | ICD-10-CM

## 2025-06-02 DIAGNOSIS — R11.2 NAUSEA AND VOMITING, UNSPECIFIED VOMITING TYPE: ICD-10-CM

## 2025-06-02 DIAGNOSIS — F10.29 ALCOHOL DEPENDENCE WITH UNSPECIFIED ALCOHOL-INDUCED DISORDER (H): ICD-10-CM

## 2025-06-02 DIAGNOSIS — R53.83 FATIGUE, UNSPECIFIED TYPE: ICD-10-CM

## 2025-06-02 DIAGNOSIS — M79.89 LEG SWELLING: ICD-10-CM

## 2025-06-02 LAB
ALBUMIN SERPL BCG-MCNC: 4.2 G/DL (ref 3.5–5.2)
ALP SERPL-CCNC: 65 U/L (ref 40–150)
ALT SERPL W P-5'-P-CCNC: 23 U/L (ref 0–70)
ANION GAP SERPL CALCULATED.3IONS-SCNC: 9 MMOL/L (ref 7–15)
AST SERPL W P-5'-P-CCNC: 31 U/L (ref 0–45)
BILIRUB SERPL-MCNC: 0.3 MG/DL
BUN SERPL-MCNC: 10.9 MG/DL (ref 8–23)
CALCIUM SERPL-MCNC: 9.2 MG/DL (ref 8.8–10.4)
CHLORIDE SERPL-SCNC: 104 MMOL/L (ref 98–107)
CREAT SERPL-MCNC: 1.05 MG/DL (ref 0.67–1.17)
EGFRCR SERPLBLD CKD-EPI 2021: 76 ML/MIN/1.73M2
ERYTHROCYTE [DISTWIDTH] IN BLOOD BY AUTOMATED COUNT: 11.9 % (ref 10–15)
GLUCOSE SERPL-MCNC: 107 MG/DL (ref 70–99)
HCO3 SERPL-SCNC: 26 MMOL/L (ref 22–29)
HCT VFR BLD AUTO: 40.9 % (ref 40–53)
HGB BLD-MCNC: 14.2 G/DL (ref 13.3–17.7)
LIPASE SERPL-CCNC: 52 U/L (ref 13–60)
MCH RBC QN AUTO: 32.3 PG (ref 26.5–33)
MCHC RBC AUTO-ENTMCNC: 34.7 G/DL (ref 31.5–36.5)
MCV RBC AUTO: 93 FL (ref 78–100)
PLATELET # BLD AUTO: 197 10E3/UL (ref 150–450)
POTASSIUM SERPL-SCNC: 4.5 MMOL/L (ref 3.4–5.3)
PROT SERPL-MCNC: 7 G/DL (ref 6.4–8.3)
RBC # BLD AUTO: 4.4 10E6/UL (ref 4.4–5.9)
SODIUM SERPL-SCNC: 139 MMOL/L (ref 135–145)
TSH SERPL DL<=0.005 MIU/L-ACNC: 1.62 UIU/ML (ref 0.3–4.2)
WBC # BLD AUTO: 4.8 10E3/UL (ref 4–11)

## 2025-06-02 PROCEDURE — 84443 ASSAY THYROID STIM HORMONE: CPT

## 2025-06-02 PROCEDURE — 3078F DIAST BP <80 MM HG: CPT

## 2025-06-02 PROCEDURE — 99214 OFFICE O/P EST MOD 30 MIN: CPT

## 2025-06-02 PROCEDURE — 85027 COMPLETE CBC AUTOMATED: CPT

## 2025-06-02 PROCEDURE — 36415 COLL VENOUS BLD VENIPUNCTURE: CPT

## 2025-06-02 PROCEDURE — 1125F AMNT PAIN NOTED PAIN PRSNT: CPT

## 2025-06-02 PROCEDURE — 80053 COMPREHEN METABOLIC PANEL: CPT

## 2025-06-02 PROCEDURE — 3077F SYST BP >= 140 MM HG: CPT

## 2025-06-02 PROCEDURE — 83690 ASSAY OF LIPASE: CPT

## 2025-06-02 ASSESSMENT — PAIN SCALES - GENERAL: PAINLEVEL_OUTOF10: MODERATE PAIN (4)

## 2025-06-02 NOTE — PROGRESS NOTES
Assessment & Plan     (R10.11) RUQ abdominal pain  (primary encounter diagnosis)  (R14.0) Abdominal distension  (R11.2) Nausea and vomiting, unspecified vomiting type  (F10.29) Alcohol dependence with unspecified alcohol-induced disorder (H)  Comment: Persistent.  Will repeat patient's lab work today to further evaluate.  Will add on lipase and H. pylori testing today.  Patient also endorses having daily alcohol intake, at least 3-5 drinks nightly.  Possible that this could be contributing to patient's symptoms.  There is concern that patient may be developing PUD, would like to have patient get EGD completed to evaluate.  Will follow-up on lab results and EGD results when obtained and whether further direction is necessary.  In the meantime okay for patient to try utilizing Pepcid in the morning instead of nighttime to see if this can help with patient's symptoms.  Regarding patient's alcohol dependence, may be something patient needs to be seen by chemical dependence specialty in the future.  Patient fully understands and is agreeable with plan of care, at this point patient will follow up as needed unless acute concerns arise in the meantime.  Plan: COMPREHENSIVE METABOLIC PANEL, Lipase, CBC with        platelets, Helicobacter pylori Antigen Stool,         Adult GI  Referral - Procedure Only    (M79.89) Leg swelling  Comment: Slight swelling noted today.  Patient is on amlodipine, low-dose, cannot rule out this contributing.  Patient does drink regularly which could be contributing as well.  Patient does follow with cardiology, has upcoming visit next month with them, would recommend he just this with them if everything returns back to normal as above.    (R42) Dizziness  Comment: Seems postural.  Very possible that patient could be having some dehydration as he does not drink much water at all.  At this point recommend increasing his water intake to rule out this being a major contributor.  Patient  "declines any cardiac red flags outside of dizziness which is reassuring.  Follow-up if noting this is worsening. Patient fully understands and is agreeable with plan of care, at this point patient will follow up as needed unless acute concerns arise in the meantime.    (R53.83) Fatigue, unspecified type  Comment: will check TSH to ensure not contributing.   Plan: TSH with free T4 reflex    (I10) Essential hypertension, benign  Comment: follows cardiology.  A bit elevated today, however patient does not feel very good which I feel is contributing to his elevated blood pressure today.  Continue to monitor at home and follow up if noting this is worsening. Patient fully understands and is agreeable with plan of care, at this point patient will follow up as needed unless acute concerns arise in the meantime.     35 minutes spent by me on the date of the encounter doing chart review, history and exam, documentation and further activities per the note  BMI  Estimated body mass index is 33.94 kg/m  as calculated from the following:    Height as of this encounter: 1.676 m (5' 6\").    Weight as of this encounter: 95.4 kg (210 lb 4.8 oz).     Carlos Ludwig is a 69 year old, presenting for the following health issues:  Abdominal Pain (Upper right - follow ) and Results (Discuss recent ultrasound test results)        6/2/2025     8:59 AM   Additional Questions   Roomed by VIANCA Pickard-B   Accompanied by John / Spouse     History of Present Illness       Reason for visit:  Questions about test results and continuing issues    He eats 0-1 servings of fruits and vegetables daily.He consumes 3 sweetened beverage(s) daily.He exercises with enough effort to increase his heart rate 20 to 29 minutes per day.  He exercises with enough effort to increase his heart rate 7 days per week.         Pain History:  When did you first notice your pain? 3-6 months   Have you seen this provider for your pain in the past? Yes   Where in " "your body do you have pain? Upper right abdomen   Are you seeing anyone else for your pain? No        12/30/2014     5:30 PM 2/4/2021    10:47 AM 5/29/2025    12:56 PM   PHQ-9 SCORE   PHQ-9 Total Score 1     PHQ-9 Total Score MyChart   3 (Minimal depression)   PHQ-9 Total Score  4 3        Patient-reported           2/4/2021    10:47 AM 1/1/2024    10:49 AM 5/29/2025    12:57 PM   CHARI-7 SCORE   Total Score  4 (minimal anxiety) 0 (minimal anxiety)   Total Score 3 4 0        Patient-reported           Chronic Pain Follow Up:    Location of pain: upper right abdomen  Analgesia/pain control:    - Recent changes:  gradual worsening, pain feels worse when his stomach is \"empty\".     - Overall control: Tolerable with discomfort    - Current treatments: Rx medications   Adherence:     - Do you ever take more pain medicine than prescribed? No    - When did you take your last dose of pain medicine?  none   Adverse effects: No   PDMP Review         Value Time User    State PDMP site checked  Yes 1/3/2024 10:07 AM Vale Christensen NP              Review of Systems  Constitutional, HEENT, cardiovascular, pulmonary, GI, , musculoskeletal, neuro, skin, endocrine and psych systems are negative, except as otherwise noted.      Objective    BP (!) 146/70 (BP Location: Right arm, Patient Position: Sitting, Cuff Size: Adult Large)   Pulse 55   Temp 97.9  F (36.6  C) (Oral)   Resp 14   Ht 1.676 m (5' 6\")   Wt 95.4 kg (210 lb 4.8 oz)   SpO2 97%   BMI 33.94 kg/m    Body mass index is 33.94 kg/m .  Physical Exam  Vitals and nursing note reviewed.   Constitutional:       General: He is not in acute distress.     Appearance: Normal appearance.   Cardiovascular:      Rate and Rhythm: Normal rate and regular rhythm.      Heart sounds: No murmur heard.     No friction rub. No gallop.   Pulmonary:      Effort: Pulmonary effort is normal. No respiratory distress.      Breath sounds: No wheezing, rhonchi or rales.   Abdominal:      " General: Bowel sounds are normal. There is distension.      Palpations: Abdomen is soft. There is no hepatomegaly or splenomegaly.      Tenderness: There is abdominal tenderness in the right upper quadrant and epigastric area. There is no right CVA tenderness, left CVA tenderness or guarding. Negative signs include Sosa's sign and McBurney's sign.   Skin:     General: Skin is warm and dry.   Neurological:      Mental Status: He is alert.   Psychiatric:         Mood and Affect: Mood normal.         Behavior: Behavior normal. Behavior is cooperative.         Thought Content: Thought content normal.         Judgment: Judgment normal.        Signed Electronically by: SILVIANO Martínez CNP

## 2025-06-03 ENCOUNTER — RESULTS FOLLOW-UP (OUTPATIENT)
Dept: FAMILY MEDICINE | Facility: CLINIC | Age: 70
End: 2025-06-03
Payer: COMMERCIAL

## 2025-06-04 ENCOUNTER — TELEPHONE (OUTPATIENT)
Dept: FAMILY MEDICINE | Facility: CLINIC | Age: 70
End: 2025-06-04
Payer: COMMERCIAL

## 2025-06-04 NOTE — TELEPHONE ENCOUNTER
Called and spoke with [facility] representative and explained that the current referral placed on 6/2 is for an EGD and not a colonoscopy.     Referral team did confirm that the referral is       Joan AGUAYO RN   Clinic RN  [facility]

## 2025-06-04 NOTE — TELEPHONE ENCOUNTER
SILVIANO Martínez CNP    [facility] calling and states they need a new referral for an upper Endosocpy/EGD.  The current referral is for a colonoscopy per [facility] report.     Fidelina Padilla RN BSN  Clinic Nurse  [facility]

## 2025-06-04 NOTE — TELEPHONE ENCOUNTER
Can we call back, order is placed for EGD after rechecking. Not sure why they are seeing colonoscopy order?    SILVIANO Martínez CNP

## 2025-06-10 ENCOUNTER — TELEPHONE (OUTPATIENT)
Dept: FAMILY MEDICINE | Facility: CLINIC | Age: 70
End: 2025-06-10
Payer: COMMERCIAL

## 2025-06-10 NOTE — TELEPHONE ENCOUNTER
Patient Quality Outreach    Patient is due for the following:   Hypertension -  Hypertension follow-up visit    Action(s) Taken:   Schedule a office visit for Hypertension    Type of outreach:    Sent [employer] message.    Questions for provider review:    None         Stephanie Watson MA  Chart routed to None.

## 2025-06-17 ENCOUNTER — TRANSFERRED RECORDS (OUTPATIENT)
Dept: ADMINISTRATIVE | Facility: CLINIC | Age: 70
End: 2025-06-17
Payer: COMMERCIAL

## 2025-06-18 NOTE — PROCEDURES
[facility]   [address], [address], [place] [address]     Patient Name: Emery Zayas  Gender:  Male  Exam Date: 06/17/2025 Visit Number:  [id number]  Age: 69 Years YOB: 1955  Attending MD: Richard Tellez MD Medical Record#:  [id number]    Procedure: Colonoscopy   Indications:       Referring MD:   Medications: Admitting Medications:   0.9% Normal Saline at TKO   Intra Procedure Medications:   Patient received monitored anesthesia care.     Complications:   ______________________________________________________________________________  Procedure:   The risks and benefits of the procedure were explained to the patient.After obtaining informed consent, the patient received monitored anesthesia care and I passed the scope   ().      Findings:    Impression:    Preliminary Plan:  Please follow up in the office with your Gastroenterology provider.  Pathology Results:  A: STOMACH, BIOPSY:           1. Normal gastric antral and body mucosae           2. Negative for Helicobacter      MICROSCOPIC  A: Performed     Electronically signed by: DIANA Odom MD    Interpreted at [facility], [address] [place] [id number]      _Electronically signed by:___________________  Lam Gray MD                 06/17/2025    cc: Alcides Yan CNP  cc: Alcides Yan CNP

## 2025-06-25 ENCOUNTER — TELEPHONE (OUTPATIENT)
Dept: FAMILY MEDICINE | Facility: CLINIC | Age: 70
End: 2025-06-25
Payer: COMMERCIAL

## 2025-06-25 NOTE — TELEPHONE ENCOUNTER
Pt calls.    He has a stool sample to collect - ordered by Alcides Yan.  He said the directions say to put it in the freezer, but he is going to bring it right over.  Advised he could bring it over right away then and advised to speak to someone in lab.  He said it talks about a label and there is no label.  Advised it would be good to talk to someone in lab.

## 2025-06-26 LAB — H PYLORI AG STL QL IA: NEGATIVE

## 2025-06-30 ENCOUNTER — PATIENT OUTREACH (OUTPATIENT)
Dept: CARE COORDINATION | Facility: CLINIC | Age: 70
End: 2025-06-30
Payer: COMMERCIAL

## 2025-07-16 ENCOUNTER — OFFICE VISIT (OUTPATIENT)
Dept: CARDIOLOGY | Facility: CLINIC | Age: 70
End: 2025-07-16
Payer: COMMERCIAL

## 2025-07-16 VITALS
HEIGHT: 66 IN | BODY MASS INDEX: 33.01 KG/M2 | DIASTOLIC BLOOD PRESSURE: 72 MMHG | HEART RATE: 50 BPM | WEIGHT: 205.4 LBS | OXYGEN SATURATION: 98 % | SYSTOLIC BLOOD PRESSURE: 145 MMHG

## 2025-07-16 DIAGNOSIS — R07.9 EXERTIONAL CHEST PAIN: ICD-10-CM

## 2025-07-16 DIAGNOSIS — E78.5 DYSLIPIDEMIA: ICD-10-CM

## 2025-07-16 DIAGNOSIS — R42 DIZZINESS: Primary | ICD-10-CM

## 2025-07-16 DIAGNOSIS — R94.31 ABNORMAL ELECTROCARDIOGRAM: ICD-10-CM

## 2025-07-16 DIAGNOSIS — Z91.89 MULTIPLE RISK FACTORS FOR CORONARY ARTERY DISEASE: ICD-10-CM

## 2025-07-16 DIAGNOSIS — R00.1 BRADYCARDIA: ICD-10-CM

## 2025-07-16 DIAGNOSIS — R06.09 DYSPNEA ON EXERTION: ICD-10-CM

## 2025-07-16 DIAGNOSIS — I10 ESSENTIAL HYPERTENSION, BENIGN: ICD-10-CM

## 2025-07-16 RX ORDER — OLMESARTAN MEDOXOMIL 40 MG/1
40 TABLET ORAL DAILY
Qty: 90 TABLET | Refills: 3 | Status: SHIPPED | OUTPATIENT
Start: 2025-07-16

## 2025-07-16 RX ORDER — AMLODIPINE BESYLATE 5 MG/1
5 TABLET ORAL 2 TIMES DAILY
Qty: 180 TABLET | Refills: 3 | Status: SHIPPED | OUTPATIENT
Start: 2025-07-16

## 2025-07-16 RX ORDER — HYDROCHLOROTHIAZIDE 25 MG/1
25 TABLET ORAL DAILY
Qty: 90 TABLET | Refills: 3 | Status: SHIPPED | OUTPATIENT
Start: 2025-07-16

## 2025-07-16 RX ORDER — OLMESARTAN MEDOXOMIL 40 MG/1
40 TABLET ORAL DAILY
Qty: 90 TABLET | Refills: 0 | Status: SHIPPED | OUTPATIENT
Start: 2025-07-16 | End: 2025-07-16

## 2025-07-16 RX ORDER — CARVEDILOL 3.12 MG/1
3.12 TABLET ORAL 2 TIMES DAILY
Qty: 180 TABLET | Refills: 3 | Status: SHIPPED | OUTPATIENT
Start: 2025-07-16 | End: 2025-07-16

## 2025-07-16 NOTE — PROGRESS NOTES
Cardiology Clinic Progress Note:    July 16, 2025   Patient Name: Emery Zayas  Patient MRN: [id number]     Consult indication: HTN    HPI:    I had the opportunity to see patient Emery Zayas in cardiology clinic for a follow up visit.     Patient is a pleasant 69-year-old male with a past medical history significant for hypertension, dyslipidemia, mild sleep apnea, vertigo, severe back pain, prior alcohol dependence, who presents for follow-up.    Since last clinic visit patient reports that overall he feels generally well, though has had issues with intermittent dizziness/lightheadedness.  No syncope.  These episodes of dizziness/lightheadedness seem to be worse with changes in position from a seated or lying position to standing, however can also occur without any specific correlation with position change.  No syncope.  He does note occasional chest discomfort after bowling, as well as generalized weakness after walking for exercise.  He also notes some mild dyspnea on exertion over the past year.  He has cut back significantly on alcohol intake.  BP today in clinic 145/72 mmHg.  ECG sinus bradycardia with RBBB, rate 44 bpm.     Assessment and Plan/Recommendations:    # Chest discomfort, dyspnea on exertion, mild. The chest pain symptoms are somewhat atypical for angina however LAZO could be an anginal equivalent, he does have RFs for CAD  # Dizziness/LH, suspect related to sinus bradycardia, has baseline RBBB  # HTN, BP elevated  # Chronic back pain  # HL, on statin  # MONSE  # Obesity     - Will stop carvedilol 12.5 mg twice daily due to marked bradycardia  - Increase amlodipine 2.5 mg twice daily to 5 mg twice daily  - Will start HCTZ 25 mg daily, he had been on this back in 2020, he denies any adverse side effects from this in the past  - Continue olmesartan  - RN BP check with BMP in about 2 weeks, will also check lipid profile and ALT  - Exercise stress echocardiogram due to baseline ECG  changes and multiple risk factors for CAD  - Zio patch monitor,  may need to add back carvedilol if results are reassuring and BP still elevated   - Continue rosuvastatin, Lovaza, fenofibrate, aspirin  - Follow-up with cardiology HIEN in 2 months, or sooner as needed    Thank you for allowing our team to participate in the care of Emery Zayas.  Please do not hesitate to call or page me with any questions or concerns.    Sincerely,     Alcides Bundy MD, [facility]  Cardiology  July 16, 2025      Voice recognition software utilized.     Total time spent on this encounter today: Greater than 43 minutes, providing care as above including, but not limited to, reviewing medical records, laboratory data, imaging studies, diagnostic studies, procedure notes, formulating an assessment and plan, recommendations, charting, and documentation.      The longitudinal plan of care for the diagnosis(es)/condition(s) as documented were addressed during this visit. Due to the added complexity in care, I will continue to support Bill in the subsequent management and with ongoing continuity of care.     Past Medical History:   The ASCVD Risk score ([place] DK, et al., 2019) failed to calculate for the following reasons:    The valid total cholesterol range is 130 to 320 mg/dL  Past Medical History:   Diagnosis Date    Acute idiopathic gout, unspecified site 08/23/2016    Alcoholism (H)     Treated 1980's, denies any recent problems with withdrawal when skips days drinking.    Anxiety 12/31/2014    Arthritis     Chronic low back pain     Dyspepsia and other specified disorders of function of stomach 07/31/2003    Elevated liver enzymes 12/30/2013    Gout     possible    Herniated intervertebral disk     thoracic    High blood triglycerides 12/30/2013    Hyperlipidemia LDL goal <130 04/15/2013    Hypertension     Hypertensive emergency 05/05/2017    Metabolic syndrome 12/30/2013    Obesity 04/15/2013    Other chest pain  07/31/2003    RBBB (right bundle branch block)     Present for several years        Past Surgical History:   Past Surgical History:   Procedure Laterality Date    ARTHROSCOPY, SHOULDER, SURGI Left 02/16/2023    [facility]    BACK SURGERY      Back surgery x 4      COLONOSCOPY N/A 01/23/2020    Procedure: COLONOSCOPY, WITH POLYPECTOMY AND BIOPSY; polypectomies  using jumbo bx forcep;  Surgeon: Sherice Mckeon MD;  Location:  GI    COLONOSCOPY N/A 1/12/2024    Procedure: Colonoscopy with polypectomy using hot snare and tattooing using scleroneedle;  Surgeon: Fidel Perez MD;  Location:  GI    ESOPHAGOSCOPY, GASTROSCOPY, DUODENOSCOPY (EGD), COMBINED  01/01/2000    normal    TONSILLECTOMY         Medications (outpatient):  Current Outpatient Medications   Medication Sig Dispense Refill    amLODIPine (NORVASC) 5 MG tablet Take 1 tablet (5 mg) by mouth 2 times daily. 180 tablet 3    aspirin 81 MG tablet Take 1 tablet by mouth daily.      azelaic acid (FINACIA) 15 % external gel Apply to face for rosacea BID 50 g 11    fenofibrate (TRICOR) 145 MG tablet Take 1 tablet (145 mg) by mouth daily. Appt needed for refills; please call [phone] to schedule 90 tablet 0    fluorouracil (EFUDEX) 5 % external cream Apply topically to the face BID for 14-21 days, or until the onset of irritation. Wash hands after use. 40 g 0    hydroCHLOROthiazide (HYDRODIURIL) 25 MG tablet Take 1 tablet (25 mg) by mouth daily. 90 tablet 3    LORazepam (ATIVAN) 1 MG tablet Take 1 tablet (1 mg) by mouth every 6 hours as needed for anxiety 10 tablet 0    Multiple Vitamins-Minerals (MULTIVITAMIN ADULT) CHEW Take 1 chew tab by mouth daily      olmesartan (BENICAR) 40 MG tablet Take 1 tablet (40 mg) by mouth daily. 90 tablet 3    omega-3 acid ethyl esters (LOVAZA) 1 g capsule TAKE 2 CAP BY MOUTH DAILY 180 capsule 0    order for DME Equipment being ordered: blood pressure cuff 1 each 0    rosuvastatin (CRESTOR) 20 MG tablet Take 1 tablet (20 mg) by  "mouth at bedtime. 90 tablet 1    fluticasone (FLONASE) 50 MCG/ACT nasal spray SPRAY 1 SPRAY INTO EACH NOSTRIL DAILY (Patient not taking: Reported on 7/16/2025) 48 mL 1    meclizine (ANTIVERT) 25 MG tablet Take 1 tablet (25 mg) by mouth 3 times daily as needed for dizziness (Patient not taking: Reported on 7/16/2025) 30 tablet 0       Allergies:  Allergies   Allergen Reactions    No Known Drug Allergy        Social History:   History   Drug Use No      History   Smoking Status    Former    Packs/day: 0.00    Types: Cigarettes    Quit date: 7/15/1993   Smokeless Tobacco    Never     Social History    Substance and Sexual Activity      Alcohol use: Yes        Alcohol/week: 7.0 standard drinks of alcohol        Types: 7 Shots of liquor per week        Comment: more than 12-15 rum and cokes or beer a week       Family History:  Family History   Problem Relation Age of Onset    Hypertension Father         unsure    Heart Disease Father         Angioplasty in late 60's or early 70's.    Unknown/Adopted Father     Hypertension Sister         unsure    Kidney Disease Sister     Unknown/Adopted Sister     Diabetes Sister     Cancer Mother         lung    Unknown/Adopted Mother     Diabetes Mother         unsure    Thyroid Disease Sister     Unknown/Adopted Sister     Unknown/Adopted Maternal Grandmother     Substance Abuse Maternal Grandfather     Depression Paternal Grandmother     Unknown/Adopted Paternal Grandfather     Diabetes Sister     Colon Cancer No family hx of          Objective & Physical Exam:  BP (!) 145/72   Pulse 50   Ht 1.676 m (5' 6\")   Wt 93.2 kg (205 lb 6.4 oz)   SpO2 98%   BMI 33.15 kg/m    Wt Readings from Last 2 Encounters:   07/16/25 93.2 kg (205 lb 6.4 oz)   06/02/25 95.4 kg (210 lb 4.8 oz)     Body mass index is 33.15 kg/m .   Body surface area is 2.08 meters squared.    Constitutional: appears stated age, in no apparent distress, appears to be well nourished  Pulmonary: clear to auscultation " bilaterally, no wheezes, no rales, no increased work of breathing  Cardiovascular: JVP normal, bradycardic, regular rhythm, no murmur appreciated, no lower extremity edema    Data reviewed:  Lab Results   Component Value Date    WBC 4.8 06/02/2025    WBC 6.3 02/04/2021    RBC 4.40 06/02/2025    RBC 4.20 (L) 02/04/2021    HGB 14.2 06/02/2025    HGB 13.8 02/04/2021    HCT 40.9 06/02/2025    HCT 40.9 02/04/2021    MCV 93 06/02/2025    MCV 97 02/04/2021    MCH 32.3 06/02/2025    MCH 32.9 02/04/2021    MCHC 34.7 06/02/2025    MCHC 33.7 02/04/2021    RDW 11.9 06/02/2025    RDW 12.2 02/04/2021     06/02/2025     02/04/2021     Sodium   Date Value Ref Range Status   06/02/2025 139 135 - 145 mmol/L Final   02/04/2021 137 133 - 144 mmol/L Final     Potassium   Date Value Ref Range Status   06/02/2025 4.5 3.4 - 5.3 mmol/L Final   02/02/2022 4.6 3.4 - 5.3 mmol/L Final   02/04/2021 4.4 3.4 - 5.3 mmol/L Final     Chloride   Date Value Ref Range Status   06/02/2025 104 98 - 107 mmol/L Final   02/02/2022 107 94 - 109 mmol/L Final   02/04/2021 107 94 - 109 mmol/L Final     Carbon Dioxide   Date Value Ref Range Status   02/04/2021 25 20 - 32 mmol/L Final     Carbon Dioxide (CO2)   Date Value Ref Range Status   06/02/2025 26 22 - 29 mmol/L Final   02/02/2022 30 20 - 32 mmol/L Final     Anion Gap   Date Value Ref Range Status   06/02/2025 9 7 - 15 mmol/L Final   02/02/2022 1 (L) 3 - 14 mmol/L Final   02/04/2021 5 3 - 14 mmol/L Final     Glucose   Date Value Ref Range Status   06/02/2025 107 (H) 70 - 99 mg/dL Final   02/02/2022 121 (H) 70 - 99 mg/dL Final   02/04/2021 110 (H) 70 - 99 mg/dL Final     Comment:     Fasting specimen     Urea Nitrogen   Date Value Ref Range Status   06/02/2025 10.9 8.0 - 23.0 mg/dL Final   02/02/2022 18 7 - 30 mg/dL Final   02/04/2021 13 7 - 30 mg/dL Final     Creatinine   Date Value Ref Range Status   06/02/2025 1.05 0.67 - 1.17 mg/dL Final   02/04/2021 1.00 0.66 - 1.25 mg/dL Final     GFR  Estimate   Date Value Ref Range Status   06/02/2025 76 >60 mL/min/1.73m2 Final     Comment:     eGFR calculated using 2021 CKD-EPI equation.   02/04/2021 78 >60 mL/min/[1.73_m2] Final     Comment:     Non  GFR Calc  Starting 12/18/2018, serum creatinine based estimated GFR (eGFR) will be   calculated using the Chronic Kidney Disease Epidemiology Collaboration   (CKD-EPI) equation.       Calcium   Date Value Ref Range Status   06/02/2025 9.2 8.8 - 10.4 mg/dL Final   02/04/2021 9.8 8.5 - 10.1 mg/dL Final     Bilirubin Total   Date Value Ref Range Status   06/02/2025 0.3 <=1.2 mg/dL Final   02/04/2021 0.7 0.2 - 1.3 mg/dL Final     Alkaline Phosphatase   Date Value Ref Range Status   06/02/2025 65 40 - 150 U/L Final   02/04/2021 94 40 - 150 U/L Final     ALT   Date Value Ref Range Status   06/02/2025 23 0 - 70 U/L Final   02/04/2021 34 0 - 70 U/L Final     AST   Date Value Ref Range Status   06/02/2025 31 0 - 45 U/L Final   02/04/2021 22 0 - 45 U/L Final     Recent Labs   Lab Test 09/16/24  0736 04/02/24  0834   CHOL 119 132   HDL 30* 35*   LDL 49 62   TRIG 199* 175*        No results found for this or any previous visit (from the past 4320 hours).

## 2025-07-16 NOTE — LETTER
7/16/2025    Physician No Ref-Primary  No address on file    RE: Emery Zayas       Dear Colleague,     I had the pleasure of seeing Emery Zayas in the [facility].      Cardiology Clinic Progress Note:    July 16, 2025   Patient Name: Emery Zayas  Patient MRN: [id number]     Consult indication: HTN    HPI:    I had the opportunity to see patient Emery Zayas in cardiology clinic for a follow up visit.     Patient is a pleasant 69-year-old male with a past medical history significant for hypertension, dyslipidemia, mild sleep apnea, vertigo, severe back pain, prior alcohol dependence, who presents for follow-up.    Since last clinic visit patient reports that overall he feels generally well, though has had issues with intermittent dizziness/lightheadedness.  No syncope.  These episodes of dizziness/lightheadedness seem to be worse with changes in position from a seated or lying position to standing, however can also occur without any specific correlation with position change.  No syncope.  He does note occasional chest discomfort after bowling, as well as generalized weakness after walking for exercise.  He also notes some mild dyspnea on exertion over the past year.  He has cut back significantly on alcohol intake.  BP today in clinic 145/72 mmHg.  ECG sinus bradycardia with RBBB, rate 44 bpm.     Assessment and Plan/Recommendations:    # Chest discomfort, dyspnea on exertion, mild. The chest pain symptoms are somewhat atypical for angina however LAZO could be an anginal equivalent, he does have RFs for CAD  # Dizziness/LH, suspect related to sinus bradycardia, has baseline RBBB  # HTN, BP elevated  # Chronic back pain  # HL, on statin  # MONSE  # Obesity     - Will stop carvedilol 12.5 mg twice daily due to marked bradycardia  - Increase amlodipine 2.5 mg twice daily to 5 mg twice daily  - Will start HCTZ 25 mg daily, he had been on this back in 2020, he denies any adverse  side effects from this in the past  - Continue olmesartan  - RN BP check with BMP in about 2 weeks, will also check lipid profile and ALT  - Exercise stress echocardiogram due to baseline ECG changes and multiple risk factors for CAD  - Zio patch monitor,  may need to add back carvedilol if results are reassuring and BP still elevated   - Continue rosuvastatin, Lovaza, fenofibrate, aspirin  - Follow-up with cardiology HIEN in 2 months, or sooner as needed    Thank you for allowing our team to participate in the care of Emery Zayas.  Please do not hesitate to call or page me with any questions or concerns.    Sincerely,     Alcides Bundy MD, [facility]  Cardiology  July 16, 2025      Voice recognition software utilized.     Total time spent on this encounter today: Greater than 43 minutes, providing care as above including, but not limited to, reviewing medical records, laboratory data, imaging studies, diagnostic studies, procedure notes, formulating an assessment and plan, recommendations, charting, and documentation.      The longitudinal plan of care for the diagnosis(es)/condition(s) as documented were addressed during this visit. Due to the added complexity in care, I will continue to support Bill in the subsequent management and with ongoing continuity of care.     Past Medical History:   The ASCVD Risk score (Nicolette DK, et al., 2019) failed to calculate for the following reasons:    The valid total cholesterol range is 130 to 320 mg/dL  Past Medical History:   Diagnosis Date     Acute idiopathic gout, unspecified site 08/23/2016     Alcoholism (H)     Treated 1980's, denies any recent problems with withdrawal when skips days drinking.     Anxiety 12/31/2014     Arthritis      Chronic low back pain      Dyspepsia and other specified disorders of function of stomach 07/31/2003     Elevated liver enzymes 12/30/2013     Gout     possible     Herniated intervertebral disk     thoracic     High  blood triglycerides 12/30/2013     Hyperlipidemia LDL goal <130 04/15/2013     Hypertension      Hypertensive emergency 05/05/2017     Metabolic syndrome 12/30/2013     Obesity 04/15/2013     Other chest pain 07/31/2003     RBBB (right bundle branch block)     Present for several years        Past Surgical History:   Past Surgical History:   Procedure Laterality Date     ARTHROSCOPY, SHOULDER, SURGI Left 02/16/2023    [facility]     BACK SURGERY       Back surgery x 4       COLONOSCOPY N/A 01/23/2020    Procedure: COLONOSCOPY, WITH POLYPECTOMY AND BIOPSY; polypectomies  using jumbo bx forcep;  Surgeon: Sherice Mckeon MD;  Location:  GI     COLONOSCOPY N/A 1/12/2024    Procedure: Colonoscopy with polypectomy using hot snare and tattooing using scleroneedle;  Surgeon: Fidel Perez MD;  Location:  GI     ESOPHAGOSCOPY, GASTROSCOPY, DUODENOSCOPY (EGD), COMBINED  01/01/2000    normal     TONSILLECTOMY         Medications (outpatient):  Current Outpatient Medications   Medication Sig Dispense Refill     amLODIPine (NORVASC) 5 MG tablet Take 1 tablet (5 mg) by mouth 2 times daily. 180 tablet 3     aspirin 81 MG tablet Take 1 tablet by mouth daily.       azelaic acid (FINACIA) 15 % external gel Apply to face for rosacea BID 50 g 11     fenofibrate (TRICOR) 145 MG tablet Take 1 tablet (145 mg) by mouth daily. Appt needed for refills; please call [phone] to schedule 90 tablet 0     fluorouracil (EFUDEX) 5 % external cream Apply topically to the face BID for 14-21 days, or until the onset of irritation. Wash hands after use. 40 g 0     hydroCHLOROthiazide (HYDRODIURIL) 25 MG tablet Take 1 tablet (25 mg) by mouth daily. 90 tablet 3     LORazepam (ATIVAN) 1 MG tablet Take 1 tablet (1 mg) by mouth every 6 hours as needed for anxiety 10 tablet 0     Multiple Vitamins-Minerals (MULTIVITAMIN ADULT) CHEW Take 1 chew tab by mouth daily       olmesartan (BENICAR) 40 MG tablet Take 1 tablet (40 mg) by mouth daily. 90 tablet 3  "    omega-3 acid ethyl esters (LOVAZA) 1 g capsule TAKE 2 CAP BY MOUTH DAILY 180 capsule 0     order for DME Equipment being ordered: blood pressure cuff 1 each 0     rosuvastatin (CRESTOR) 20 MG tablet Take 1 tablet (20 mg) by mouth at bedtime. 90 tablet 1     fluticasone (FLONASE) 50 MCG/ACT nasal spray SPRAY 1 SPRAY INTO EACH NOSTRIL DAILY (Patient not taking: Reported on 7/16/2025) 48 mL 1     meclizine (ANTIVERT) 25 MG tablet Take 1 tablet (25 mg) by mouth 3 times daily as needed for dizziness (Patient not taking: Reported on 7/16/2025) 30 tablet 0       Allergies:  Allergies   Allergen Reactions     No Known Drug Allergy        Social History:   History   Drug Use No      History   Smoking Status     Former     Packs/day: 0.00     Types: Cigarettes     Quit date: 7/15/1993   Smokeless Tobacco     Never     Social History    Substance and Sexual Activity      Alcohol use: Yes        Alcohol/week: 7.0 standard drinks of alcohol        Types: 7 Shots of liquor per week        Comment: more than 12-15 rum and cokes or beer a week       Family History:  Family History   Problem Relation Age of Onset     Hypertension Father         unsure     Heart Disease Father         Angioplasty in late 60's or early 70's.     Unknown/Adopted Father      Hypertension Sister         unsure     Kidney Disease Sister      Unknown/Adopted Sister      Diabetes Sister      Cancer Mother         lung     Unknown/Adopted Mother      Diabetes Mother         unsure     Thyroid Disease Sister      Unknown/Adopted Sister      Unknown/Adopted Maternal Grandmother      Substance Abuse Maternal Grandfather      Depression Paternal Grandmother      Unknown/Adopted Paternal Grandfather      Diabetes Sister      Colon Cancer No family hx of          Objective & Physical Exam:  BP (!) 145/72   Pulse 50   Ht 1.676 m (5' 6\")   Wt 93.2 kg (205 lb 6.4 oz)   SpO2 98%   BMI 33.15 kg/m    Wt Readings from Last 2 Encounters:   07/16/25 93.2 kg (205 " lb 6.4 oz)   06/02/25 95.4 kg (210 lb 4.8 oz)     Body mass index is 33.15 kg/m .   Body surface area is 2.08 meters squared.    Constitutional: appears stated age, in no apparent distress, appears to be well nourished  Pulmonary: clear to auscultation bilaterally, no wheezes, no rales, no increased work of breathing  Cardiovascular: JVP normal, bradycardic, regular rhythm, no murmur appreciated, no lower extremity edema    Data reviewed:  Lab Results   Component Value Date    WBC 4.8 06/02/2025    WBC 6.3 02/04/2021    RBC 4.40 06/02/2025    RBC 4.20 (L) 02/04/2021    HGB 14.2 06/02/2025    HGB 13.8 02/04/2021    HCT 40.9 06/02/2025    HCT 40.9 02/04/2021    MCV 93 06/02/2025    MCV 97 02/04/2021    MCH 32.3 06/02/2025    MCH 32.9 02/04/2021    MCHC 34.7 06/02/2025    MCHC 33.7 02/04/2021    RDW 11.9 06/02/2025    RDW 12.2 02/04/2021     06/02/2025     02/04/2021     Sodium   Date Value Ref Range Status   06/02/2025 139 135 - 145 mmol/L Final   02/04/2021 137 133 - 144 mmol/L Final     Potassium   Date Value Ref Range Status   06/02/2025 4.5 3.4 - 5.3 mmol/L Final   02/02/2022 4.6 3.4 - 5.3 mmol/L Final   02/04/2021 4.4 3.4 - 5.3 mmol/L Final     Chloride   Date Value Ref Range Status   06/02/2025 104 98 - 107 mmol/L Final   02/02/2022 107 94 - 109 mmol/L Final   02/04/2021 107 94 - 109 mmol/L Final     Carbon Dioxide   Date Value Ref Range Status   02/04/2021 25 20 - 32 mmol/L Final     Carbon Dioxide (CO2)   Date Value Ref Range Status   06/02/2025 26 22 - 29 mmol/L Final   02/02/2022 30 20 - 32 mmol/L Final     Anion Gap   Date Value Ref Range Status   06/02/2025 9 7 - 15 mmol/L Final   02/02/2022 1 (L) 3 - 14 mmol/L Final   02/04/2021 5 3 - 14 mmol/L Final     Glucose   Date Value Ref Range Status   06/02/2025 107 (H) 70 - 99 mg/dL Final   02/02/2022 121 (H) 70 - 99 mg/dL Final   02/04/2021 110 (H) 70 - 99 mg/dL Final     Comment:     Fasting specimen     Urea Nitrogen   Date Value Ref Range  Status   06/02/2025 10.9 8.0 - 23.0 mg/dL Final   02/02/2022 18 7 - 30 mg/dL Final   02/04/2021 13 7 - 30 mg/dL Final     Creatinine   Date Value Ref Range Status   06/02/2025 1.05 0.67 - 1.17 mg/dL Final   02/04/2021 1.00 0.66 - 1.25 mg/dL Final     GFR Estimate   Date Value Ref Range Status   06/02/2025 76 >60 mL/min/1.73m2 Final     Comment:     eGFR calculated using 2021 CKD-EPI equation.   02/04/2021 78 >60 mL/min/[1.73_m2] Final     Comment:     Non  GFR Calc  Starting 12/18/2018, serum creatinine based estimated GFR (eGFR) will be   calculated using the Chronic Kidney Disease Epidemiology Collaboration   (CKD-EPI) equation.       Calcium   Date Value Ref Range Status   06/02/2025 9.2 8.8 - 10.4 mg/dL Final   02/04/2021 9.8 8.5 - 10.1 mg/dL Final     Bilirubin Total   Date Value Ref Range Status   06/02/2025 0.3 <=1.2 mg/dL Final   02/04/2021 0.7 0.2 - 1.3 mg/dL Final     Alkaline Phosphatase   Date Value Ref Range Status   06/02/2025 65 40 - 150 U/L Final   02/04/2021 94 40 - 150 U/L Final     ALT   Date Value Ref Range Status   06/02/2025 23 0 - 70 U/L Final   02/04/2021 34 0 - 70 U/L Final     AST   Date Value Ref Range Status   06/02/2025 31 0 - 45 U/L Final   02/04/2021 22 0 - 45 U/L Final     Recent Labs   Lab Test 09/16/24  0736 04/02/24  0834   CHOL 119 132   HDL 30* 35*   LDL 49 62   TRIG 199* 175*        No results found for this or any previous visit (from the past 4320 hours).       Thank you for allowing me to participate in the care of your patient.      Sincerely,     Alcides Bundy MD     [facility]  cc:   Nahomi Chawla, PA-C  [address] [place] [address]

## 2025-07-16 NOTE — PATIENT INSTRUCTIONS
July 16, 2025    Thank you for allowing our Cardiology team to participate in your care.     Please note the following changes to your heart treatment plan:     Medication changes:   - stop the carvedilol 12.5 mg twice daily (so for the next 2 days take half a tablet twice daily then stop)    - increase amlodipine to 5 mg twice daily  - start hydrochlorothiazide 25 mg daily     Tests to be done:  - labs in 2 weeks with RN BP check   - ZioPatch monitor   - exercise stress echocardiogram     Follow up:  - Follow up in 2 months with cardiology HIEN, or sooner as needed      For scheduling, please call [phone]      Please contact our team through [employer] or our Nurse Team Voicemail service [phone] for questions or concerns.     General Clinic [phone]     If you are having a medical emergency, please call [phone].     Sincerely,    Alcides Bundy MD, [facility]  Cardiology    [facility] - [facility] - [facility] [place]

## 2025-07-17 DIAGNOSIS — E78.5 HYPERLIPIDEMIA LDL GOAL <130: ICD-10-CM

## 2025-07-17 RX ORDER — ROSUVASTATIN CALCIUM 20 MG/1
20 TABLET, COATED ORAL AT BEDTIME
Qty: 90 TABLET | Refills: 3 | Status: SHIPPED | OUTPATIENT
Start: 2025-07-17

## 2025-07-21 ENCOUNTER — OFFICE VISIT (OUTPATIENT)
Dept: DERMATOLOGY | Facility: CLINIC | Age: 70
End: 2025-07-21
Payer: COMMERCIAL

## 2025-07-21 DIAGNOSIS — L71.9 ACNE ROSACEA: ICD-10-CM

## 2025-07-21 DIAGNOSIS — D22.9 MULTIPLE BENIGN NEVI: ICD-10-CM

## 2025-07-21 DIAGNOSIS — L82.1 SK (SEBORRHEIC KERATOSIS): ICD-10-CM

## 2025-07-21 DIAGNOSIS — Z12.83 SCREENING EXAM FOR SKIN CANCER: ICD-10-CM

## 2025-07-21 DIAGNOSIS — L57.0 AK (ACTINIC KERATOSIS): ICD-10-CM

## 2025-07-21 DIAGNOSIS — D18.01 CHERRY ANGIOMA: Primary | ICD-10-CM

## 2025-07-21 DIAGNOSIS — L81.4 LENTIGINES: ICD-10-CM

## 2025-07-21 PROCEDURE — 17000 DESTRUCT PREMALG LESION: CPT | Performed by: PHYSICIAN ASSISTANT

## 2025-07-21 PROCEDURE — 99214 OFFICE O/P EST MOD 30 MIN: CPT | Mod: 25 | Performed by: PHYSICIAN ASSISTANT

## 2025-07-21 RX ORDER — AZELAIC ACID 0.15 G/G
GEL TOPICAL
Qty: 50 G | Refills: 11 | Status: SHIPPED | OUTPATIENT
Start: 2025-07-21

## 2025-07-21 NOTE — LETTER
7/21/2025      Emery Zayas  [address] [facility] [address]      Dear Colleague,    Thank you for referring your patient, Emery Zayas, to the [facility]. Please see a copy of my visit note below.    [facility] Dermatology Note  Encounter Date: Jul 21, 2025  Office Visit      Dermatology Problem List:  FBSE 7/21/25    Acne rosacea  - current: azelaic acid 15% gel   - previous: metronidazole 0.75% cream  2. Actinic keratosis - face.  - Efudex in the past x2.  Repeat efudex to temples/forehead/hairline 12/9/24-good response  - s/p Cryo to the upper lip 7/21/2025    Social: .  Present today with his wife  ____________________________________________    Assessment & Plan:  # Actinic keratosis.  X 1-upper lip  - Cryotherapy performed today, see procedure note below.  - Excellent response to Efudex to the temples and forehead.  No residual lesions noted at the sites.    # Benign findings: multiple benign nevi, lentigines, cherry angiomas, SKs  - edu on benign etiology  - Signs and Symptoms of non-melanoma skin cancer and ABCDEs of melanoma reviewed with patient. Patient encouraged to perform monthly self skin exams and educated on how to perform them. UV precautions reviewed with patient. Patient was asked about new or changing moles/lesions on body.   - Sunscreen: Apply 20 minutes prior to going outdoors and reapply every two hours, when wet or sweating. We recommend using an SPF 30 or higher, and to use one that is water resistant.     - RTC for changes     # Acne rosacea-flaring today.  This is a chronic problem  - Encourage patient to use his azelaic acid more frequently.  Recommended he use it daily to the mid and lower face.    - Refilled azelaic acid 15% gel advised to use daily and up to twice daily for flares  - Discussed how rosacea can be a progressive condition as well as be triggered by certain common items such as the weather  heat chocolate alcohol and other things.    Procedures Performed:   CRYOTHERAPY PROCEDURE NOTE: 1 lesions in the above locations were treated with liquid nitrogen utilizing a 5-10sec thaw time. Patient was advised that the treated areas will become red, swollen, may develop a blister and then should crust and peal off in the next 1-2 weeks. Post-procedure instructions were provided.      Follow-up: 1 year(s) in-person, or earlier for new or changing lesions    Staff and scribe:    Scribe Disclosure:   I, ZURI REILLY, am serving as a scribe; to document services personally performed by Gai Chan PA-C -based on data collection and the provider's statements to me.     Provider Disclosure:  I agree with above History, Review of Systems, Physical exam and Plan.  I have reviewed the content of the documentation and have edited it as needed. I have personally performed the services documented here and the documentation accurately represents those services and the decisions I have made.      Electronically signed by:    All risks, benefits and alternatives were discussed with patient.  Patient is in agreement and understands the assessment and plan.  All questions were answered.    Gia Chan PA-C, MPAS  [facility] [place]: Phone: [phone], Fax: [phone]  [facility]: Phone: [phone],  Fax: [phone]  [facility]: Phone: [phone], Fax: [phone]  ____________________________________________    CC: Skin Check (Bumps by eyes and nose x5-6 months but lasts for about a week and goes away)      Reviewed patients past medical history and pertinent chart review prior to patient's visit today.     HPI:  Mr. Emery Zayas is a 69 year old male who presents today as a return patient for FBSE. Today patient reported bumps by eyes and nose x5-6 months but lasts for about a week and goes away.   Has not been consistently using his azelaic acid.  He tends to use this as a spot treatment.  He reports that the Efudex cream cleared his forehead and temples up very well.    No personal or family hx of skin cancer.    Patient is otherwise feeling well, without additional concerns.  Labs:  N/A    Physical Exam:  Vitals: There were no vitals taken for this visit.  SKIN: Total skin excluding the undergarment areas was performed. The exam included the head/face, neck, both arms, chest, back, abdomen, both legs, digits and/or nails.    - - Ro's skin type II, has<100 nevi  - There are dome shaped bright red papules on the trunk.   - Multiple regular brown pigmented macules and papules are identified on the trunk and extremities.   - Scattered brown macules on sun exposed areas.  - There are waxy stuck on tan to brown papules on the trunk.     - There is an erythematous macule with overyling adherent scale on the upper lip x 1  -Mild erythema of the mid face with more significant erythema and dilated vessels on the nose.  Erythematous papule on the chin and faint erythematous papules on the left lateral thigh and upper cheek  - No other lesions of concern on areas examined.     Medications:  Current Outpatient Medications   Medication Sig Dispense Refill     amLODIPine (NORVASC) 5 MG tablet Take 1 tablet (5 mg) by mouth 2 times daily. 180 tablet 3     aspirin 81 MG tablet Take 1 tablet by mouth daily.       fenofibrate (TRICOR) 145 MG tablet Take 1 tablet (145 mg) by mouth daily. Appt needed for refills; please call [phone] to schedule 90 tablet 0     hydroCHLOROthiazide (HYDRODIURIL) 25 MG tablet Take 1 tablet (25 mg) by mouth daily. 90 tablet 3     LORazepam (ATIVAN) 1 MG tablet Take 1 tablet (1 mg) by mouth every 6 hours as needed for anxiety 10 tablet 0     Multiple Vitamins-Minerals (MULTIVITAMIN ADULT) CHEW Take 1 chew tab by mouth daily       olmesartan (BENICAR) 40 MG tablet Take 1 tablet (40 mg)  by mouth daily. 90 tablet 3     omega-3 acid ethyl esters (LOVAZA) 1 g capsule TAKE 2 CAP BY MOUTH DAILY 180 capsule 0     order for DME Equipment being ordered: blood pressure cuff 1 each 0     rosuvastatin (CRESTOR) 20 MG tablet Take 1 tablet (20 mg) by mouth at bedtime. 90 tablet 3     azelaic acid (FINACIA) 15 % external gel Apply to face for rosacea BID (Patient not taking: Reported on 7/21/2025) 50 g 11     fluorouracil (EFUDEX) 5 % external cream Apply topically to the face BID for 14-21 days, or until the onset of irritation. Wash hands after use. (Patient not taking: Reported on 7/21/2025) 40 g 0     fluticasone (FLONASE) 50 MCG/ACT nasal spray SPRAY 1 SPRAY INTO EACH NOSTRIL DAILY (Patient not taking: Reported on 7/21/2025) 48 mL 1     meclizine (ANTIVERT) 25 MG tablet Take 1 tablet (25 mg) by mouth 3 times daily as needed for dizziness (Patient not taking: Reported on 7/21/2025) 30 tablet 0     No current facility-administered medications for this visit.      Past Medical/Surgical History:   Patient Active Problem List   Diagnosis     Other and unspecified disc disorder of lumbar region     Dyspepsia and other specified disorders of function of stomach     Other chest pain     Chronic gout without tophus, unspecified cause, unspecified site     Hyperlipidemia LDL goal <130     Obesity     Metabolic syndrome     High blood triglycerides     Elevated liver enzymes     Anxiety     Essential hypertension, benign     Uncomplicated alcohol dependence (H)     Chronic bilateral low back pain without sciatica     Diffuse idiopathic skeletal hyperostosis     Facet arthropathy, lumbosacral     DDD (degenerative disc disease), lumbosacral     DDD (degenerative disc disease), thoracic     Lumbosacral spinal stenosis     Acute pain of left shoulder     Aftercare following surgery of the musculoskeletal system     Personal history of colon polyps, unspecified     Past Medical History:   Diagnosis Date     Acute  idiopathic gout, unspecified site 08/23/2016     Alcoholism (H)     Treated 1980's, denies any recent problems with withdrawal when skips days drinking.     Anxiety 12/31/2014     Arthritis      Chronic low back pain      Dyspepsia and other specified disorders of function of stomach 07/31/2003     Elevated liver enzymes 12/30/2013     Gout     possible     Herniated intervertebral disk     thoracic     High blood triglycerides 12/30/2013     Hyperlipidemia LDL goal <130 04/15/2013     Hypertension      Hypertensive emergency 05/05/2017     Metabolic syndrome 12/30/2013     Obesity 04/15/2013     Other chest pain 07/31/2003     RBBB (right bundle branch block)     Present for several years                        Again, thank you for allowing me to participate in the care of your patient.        Sincerely,        Gia Chan PA-C    Electronically signed

## 2025-07-21 NOTE — PROGRESS NOTES
[facility] Dermatology Note  Encounter Date: Jul 21, 2025  Office Visit      Dermatology Problem List:  FBSE 7/21/25    Acne rosacea  - current: azelaic acid 15% gel   - previous: metronidazole 0.75% cream  2. Actinic keratosis - face.  - Efudex in the past x2.  Repeat efudex to temples/forehead/hairline 12/9/24-good response  - s/p Cryo to the upper lip 7/21/2025    Social: .  Present today with his wife  ____________________________________________    Assessment & Plan:  # Actinic keratosis.  X 1-upper lip  - Cryotherapy performed today, see procedure note below.  - Excellent response to Efudex to the temples and forehead.  No residual lesions noted at the sites.    # Benign findings: multiple benign nevi, lentigines, cherry angiomas, SKs  - edu on benign etiology  - Signs and Symptoms of non-melanoma skin cancer and ABCDEs of melanoma reviewed with patient. Patient encouraged to perform monthly self skin exams and educated on how to perform them. UV precautions reviewed with patient. Patient was asked about new or changing moles/lesions on body.   - Sunscreen: Apply 20 minutes prior to going outdoors and reapply every two hours, when wet or sweating. We recommend using an SPF 30 or higher, and to use one that is water resistant.     - RTC for changes     # Acne rosacea-flaring today.  This is a chronic problem  - Encourage patient to use his azelaic acid more frequently.  Recommended he use it daily to the mid and lower face.    - Refilled azelaic acid 15% gel advised to use daily and up to twice daily for flares  - Discussed how rosacea can be a progressive condition as well as be triggered by certain common items such as the weather heat chocolate alcohol and other things.    Procedures Performed:   CRYOTHERAPY PROCEDURE NOTE: 1 lesions in the above locations were treated with liquid nitrogen utilizing a 5-10sec thaw time. Patient was advised that the treated areas will become red,  swollen, may develop a blister and then should crust and peal off in the next 1-2 weeks. Post-procedure instructions were provided.      Follow-up: 1 year(s) in-person, or earlier for new or changing lesions    Staff and scribe:    Scribe Disclosure:   I, ZURI REILLY, am serving as a scribe; to document services personally performed by Gia Chan PA-C -based on data collection and the provider's statements to me.     Provider Disclosure:  I agree with above History, Review of Systems, Physical exam and Plan.  I have reviewed the content of the documentation and have edited it as needed. I have personally performed the services documented here and the documentation accurately represents those services and the decisions I have made.      Electronically signed by:    All risks, benefits and alternatives were discussed with patient.  Patient is in agreement and understands the assessment and plan.  All questions were answered.    Gia Chan PA-C, [facility]  [facility] [place]: Phone: [phone], Fax: [phone]  [facility]: Phone: [phone],  Fax: [phone]  [facility]: Phone: [phone], Fax: [phone]  ____________________________________________    CC: Skin Check (Bumps by eyes and nose x5-6 months but lasts for about a week and goes away)      Reviewed patients past medical history and pertinent chart review prior to patient's visit today.     HPI:  Mr. Emery Zayas is a 69 year old male who presents today as a return patient for FBSE. Today patient reported bumps by eyes and nose x5-6 months but lasts for about a week and goes away.  Has not been consistently using his azelaic acid.  He tends to use this as a spot treatment.  He reports that the Efudex cream cleared his forehead and temples up very well.    No personal or family hx of skin cancer.    Patient is otherwise feeling  well, without additional concerns.  Labs:  N/A    Physical Exam:  Vitals: There were no vitals taken for this visit.  SKIN: Total skin excluding the undergarment areas was performed. The exam included the head/face, neck, both arms, chest, back, abdomen, both legs, digits and/or nails.    - - Ro's skin type II, has<100 nevi  - There are dome shaped bright red papules on the trunk.   - Multiple regular brown pigmented macules and papules are identified on the trunk and extremities.   - Scattered brown macules on sun exposed areas.  - There are waxy stuck on tan to brown papules on the trunk.     - There is an erythematous macule with overyling adherent scale on the upper lip x 1  -Mild erythema of the mid face with more significant erythema and dilated vessels on the nose.  Erythematous papule on the chin and faint erythematous papules on the left lateral thigh and upper cheek  - No other lesions of concern on areas examined.     Medications:  Current Outpatient Medications   Medication Sig Dispense Refill    amLODIPine (NORVASC) 5 MG tablet Take 1 tablet (5 mg) by mouth 2 times daily. 180 tablet 3    aspirin 81 MG tablet Take 1 tablet by mouth daily.      fenofibrate (TRICOR) 145 MG tablet Take 1 tablet (145 mg) by mouth daily. Appt needed for refills; please call [phone] to schedule 90 tablet 0    hydroCHLOROthiazide (HYDRODIURIL) 25 MG tablet Take 1 tablet (25 mg) by mouth daily. 90 tablet 3    LORazepam (ATIVAN) 1 MG tablet Take 1 tablet (1 mg) by mouth every 6 hours as needed for anxiety 10 tablet 0    Multiple Vitamins-Minerals (MULTIVITAMIN ADULT) CHEW Take 1 chew tab by mouth daily      olmesartan (BENICAR) 40 MG tablet Take 1 tablet (40 mg) by mouth daily. 90 tablet 3    omega-3 acid ethyl esters (LOVAZA) 1 g capsule TAKE 2 CAP BY MOUTH DAILY 180 capsule 0    order for DME Equipment being ordered: blood pressure cuff 1 each 0    rosuvastatin (CRESTOR) 20 MG tablet Take 1 tablet (20 mg) by mouth  at bedtime. 90 tablet 3    azelaic acid (FINACIA) 15 % external gel Apply to face for rosacea BID (Patient not taking: Reported on 7/21/2025) 50 g 11    fluorouracil (EFUDEX) 5 % external cream Apply topically to the face BID for 14-21 days, or until the onset of irritation. Wash hands after use. (Patient not taking: Reported on 7/21/2025) 40 g 0    fluticasone (FLONASE) 50 MCG/ACT nasal spray SPRAY 1 SPRAY INTO EACH NOSTRIL DAILY (Patient not taking: Reported on 7/21/2025) 48 mL 1    meclizine (ANTIVERT) 25 MG tablet Take 1 tablet (25 mg) by mouth 3 times daily as needed for dizziness (Patient not taking: Reported on 7/21/2025) 30 tablet 0     No current facility-administered medications for this visit.      Past Medical/Surgical History:   Patient Active Problem List   Diagnosis    Other and unspecified disc disorder of lumbar region    Dyspepsia and other specified disorders of function of stomach    Other chest pain    Chronic gout without tophus, unspecified cause, unspecified site    Hyperlipidemia LDL goal <130    Obesity    Metabolic syndrome    High blood triglycerides    Elevated liver enzymes    Anxiety    Essential hypertension, benign    Uncomplicated alcohol dependence (H)    Chronic bilateral low back pain without sciatica    Diffuse idiopathic skeletal hyperostosis    Facet arthropathy, lumbosacral    DDD (degenerative disc disease), lumbosacral    DDD (degenerative disc disease), thoracic    Lumbosacral spinal stenosis    Acute pain of left shoulder    Aftercare following surgery of the musculoskeletal system    Personal history of colon polyps, unspecified     Past Medical History:   Diagnosis Date    Acute idiopathic gout, unspecified site 08/23/2016    Alcoholism (H)     Treated 1980's, denies any recent problems with withdrawal when skips days drinking.    Anxiety 12/31/2014    Arthritis     Chronic low back pain     Dyspepsia and other specified disorders of function of stomach 07/31/2003     Elevated liver enzymes 12/30/2013    Gout     possible    Herniated intervertebral disk     thoracic    High blood triglycerides 12/30/2013    Hyperlipidemia LDL goal <130 04/15/2013    Hypertension     Hypertensive emergency 05/05/2017    Metabolic syndrome 12/30/2013    Obesity 04/15/2013    Other chest pain 07/31/2003    RBBB (right bundle branch block)     Present for several years

## 2025-07-23 ENCOUNTER — ORDERS ONLY (AUTO-RELEASED) (OUTPATIENT)
Dept: CARDIOLOGY | Facility: CLINIC | Age: 70
End: 2025-07-23
Payer: COMMERCIAL

## 2025-07-23 DIAGNOSIS — R42 DIZZINESS: ICD-10-CM

## 2025-07-23 DIAGNOSIS — R00.1 BRADYCARDIA: ICD-10-CM

## 2025-07-31 ENCOUNTER — DOCUMENTATION ONLY (OUTPATIENT)
Dept: CARDIOLOGY | Facility: CLINIC | Age: 70
End: 2025-07-31

## 2025-07-31 ENCOUNTER — ALLIED HEALTH/NURSE VISIT (OUTPATIENT)
Dept: CARDIOLOGY | Facility: CLINIC | Age: 70
End: 2025-07-31
Attending: INTERNAL MEDICINE
Payer: COMMERCIAL

## 2025-07-31 ENCOUNTER — LAB (OUTPATIENT)
Dept: LAB | Facility: CLINIC | Age: 70
End: 2025-07-31
Payer: COMMERCIAL

## 2025-07-31 VITALS — SYSTOLIC BLOOD PRESSURE: 112 MMHG | HEART RATE: 62 BPM | DIASTOLIC BLOOD PRESSURE: 64 MMHG

## 2025-07-31 DIAGNOSIS — I10 ESSENTIAL HYPERTENSION, BENIGN: ICD-10-CM

## 2025-07-31 DIAGNOSIS — E78.5 DYSLIPIDEMIA: ICD-10-CM

## 2025-07-31 LAB
ALT SERPL W P-5'-P-CCNC: 21 U/L (ref 0–70)
ANION GAP SERPL CALCULATED.3IONS-SCNC: 13 MMOL/L (ref 7–15)
BUN SERPL-MCNC: 23.8 MG/DL (ref 8–23)
CALCIUM SERPL-MCNC: 9.8 MG/DL (ref 8.8–10.4)
CHLORIDE SERPL-SCNC: 101 MMOL/L (ref 98–107)
CHOLEST SERPL-MCNC: 111 MG/DL
CREAT SERPL-MCNC: 1.4 MG/DL (ref 0.67–1.17)
EGFRCR SERPLBLD CKD-EPI 2021: 54 ML/MIN/1.73M2
FASTING STATUS PATIENT QL REPORTED: YES
GLUCOSE SERPL-MCNC: 105 MG/DL (ref 70–99)
HCO3 SERPL-SCNC: 27 MMOL/L (ref 22–29)
HDLC SERPL-MCNC: 30 MG/DL
LDLC SERPL CALC-MCNC: 55 MG/DL
NONHDLC SERPL-MCNC: 81 MG/DL
POTASSIUM SERPL-SCNC: 4.8 MMOL/L (ref 3.4–5.3)
SODIUM SERPL-SCNC: 141 MMOL/L (ref 135–145)
TRIGL SERPL-MCNC: 130 MG/DL

## 2025-07-31 NOTE — PROGRESS NOTES
[facility] BLOOD PRESSURE CHECK     Last office visit: 7/16/2025    Previous blood pressure: 145/72 mm Hg  Previous heart rate: 50 bpm      Time of visit: 10 am    Morning medications were taken at: 9 am     Today's blood pressure: 112/64 mm Hg  Today's heart rate: 62 bpm       Additional Comments: pt has not been checking bp at home.      Results routed to: nursing team 2      Ordering Provider: Dr. Bundy  In clinic Provider: Dr. Atkins

## 2025-08-01 ENCOUNTER — TELEPHONE (OUTPATIENT)
Dept: CARDIOLOGY | Facility: CLINIC | Age: 70
End: 2025-08-01
Payer: COMMERCIAL

## 2025-08-01 DIAGNOSIS — I10 ESSENTIAL HYPERTENSION, BENIGN: Primary | ICD-10-CM

## 2025-08-04 ENCOUNTER — HOSPITAL ENCOUNTER (OUTPATIENT)
Dept: CARDIOLOGY | Facility: CLINIC | Age: 70
Discharge: HOME | End: 2025-08-04
Attending: INTERNAL MEDICINE | Admitting: INTERNAL MEDICINE
Payer: COMMERCIAL

## 2025-08-04 DIAGNOSIS — R06.09 DYSPNEA ON EXERTION: ICD-10-CM

## 2025-08-04 DIAGNOSIS — R94.31 ABNORMAL ELECTROCARDIOGRAM: ICD-10-CM

## 2025-08-04 DIAGNOSIS — R07.9 EXERTIONAL CHEST PAIN: ICD-10-CM

## 2025-08-04 DIAGNOSIS — Z91.89 MULTIPLE RISK FACTORS FOR CORONARY ARTERY DISEASE: ICD-10-CM

## 2025-08-04 PROCEDURE — 93350 STRESS TTE ONLY: CPT | Mod: 26 | Performed by: INTERNAL MEDICINE

## 2025-08-04 PROCEDURE — 93018 CV STRESS TEST I&R ONLY: CPT | Performed by: INTERNAL MEDICINE

## 2025-08-04 PROCEDURE — 93325 DOPPLER ECHO COLOR FLOW MAPG: CPT | Mod: TC

## 2025-08-04 PROCEDURE — 93321 DOPPLER ECHO F-UP/LMTD STD: CPT | Mod: 26 | Performed by: INTERNAL MEDICINE

## 2025-08-04 PROCEDURE — 255N000002 HC RX 255 OP 636: Performed by: INTERNAL MEDICINE

## 2025-08-04 PROCEDURE — 93016 CV STRESS TEST SUPVJ ONLY: CPT | Performed by: INTERNAL MEDICINE

## 2025-08-04 PROCEDURE — 93325 DOPPLER ECHO COLOR FLOW MAPG: CPT | Mod: 26 | Performed by: INTERNAL MEDICINE

## 2025-08-04 RX ADMIN — HUMAN ALBUMIN MICROSPHERES AND PERFLUTREN 2 ML (DILUTED): 10; .22 INJECTION, SOLUTION INTRAVENOUS at 09:11

## 2025-08-07 LAB — CV ZIO PRELIM RESULTS: NORMAL

## 2025-08-18 ENCOUNTER — LAB (OUTPATIENT)
Dept: LAB | Facility: CLINIC | Age: 70
End: 2025-08-18
Payer: COMMERCIAL

## 2025-08-18 DIAGNOSIS — I10 ESSENTIAL HYPERTENSION, BENIGN: ICD-10-CM

## 2025-08-18 LAB
ANION GAP SERPL CALCULATED.3IONS-SCNC: 10 MMOL/L (ref 7–15)
BUN SERPL-MCNC: 17.3 MG/DL (ref 8–23)
CALCIUM SERPL-MCNC: 9.6 MG/DL (ref 8.8–10.4)
CHLORIDE SERPL-SCNC: 104 MMOL/L (ref 98–107)
CREAT SERPL-MCNC: 1.54 MG/DL (ref 0.67–1.17)
EGFRCR SERPLBLD CKD-EPI 2021: 48 ML/MIN/1.73M2
GLUCOSE SERPL-MCNC: 131 MG/DL (ref 70–99)
HCO3 SERPL-SCNC: 25 MMOL/L (ref 22–29)
POTASSIUM SERPL-SCNC: 4.2 MMOL/L (ref 3.4–5.3)
SODIUM SERPL-SCNC: 139 MMOL/L (ref 135–145)

## 2025-08-18 PROCEDURE — 36415 COLL VENOUS BLD VENIPUNCTURE: CPT | Performed by: INTERNAL MEDICINE

## 2025-08-18 PROCEDURE — 80048 BASIC METABOLIC PNL TOTAL CA: CPT | Performed by: INTERNAL MEDICINE

## 2025-08-20 DIAGNOSIS — E78.1 HIGH BLOOD TRIGLYCERIDES: ICD-10-CM

## 2025-08-20 RX ORDER — FENOFIBRATE 145 MG/1
145 TABLET, FILM COATED ORAL DAILY
Qty: 90 TABLET | Refills: 3 | Status: SHIPPED | OUTPATIENT
Start: 2025-08-20

## 2025-08-20 RX ORDER — OMEGA-3-ACID ETHYL ESTERS 1 G/1
CAPSULE, LIQUID FILLED ORAL
Qty: 180 CAPSULE | Refills: 3 | Status: SHIPPED | OUTPATIENT
Start: 2025-08-20

## 2025-08-30 SDOH — HEALTH STABILITY: PHYSICAL HEALTH: ON AVERAGE, HOW MANY MINUTES DO YOU ENGAGE IN EXERCISE AT THIS LEVEL?: 30 MIN

## 2025-08-30 SDOH — HEALTH STABILITY: PHYSICAL HEALTH: ON AVERAGE, HOW MANY DAYS PER WEEK DO YOU ENGAGE IN MODERATE TO STRENUOUS EXERCISE (LIKE A BRISK WALK)?: 7 DAYS

## 2025-09-02 ENCOUNTER — OFFICE VISIT (OUTPATIENT)
Dept: FAMILY MEDICINE | Facility: CLINIC | Age: 70
End: 2025-09-02
Attending: NURSE PRACTITIONER
Payer: COMMERCIAL

## 2025-09-02 VITALS
HEART RATE: 62 BPM | DIASTOLIC BLOOD PRESSURE: 68 MMHG | SYSTOLIC BLOOD PRESSURE: 136 MMHG | HEIGHT: 66 IN | OXYGEN SATURATION: 98 % | BODY MASS INDEX: 32.66 KG/M2 | WEIGHT: 203.2 LBS | RESPIRATION RATE: 16 BRPM

## 2025-09-02 DIAGNOSIS — Z00.00 ENCOUNTER FOR MEDICARE ANNUAL WELLNESS EXAM: Primary | ICD-10-CM

## 2025-09-02 DIAGNOSIS — Z12.5 SCREENING FOR PROSTATE CANCER: ICD-10-CM

## 2025-09-02 DIAGNOSIS — F10.20 UNCOMPLICATED ALCOHOL DEPENDENCE (H): ICD-10-CM

## 2025-09-02 DIAGNOSIS — M1A.9XX0 CHRONIC GOUT WITHOUT TOPHUS, UNSPECIFIED CAUSE, UNSPECIFIED SITE: ICD-10-CM

## 2025-09-02 DIAGNOSIS — R73.09 ELEVATED GLUCOSE: ICD-10-CM

## 2025-09-02 DIAGNOSIS — M48.07 LUMBOSACRAL SPINAL STENOSIS: ICD-10-CM

## 2025-09-02 DIAGNOSIS — I10 ESSENTIAL HYPERTENSION, BENIGN: ICD-10-CM

## 2025-09-02 DIAGNOSIS — R25.1 TREMOR OF BOTH HANDS: ICD-10-CM

## 2025-09-02 LAB
ANION GAP SERPL CALCULATED.3IONS-SCNC: 7 MMOL/L (ref 7–15)
BUN SERPL-MCNC: 14.5 MG/DL (ref 8–23)
CALCIUM SERPL-MCNC: 9.4 MG/DL (ref 8.8–10.4)
CHLORIDE SERPL-SCNC: 107 MMOL/L (ref 98–107)
CREAT SERPL-MCNC: 1.11 MG/DL (ref 0.67–1.17)
EGFRCR SERPLBLD CKD-EPI 2021: 71 ML/MIN/1.73M2
EST. AVERAGE GLUCOSE BLD GHB EST-MCNC: 117 MG/DL
GLUCOSE SERPL-MCNC: 121 MG/DL (ref 70–99)
HBA1C MFR BLD: 5.7 % (ref 0–5.6)
HCO3 SERPL-SCNC: 27 MMOL/L (ref 22–29)
POTASSIUM SERPL-SCNC: 4 MMOL/L (ref 3.4–5.3)
PSA SERPL DL<=0.01 NG/ML-MCNC: 3.83 NG/ML (ref 0–6.5)
SODIUM SERPL-SCNC: 141 MMOL/L (ref 135–145)
URATE SERPL-MCNC: 5.8 MG/DL (ref 3.4–7)

## 2025-09-02 PROCEDURE — 83036 HEMOGLOBIN GLYCOSYLATED A1C: CPT | Performed by: PHYSICIAN ASSISTANT

## 2025-09-02 PROCEDURE — G0103 PSA SCREENING: HCPCS | Performed by: PHYSICIAN ASSISTANT

## 2025-09-02 PROCEDURE — 84550 ASSAY OF BLOOD/URIC ACID: CPT | Performed by: PHYSICIAN ASSISTANT

## 2025-09-02 PROCEDURE — 80048 BASIC METABOLIC PNL TOTAL CA: CPT | Performed by: PHYSICIAN ASSISTANT

## 2025-09-02 ASSESSMENT — PAIN SCALES - GENERAL: PAINLEVEL_OUTOF10: NO PAIN (0)

## (undated) DEVICE — KIT ENDO TURNOVER/PROCEDURE W/CLEAN A SCOPE LINERS 103888

## (undated) DEVICE — ENDO SNARE POLYPECTOMY OVAL 15MM LOOP SD-240U-15

## (undated) DEVICE — ENDO FORCEP BX CAPTURA JUMBO SPIKE 2.8MMX230CM G53042

## (undated) RX ORDER — FENTANYL CITRATE 50 UG/ML
INJECTION, SOLUTION INTRAMUSCULAR; INTRAVENOUS
Status: DISPENSED
Start: 2020-01-23

## (undated) RX ORDER — SIMETHICONE 40MG/0.6ML
SUSPENSION, DROPS(FINAL DOSAGE FORM)(ML) ORAL
Status: DISPENSED
Start: 2020-01-23

## (undated) RX ORDER — FENTANYL CITRATE 50 UG/ML
INJECTION, SOLUTION INTRAMUSCULAR; INTRAVENOUS
Status: DISPENSED
Start: 2024-01-12